# Patient Record
Sex: FEMALE | Race: WHITE | NOT HISPANIC OR LATINO | Employment: OTHER | URBAN - METROPOLITAN AREA
[De-identification: names, ages, dates, MRNs, and addresses within clinical notes are randomized per-mention and may not be internally consistent; named-entity substitution may affect disease eponyms.]

---

## 2019-05-26 ENCOUNTER — HOSPITAL ENCOUNTER (INPATIENT)
Facility: MEDICAL CENTER | Age: 79
LOS: 4 days | DRG: 286 | End: 2019-05-30
Attending: EMERGENCY MEDICINE | Admitting: HOSPITALIST
Payer: MEDICARE

## 2019-05-26 ENCOUNTER — APPOINTMENT (OUTPATIENT)
Dept: RADIOLOGY | Facility: MEDICAL CENTER | Age: 79
DRG: 286 | End: 2019-05-26
Attending: HOSPITALIST
Payer: MEDICARE

## 2019-05-26 ENCOUNTER — APPOINTMENT (OUTPATIENT)
Dept: RADIOLOGY | Facility: MEDICAL CENTER | Age: 79
DRG: 286 | End: 2019-05-26
Attending: EMERGENCY MEDICINE
Payer: MEDICARE

## 2019-05-26 ENCOUNTER — APPOINTMENT (OUTPATIENT)
Dept: CARDIOLOGY | Facility: MEDICAL CENTER | Age: 79
DRG: 286 | End: 2019-05-26
Attending: HOSPITALIST
Payer: MEDICARE

## 2019-05-26 DIAGNOSIS — I50.31 ACUTE DIASTOLIC CONGESTIVE HEART FAILURE (HCC): ICD-10-CM

## 2019-05-26 DIAGNOSIS — E87.70 HYPERVOLEMIA, UNSPECIFIED HYPERVOLEMIA TYPE: ICD-10-CM

## 2019-05-26 DIAGNOSIS — N19 RENAL FAILURE, UNSPECIFIED CHRONICITY: ICD-10-CM

## 2019-05-26 DIAGNOSIS — J96.01 ACUTE RESPIRATORY FAILURE WITH HYPOXIA (HCC): ICD-10-CM

## 2019-05-26 PROBLEM — R79.89 ELEVATED LIVER FUNCTION TESTS: Status: ACTIVE | Noted: 2019-05-26

## 2019-05-26 PROBLEM — I10 ESSENTIAL HYPERTENSION: Status: ACTIVE | Noted: 2019-05-26

## 2019-05-26 PROBLEM — I50.9 ACUTE CHF (CONGESTIVE HEART FAILURE) (HCC): Status: ACTIVE | Noted: 2019-05-26

## 2019-05-26 PROBLEM — N18.30 CHRONIC RENAL IMPAIRMENT, STAGE 3 (MODERATE): Status: ACTIVE | Noted: 2019-05-26

## 2019-05-26 PROBLEM — I48.0 PAROXYSMAL A-FIB (HCC): Status: ACTIVE | Noted: 2019-05-26

## 2019-05-26 LAB
ALBUMIN SERPL BCP-MCNC: 4.3 G/DL (ref 3.2–4.9)
ALBUMIN/GLOB SERPL: 1.5 G/DL
ALP SERPL-CCNC: 110 U/L (ref 30–99)
ALT SERPL-CCNC: 76 U/L (ref 2–50)
ANION GAP SERPL CALC-SCNC: 8 MMOL/L (ref 0–11.9)
APPEARANCE UR: CLEAR
APPEARANCE UR: CLEAR
AST SERPL-CCNC: 47 U/L (ref 12–45)
BACTERIA #/AREA URNS HPF: NEGATIVE /HPF
BASOPHILS # BLD AUTO: 0.6 % (ref 0–1.8)
BASOPHILS # BLD: 0.05 K/UL (ref 0–0.12)
BILIRUB SERPL-MCNC: 0.8 MG/DL (ref 0.1–1.5)
BILIRUB UR QL STRIP.AUTO: NEGATIVE
BILIRUB UR QL STRIP.AUTO: NEGATIVE
BNP SERPL-MCNC: 1431 PG/ML (ref 0–100)
BUN SERPL-MCNC: 57 MG/DL (ref 8–22)
CALCIUM SERPL-MCNC: 9.4 MG/DL (ref 8.5–10.5)
CHLORIDE SERPL-SCNC: 111 MMOL/L (ref 96–112)
CO2 SERPL-SCNC: 18 MMOL/L (ref 20–33)
COLOR UR: YELLOW
COLOR UR: YELLOW
CREAT SERPL-MCNC: 2.27 MG/DL (ref 0.5–1.4)
EKG IMPRESSION: NORMAL
EOSINOPHIL # BLD AUTO: 0.17 K/UL (ref 0–0.51)
EOSINOPHIL NFR BLD: 2.1 % (ref 0–6.9)
EPI CELLS #/AREA URNS HPF: NEGATIVE /HPF
ERYTHROCYTE [DISTWIDTH] IN BLOOD BY AUTOMATED COUNT: 49.3 FL (ref 35.9–50)
GLOBULIN SER CALC-MCNC: 2.9 G/DL (ref 1.9–3.5)
GLUCOSE SERPL-MCNC: 119 MG/DL (ref 65–99)
GLUCOSE UR STRIP.AUTO-MCNC: NEGATIVE MG/DL
GLUCOSE UR STRIP.AUTO-MCNC: NEGATIVE MG/DL
HCT VFR BLD AUTO: 40.6 % (ref 37–47)
HGB BLD-MCNC: 13.1 G/DL (ref 12–16)
HYALINE CASTS #/AREA URNS LPF: NORMAL /LPF
IMM GRANULOCYTES # BLD AUTO: 0.03 K/UL (ref 0–0.11)
IMM GRANULOCYTES NFR BLD AUTO: 0.4 % (ref 0–0.9)
KETONES UR STRIP.AUTO-MCNC: NEGATIVE MG/DL
KETONES UR STRIP.AUTO-MCNC: NEGATIVE MG/DL
LACTATE BLD-SCNC: 0.8 MMOL/L (ref 0.5–2)
LACTATE BLD-SCNC: 1.8 MMOL/L (ref 0.5–2)
LEUKOCYTE ESTERASE UR QL STRIP.AUTO: ABNORMAL
LEUKOCYTE ESTERASE UR QL STRIP.AUTO: NEGATIVE
LV EJECT FRACT  99904: 70
LV EJECT FRACT MOD 2C 99903: 79.46
LV EJECT FRACT MOD 4C 99902: 81.21
LV EJECT FRACT MOD BP 99901: 80.35
LYMPHOCYTES # BLD AUTO: 1.49 K/UL (ref 1–4.8)
LYMPHOCYTES NFR BLD: 18.5 % (ref 22–41)
MAGNESIUM SERPL-MCNC: 2.7 MG/DL (ref 1.5–2.5)
MCH RBC QN AUTO: 31.1 PG (ref 27–33)
MCHC RBC AUTO-ENTMCNC: 32.3 G/DL (ref 33.6–35)
MCV RBC AUTO: 96.4 FL (ref 81.4–97.8)
MICRO URNS: ABNORMAL
MICRO URNS: NORMAL
MONOCYTES # BLD AUTO: 0.57 K/UL (ref 0–0.85)
MONOCYTES NFR BLD AUTO: 7.1 % (ref 0–13.4)
NEUTROPHILS # BLD AUTO: 5.76 K/UL (ref 2–7.15)
NEUTROPHILS NFR BLD: 71.3 % (ref 44–72)
NITRITE UR QL STRIP.AUTO: NEGATIVE
NITRITE UR QL STRIP.AUTO: NEGATIVE
NRBC # BLD AUTO: 0 K/UL
NRBC BLD-RTO: 0 /100 WBC
PH UR STRIP.AUTO: 5 [PH]
PH UR STRIP.AUTO: 5.5 [PH]
PHOSPHATE SERPL-MCNC: 4.2 MG/DL (ref 2.5–4.5)
PLATELET # BLD AUTO: 240 K/UL (ref 164–446)
PMV BLD AUTO: 11.4 FL (ref 9–12.9)
POTASSIUM SERPL-SCNC: 4.2 MMOL/L (ref 3.6–5.5)
PROCALCITONIN SERPL-MCNC: <0.05 NG/ML
PROT SERPL-MCNC: 7.2 G/DL (ref 6–8.2)
PROT UR QL STRIP: NEGATIVE MG/DL
PROT UR QL STRIP: NEGATIVE MG/DL
RBC # BLD AUTO: 4.21 M/UL (ref 4.2–5.4)
RBC # URNS HPF: NORMAL /HPF
RBC UR QL AUTO: NEGATIVE
RBC UR QL AUTO: NEGATIVE
SODIUM SERPL-SCNC: 137 MMOL/L (ref 135–145)
SP GR UR STRIP.AUTO: 1.01
SP GR UR STRIP.AUTO: 1.01
TROPONIN I SERPL-MCNC: 0.01 NG/ML (ref 0–0.04)
TROPONIN I SERPL-MCNC: 0.01 NG/ML (ref 0–0.04)
TROPONIN I SERPL-MCNC: 0.02 NG/ML (ref 0–0.04)
TSH SERPL DL<=0.005 MIU/L-ACNC: 4.08 UIU/ML (ref 0.38–5.33)
UROBILINOGEN UR STRIP.AUTO-MCNC: 0.2 MG/DL
UROBILINOGEN UR STRIP.AUTO-MCNC: 0.2 MG/DL
WBC # BLD AUTO: 8.1 K/UL (ref 4.8–10.8)
WBC #/AREA URNS HPF: NORMAL /HPF

## 2019-05-26 PROCEDURE — 84100 ASSAY OF PHOSPHORUS: CPT

## 2019-05-26 PROCEDURE — 304561 HCHG STAT O2

## 2019-05-26 PROCEDURE — 700102 HCHG RX REV CODE 250 W/ 637 OVERRIDE(OP): Performed by: INTERNAL MEDICINE

## 2019-05-26 PROCEDURE — 85025 COMPLETE CBC W/AUTO DIFF WBC: CPT

## 2019-05-26 PROCEDURE — 96374 THER/PROPH/DIAG INJ IV PUSH: CPT

## 2019-05-26 PROCEDURE — 93005 ELECTROCARDIOGRAM TRACING: CPT | Performed by: EMERGENCY MEDICINE

## 2019-05-26 PROCEDURE — 76775 US EXAM ABDO BACK WALL LIM: CPT

## 2019-05-26 PROCEDURE — 84443 ASSAY THYROID STIM HORMONE: CPT

## 2019-05-26 PROCEDURE — 71045 X-RAY EXAM CHEST 1 VIEW: CPT

## 2019-05-26 PROCEDURE — 99291 CRITICAL CARE FIRST HOUR: CPT

## 2019-05-26 PROCEDURE — 700102 HCHG RX REV CODE 250 W/ 637 OVERRIDE(OP): Performed by: HOSPITALIST

## 2019-05-26 PROCEDURE — 83880 ASSAY OF NATRIURETIC PEPTIDE: CPT

## 2019-05-26 PROCEDURE — 81001 URINALYSIS AUTO W/SCOPE: CPT

## 2019-05-26 PROCEDURE — 93306 TTE W/DOPPLER COMPLETE: CPT

## 2019-05-26 PROCEDURE — 93306 TTE W/DOPPLER COMPLETE: CPT | Mod: 26 | Performed by: INTERNAL MEDICINE

## 2019-05-26 PROCEDURE — 84484 ASSAY OF TROPONIN QUANT: CPT

## 2019-05-26 PROCEDURE — 36415 COLL VENOUS BLD VENIPUNCTURE: CPT

## 2019-05-26 PROCEDURE — 770022 HCHG ROOM/CARE - ICU (200)

## 2019-05-26 PROCEDURE — 87040 BLOOD CULTURE FOR BACTERIA: CPT

## 2019-05-26 PROCEDURE — 700111 HCHG RX REV CODE 636 W/ 250 OVERRIDE (IP): Performed by: EMERGENCY MEDICINE

## 2019-05-26 PROCEDURE — 81003 URINALYSIS AUTO W/O SCOPE: CPT

## 2019-05-26 PROCEDURE — 99223 1ST HOSP IP/OBS HIGH 75: CPT | Mod: AI | Performed by: HOSPITALIST

## 2019-05-26 PROCEDURE — A9270 NON-COVERED ITEM OR SERVICE: HCPCS | Performed by: HOSPITALIST

## 2019-05-26 PROCEDURE — 93005 ELECTROCARDIOGRAM TRACING: CPT

## 2019-05-26 PROCEDURE — 80053 COMPREHEN METABOLIC PANEL: CPT

## 2019-05-26 PROCEDURE — 94640 AIRWAY INHALATION TREATMENT: CPT

## 2019-05-26 PROCEDURE — A9270 NON-COVERED ITEM OR SERVICE: HCPCS | Performed by: INTERNAL MEDICINE

## 2019-05-26 PROCEDURE — 83605 ASSAY OF LACTIC ACID: CPT

## 2019-05-26 PROCEDURE — 83735 ASSAY OF MAGNESIUM: CPT

## 2019-05-26 PROCEDURE — 700111 HCHG RX REV CODE 636 W/ 250 OVERRIDE (IP): Performed by: HOSPITALIST

## 2019-05-26 PROCEDURE — 99291 CRITICAL CARE FIRST HOUR: CPT | Performed by: INTERNAL MEDICINE

## 2019-05-26 PROCEDURE — 94002 VENT MGMT INPAT INIT DAY: CPT

## 2019-05-26 PROCEDURE — 84145 PROCALCITONIN (PCT): CPT

## 2019-05-26 RX ORDER — ACETAMINOPHEN 325 MG/1
650 TABLET ORAL PRN
COMMUNITY
End: 2019-12-04

## 2019-05-26 RX ORDER — FUROSEMIDE 10 MG/ML
20 INJECTION INTRAMUSCULAR; INTRAVENOUS
Status: DISCONTINUED | OUTPATIENT
Start: 2019-05-26 | End: 2019-05-27

## 2019-05-26 RX ORDER — ONDANSETRON 2 MG/ML
4 INJECTION INTRAMUSCULAR; INTRAVENOUS EVERY 4 HOURS PRN
Status: DISCONTINUED | OUTPATIENT
Start: 2019-05-26 | End: 2019-05-30 | Stop reason: HOSPADM

## 2019-05-26 RX ORDER — ONDANSETRON 4 MG/1
4 TABLET, ORALLY DISINTEGRATING ORAL EVERY 4 HOURS PRN
Status: DISCONTINUED | OUTPATIENT
Start: 2019-05-26 | End: 2019-05-30 | Stop reason: HOSPADM

## 2019-05-26 RX ORDER — FUROSEMIDE 40 MG/1
40 TABLET ORAL DAILY
Status: ON HOLD | COMMUNITY
End: 2019-05-30

## 2019-05-26 RX ORDER — ALPRAZOLAM 0.25 MG/1
0.25 TABLET ORAL NIGHTLY PRN
Status: DISCONTINUED | OUTPATIENT
Start: 2019-05-26 | End: 2019-05-30 | Stop reason: HOSPADM

## 2019-05-26 RX ORDER — ACETAMINOPHEN 325 MG/1
650 TABLET ORAL EVERY 6 HOURS PRN
Status: DISCONTINUED | OUTPATIENT
Start: 2019-05-26 | End: 2019-05-30 | Stop reason: HOSPADM

## 2019-05-26 RX ORDER — AMOXICILLIN 250 MG
2 CAPSULE ORAL 2 TIMES DAILY
Status: DISCONTINUED | OUTPATIENT
Start: 2019-05-26 | End: 2019-05-30 | Stop reason: HOSPADM

## 2019-05-26 RX ORDER — LOSARTAN POTASSIUM 50 MG/1
50 TABLET ORAL DAILY
COMMUNITY
End: 2019-09-11

## 2019-05-26 RX ORDER — POTASSIUM CHLORIDE 20 MEQ/1
20 TABLET, EXTENDED RELEASE ORAL 2 TIMES DAILY
Status: DISCONTINUED | OUTPATIENT
Start: 2019-05-26 | End: 2019-05-30 | Stop reason: HOSPADM

## 2019-05-26 RX ORDER — HEPARIN SODIUM 5000 [USP'U]/ML
5000 INJECTION, SOLUTION INTRAVENOUS; SUBCUTANEOUS EVERY 8 HOURS
Status: DISCONTINUED | OUTPATIENT
Start: 2019-05-26 | End: 2019-05-30 | Stop reason: HOSPADM

## 2019-05-26 RX ORDER — LOSARTAN POTASSIUM 50 MG/1
50 TABLET ORAL
Status: DISCONTINUED | OUTPATIENT
Start: 2019-05-27 | End: 2019-05-30 | Stop reason: HOSPADM

## 2019-05-26 RX ORDER — OMEPRAZOLE 20 MG/1
20 CAPSULE, DELAYED RELEASE ORAL DAILY
COMMUNITY
End: 2019-11-19

## 2019-05-26 RX ORDER — POLYETHYLENE GLYCOL 3350 17 G/17G
1 POWDER, FOR SOLUTION ORAL
Status: DISCONTINUED | OUTPATIENT
Start: 2019-05-26 | End: 2019-05-30 | Stop reason: HOSPADM

## 2019-05-26 RX ORDER — AMLODIPINE BESYLATE 10 MG/1
10 TABLET ORAL DAILY
Status: ON HOLD | COMMUNITY
End: 2019-05-30

## 2019-05-26 RX ORDER — ALPRAZOLAM 0.25 MG/1
0.25 TABLET ORAL NIGHTLY PRN
COMMUNITY
End: 2024-01-19

## 2019-05-26 RX ORDER — ATENOLOL 25 MG/1
25 TABLET ORAL DAILY
Status: ON HOLD | COMMUNITY
End: 2019-05-30

## 2019-05-26 RX ORDER — BISACODYL 10 MG
10 SUPPOSITORY, RECTAL RECTAL
Status: DISCONTINUED | OUTPATIENT
Start: 2019-05-26 | End: 2019-05-30 | Stop reason: HOSPADM

## 2019-05-26 RX ORDER — FUROSEMIDE 10 MG/ML
40 INJECTION INTRAMUSCULAR; INTRAVENOUS ONCE
Status: COMPLETED | OUTPATIENT
Start: 2019-05-26 | End: 2019-05-26

## 2019-05-26 RX ADMIN — FUROSEMIDE 40 MG: 10 INJECTION, SOLUTION INTRAMUSCULAR; INTRAVENOUS at 04:55

## 2019-05-26 RX ADMIN — FUROSEMIDE 20 MG: 10 INJECTION, SOLUTION INTRAMUSCULAR; INTRAVENOUS at 15:46

## 2019-05-26 RX ADMIN — POTASSIUM CHLORIDE 20 MEQ: 1500 TABLET, EXTENDED RELEASE ORAL at 17:42

## 2019-05-26 RX ADMIN — HEPARIN SODIUM 5000 UNITS: 5000 INJECTION, SOLUTION INTRAVENOUS; SUBCUTANEOUS at 21:18

## 2019-05-26 RX ADMIN — POTASSIUM CHLORIDE 20 MEQ: 1500 TABLET, EXTENDED RELEASE ORAL at 09:57

## 2019-05-26 RX ADMIN — ALPRAZOLAM 0.25 MG: 0.25 TABLET ORAL at 22:14

## 2019-05-26 RX ADMIN — HEPARIN SODIUM 5000 UNITS: 5000 INJECTION, SOLUTION INTRAVENOUS; SUBCUTANEOUS at 14:26

## 2019-05-26 ASSESSMENT — ENCOUNTER SYMPTOMS
SENSORY CHANGE: 0
SPUTUM PRODUCTION: 0
NECK PAIN: 0
NERVOUS/ANXIOUS: 0
WEIGHT LOSS: 0
MEMORY LOSS: 1
PND: 1
MYALGIAS: 0
MUSCULOSKELETAL NEGATIVE: 1
CLAUDICATION: 0
HEADACHES: 0
DIARRHEA: 0
ORTHOPNEA: 0
HEMOPTYSIS: 0
ORTHOPNEA: 1
SHORTNESS OF BREATH: 1
DIZZINESS: 0
SPEECH CHANGE: 0
BLURRED VISION: 0
BLOOD IN STOOL: 0
VOMITING: 0
BRUISES/BLEEDS EASILY: 0
HEARTBURN: 0
COUGH: 0
FLANK PAIN: 0
PALPITATIONS: 0
NAUSEA: 0
FEVER: 0
DIAPHORESIS: 1
CHILLS: 0
DOUBLE VISION: 0
NAUSEA: 1
BACK PAIN: 0
DEPRESSION: 0
SORE THROAT: 0
SINUS PAIN: 0
ABDOMINAL PAIN: 0
FOCAL WEAKNESS: 0
TINGLING: 0

## 2019-05-26 ASSESSMENT — COGNITIVE AND FUNCTIONAL STATUS - GENERAL
MOVING FROM LYING ON BACK TO SITTING ON SIDE OF FLAT BED: A LITTLE
CLIMB 3 TO 5 STEPS WITH RAILING: A LOT
WALKING IN HOSPITAL ROOM: A LOT
MOBILITY SCORE: 15
TOILETING: A LITTLE
SUGGESTED CMS G CODE MODIFIER DAILY ACTIVITY: CI
STANDING UP FROM CHAIR USING ARMS: A LOT
SUGGESTED CMS G CODE MODIFIER MOBILITY: CK
MOVING TO AND FROM BED TO CHAIR: A LOT
DAILY ACTIVITIY SCORE: 23

## 2019-05-26 ASSESSMENT — LIFESTYLE VARIABLES
DO YOU DRINK ALCOHOL: NO
SUBSTANCE_ABUSE: 0
EVER_SMOKED: NEVER

## 2019-05-26 ASSESSMENT — PATIENT HEALTH QUESTIONNAIRE - PHQ9
1. LITTLE INTEREST OR PLEASURE IN DOING THINGS: NOT AT ALL
SUM OF ALL RESPONSES TO PHQ9 QUESTIONS 1 AND 2: 0
2. FEELING DOWN, DEPRESSED, IRRITABLE, OR HOPELESS: NOT AT ALL

## 2019-05-26 ASSESSMENT — PAIN DESCRIPTION - DESCRIPTORS: DESCRIPTORS: ACHING

## 2019-05-26 ASSESSMENT — PULMONARY FUNCTION TESTS: EPAP_CMH2O: 8

## 2019-05-26 NOTE — PROGRESS NOTES
2 RN skin check completed with RACHEAL Lozano.     Medical devices in use:    -BIPAP (patient changes between HFNC and BIPAP)   -Pulse ox probe, cardiac monitor, BP cuff   -Lilly catheter   -SCDs   -PIV x2 to right arm    Areas of concern noted:   -Bilateral lower legs mildly dusky   -top of nose from BIPAP, padding accordingly    -Generalized pitting edema to bilateral lower extremities.      Interventions:   -Low air loss mattress   -Q2h turns with repositioning of all extremities   -Repositioning medical devices prn   - Mepilex to sacrum       Wound consult not applicable, wound photos not applicable at this time.

## 2019-05-26 NOTE — ASSESSMENT & PLAN NOTE
Likely due to volume overload from valvular heart disease.  CXR 5/27 showing decrease pulmonary edema  Monitor I/O's  Severe mitral valvular disease as likely etiology.  Poor candidate for open heart surgery but a good candidate for MitraClip.  RT protocol  Supplemental oxygen and on high flow NC as of 5/27am.  5/28: Left heart cath showed 3-4+ mitral regurgitation.  Plan for YAO tomorrow.  Continue with Demadex.  5/29: Gradually resolving.  YAO showed mild mitral regurgitation.  No indication for mitral valve repair at this point.  Continue medical management.

## 2019-05-26 NOTE — PROGRESS NOTES
Patient arrived to [facility] accompanied by ACLS RN x2 via [facility]. Patient on transport monitor at 0900.     HR 52  /65  O2 sat: 95%, BIPAP 12/8, 50%  RR 14  Temp: 98f temporal  Weight: 67 kg bed scale.     Dr. Thompson aware of patient's arrival to unit.

## 2019-05-26 NOTE — CONSULTS
Critical Care Consultation    Date of consult: 5/26/2019    Referring Physician  No att. providers found    Reason for Consultation  Hypoxemic respiratory failure requiring BiPAP    History of Presenting Illness  78 y.o. female with a history of hypertension and atrial fibrillation who presented 5/26/2019 with severe shortness of breath.  Patient's been having dyspnea for upwards of several weeks.  It was worse the last couple nights that she could not sleep.  Denies any associated infectious symptoms such as cough, sputum production fever etc.  She is a non-smoker and does not use home O2.  She has chronic kidney disease but is not on dialysis.  She normally does not retain water but she is been having ankle swelling for a few weeks as well as orthopnea and PND.  She denies chest pain but does occasionally have palpitations and she experienced about once a year but not recently.  She had a prior heart catheterization that apparently was okay but she was told she had a heart problem and has been taking medications primarily for hypertension.  She states she is pretty faithful with her medication regimen.  She was started on BiPAP in the ER after presenting with pretty severe dyspnea and she improved significantly.  She received an intravenous dose of Lasix and nearly gave a liter of fluids out in the first hour or 2.  Her x-ray was consistent with cardiomegaly pulmonary edema.  Her BNP was elevated nearly 1500.  Her EKG did not reveal a STEMI.  Her first troponin I was 0.02.  Med rec form was not completed to the late, she cannot recall her medicines are much of the day details of her admission in the [facility] for work-up.  Old records were requested from [place] and they are coming.  Renal ultrasound and echocardiogram are pending.    Code Status  Full Code    Review of Systems  Review of Systems   Constitutional: Positive for diaphoresis and malaise/fatigue. Negative for chills and fever.   HENT: Negative for  congestion, sinus pain and sore throat.    Eyes: Negative for blurred vision and double vision.   Respiratory: Positive for shortness of breath. Negative for cough, hemoptysis and sputum production.    Cardiovascular: Positive for orthopnea, leg swelling and PND. Negative for palpitations.   Gastrointestinal: Positive for nausea. Negative for abdominal pain, blood in stool, diarrhea, melena and vomiting.   Genitourinary: Negative for dysuria, flank pain and hematuria.   Musculoskeletal: Negative.    Neurological: Negative for sensory change, speech change, focal weakness and headaches.   Endo/Heme/Allergies: Does not bruise/bleed easily.   Psychiatric/Behavioral: Positive for memory loss. Negative for substance abuse. The patient is not nervous/anxious.        Past Medical History   has a past medical history of A-fib (HCC); Bowel obstruction (HCC); Irregular heart beat; and Renal disorder.    Surgical History   has a past surgical history that includes gyn surgery and other orthopedic surgery.    Family History  family history is not on file.    Social History   reports that she has never smoked. She has never used smokeless tobacco. She reports that she drinks alcohol. She reports that she does not use drugs.    Medications  Home Medications     Reviewed by Leann Harrington, Pharmacy Intern (Pharmacy Intern) on 05/26/19 at 1309  Med List Status: Complete   Medication Last Dose Status   ALPRAZolam (XANAX) 0.25 MG Tab  Active   amLODIPine (NORVASC) 10 MG Tab  Active   atenolol (TENORMIN) 25 MG Tab  Active   losartan (COZAAR) 50 MG Tab  Active              Current Facility-Administered Medications   Medication Dose Route Frequency Provider Last Rate Last Dose   • furosemide (LASIX) injection 20 mg  20 mg Intravenous BID DIURETIC Freddie Encarnacion M.D.   20 mg at 05/26/19 1546   • potassium chloride SA (Kdur) tablet 20 mEq  20 mEq Oral BID Freddie Encarnacion M.D.   20 mEq at 05/26/19 1742   • senna-docusate (PERICOLACE  or SENOKOT S) 8.6-50 MG per tablet 2 Tab  2 Tab Oral BID Freddie Encarnacion M.D.        And   • polyethylene glycol/lytes (MIRALAX) PACKET 1 Packet  1 Packet Oral QDAY PRN Freddie Encarnacion M.D.        And   • magnesium hydroxide (MILK OF MAGNESIA) suspension 30 mL  30 mL Oral QDAY PRN Freddie Encarnacion M.D.        And   • bisacodyl (DULCOLAX) suppository 10 mg  10 mg Rectal QDAY PRN Freddie Encarnacion M.D.       • Respiratory Care per Protocol   Nebulization Continuous RT Freddie Encarnacion M.D.       • heparin injection 5,000 Units  5,000 Units Subcutaneous Q8HRS Freddie Encarnacion M.D.   5,000 Units at 05/26/19 1426   • acetaminophen (TYLENOL) tablet 650 mg  650 mg Oral Q6HRS PRN Freddie Encarnacion M.D.       • ondansetron (ZOFRAN) syringe/vial injection 4 mg  4 mg Intravenous Q4HRS PRN Freddie Encarnacion M.D.       • ondansetron (ZOFRAN ODT) dispertab 4 mg  4 mg Oral Q4HRS PRN Freddie Encarnacion M.D.       • [START ON 5/27/2019] losartan (COZAAR) tablet 50 mg  50 mg Oral Q DAY Peterson Thompson M.D.           Allergies  Allergies   Allergen Reactions   • Pcn [Penicillins]      rash       Vital Signs last 24 hours  Temp:  [36.7 °C (98 °F)] 36.7 °C (98 °F)  Pulse:  [43-62] 56  Resp:  [19-65] 30  BP: (140)/(70) 140/70  SpO2:  [81 %-100 %] 97 %    Physical Exam  Physical Exam   Constitutional: She is oriented to person, place, and time. She appears well-developed and well-nourished. She appears lethargic. She is cooperative. She has a sickly appearance. She appears distressed (Initially prior to BiPAP). Face mask in place.   HENT:   Head: Normocephalic and atraumatic.   Mouth/Throat: Mucous membranes are not dry and not cyanotic.   Eyes: Pupils are equal, round, and reactive to light. EOM are normal. No scleral icterus.   Neck: Phonation normal. Neck supple. JVD present. No thyromegaly present.   Cardiovascular: Regular rhythm and intact distal pulses.   Occasional extrasystoles are present. Bradycardia present.  PMI is not  displaced.  Exam reveals no gallop and no friction rub.    No murmur heard.  Pulmonary/Chest: Accessory muscle usage present. Tachypnea noted. No respiratory distress. She has decreased breath sounds. She has no wheezes. She has no rhonchi. She has rales.   Abdominal: Soft. Bowel sounds are normal. She exhibits no distension. There is no hepatosplenomegaly. There is no tenderness. There is no rebound, no guarding and no CVA tenderness.   Musculoskeletal: She exhibits edema.   Neurological: She is oriented to person, place, and time. She appears lethargic. No cranial nerve deficit or sensory deficit. She exhibits normal muscle tone. GCS eye subscore is 4. GCS verbal subscore is 5. GCS motor subscore is 6.   Skin: Skin is warm. She is diaphoretic (Initially prior to BiPAP). No cyanosis or erythema. Nails show no clubbing.   Psychiatric: She has a normal mood and affect. Her speech is normal and behavior is normal. Judgment and thought content normal. Cognition and memory are impaired.   Vitals reviewed.      Fluids    Intake/Output Summary (Last 24 hours) at 19 1751  Last data filed at 19 0452   Gross per 24 hour   Intake                0 ml   Output               50 ml   Net              -50 ml       Laboratory  Recent Results (from the past 48 hour(s))   EKG    Collection Time: 19  4:25 AM   Result Value Ref Range    Report       [facility]    Test Date:  2019  Pt Name:    NALINI SORIANO               Department: ER  MRN:        [id number]                      Room:        04  Gender:     Female                       Technician: [id number]  :        [id number]                   Requested By:ER TRIAGE PROTOCOL  Order #:    [id number]                    Reading MD:    Measurements  Intervals                                Axis  Rate:       47                           P:          20  UT:         204                          QRS:        -27  QRSD:       98                            T:          69  QT:         488  QTc:        432    Interpretive Statements  SINUS BRADYCARDIA  PROBABLE LEFT ATRIAL ABNORMALITY  LEFT VENTRICULAR HYPERTROPHY  No previous ECG available for comparison     CBC w/ Differential    Collection Time: 05/26/19  4:32 AM   Result Value Ref Range    WBC 8.1 4.8 - 10.8 K/uL    RBC 4.21 4.20 - 5.40 M/uL    Hemoglobin 13.1 12.0 - 16.0 g/dL    Hematocrit 40.6 37.0 - 47.0 %    MCV 96.4 81.4 - 97.8 fL    MCH 31.1 27.0 - 33.0 pg    MCHC 32.3 (L) 33.6 - 35.0 g/dL    RDW 49.3 35.9 - 50.0 fL    Platelet Count 240 164 - 446 K/uL    MPV 11.4 9.0 - 12.9 fL    Neutrophils-Polys 71.30 44.00 - 72.00 %    Lymphocytes 18.50 (L) 22.00 - 41.00 %    Monocytes 7.10 0.00 - 13.40 %    Eosinophils 2.10 0.00 - 6.90 %    Basophils 0.60 0.00 - 1.80 %    Immature Granulocytes 0.40 0.00 - 0.90 %    Nucleated RBC 0.00 /100 WBC    Neutrophils (Absolute) 5.76 2.00 - 7.15 K/uL    Lymphs (Absolute) 1.49 1.00 - 4.80 K/uL    Monos (Absolute) 0.57 0.00 - 0.85 K/uL    Eos (Absolute) 0.17 0.00 - 0.51 K/uL    Baso (Absolute) 0.05 0.00 - 0.12 K/uL    Immature Granulocytes (abs) 0.03 0.00 - 0.11 K/uL    NRBC (Absolute) 0.00 K/uL   Complete Metabolic Panel (CMP)    Collection Time: 05/26/19  4:32 AM   Result Value Ref Range    Sodium 137 135 - 145 mmol/L    Potassium 4.2 3.6 - 5.5 mmol/L    Chloride 111 96 - 112 mmol/L    Co2 18 (L) 20 - 33 mmol/L    Anion Gap 8.0 0.0 - 11.9    Glucose 119 (H) 65 - 99 mg/dL    Bun 57 (H) 8 - 22 mg/dL    Creatinine 2.27 (H) 0.50 - 1.40 mg/dL    Calcium 9.4 8.5 - 10.5 mg/dL    AST(SGOT) 47 (H) 12 - 45 U/L    ALT(SGPT) 76 (H) 2 - 50 U/L    Alkaline Phosphatase 110 (H) 30 - 99 U/L    Total Bilirubin 0.8 0.1 - 1.5 mg/dL    Albumin 4.3 3.2 - 4.9 g/dL    Total Protein 7.2 6.0 - 8.2 g/dL    Globulin 2.9 1.9 - 3.5 g/dL    A-G Ratio 1.5 g/dL   Btype Natriuretic Peptide    Collection Time: 05/26/19  4:32 AM   Result Value Ref Range    B Natriuretic Peptide 1431 (H) 0 - 100 pg/mL    Troponin STAT    Collection Time: 05/26/19  4:32 AM   Result Value Ref Range    Troponin I 0.02 0.00 - 0.04 ng/mL   Magnesium    Collection Time: 05/26/19  4:32 AM   Result Value Ref Range    Magnesium 2.7 (H) 1.5 - 2.5 mg/dL   Phosphorus    Collection Time: 05/26/19  4:32 AM   Result Value Ref Range    Phosphorus 4.2 2.5 - 4.5 mg/dL   LACTIC ACID    Collection Time: 05/26/19  4:32 AM   Result Value Ref Range    Lactic Acid 1.8 0.5 - 2.0 mmol/L   ESTIMATED GFR    Collection Time: 05/26/19  4:32 AM   Result Value Ref Range    GFR If African American 25 (A) >60 mL/min/1.73 m 2    GFR If Non African American 21 (A) >60 mL/min/1.73 m 2   URINALYSIS CULTURE, IF INDICATED    Collection Time: 05/26/19  6:37 AM   Result Value Ref Range    Color Yellow     Character Clear     Specific Gravity 1.011 <1.035    Ph 5.0 5.0 - 8.0    Glucose Negative Negative mg/dL    Ketones Negative Negative mg/dL    Protein Negative Negative mg/dL    Bilirubin Negative Negative    Urobilinogen, Urine 0.2 Negative    Nitrite Negative Negative    Leukocyte Esterase Negative Negative    Occult Blood Negative Negative    Micro Urine Req see below    URINALYSIS    Collection Time: 05/26/19  9:30 AM   Result Value Ref Range    Color Yellow     Character Clear     Specific Gravity 1.008 <1.035    Ph 5.5 5.0 - 8.0    Glucose Negative Negative mg/dL    Ketones Negative Negative mg/dL    Protein Negative Negative mg/dL    Bilirubin Negative Negative    Urobilinogen, Urine 0.2 Negative    Nitrite Negative Negative    Leukocyte Esterase Trace (A) Negative    Occult Blood Negative Negative    Micro Urine Req Microscopic    URINE MICROSCOPIC (W/UA)    Collection Time: 05/26/19  9:30 AM   Result Value Ref Range    WBC 2-5 /hpf    RBC 0-2 /hpf    Bacteria Negative None /hpf    Epithelial Cells Negative /hpf    Hyaline Cast 0-2 /lpf   LACTIC ACID    Collection Time: 05/26/19  9:45 AM   Result Value Ref Range    Lactic Acid 0.8 0.5 - 2.0 mmol/L   PROCALCITONIN     Collection Time: 05/26/19  9:45 AM   Result Value Ref Range    Procalcitonin <0.05 <0.25 ng/mL   TSH    Collection Time: 05/26/19  9:45 AM   Result Value Ref Range    TSH 4.080 0.380 - 5.330 uIU/mL   TROPONIN    Collection Time: 05/26/19  9:45 AM   Result Value Ref Range    Troponin I 0.01 0.00 - 0.04 ng/mL   EC-ECHOCARDIOGRAM COMPLETE W/O CONT    Collection Time: 05/26/19  1:28 PM   Result Value Ref Range    Eject.Frac. MOD BP 80.35     Eject.Frac. MOD 4C 81.21     Eject.Frac. MOD 2C 79.46     Left Ventrical Ejection Fraction 70    TROPONIN    Collection Time: 05/26/19  4:08 PM   Result Value Ref Range    Troponin I 0.01 0.00 - 0.04 ng/mL       Imaging  EC-ECHOCARDIOGRAM COMPLETE W/O CONT   Final Result      US-RENAL   Final Result      1.  Bilateral small echogenic kidney consistent with sequela of chronic process      2.  Negative for hydronephrosis      3.  Multiple renal cyst      DX-CHEST-PORTABLE (1 VIEW)   Final Result      Perihilar and bibasilar opacities with probable small left pleural effusion. Findings are nonspecific and may represent bilateral pneumonia or edema.          Assessment/Plan  * Acute respiratory failure with hypoxia (HCC)- (present on admission)   Assessment & Plan    BiPAP initiated in the ER 5/26  Alternate with high flow nasal cannula  Forced diuresis with Lasix  Infection not suspected, hold on antibiotics for now  RT protocols  Pulmonary toilet including incentive spirometry  Mobilize when clinically appropriate  Monitor for the need for intubation and mechanical ventilation, patient request full CODE STATUS, reviewed with her at length     Acute CHF (congestive heart failure) (HCC)- (present on admission)   Assessment & Plan    History, exam, lab and chest x-ray are all consistent with left heart failure  Patient has chronic kidney disease and systemic hypertension which undoubtedly contributing to this process.  Historically she does not describe a recent myocardial infarction  but will evaluate for this with Trop I/ECHO    Serial BNP levels will be obtained.    Forced diuresis initiated and she is clinically proved,  Monitor fluid balance  Echocardiogram pending  Patient may need cardiology consultation  Tenuous EKG rhythm monitoring and follow-up EKG as needed  Optimize electrolytes     Essential hypertension   Assessment & Plan    Resume home regimen as clinically appropriate, patient cannot recall the names or medicines, working on med rec  Forced diuresis first, optimize electrolytes  Patient fairly bradycardic, hold her beta-blocker for now  Patient thinks she takes losartan, cannot recall her other medicines, resume losartan in a.m.  Monitor for the need for nicardipine continuous infusion to control hypertension specially if contributing to acute pulmonary edema  Echocardiogram pending  Old records from [place] to be obtained     Paroxysmal A-fib (HCC)- (present on admission)   Assessment & Plan    Obtain old records  Check TSH  Echocardiogram pending  Med rec form still not complete and patient does not recall her medicines although she does think she takes a atenolol  Patient notes palpitations and fast heart rate only about once a year  Cardiac monitoring  May need cardiac consultation     Chronic renal impairment, stage 3 (moderate) (HCC)- (present on admission)   Assessment & Plan    Patient is chronic kidney disease dating back at least 10 years by her history  Creatinine apparently is always been a little over 2 it has not changed for quite some time  Fluid, sodium and potassium restricted diet as clinically appropriate  Patient stated she likes to drink a lot of water, fluid restriction will be imposed on her if she cannot control herself, this is reviewed with her nursing staff at length  Renal ultrasound pending  Avoid nephrotoxins  Renally dose medications  Monitor for the need for nephrology consultation     Elevated liver function tests   Assessment & Plan    Mild  elevation in transaminases, ALT greater than AST, alk phos elevated as well  Query hepatic congestion from heart failure versus medications, doubt ALD  Avoid hepatotoxins  Serial CMP  Reassess other hepatic synthetic function as needed         Discussed patient condition and risk of morbidity and/or mortality with Hospitalist, RN, RT, Pharmacy and Patient.    The patient remains critically ill.  Critical care time = 45 minutes in directly providing and coordinating critical care and extensive data review.  No time overlap and excludes procedures.

## 2019-05-26 NOTE — ASSESSMENT & PLAN NOTE
5/27 BUN:55, Cr:2.22  Likely acute but no labs at [facility] since 2005 (normal renal function in 2005)  Monitor I/O's  C/o mitral valve as etiology  Kidney ultrasound showed chronic kidney disease.  No hydronephrosis noted.  Avoid nephrotoxins.  Renal dose all medications.

## 2019-05-26 NOTE — ED NOTES
Rec'd report.  Pt assessment completed. She is compliant on bipap and denies needs, no work of breathing noted.  Oriented to be able to remove if nauseated.  Discussed change of shift.  Call light in lap.

## 2019-05-26 NOTE — ASSESSMENT & PLAN NOTE
Due to significant valvular heart disease.  5/26 Echocardiogram:   Normal left ventricular systolic function.   Left ventricular ejection fraction is visually estimated to be 70%.   Severe mitral regurgitation.   Right heart pressures are consistent with severe pulmonary hypertension    at 70 mmHg.  5/28: Continue with Demadex  5/29: Currently euvolemic.

## 2019-05-26 NOTE — ED PROVIDER NOTES
ED Provider Note    ED Provider Note    Primary care provider: Pcp Unknown  Means of arrival: POV  History obtained from: Patient  History limited by: None    CHIEF COMPLAINT  Chief Complaint   Patient presents with   • Shortness of Breath   • Abdominal Pain       HPI  Geno Gallegos is a 78 y.o. female who presents to the Emergency Department with her  with a chief complaint of shortness of breath.  Patient states that she has been having symptoms for 2 weeks, worsening over that period, last night being the worst.  She presented to triage and was noted to be hypoxic and brought immediately back to room 4.  She denies fever.  She states she has a history of renal insufficiency but is not on dialysis.  She does not smoke and she is not on oxygen at home.  She denies any fever.  No pain at this time.  She does state that she had some mild abdominal pain this morning but this is since resolved.  She has lower extremity edema which she reports is a new.  Patient denies any history of congestive heart failure.    REVIEW OF SYSTEMS  Review of Systems   Constitutional: Negative for chills and fever.   HENT: Negative for congestion.    Respiratory: Positive for shortness of breath.    Cardiovascular: Positive for leg swelling. Negative for chest pain and palpitations.   Gastrointestinal: Negative for abdominal pain, nausea and vomiting.   Genitourinary: Negative for dysuria and urgency.   Musculoskeletal: Negative for back pain.   Neurological: Negative for dizziness and headaches.   All other systems reviewed and are negative.      PAST MEDICAL HISTORY   has a past medical history of A-fib (HCC); Bowel obstruction (HCC); Irregular heart beat; and Renal disorder.    SURGICAL HISTORY   has a past surgical history that includes gyn surgery and other orthopedic surgery.    SOCIAL HISTORY  Social History   Substance Use Topics   • Smoking status: Never Smoker   • Smokeless tobacco: Never Used   • Alcohol use Yes       "Comment: occas      History   Drug Use No       FAMILY HISTORY  History reviewed. No pertinent family history.    CURRENT MEDICATIONS  Home Medications     Reviewed by Leann Harrington, Pharmacy Intern (Pharmacy Intern) on 05/26/19 at 1309  Med List Status: Complete   Medication Last Dose Status   ALPRAZolam (XANAX) 0.25 MG Tab  Active   amLODIPine (NORVASC) 10 MG Tab  Active   atenolol (TENORMIN) 25 MG Tab  Active   losartan (COZAAR) 50 MG Tab  Active                ALLERGIES  Allergies   Allergen Reactions   • Pcn [Penicillins]      rash       PHYSICAL EXAM  VITAL SIGNS: /70   Pulse (!) 54   Temp 36.7 °C (98 °F) (Temporal)   Resp (!) 28   Ht 1.702 m (5' 7\")   Wt 68 kg (149 lb 14.6 oz)   SpO2 96%   BMI 23.48 kg/m²   Vitals reviewed.  Constitutional: Patient is oriented to person, place, and time. Appears well-developed and well-nourished. No distress.    Head: Normocephalic and atraumatic.   Ears: Normal external ears bilaterally.   Mouth/Throat: Oropharynx is clear and moist  Eyes: Conjunctivae are normal. Pupils are equal, round, and reactive to light.   Neck: Normal range of motion. Neck supple.  Cardiovascular: Normal rate, regular rhythm and normal heart sounds. Normal peripheral pulses.  Pulmonary/Chest: There is mild increased work of breathing.  Diminished breath sounds with basilar rales extending up to the mid lung fields.  no wheezes, rhonchi. No chest wall tenderness.  Abdominal: Soft. Bowel sounds are normal. There is no tenderness. No rebound or guarding, or peritoneal signs. No CVA tenderness.  Musculoskeletal: No edema and no tenderness.   Lymphadenopathy: No cervical adenopathy.   Neurological: No focal deficits.   Skin: Skin is warm and dry. No erythema. No pallor.   Psychiatric: Patient has a normal mood and affect.     LABS  Results for orders placed or performed during the hospital encounter of 05/26/19   CBC w/ Differential   Result Value Ref Range    WBC 8.1 4.8 - 10.8 K/uL    " RBC 4.21 4.20 - 5.40 M/uL    Hemoglobin 13.1 12.0 - 16.0 g/dL    Hematocrit 40.6 37.0 - 47.0 %    MCV 96.4 81.4 - 97.8 fL    MCH 31.1 27.0 - 33.0 pg    MCHC 32.3 (L) 33.6 - 35.0 g/dL    RDW 49.3 35.9 - 50.0 fL    Platelet Count 240 164 - 446 K/uL    MPV 11.4 9.0 - 12.9 fL    Neutrophils-Polys 71.30 44.00 - 72.00 %    Lymphocytes 18.50 (L) 22.00 - 41.00 %    Monocytes 7.10 0.00 - 13.40 %    Eosinophils 2.10 0.00 - 6.90 %    Basophils 0.60 0.00 - 1.80 %    Immature Granulocytes 0.40 0.00 - 0.90 %    Nucleated RBC 0.00 /100 WBC    Neutrophils (Absolute) 5.76 2.00 - 7.15 K/uL    Lymphs (Absolute) 1.49 1.00 - 4.80 K/uL    Monos (Absolute) 0.57 0.00 - 0.85 K/uL    Eos (Absolute) 0.17 0.00 - 0.51 K/uL    Baso (Absolute) 0.05 0.00 - 0.12 K/uL    Immature Granulocytes (abs) 0.03 0.00 - 0.11 K/uL    NRBC (Absolute) 0.00 K/uL   Complete Metabolic Panel (CMP)   Result Value Ref Range    Sodium 137 135 - 145 mmol/L    Potassium 4.2 3.6 - 5.5 mmol/L    Chloride 111 96 - 112 mmol/L    Co2 18 (L) 20 - 33 mmol/L    Anion Gap 8.0 0.0 - 11.9    Glucose 119 (H) 65 - 99 mg/dL    Bun 57 (H) 8 - 22 mg/dL    Creatinine 2.27 (H) 0.50 - 1.40 mg/dL    Calcium 9.4 8.5 - 10.5 mg/dL    AST(SGOT) 47 (H) 12 - 45 U/L    ALT(SGPT) 76 (H) 2 - 50 U/L    Alkaline Phosphatase 110 (H) 30 - 99 U/L    Total Bilirubin 0.8 0.1 - 1.5 mg/dL    Albumin 4.3 3.2 - 4.9 g/dL    Total Protein 7.2 6.0 - 8.2 g/dL    Globulin 2.9 1.9 - 3.5 g/dL    A-G Ratio 1.5 g/dL   Btype Natriuretic Peptide   Result Value Ref Range    B Natriuretic Peptide 1431 (H) 0 - 100 pg/mL   Troponin STAT   Result Value Ref Range    Troponin I 0.02 0.00 - 0.04 ng/mL   Magnesium   Result Value Ref Range    Magnesium 2.7 (H) 1.5 - 2.5 mg/dL   Phosphorus   Result Value Ref Range    Phosphorus 4.2 2.5 - 4.5 mg/dL   LACTIC ACID   Result Value Ref Range    Lactic Acid 1.8 0.5 - 2.0 mmol/L   LACTIC ACID   Result Value Ref Range    Lactic Acid 0.8 0.5 - 2.0 mmol/L   URINALYSIS CULTURE, IF INDICATED    Result Value Ref Range    Color Yellow     Character Clear     Specific Gravity 1.011 <1.035    Ph 5.0 5.0 - 8.0    Glucose Negative Negative mg/dL    Ketones Negative Negative mg/dL    Protein Negative Negative mg/dL    Bilirubin Negative Negative    Urobilinogen, Urine 0.2 Negative    Nitrite Negative Negative    Leukocyte Esterase Negative Negative    Occult Blood Negative Negative    Micro Urine Req see below    ESTIMATED GFR   Result Value Ref Range    GFR If African American 25 (A) >60 mL/min/1.73 m 2    GFR If Non African American 21 (A) >60 mL/min/1.73 m 2   PROCALCITONIN   Result Value Ref Range    Procalcitonin <0.05 <0.25 ng/mL   URINALYSIS   Result Value Ref Range    Color Yellow     Character Clear     Specific Gravity 1.008 <1.035    Ph 5.5 5.0 - 8.0    Glucose Negative Negative mg/dL    Ketones Negative Negative mg/dL    Protein Negative Negative mg/dL    Bilirubin Negative Negative    Urobilinogen, Urine 0.2 Negative    Nitrite Negative Negative    Leukocyte Esterase Trace (A) Negative    Occult Blood Negative Negative    Micro Urine Req Microscopic    TSH   Result Value Ref Range    TSH 4.080 0.380 - 5.330 uIU/mL   URINE MICROSCOPIC (W/UA)   Result Value Ref Range    WBC 2-5 /hpf    RBC 0-2 /hpf    Bacteria Negative None /hpf    Epithelial Cells Negative /hpf    Hyaline Cast 0-2 /lpf   TROPONIN   Result Value Ref Range    Troponin I 0.01 0.00 - 0.04 ng/mL   EKG   Result Value Ref Range    Report       [facility] Emergency Dept.    Test Date:  2019  Pt Name:    NALINI SORIANO               Department: ER  MRN:        [id number]                      Room:        04  Gender:     Female                       Technician: [id number]  :        [id number]                   Requested By:ER TRIAGE PROTOCOL  Order #:    [id number]                    Anup BENDER:    Measurements  Intervals                                Axis  Rate:       47                           P:          20  UT:          204                          QRS:        -27  QRSD:       98                           T:          69  QT:         488  QTc:        432    Interpretive Statements  SINUS BRADYCARDIA  PROBABLE LEFT ATRIAL ABNORMALITY  LEFT VENTRICULAR HYPERTROPHY  No previous ECG available for comparison         All labs reviewed by me.    EKG Interpretation  Interpreted by me    Rhythm: Sinus bradycardia  Rate: 47  Axis: normal  Ectopy: none  Conduction: LVH   ST Segments: no acute change  T Waves: no acute change  Q Waves: none    Clinical Impression: No old EKG for comparison.  Sinus bradycardia with left ventricular hypertrophy.  No acute ST segment elevation.      RADIOLOGY  DX-CHEST-PORTABLE (1 VIEW)   Final Result      Perihilar and bibasilar opacities with probable small left pleural effusion. Findings are nonspecific and may represent bilateral pneumonia or edema.      EC-ECHOCARDIOGRAM COMPLETE W/O CONT    (Results Pending)   US-RENAL    (Results Pending)     The radiologist's interpretation of all radiological studies have been reviewed by me.    COURSE & MEDICAL DECISION MAKING  Pertinent Labs & Imaging studies reviewed. (See chart for details)    Obtained and reviewed past medical records.  Patient's last encounter in our EMR is from 2005.  Creatinine noted to be normal at that time.    4:35 AM - Patient seen and examined at bedside, upon arrival.  Patient presents with increased work of breathing and shortness of breath.  No abdominal pain at this time.  She is awake and alert.  She is a good candidate for BiPAP at this time and respiratory has been called.  She does have lower extremity edema and rales concerning for fluid overload especially given the history that she gives of renal insufficiency.  She is not on dialysis.    The differential diagnoses include but are not limited to: Would overload, worsening kidney function, pneumonia    4:42 AM, patient is on BiPAP and satting 100%.  She is awake and alert.   Subjectively notes improvement.  Clinically, this patient appears to be fluid overloaded likely worsening in her renal function or possibly new onset heart failure.  Less likely infectious etiology.  We will continue to monitor closely.  Will gently diurese.    5:24 AM, patient's reevaluated at the bedside.   is at the bedside.  She is resting and appears comfortable.  BiPAP mask in place.  Oxygenating well, 100%.  Systolic blood pressure is improved at 124 up from 109 previously.  She is still bradycardic.  We will continue to monitor closely.    7:10 AM intensivist,/PMA and hospitalist paged.    7:12 AM, at [facility] paged    7:46 AM, discussed with Dr. Encarnacion.  I have spoken with [facility] and they report, that the do not admit for her PCP.  He agrees to admit the patient to their service.    Patient's reevaluated the bedside.  She still tolerating BiPAP well, she did have one episode of decreasing saturations to the high 80s low 90s but with some encouragement, she can take deep breaths and this improves.  At this time, she does not appear to be ready to discontinue BiPAP.    The total critical care time on this patient is 40 minutes, resuscitating patient, speaking with admitting physician, and deciphering test results. This 40 minutes is exclusive of separately billable procedures.      FINAL IMPRESSION  1. Hypervolemia, unspecified hypervolemia type    2. Renal failure, unspecified chronicity    3. Acute respiratory failure with hypoxia (HCC)    Critical care time: 40 minutes

## 2019-05-26 NOTE — H&P
Hospital Medicine History & Physical Note    Date of Service  5/26/2019    Primary Care Physician  Pcp Unknown    Consultants  Dr reddy pma    Code Status  full    Chief Complaint  dyspnea    History of Presenting Illness  78 y.o. female who presented 5/26/2019 with past medical history of A. Fib, renal insufficiency, GERD comes the emergency room complaining of shortness of breath as well as the last 2 weeks. progressively ge getting worse.  She describes paroxysmal  nocturnal dyspnea.  In the ED her BNP was elevated she had x-ray shows infiltrates consistent with edema versus pneumonia.  She was hypoxic and needed a BiPAP to stabilize her respiratory status    Pulmonary MD was consulted      No chest pain    No fever chills    Review of Systems  Review of Systems   Constitutional: Negative for chills, fever and weight loss.   HENT: Negative for ear discharge, ear pain, hearing loss and tinnitus.    Respiratory: Positive for shortness of breath.    Cardiovascular: Negative for palpitations, orthopnea and claudication.   Gastrointestinal: Negative for abdominal pain, heartburn, nausea and vomiting.   Genitourinary: Negative for dysuria, frequency and urgency.   Musculoskeletal: Negative for back pain, myalgias and neck pain.   Neurological: Negative for dizziness, tingling and headaches.   Psychiatric/Behavioral: Negative for depression, substance abuse and suicidal ideas.   All other systems reviewed and are negative.      Past Medical History   has a past medical history of A-fib (HCC); Bowel obstruction (HCC); Irregular heart beat; and Renal disorder.    Surgical History   has a past surgical history that includes gyn surgery and other orthopedic surgery.     Family History  No family history of stroke in the family    Social History   reports that she has never smoked. She has never used smokeless tobacco. She reports that she drinks alcohol. She reports that she does not use drugs.    Allergies  Allergies    Allergen Reactions   • Pcn [Penicillins]      rash       Medications  None       Physical Exam  Temp:  [36.7 °C (98 °F)] 36.7 °C (98 °F)  Pulse:  [43-55] 46  Resp:  [19-28] 28  BP: (140)/(70) 140/70  SpO2:  [81 %-100 %] 93 %    Physical Exam   Constitutional: She is oriented to person, place, and time. She appears well-developed and well-nourished. No distress.   HENT:   Head: Normocephalic and atraumatic.   Mouth/Throat: No oropharyngeal exudate.   Eyes: Right eye exhibits no discharge. Left eye exhibits no discharge.   Neck: JVD present.   Cardiovascular: Normal rate, regular rhythm and intact distal pulses.  Exam reveals no gallop and no friction rub.    No murmur heard.  Pulmonary/Chest: Effort normal. No respiratory distress. She has no wheezes. She has no rales.   Mild bilateral rhonchi    Tachypnea   Abdominal: She exhibits no distension. There is no rebound.   Musculoskeletal: Normal range of motion. She exhibits edema. She exhibits no deformity.   Neurological: She is alert and oriented to person, place, and time. No cranial nerve deficit. Coordination normal.   Skin: She is not diaphoretic. No erythema. No pallor.   Psychiatric: She has a normal mood and affect. Thought content normal.       Laboratory:  Recent Labs      05/26/19 0432   WBC  8.1   RBC  4.21   HEMOGLOBIN  13.1   HEMATOCRIT  40.6   MCV  96.4   MCH  31.1   MCHC  32.3*   RDW  49.3   PLATELETCT  240   MPV  11.4     Recent Labs      05/26/19 0432   SODIUM  137   POTASSIUM  4.2   CHLORIDE  111   CO2  18*   GLUCOSE  119*   BUN  57*   CREATININE  2.27*   CALCIUM  9.4     Recent Labs      05/26/19 0432   ALTSGPT  76*   ASTSGOT  47*   ALKPHOSPHAT  110*   TBILIRUBIN  0.8   GLUCOSE  119*         Recent Labs      05/26/19 0432   BNPBTYPENAT  1431*         Recent Labs      05/26/19 0432   TROPONINI  0.02       Urinalysis:    Recent Labs      05/26/19   0637   SPECGRAVITY  1.011   GLUCOSEUR  Negative   KETONES  Negative   NITRITE  Negative    LEUKESTERAS  Negative        Imaging:  DX-CHEST-PORTABLE (1 VIEW)   Final Result      Perihilar and bibasilar opacities with probable small left pleural effusion. Findings are nonspecific and may represent bilateral pneumonia or edema.      EC-ECHOCARDIOGRAM COMPLETE W/O CONT    (Results Pending)   US-RENAL    (Results Pending)     ekg revieved by me, sinus magdiel rate 47, lvh    Assessment/Plan:  I anticipate this patient will require at least two midnights for appropriate medical management, necessitating inpatient admission.    * Acute respiratory failure with hypoxia (HCC)- (present on admission)   Assessment & Plan    Diuresis      Check procalcitonin level  BiPAP     Paroxysmal A-fib (HCC)- (present on admission)   Assessment & Plan        Asa daily    Check echo         Chronic renal impairment, stage 3 (moderate) (HCC)- (present on admission)   Assessment & Plan    Baseline unknown    Avoid nephrotoxins    Check renal ultrasound     Acute CHF (congestive heart failure) (HCC)- (present on admission)   Assessment & Plan    Diuresis IV Lasix    Monitor I's and O    Daily weight    Check echocardiogram         VTE prophylaxis: scd

## 2019-05-26 NOTE — PROGRESS NOTES
Updates given to Dr. Thompson. Medical records regarding cardiac heart cath received from [facility], discussed renal u/s results and med rec completed. New order received to restart losartan home med tomorrow morning.

## 2019-05-26 NOTE — ED TRIAGE NOTES
Pt to triage via w/c with family. Pt c/o SOB x 3 weeks, worse when laying flat. Pt reports coughing up brownish-red phlem x 1 this AM. RA sat 84% on arrival, improved to 95% on 6L NC. Pt denies chest pain or discomfort. Pt reports abd discomfort this am, currently resolved. Denies n/v/d. Pt noted to be bradycardic, Hx of afib per pt. Pt denies home O2 use. Pt to room 4. Primary RN at .

## 2019-05-26 NOTE — ED NOTES
Pt brought to R4 immediately from triage, pt O2 saturation 80 percent on 10L O2, ERP Bhut[place] to room.

## 2019-05-26 NOTE — PROGRESS NOTES
Call placed to patient's spouse, Rachid, regarding patient's medication list. Per patient's spouse, patient has over 25 bottles of medications that are both current and not current and unable to give RN information at this time. Will call patient's pharmacy for med rec.

## 2019-05-27 ENCOUNTER — APPOINTMENT (OUTPATIENT)
Dept: RADIOLOGY | Facility: MEDICAL CENTER | Age: 79
DRG: 286 | End: 2019-05-27
Attending: INTERNAL MEDICINE
Payer: MEDICARE

## 2019-05-27 PROBLEM — I38 VALVULAR HEART DISEASE: Status: ACTIVE | Noted: 2019-05-27

## 2019-05-27 LAB
ANION GAP SERPL CALC-SCNC: 10 MMOL/L (ref 0–11.9)
BNP SERPL-MCNC: 1074 PG/ML (ref 0–100)
BUN SERPL-MCNC: 55 MG/DL (ref 8–22)
CALCIUM SERPL-MCNC: 8.7 MG/DL (ref 8.5–10.5)
CHLORIDE SERPL-SCNC: 109 MMOL/L (ref 96–112)
CO2 SERPL-SCNC: 18 MMOL/L (ref 20–33)
CREAT SERPL-MCNC: 2.22 MG/DL (ref 0.5–1.4)
ERYTHROCYTE [DISTWIDTH] IN BLOOD BY AUTOMATED COUNT: 50.3 FL (ref 35.9–50)
GLUCOSE SERPL-MCNC: 105 MG/DL (ref 65–99)
HCT VFR BLD AUTO: 34.9 % (ref 37–47)
HGB BLD-MCNC: 11 G/DL (ref 12–16)
MCH RBC QN AUTO: 30.9 PG (ref 27–33)
MCHC RBC AUTO-ENTMCNC: 31.5 G/DL (ref 33.6–35)
MCV RBC AUTO: 98 FL (ref 81.4–97.8)
PLATELET # BLD AUTO: 179 K/UL (ref 164–446)
PMV BLD AUTO: 11.4 FL (ref 9–12.9)
POTASSIUM SERPL-SCNC: 4.4 MMOL/L (ref 3.6–5.5)
RBC # BLD AUTO: 3.56 M/UL (ref 4.2–5.4)
SODIUM SERPL-SCNC: 137 MMOL/L (ref 135–145)
WBC # BLD AUTO: 6.1 K/UL (ref 4.8–10.8)

## 2019-05-27 PROCEDURE — 700111 HCHG RX REV CODE 636 W/ 250 OVERRIDE (IP): Performed by: HOSPITALIST

## 2019-05-27 PROCEDURE — 700102 HCHG RX REV CODE 250 W/ 637 OVERRIDE(OP): Performed by: INTERNAL MEDICINE

## 2019-05-27 PROCEDURE — 85027 COMPLETE CBC AUTOMATED: CPT

## 2019-05-27 PROCEDURE — 700102 HCHG RX REV CODE 250 W/ 637 OVERRIDE(OP): Performed by: HOSPITALIST

## 2019-05-27 PROCEDURE — 80048 BASIC METABOLIC PNL TOTAL CA: CPT

## 2019-05-27 PROCEDURE — 99233 SBSQ HOSP IP/OBS HIGH 50: CPT | Performed by: HOSPITALIST

## 2019-05-27 PROCEDURE — A9270 NON-COVERED ITEM OR SERVICE: HCPCS | Performed by: INTERNAL MEDICINE

## 2019-05-27 PROCEDURE — 99223 1ST HOSP IP/OBS HIGH 75: CPT | Performed by: INTERNAL MEDICINE

## 2019-05-27 PROCEDURE — 71045 X-RAY EXAM CHEST 1 VIEW: CPT

## 2019-05-27 PROCEDURE — 94640 AIRWAY INHALATION TREATMENT: CPT

## 2019-05-27 PROCEDURE — A9270 NON-COVERED ITEM OR SERVICE: HCPCS | Performed by: HOSPITALIST

## 2019-05-27 PROCEDURE — 99291 CRITICAL CARE FIRST HOUR: CPT | Performed by: INTERNAL MEDICINE

## 2019-05-27 PROCEDURE — 770020 HCHG ROOM/CARE - TELE (206)

## 2019-05-27 PROCEDURE — 83880 ASSAY OF NATRIURETIC PEPTIDE: CPT

## 2019-05-27 RX ORDER — TORSEMIDE 20 MG/1
20 TABLET ORAL
Status: DISCONTINUED | OUTPATIENT
Start: 2019-05-27 | End: 2019-05-30 | Stop reason: HOSPADM

## 2019-05-27 RX ADMIN — SENNOSIDES,DOCUSATE SODIUM 2 TABLET: 50; 8.6 TABLET, FILM COATED ORAL at 05:14

## 2019-05-27 RX ADMIN — FUROSEMIDE 20 MG: 10 INJECTION, SOLUTION INTRAMUSCULAR; INTRAVENOUS at 05:14

## 2019-05-27 RX ADMIN — ALPRAZOLAM 0.25 MG: 0.25 TABLET ORAL at 20:44

## 2019-05-27 RX ADMIN — LOSARTAN POTASSIUM 50 MG: 50 TABLET ORAL at 05:14

## 2019-05-27 RX ADMIN — POTASSIUM CHLORIDE 20 MEQ: 1500 TABLET, EXTENDED RELEASE ORAL at 18:15

## 2019-05-27 RX ADMIN — HEPARIN SODIUM 5000 UNITS: 5000 INJECTION, SOLUTION INTRAVENOUS; SUBCUTANEOUS at 14:40

## 2019-05-27 RX ADMIN — HEPARIN SODIUM 5000 UNITS: 5000 INJECTION, SOLUTION INTRAVENOUS; SUBCUTANEOUS at 05:14

## 2019-05-27 RX ADMIN — POTASSIUM CHLORIDE 20 MEQ: 1500 TABLET, EXTENDED RELEASE ORAL at 05:14

## 2019-05-27 RX ADMIN — TORSEMIDE 20 MG: 20 TABLET ORAL at 15:55

## 2019-05-27 RX ADMIN — HEPARIN SODIUM 5000 UNITS: 5000 INJECTION, SOLUTION INTRAVENOUS; SUBCUTANEOUS at 20:44

## 2019-05-27 RX ADMIN — ACETAMINOPHEN 650 MG: 325 TABLET, FILM COATED ORAL at 15:51

## 2019-05-27 ASSESSMENT — PATIENT HEALTH QUESTIONNAIRE - PHQ9
SUM OF ALL RESPONSES TO PHQ9 QUESTIONS 1 AND 2: 0
2. FEELING DOWN, DEPRESSED, IRRITABLE, OR HOPELESS: NOT AT ALL
1. LITTLE INTEREST OR PLEASURE IN DOING THINGS: NOT AT ALL

## 2019-05-27 ASSESSMENT — ENCOUNTER SYMPTOMS
ABDOMINAL PAIN: 0
SPEECH CHANGE: 0
COUGH: 0
CHILLS: 0
DEPRESSION: 0
FEVER: 0
NAUSEA: 0
WEAKNESS: 0
HEADACHES: 0
SHORTNESS OF BREATH: 1
ORTHOPNEA: 0
VOMITING: 0
SORE THROAT: 0
BACK PAIN: 0
DIZZINESS: 0
NECK PAIN: 0
NERVOUS/ANXIOUS: 0
PALPITATIONS: 0
HEMOPTYSIS: 0
FOCAL WEAKNESS: 0
SPUTUM PRODUCTION: 0

## 2019-05-27 NOTE — ASSESSMENT & PLAN NOTE
History, exam, lab and chest x-ray are all consistent with left heart failure  Patient has chronic kidney disease and systemic hypertension which undoubtedly contributing to this process.  Historically she does not describe a recent myocardial infarction but will evaluate for this with Trop I/ECHO    Serial BNP levels will be obtained.    Forced diuresis initiated and she is clinically proved,  Monitor fluid balance  Echocardiogram -> shows severe MR with preserved EF  Will get Cardiology consultation and likely Cardiac surgery consultation   Tenuous EKG rhythm monitoring and follow-up EKG as needed  Optimize electrolytes    This is a surgical disease however with patient age and kidney function could be difficult surgical candidate. Patient bedside exam and function is excellent. Medical therapy likely will fail continue to optimize hemodynamics  Stop atenolol to allow a higher HR 80-90's to prevent regurg continue afterload reduction and diuresis

## 2019-05-27 NOTE — PROGRESS NOTES
2 RN skin check complete with RACHEAL Blunt.    Devices in place:   *High flow O2 and intermittent bipap    *PIVs on RUE   *Pulse ox   *Cardiac monitor leads   *BP Cuff to RUE   *Jo Catheter   *SCDs  Skin assessedunder the following devices: High flow O2, PIV lines, Pulse ox, cardiac leads changed, Skin assessed under BP cuff, jo catheter, and under SCDs.   Following areas of concern:    *Cheeks and top of nose are pink/red from HFO2 and BIPAP, blanching and intact   *Scattered bruising noted on upper extremities    *Generalized pitting edema to bilateral lower extremities.  The following interventions in place:   *Low air loss mattress in use    *Preventative mepilex in place on sacral region, sacrum is intact and blanching.   *Repositioning Q 2 hours.   *Pillows used to float elbows and heels    Wound consult placedYES/NO: N\A    Wound reported YES/NO: N\A  Appropriate LDAs opened YES/NO: N\A    Wound consult  Not applicable at this time.

## 2019-05-27 NOTE — PROGRESS NOTES
Critical Care Progress Note    Date of admission  5/26/2019    Chief Complaint  78 y.o. female with a history of hypertension and atrial fibrillation who presented 5/26/2019 with severe shortness of breath.  Patient's been having dyspnea for upwards of several weeks.  It was worse the last couple nights that she could not sleep.  Denies any associated infectious symptoms such as cough, sputum production fever etc.  She is a non-smoker and does not use home O2.  She has chronic kidney disease but is not on dialysis.  She normally does not retain water but she is been having ankle swelling for a few weeks as well as orthopnea and PND.  She denies chest pain but does occasionally have palpitations and she experienced about once a year but not recently.  She had a prior heart catheterization that apparently was okay but she was told she had a heart problem and has been taking medications primarily for hypertension.  She states she is pretty faithful with her medication regimen.  She was started on BiPAP in the ER after presenting with pretty severe dyspnea and she improved significantly.  She received an intravenous dose of Lasix and nearly gave a liter of fluids out in the first hour or 2.  Her x-ray was consistent with cardiomegaly pulmonary edema.  Her BNP was elevated nearly 1500.  Her EKG did not reveal a STEMI.  Her first troponin I was 0.02.  Med rec form was not completed to the late, she cannot recall her medicines are much of the day details of her admission in the [facility] for work-up.  Old records were requested from [facility] and they are coming.  Renal ultrasound and echocardiogram are pending. Taken from Dr Thompson note.     Hospital Course    Interval Problem Update  Reviewed last 24 hour events:  Neuro: aox4   HR: snr and magdiel 50-60's  SBP: 100-130's  Tmax: 100.8  GI: cardiac, no BM  UOP: 1200ml jo   Lines: peripheral  Resp: HFNC 35/60L  Vte: heparin  PPI/H2:n/a home ppi  Antibx: none    Losartan,  prilosec, norvasc, atenolol     Telemetry ?        Review of Systems  Review of Systems   Constitutional: Negative for chills and fever.   HENT: Negative for congestion.    Respiratory: Positive for shortness of breath. Negative for cough, hemoptysis and sputum production.    Cardiovascular: Positive for leg swelling. Negative for chest pain, palpitations and orthopnea.   Gastrointestinal: Negative for abdominal pain, nausea and vomiting.   Genitourinary: Negative for dysuria and urgency.   Neurological: Negative for speech change, focal weakness, weakness and headaches.   Psychiatric/Behavioral: Negative for depression.   All other systems reviewed and are negative.       Vital Signs for last 24 hours   Temp:  [37.1 °C (98.8 °F)] 37.1 °C (98.8 °F)  Pulse:  [45-62] 53  Resp:  [16-36] 20  BP: (134)/(63) 134/63  SpO2:  [92 %-99 %] 94 %    Hemodynamic parameters for last 24 hours       Respiratory Information for the last 24 hours       Physical Exam   Physical Exam   Constitutional: She is oriented to person, place, and time. She appears well-developed and well-nourished. No distress.   Taking laps with nurse   HENT:   Head: Normocephalic.   Eyes: Pupils are equal, round, and reactive to light. EOM are normal.   Neck: JVD present. No tracheal deviation present. No thyromegaly present.   Cardiovascular: Normal rate and regular rhythm.    Murmur heard.  Pulmonary/Chest: No respiratory distress. She has no wheezes. She has rales.   Abdominal: She exhibits no distension. There is no tenderness. There is no rebound.   Musculoskeletal: She exhibits edema. She exhibits no tenderness.   Neurological: She is alert and oriented to person, place, and time. No cranial nerve deficit. Coordination normal.   Moves all extremities follow commands quickly is appropriate   Skin: Skin is warm and dry. She is not diaphoretic. No erythema.   Psychiatric: She has a normal mood and affect.       Medications  Current Facility-Administered  Medications   Medication Dose Route Frequency Provider Last Rate Last Dose   • torsemide (DEMADEX) tablet 20 mg  20 mg Oral Q DAY Sulaiman Hidalgo M.D.   20 mg at 05/27/19 1555   • potassium chloride SA (Kdur) tablet 20 mEq  20 mEq Oral BID Freddie Encarnacion M.D.   20 mEq at 05/27/19 0514   • senna-docusate (PERICOLACE or SENOKOT S) 8.6-50 MG per tablet 2 Tab  2 Tab Oral BID Freddie Encarnacion M.D.   2 Tab at 05/27/19 0514    And   • polyethylene glycol/lytes (MIRALAX) PACKET 1 Packet  1 Packet Oral QDAY PRN Freddie Encarnacion M.D.        And   • magnesium hydroxide (MILK OF MAGNESIA) suspension 30 mL  30 mL Oral QDAY PRN Freddie Encarnacion M.D.        And   • bisacodyl (DULCOLAX) suppository 10 mg  10 mg Rectal QDAY PRN Freddie Encarnacion M.D.       • Respiratory Care per Protocol   Nebulization Continuous RT Freddie Encarnacion M.D.       • heparin injection 5,000 Units  5,000 Units Subcutaneous Q8HRS Freddie Encarnacion M.D.   5,000 Units at 05/27/19 1440   • acetaminophen (TYLENOL) tablet 650 mg  650 mg Oral Q6HRS PRN Freddie Encarnacion M.D.   650 mg at 05/27/19 1551   • ondansetron (ZOFRAN) syringe/vial injection 4 mg  4 mg Intravenous Q4HRS PRN Freddie Encarnacion M.D.       • ondansetron (ZOFRAN ODT) dispertab 4 mg  4 mg Oral Q4HRS PRN Freddie Encarnacion M.D.       • losartan (COZAAR) tablet 50 mg  50 mg Oral Q DAY Peterson Thompson M.D.   50 mg at 05/27/19 0514   • ALPRAZolam (XANAX) tablet 0.25 mg  0.25 mg Oral HS PRN Jeremy M Gonda, M.D.   0.25 mg at 05/26/19 2214       Fluids    Intake/Output Summary (Last 24 hours) at 05/27/19 1625  Last data filed at 05/27/19 1400   Gross per 24 hour   Intake             1320 ml   Output             3125 ml   Net            -1805 ml       Laboratory      Recent Labs      05/26/19   0432  05/26/19   0945  05/26/19   1608  05/27/19   0307   TROPONINI  0.02  0.01  0.01   --    BNPBTYPENAT  1431*   --    --   1074*     Recent Labs      05/26/19 0432 05/27/19   0307   SODIUM  137  137    POTASSIUM  4.2  4.4   CHLORIDE  111  109   CO2  18*  18*   BUN  57*  55*   CREATININE  2.27*  2.22*   MAGNESIUM  2.7*   --    PHOSPHORUS  4.2   --    CALCIUM  9.4  8.7     Recent Labs      05/26/19 0432 05/27/19 0307   ALTSGPT  76*   --    ASTSGOT  47*   --    ALKPHOSPHAT  110*   --    TBILIRUBIN  0.8   --    GLUCOSE  119*  105*     Recent Labs      05/26/19 0432 05/27/19 0307   WBC  8.1  6.1   NEUTSPOLYS  71.30   --    LYMPHOCYTES  18.50*   --    MONOCYTES  7.10   --    EOSINOPHILS  2.10   --    BASOPHILS  0.60   --    ASTSGOT  47*   --    ALTSGPT  76*   --    ALKPHOSPHAT  110*   --    TBILIRUBIN  0.8   --      Recent Labs      05/26/19 0432 05/27/19 0307   RBC  4.21  3.56*   HEMOGLOBIN  13.1  11.0*   HEMATOCRIT  40.6  34.9*   PLATELETCT  240  179       Imaging  X-Ray:  I have personally reviewed the images and compared with prior images.  EKG:  I have personally reviewed the images and compared with prior images.  Echo:   Reviewed    Assessment/Plan  * Acute respiratory failure with hypoxia (HCC)- (present on admission)   Assessment & Plan    BiPAP initiated in the ER 5/26 -> on HFNC 35l 60% continue force diuresis and active titration  Forced diuresis with Lasix  Infection not suspected, hold on antibiotics for now  RT protocols  Pulmonary toilet including incentive spirometry  Mobilize when clinically appropriate  Monitor for the need for intubation and mechanical ventilation, patient request full CODE STATUS, reviewed with her at length     Acute CHF (congestive heart failure) (HCC)- (present on admission)   Assessment & Plan    History, exam, lab and chest x-ray are all consistent with left heart failure  Patient has chronic kidney disease and systemic hypertension which undoubtedly contributing to this process.  Historically she does not describe a recent myocardial infarction but will evaluate for this with Trop I/ECHO    Serial BNP levels will be obtained.    Forced diuresis initiated and  she is clinically proved,  Monitor fluid balance  Echocardiogram -> shows severe MR with preserved EF  Will get Cardiology consultation and likely Cardiac surgery consultation   Tenuous EKG rhythm monitoring and follow-up EKG as needed  Optimize electrolytes    This is a surgical disease however with patient age and kidney function could be difficult surgical candidate. Patient bedside exam and function is excellent. Medical therapy likely will fail continue to optimize hemodynamics  Stop atenolol to allow a higher HR 80-90's to prevent regurg continue afterload reduction and diuresis     Essential hypertension   Assessment & Plan    Resume home regimen as clinically appropriate, patient cannot recall the names or medicines, working on med rec  Forced diuresis first, optimize electrolytes  Patient fairly bradycardic, hold her beta-blocker for now  Patient thinks she takes losartan, cannot recall her other medicines, resume losartan in a.m.  Monitor for the need for nicardipine continuous infusion to control hypertension specially if contributing to acute pulmonary edema  Echocardiogram pending  Old records from [place] to be obtained     Paroxysmal A-fib (HCC)- (present on admission)   Assessment & Plan    Obtain old records  Check TSH  Echocardiogram pending  Med rec form still not complete and patient does not recall her medicines although she does think she takes a atenolol  Patient notes palpitations and fast heart rate only about once a year  Cardiac monitoring     Chronic renal impairment, stage 3 (moderate) (HCC)- (present on admission)   Assessment & Plan    Patient is chronic kidney disease dating back at least 10 years by her history  Creatinine apparently is always been a little over 2 it has not changed for quite some time  Fluid, sodium and potassium restricted diet as clinically appropriate  Patient stated she likes to drink a lot of water, fluid restriction will be imposed on her if she cannot control  herself, this is reviewed with her nursing staff at length  Renal ultrasound pending  Avoid nephrotoxins  Renally dose medications  Monitor for the need for nephrology consultation     Valvular heart disease   Assessment & Plan    Severe MR seen on echo appreciate cardiology consultation   Plan as above     Elevated liver function tests   Assessment & Plan    Mild elevation in transaminases, ALT greater than AST, alk phos elevated as well  Query hepatic congestion from heart failure versus medications, doubt ALD  Avoid hepatotoxins  Serial CMP  Reassess other hepatic synthetic function as needed          VTE:  Heparin  Ulcer: Not Indicated  Lines: None    I have performed a physical exam and reviewed and updated ROS and Plan today (5/27/2019). In review of yesterday's note (5/26/2019), there are no changes except as documented above.     Discussed patient condition and risk of morbidity and/or mortality with Hospitalist, RN, RT, Pharmacy, Charge nurse / hot rounds, Patient and cardiology  The patient remains critically ill from severe MR requiring active force diuresis and titration of HFNC to prevent life threatening hypoxia.  Critical care time = 40 minutes in directly providing and coordinating critical care and extensive data review.  No time overlap and excludes procedures.

## 2019-05-27 NOTE — ASSESSMENT & PLAN NOTE
Resume home regimen as clinically appropriate, patient cannot recall the names or medicines, working on med rec  Forced diuresis first, optimize electrolytes  Patient fairly bradycardic, hold her beta-blocker for now  Patient thinks she takes losartan, cannot recall her other medicines, resume losartan in a.m.  Monitor for the need for nicardipine continuous infusion to control hypertension specially if contributing to acute pulmonary edema  Echocardiogram pending  Old records from [place] to be obtained

## 2019-05-27 NOTE — PROGRESS NOTES
2 RN skin check completed with RACHEAL Nice     Devices in place:              -High flow nasal cannula              -Pulse ox probe, cardiac monitor, BP cuff              -Jo catheter              -SCDs              -PIV x2 to RUE             Skin assessed under the following devices:     -High flow nasal cannula              -Pulse ox probe, cardiac monitor, BP cuff              -Jo catheter              -SCDs              -PIV x2 to RUE     Following areas of concern:               -Cheeks from HFNC, padding accordingly               -Bruising to BUE from IV attempts and lab draws              -Generalized pitting edema to BLE with mild discoloration    The following interventions in place:              -Low air loss mattress              -Q2h turns with repositioning of all extremities              -Repositioning medical devices prn              - Mepilex to sacrum    -Increasing mobility as tolerated    -Potential d/c of jo      Wound consult placedYES/NO: N/a   Wound reported YES/NO: N/a  Appropriate LDAs opened YES/NO: N/a

## 2019-05-27 NOTE — ASSESSMENT & PLAN NOTE
BiPAP initiated in the ER 5/26 -> on HFNC 35l 60% continue force diuresis and active titration  Forced diuresis with Lasix  Infection not suspected, hold on antibiotics for now  RT protocols  Pulmonary toilet including incentive spirometry  Mobilize when clinically appropriate  Monitor for the need for intubation and mechanical ventilation, patient request full CODE STATUS, reviewed with her at length

## 2019-05-27 NOTE — ASSESSMENT & PLAN NOTE
Mild elevation in transaminases, ALT greater than AST, alk phos elevated as well  Query hepatic congestion from heart failure versus medications, doubt ALD  Avoid hepatotoxins  Serial CMP  Reassess other hepatic synthetic function as needed

## 2019-05-27 NOTE — CARE PLAN
Problem: Infection  Goal: Will remain free from infection  Standard precautions in place with proper hand hygiene.   Infection control discussed with patient   Assessing potential removal of jo catheter.  Monitoring trends in MEWS, labs, and VS.     Problem: Venous Thromboembolism (VTW)/Deep Vein Thrombosis (DVT) Prevention:  Goal: Patient will participate in Venous Thrombosis (VTE)/Deep Vein Thrombosis (DVT)Prevention Measures  DVT prophylaxis in place with heparin subq per MD order  Patient intermittently refusing SCDs d/t cramping. Education provided, continues to refuse.   Will encourage increased mobility throughout the day.

## 2019-05-27 NOTE — CONSULTS
Cardiology Consult Note:    Sulaiman To  Date & Time note created:    5/27/2019   3:26 PM     Referring MD:  Dr. Hathaway    Patient ID:   Name:             Geno Gallegos     YOB: 1940  Age:                 79 y.o.  female   MRN:               [id number]                                                             Chief Complaint / Reason for consult:  Mitral regurgitation and heart failure exacerbation.    History of Present Illness:    This is an 79 years old women with prior history of paroxysmal atrial fibrillation, presented to the hospital with progressively worsening shortness of breath.  Patient was found to be volume overloaded.  She had a transthoracic echocardiogram which showed severe mitral regurgitation with preserved left ventricular systolic function.  Of note, patient does have paroxysmal atrial fibrillation for many years.  She is not on anticoagulation in the outpatient setting.  She does not have a cardiologist in the past.  In 2011, [place] which showed [place] coronaries.    I have independently interpreted and reviewed patient's ECG with patient today, which showed normal sinus rhythm, normal PA, QT intervals. No evidence of acute coronary syndrome.    Review of Systems:      Constitutional: Denies fevers, Denies weight changes  Eyes: Denies changes in vision, no eye pain  Ears/Nose/Throat/Mouth: Denies nasal congestion or sore throat   Cardiovascular: no chest pain, no palpitations   Respiratory: yes shortness of breath , Denies cough  Gastrointestinal/Hepatic: Denies abdominal pain, nausea, vomiting, diarrhea, constipation or GI bleeding   Genitourinary: Denies dysuria or frequency  Musculoskeletal/Rheum: Denies  joint pain and swelling   Skin: Denies rash  Neurological: Denies headache, confusion, memory loss or focal weakness/parasthesias  Psychiatric: denies mood disorder   Endocrine: Daysi thyroid  problems  Heme/Oncology/Lymph Nodes: Denies enlarged lymph nodes, denies brusing or known bleeding disorder  All other systems were reviewed and are negative (AMA/CMS criteria)                Past Medical History:   Past Medical History:   Diagnosis Date   • A-fib (HCC)    • Bowel obstruction (HCC)    • Irregular heart beat    • Renal disorder     stage 4 kidney failure   • Valvular heart disease 5/27/2019     Active Hospital Problems    Diagnosis   • Acute respiratory failure with hypoxia (HCC) [J96.01]     Priority: High   • Acute CHF (congestive heart failure) (HCC) [I50.9]     Priority: High   • Chronic renal impairment, stage 3 (moderate) (HCC) [N18.3]     Priority: Medium   • Paroxysmal A-fib (HCC) [I48.0]     Priority: Medium   • Essential hypertension [I10]     Priority: Medium   • Elevated liver function tests [R94.5]     Priority: Low   • Valvular heart disease [I38]       Past Surgical History:  Past Surgical History:   Procedure Laterality Date   • GYN SURGERY      hysterectomy   • OTHER ORTHOPEDIC SURGERY      right hip replacement       Hospital Medications:    Current Facility-Administered Medications:   •  furosemide (LASIX) injection 20 mg, 20 mg, Intravenous, BID DIURETIC, Freddie Encarnacion M.D., 20 mg at 05/27/19 0514  •  potassium chloride SA (Kdur) tablet 20 mEq, 20 mEq, Oral, BID, Freddie Encarnacion M.D., 20 mEq at 05/27/19 0514  •  senna-docusate (PERICOLACE or SENOKOT S) 8.6-50 MG per tablet 2 Tab, 2 Tab, Oral, BID, 2 Tab at 05/27/19 0514 **AND** polyethylene glycol/lytes (MIRALAX) PACKET 1 Packet, 1 Packet, Oral, QDAY PRN **AND** magnesium hydroxide (MILK OF MAGNESIA) suspension 30 mL, 30 mL, Oral, QDAY PRN **AND** bisacodyl (DULCOLAX) suppository 10 mg, 10 mg, Rectal, QDAY PRN, Freddie Encarnacion M.D.  •  Respiratory Care per Protocol, , Nebulization, Continuous RT, Freddie Encarnacion M.D.  •  heparin injection 5,000 Units, 5,000 Units, Subcutaneous, Q8HRS, Freddie Encarnacion M.D., 5,000 Units at  05/27/19 0514  •  acetaminophen (TYLENOL) tablet 650 mg, 650 mg, Oral, Q6HRS PRN, Freddie Encarnacion M.D.  •  ondansetron (ZOFRAN) syringe/vial injection 4 mg, 4 mg, Intravenous, Q4HRS PRN, Freddie Encarnacion M.D.  •  ondansetron (ZOFRAN ODT) dispertab 4 mg, 4 mg, Oral, Q4HRS PRN, Freddie Encarnacion M.D.  •  losartan (COZAAR) tablet 50 mg, 50 mg, Oral, Q DAY, Peterson Thompson M.D., 50 mg at 05/27/19 0514  •  ALPRAZolam (XANAX) tablet 0.25 mg, 0.25 mg, Oral, HS PRN, Jeremy M Gonda, M.D., 0.25 mg at 05/26/19 2214    Current Outpatient Medications:  Prescriptions Prior to Admission   Medication Sig Dispense Refill Last Dose   • losartan (COZAAR) 50 MG Tab Take 50 mg by mouth every day.      • atenolol (TENORMIN) 25 MG Tab Take 25 mg by mouth every day.      • amLODIPine (NORVASC) 10 MG Tab Take 10 mg by mouth every day.      • ALPRAZolam (XANAX) 0.25 MG Tab Take 0.25 mg by mouth at bedtime as needed for Sleep.      • omeprazole (PRILOSEC) 20 MG delayed-release capsule Take 20 mg by mouth every day.      • furosemide (LASIX) 40 MG Tab Take 40 mg by mouth every day.      • acetaminophen (TYLENOL) 325 MG Tab Take 650 mg by mouth as needed (for arthritis).      • VITAMIN E PO Take  by mouth every day.      • multivitamin (THERAGRAN) Tab Take 1 Tab by mouth every day.          Medication Allergy:  Allergies   Allergen Reactions   • Pcn [Penicillins]      rash       Family History:  History reviewed. No pertinent family history.    Social History:  Social History     Social History   • Marital status:      Spouse name: N/A   • Number of children: N/A   • Years of education: N/A     Occupational History   • Not on file.     Social History Main Topics   • Smoking status: Never Smoker   • Smokeless tobacco: Never Used   • Alcohol use Yes      Comment: occas   • Drug use: No   • Sexual activity: Not on file     Other Topics Concern   • Not on file     Social History Narrative   • No narrative on file         Physical  "Exam:  Vitals/ General Appearance:   Weight/BMI: Body mass index is 23.86 kg/m².  /63   Pulse (!) 56   Temp 36.7 °C (98 °F) (Temporal)   Resp (!) 24   Ht 1.702 m (5' 7\")   Wt 69.1 kg (152 lb 5.4 oz)   SpO2 95%   Vitals:    05/27/19 0613 05/27/19 0700 05/27/19 0800 05/27/19 1044   BP:       Pulse: (!) 55 60 (!) 50 (!) 56   Resp: (!) 23 20 20 (!) 24   Temp:       TempSrc:   Lilly    SpO2: 93% 96% 98% 95%   Weight:       Height:         Oxygen Therapy:  Pulse Oximetry: 95 %, O2 (LPM): 6, FiO2%: 60 %, O2 Delivery: High Flow Nasal Cannula    Constitutional:   Well developed, Well nourished, No acute distress  HENMT:  Normocephalic, Atraumatic, Oropharynx moist mucous membranes, No oral exudates, Nose normal.  No thyromegaly.  Eyes:  EOMI, Conjunctiva normal, No discharge.  Neck:  Normal range of motion, No cervical tenderness,  no JVD.  Cardiovascular:  Normal heart rate, Normal rhythm, 3/6 systolic mumur heard best at lateral side area (MV). No rubs, No gallops.   Extremitites with intact distal pulses, no cyanosis, or edema.  Lungs:  Normal breath sounds, breath sounds clear to auscultation bilaterally,  no rales, no rhonchi, no wheezing.   Abdomen: Bowel sounds normal, Soft, No tenderness, No guarding, No rebound, No masses, No hepatosplenomegaly.  Skin: Warm, Dry, No erythema, No rash, no induration.  Neurologic: Alert & oriented x 3, No focal deficits noted, cranial nerves II through X are intact.  Psychiatric: Affect normal, Judgment normal, Mood normal.      MDM (Data Review):     Records reviewed and summarized in current documentation    Lab Data Review:  Recent Results (from the past 24 hour(s))   TROPONIN    Collection Time: 05/26/19  4:08 PM   Result Value Ref Range    Troponin I 0.01 0.00 - 0.04 ng/mL   CBC WITHOUT DIFFERENTIAL    Collection Time: 05/27/19  3:07 AM   Result Value Ref Range    WBC 6.1 4.8 - 10.8 K/uL    RBC 3.56 (L) 4.20 - 5.40 M/uL    Hemoglobin 11.0 (L) 12.0 - 16.0 g/dL    " Hematocrit 34.9 (L) 37.0 - 47.0 %    MCV 98.0 (H) 81.4 - 97.8 fL    MCH 30.9 27.0 - 33.0 pg    MCHC 31.5 (L) 33.6 - 35.0 g/dL    RDW 50.3 (H) 35.9 - 50.0 fL    Platelet Count 179 164 - 446 K/uL    MPV 11.4 9.0 - 12.9 fL   Basic Metabolic Panel    Collection Time: 05/27/19  3:07 AM   Result Value Ref Range    Sodium 137 135 - 145 mmol/L    Potassium 4.4 3.6 - 5.5 mmol/L    Chloride 109 96 - 112 mmol/L    Co2 18 (L) 20 - 33 mmol/L    Glucose 105 (H) 65 - 99 mg/dL    Bun 55 (H) 8 - 22 mg/dL    Creatinine 2.22 (H) 0.50 - 1.40 mg/dL    Calcium 8.7 8.5 - 10.5 mg/dL    Anion Gap 10.0 0.0 - 11.9   BTYPE NATRIURETIC PEPTIDE    Collection Time: 05/27/19  3:07 AM   Result Value Ref Range    B Natriuretic Peptide 1074 (H) 0 - 100 pg/mL   ESTIMATED GFR    Collection Time: 05/27/19  3:07 AM   Result Value Ref Range    GFR If  26 (A) >60 mL/min/1.73 m 2    GFR If Non African American 21 (A) >60 mL/min/1.73 m 2       Imaging/Procedures Review:    Chest Xray:  Reviewed    EKG:   As in HPI.     MDM (Assessment and Plan):     Active Hospital Problems    Diagnosis   • Acute respiratory failure with hypoxia (HCC) [J96.01]     Priority: High   • Acute CHF (congestive heart failure) (HCC) [I50.9]     Priority: High   • Chronic renal impairment, stage 3 (moderate) (HCC) [N18.3]     Priority: Medium   • Paroxysmal A-fib (HCC) [I48.0]     Priority: Medium   • Essential hypertension [I10]     Priority: Medium   • Elevated liver function tests [R94.5]     Priority: Low   • Valvular heart disease [I38]         At this time, the acute decompensated respiratory distress secondary to volume overloaded state is likely related to valvular disease of mitral regurgitation.  She is indicated to undergo further intervention for her valvular disease.  At this point, we will obtain transesophageal echocardiogram to further assess the nature of her mitral regurgitation.  Overall, she might be a candidate for mitral clip.  I do think that  she is at high risk for any open heart surgery.  She has a baseline GFR of 21.  We will plan for further invasive cardiac work-up and intervention after her YAO.  The risks and benefits of YAO were explained to patient and her .  At this time, they would like to proceed.    In the meantime, we will optimize medical therapy.  We will put patient on torsemide therapy.      Thank you for referring this patient to our cardiology service.  We will follow patient with you.      Sulaiman Hidalgo MD.   Cardiology Inpatient Service.  [facility].  [phone].  Taylor Robbins.

## 2019-05-27 NOTE — CARE PLAN
Problem: Bowel/Gastric:  Goal: Normal bowel function is maintained or improved  Patient tolerating cardiac diet.   Discussed interventions to promote bowel pattern and evacuation.   Patient verbalized understanding     Problem: Respiratory:  Goal: Respiratory status will improve  Collaboration in place with MD and RT regarding BIPAP and HFNC settings.   Monitoring continuous pulse ox and serial cxr.   Encouraging patient to turn cough, deep breathe and use IS.

## 2019-05-27 NOTE — ASSESSMENT & PLAN NOTE
Patient is chronic kidney disease dating back at least 10 years by her history  Creatinine apparently is always been a little over 2 it has not changed for quite some time  Fluid, sodium and potassium restricted diet as clinically appropriate  Patient stated she likes to drink a lot of water, fluid restriction will be imposed on her if she cannot control herself, this is reviewed with her nursing staff at length  Renal ultrasound pending  Avoid nephrotoxins  Renally dose medications  Monitor for the need for nephrology consultation

## 2019-05-27 NOTE — PROGRESS NOTES
Hospital Medicine Daily Progress Note    Date of Service  5/27/2019    Chief Complaint  Short of breath    Hospital Course    78-year-old female with history of atrial fibrillation, renal insufficiency gastroesophageal reflux disease came in with increasing shortness of breath over prior 2-week period of time.  Patient was found in the emergency room pulmonary edema on x-ray.  Patient was admitted to the hospital IV Lasix and had significant improvement overnight.  Echocardiogram showed severe mitral regurgitation with preserved ejection fraction.      Interval Problem Update  Stable BP/HR  On high flow NC 35L & 60% this am  Patient later weaned down to nasal cannula  Patient alert and oriented speech clear  Patient updated on her severe mitral regurgitation  I have consulted , cardiologist for evaluation of mitral valve replacement and medical treatment.  Good urine output with lasix  CXR less pulmonary edema    Consultants/Specialty  Pulmonary    Code Status  FULL    Disposition  Transfer to telemetry    Review of Systems  Review of Systems   Constitutional: Negative for fever and malaise/fatigue.   HENT: Negative for congestion and sore throat.    Respiratory: Positive for shortness of breath. Negative for cough and sputum production.    Cardiovascular: Negative for chest pain, palpitations and leg swelling.   Gastrointestinal: Negative for abdominal pain and nausea.   Genitourinary: Positive for frequency. Negative for dysuria.   Musculoskeletal: Negative for back pain and neck pain.   Neurological: Negative for dizziness, speech change and headaches.   Psychiatric/Behavioral: The patient is not nervous/anxious.         Physical Exam  Temp:  [37.1 °C (98.8 °F)] 37.1 °C (98.8 °F)  Pulse:  [45-62] 53  Resp:  [16-36] 20  BP: (134)/(63) 134/63  SpO2:  [92 %-99 %] 94 %    Physical Exam   Constitutional: She is oriented to person, place, and time. She appears well-developed. No distress.   HENT:   Head:  Normocephalic and atraumatic.   Nose: Nose normal.   Mouth/Throat: No oropharyngeal exudate.   Eyes: Conjunctivae and EOM are normal. Right eye exhibits no discharge. Left eye exhibits no discharge.   Neck: No tracheal deviation present.   Cardiovascular: Normal rate and regular rhythm.    Murmur heard.  Pulmonary/Chest: Effort normal. No stridor. No respiratory distress. She has no wheezes. She has rales (fine crackles bibasilar).   Abdominal: Soft. Bowel sounds are normal. She exhibits no distension. There is no tenderness.   Musculoskeletal: She exhibits no edema.   Neurological: She is alert and oriented to person, place, and time. No cranial nerve deficit.   Skin: Skin is warm. She is not diaphoretic.   Psychiatric: She has a normal mood and affect. Her behavior is normal.   Vitals reviewed.      Fluids    Intake/Output Summary (Last 24 hours) at 05/27/19 1803  Last data filed at 05/27/19 1400   Gross per 24 hour   Intake             1200 ml   Output             2850 ml   Net            -1650 ml       Laboratory  Recent Labs      05/26/19   0432  05/27/19   0307   WBC  8.1  6.1   RBC  4.21  3.56*   HEMOGLOBIN  13.1  11.0*   HEMATOCRIT  40.6  34.9*   MCV  96.4  98.0*   MCH  31.1  30.9   MCHC  32.3*  31.5*   RDW  49.3  50.3*   PLATELETCT  240  179   MPV  11.4  11.4     Recent Labs      05/26/19   0432  05/27/19   0307   SODIUM  137  137   POTASSIUM  4.2  4.4   CHLORIDE  111  109   CO2  18*  18*   GLUCOSE  119*  105*   BUN  57*  55*   CREATININE  2.27*  2.22*   CALCIUM  9.4  8.7         Recent Labs      05/26/19   0432  05/27/19   0307   BNPBTYPENAT  1431*  1074*           Imaging  DX-CHEST-PORTABLE (1 VIEW)   Final Result      1.  There are continued changes of pulmonary edema with slightly improved aeration.      EC-ECHOCARDIOGRAM COMPLETE W/O CONT   Final Result      US-RENAL   Final Result      1.  Bilateral small echogenic kidney consistent with sequela of chronic process      2.  Negative for hydronephrosis       3.  Multiple renal cyst      DX-CHEST-PORTABLE (1 VIEW)   Final Result      Perihilar and bibasilar opacities with probable small left pleural effusion. Findings are nonspecific and may represent bilateral pneumonia or edema.      EC-YAO W/O CONT    (Results Pending)        Assessment/Plan  * Acute respiratory failure with hypoxia (HCC)- (present on admission)   Assessment & Plan    Lasix  CXR 5/27 showing decrease pulmonary edema  Monitor I/O's  Severe mitral valvular disease as likely etiology  RT protocol  Supplemental oxygen and on high flow NC as of 5/27am.     Acute CHF (congestive heart failure) (HCC)- (present on admission)   Assessment & Plan    Lasix 20mg IV BID, losartan  5/26 Echocardiogram:   Normal left ventricular systolic function.   Left ventricular ejection fraction is visually estimated to be 70%.   Severe mitral regurgitation.   Right heart pressures are consistent with severe pulmonary hypertension    at 70 mmHg.  Consult cardiology (question if valve surgery candidate)     Essential hypertension   Assessment & Plan    Monitor vitals  Losartan (watch renal function)  Was on atenolol (hold due to bradycardia) and amlodipine 10mg       Paroxysmal A-fib (HCC)- (present on admission)   Assessment & Plan    Monitor on telemetry  Mitral valve       Chronic renal impairment, stage 3 (moderate) (HCC)- (present on admission)   Assessment & Plan    5/27 BUN:55, Cr:2.22  Likely acute but no labs at [facility] since 2005 (normal renal function in 2005)  Monitor I/O's  C/o mitral valve as etiology     Valvular heart disease   Assessment & Plan    Severe mitral regurgitation noted on echocardiogram  Cardiology consulted for consideration of surgery     Elevated liver function tests   Assessment & Plan    Likely hepatic congestion  Monitor labs          VTE prophylaxis: Heparin

## 2019-05-27 NOTE — PROGRESS NOTES
"Patient refusing mobility at this time. Education provided on importance of mobilization and benefits to health. Patient stating she \"just wants to rest for now.\" RN will continue to encourage patient to increase activity, and attempt to mobilize in the a.m.   "

## 2019-05-27 NOTE — PROGRESS NOTES
Cardiology at bedside.   Discussed options with patient.     Patient to be NPO at midnight for YAO tomorrow 5/28

## 2019-05-27 NOTE — ASSESSMENT & PLAN NOTE
Obtain old records  Check TSH  Echocardiogram pending  Med rec form still not complete and patient does not recall her medicines although she does think she takes a atenolol  Patient notes palpitations and fast heart rate only about once a year  Cardiac monitoring

## 2019-05-27 NOTE — RESPIRATORY CARE
COPD EDUCATION by COPD CLINICAL EDUCATOR  5/27/2019 at 8:41 AM by Concha Gillespie     Patient reviewed by COPD education team. Patient does not have a history or diagnosis of COPD and is a non-smoker, therefore does not qualify for the COPD program.

## 2019-05-27 NOTE — PROGRESS NOTES
Patient preforming edge of bed mobility and standing by bedside with 1 RN assist. Stable gait. VSS.

## 2019-05-27 NOTE — PROGRESS NOTES
"Patient refusing to wear SCDs at beginning of shift. RN educated patient in regards to purpose of SCDs and health benefits related to SCDs use. Patient agreed to trying SCDs.    2300- Patient requesting to take SCDs off due to them increasing cramps in her lower extremities. Patient states that she has baseline \"leg cramping at home,\" and that she has a cream that she uses to alleviate pain. RN offered lotion and lower ext. massage to help decrease calf pain. Patient stating that \"it feels a lot better\" after massage. Patient refusing SCDs use at this time.     RN will continue to monitor.   "

## 2019-05-27 NOTE — CARE PLAN
Problem: Safety  Goal: Will remain free from falls    Intervention: Implement fall precautions  Safety interventions in place. Bed locked and in the lowest position. Bed alarm on. Call light within reach.

## 2019-05-28 ENCOUNTER — TELEPHONE (OUTPATIENT)
Dept: CARDIOLOGY | Facility: MEDICAL CENTER | Age: 79
End: 2019-05-28

## 2019-05-28 ENCOUNTER — APPOINTMENT (OUTPATIENT)
Dept: CARDIOLOGY | Facility: MEDICAL CENTER | Age: 79
DRG: 286 | End: 2019-05-28
Attending: INTERNAL MEDICINE
Payer: MEDICARE

## 2019-05-28 LAB
ANION GAP SERPL CALC-SCNC: 7 MMOL/L (ref 0–11.9)
BASOPHILS # BLD AUTO: 0.8 % (ref 0–1.8)
BASOPHILS # BLD: 0.04 K/UL (ref 0–0.12)
BUN SERPL-MCNC: 53 MG/DL (ref 8–22)
CALCIUM SERPL-MCNC: 9 MG/DL (ref 8.5–10.5)
CHLORIDE SERPL-SCNC: 110 MMOL/L (ref 96–112)
CO2 SERPL-SCNC: 22 MMOL/L (ref 20–33)
CREAT SERPL-MCNC: 2.25 MG/DL (ref 0.5–1.4)
EOSINOPHIL # BLD AUTO: 0.23 K/UL (ref 0–0.51)
EOSINOPHIL NFR BLD: 4.4 % (ref 0–6.9)
ERYTHROCYTE [DISTWIDTH] IN BLOOD BY AUTOMATED COUNT: 47.8 FL (ref 35.9–50)
GLUCOSE SERPL-MCNC: 105 MG/DL (ref 65–99)
HCT VFR BLD AUTO: 36.1 % (ref 37–47)
HGB BLD-MCNC: 11.5 G/DL (ref 12–16)
IMM GRANULOCYTES # BLD AUTO: 0.01 K/UL (ref 0–0.11)
IMM GRANULOCYTES NFR BLD AUTO: 0.2 % (ref 0–0.9)
LYMPHOCYTES # BLD AUTO: 1.76 K/UL (ref 1–4.8)
LYMPHOCYTES NFR BLD: 33.5 % (ref 22–41)
MCH RBC QN AUTO: 30.6 PG (ref 27–33)
MCHC RBC AUTO-ENTMCNC: 31.9 G/DL (ref 33.6–35)
MCV RBC AUTO: 96 FL (ref 81.4–97.8)
MONOCYTES # BLD AUTO: 0.48 K/UL (ref 0–0.85)
MONOCYTES NFR BLD AUTO: 9.1 % (ref 0–13.4)
NEUTROPHILS # BLD AUTO: 2.73 K/UL (ref 2–7.15)
NEUTROPHILS NFR BLD: 52 % (ref 44–72)
NRBC # BLD AUTO: 0 K/UL
NRBC BLD-RTO: 0 /100 WBC
PLATELET # BLD AUTO: 191 K/UL (ref 164–446)
PMV BLD AUTO: 10.9 FL (ref 9–12.9)
POTASSIUM SERPL-SCNC: 4.6 MMOL/L (ref 3.6–5.5)
RBC # BLD AUTO: 3.76 M/UL (ref 4.2–5.4)
SODIUM SERPL-SCNC: 139 MMOL/L (ref 135–145)
WBC # BLD AUTO: 5.3 K/UL (ref 4.8–10.8)

## 2019-05-28 PROCEDURE — 700102 HCHG RX REV CODE 250 W/ 637 OVERRIDE(OP): Performed by: INTERNAL MEDICINE

## 2019-05-28 PROCEDURE — 85025 COMPLETE CBC W/AUTO DIFF WBC: CPT

## 2019-05-28 PROCEDURE — A9270 NON-COVERED ITEM OR SERVICE: HCPCS | Performed by: INTERNAL MEDICINE

## 2019-05-28 PROCEDURE — 99232 SBSQ HOSP IP/OBS MODERATE 35: CPT | Performed by: FAMILY MEDICINE

## 2019-05-28 PROCEDURE — 99233 SBSQ HOSP IP/OBS HIGH 50: CPT | Performed by: INTERNAL MEDICINE

## 2019-05-28 PROCEDURE — 80048 BASIC METABOLIC PNL TOTAL CA: CPT

## 2019-05-28 PROCEDURE — 36415 COLL VENOUS BLD VENIPUNCTURE: CPT

## 2019-05-28 PROCEDURE — 700117 HCHG RX CONTRAST REV CODE 255: Performed by: INTERNAL MEDICINE

## 2019-05-28 PROCEDURE — C1769 GUIDE WIRE: HCPCS

## 2019-05-28 PROCEDURE — B2111ZZ FLUOROSCOPY OF MULTIPLE CORONARY ARTERIES USING LOW OSMOLAR CONTRAST: ICD-10-PCS | Performed by: INTERNAL MEDICINE

## 2019-05-28 PROCEDURE — 700101 HCHG RX REV CODE 250

## 2019-05-28 PROCEDURE — 93460 R&L HRT ART/VENTRICLE ANGIO: CPT | Mod: 26 | Performed by: INTERNAL MEDICINE

## 2019-05-28 PROCEDURE — 99152 MOD SED SAME PHYS/QHP 5/>YRS: CPT | Performed by: INTERNAL MEDICINE

## 2019-05-28 PROCEDURE — 700111 HCHG RX REV CODE 636 W/ 250 OVERRIDE (IP)

## 2019-05-28 PROCEDURE — 4A023N8 MEASUREMENT OF CARDIAC SAMPLING AND PRESSURE, BILATERAL, PERCUTANEOUS APPROACH: ICD-10-PCS | Performed by: INTERNAL MEDICINE

## 2019-05-28 PROCEDURE — 85610 PROTHROMBIN TIME: CPT

## 2019-05-28 PROCEDURE — 700102 HCHG RX REV CODE 250 W/ 637 OVERRIDE(OP): Performed by: HOSPITALIST

## 2019-05-28 PROCEDURE — 770020 HCHG ROOM/CARE - TELE (206)

## 2019-05-28 PROCEDURE — 700111 HCHG RX REV CODE 636 W/ 250 OVERRIDE (IP): Performed by: HOSPITALIST

## 2019-05-28 PROCEDURE — B2151ZZ FLUOROSCOPY OF LEFT HEART USING LOW OSMOLAR CONTRAST: ICD-10-PCS | Performed by: INTERNAL MEDICINE

## 2019-05-28 PROCEDURE — A9270 NON-COVERED ITEM OR SERVICE: HCPCS | Performed by: HOSPITALIST

## 2019-05-28 RX ORDER — HEPARIN SODIUM,PORCINE 1000/ML
VIAL (ML) INJECTION
Status: COMPLETED
Start: 2019-05-28 | End: 2019-05-28

## 2019-05-28 RX ORDER — LIDOCAINE HYDROCHLORIDE 20 MG/ML
INJECTION, SOLUTION INFILTRATION; PERINEURAL
Status: COMPLETED
Start: 2019-05-28 | End: 2019-05-28

## 2019-05-28 RX ORDER — VERAPAMIL HYDROCHLORIDE 2.5 MG/ML
INJECTION, SOLUTION INTRAVENOUS
Status: COMPLETED
Start: 2019-05-28 | End: 2019-05-28

## 2019-05-28 RX ORDER — MIDAZOLAM HYDROCHLORIDE 1 MG/ML
INJECTION INTRAMUSCULAR; INTRAVENOUS
Status: COMPLETED
Start: 2019-05-28 | End: 2019-05-28

## 2019-05-28 RX ADMIN — POTASSIUM CHLORIDE 20 MEQ: 1500 TABLET, EXTENDED RELEASE ORAL at 05:18

## 2019-05-28 RX ADMIN — VERAPAMIL HYDROCHLORIDE 2.5 MG: 2.5 INJECTION, SOLUTION INTRAVENOUS at 10:15

## 2019-05-28 RX ADMIN — HEPARIN SODIUM 5000 UNITS: 5000 INJECTION, SOLUTION INTRAVENOUS; SUBCUTANEOUS at 21:03

## 2019-05-28 RX ADMIN — ALPRAZOLAM 0.25 MG: 0.25 TABLET ORAL at 21:03

## 2019-05-28 RX ADMIN — HEPARIN SODIUM: 1000 INJECTION, SOLUTION INTRAVENOUS; SUBCUTANEOUS at 10:42

## 2019-05-28 RX ADMIN — HEPARIN SODIUM 2000 UNITS: 200 INJECTION, SOLUTION INTRAVENOUS at 10:39

## 2019-05-28 RX ADMIN — MIDAZOLAM: 1 INJECTION INTRAMUSCULAR; INTRAVENOUS at 10:15

## 2019-05-28 RX ADMIN — HEPARIN SODIUM 5000 UNITS: 5000 INJECTION, SOLUTION INTRAVENOUS; SUBCUTANEOUS at 14:51

## 2019-05-28 RX ADMIN — POTASSIUM CHLORIDE 20 MEQ: 1500 TABLET, EXTENDED RELEASE ORAL at 17:35

## 2019-05-28 RX ADMIN — TORSEMIDE 20 MG: 20 TABLET ORAL at 05:18

## 2019-05-28 RX ADMIN — HEPARIN SODIUM 5000 UNITS: 5000 INJECTION, SOLUTION INTRAVENOUS; SUBCUTANEOUS at 05:18

## 2019-05-28 RX ADMIN — NITROGLYCERIN 10 ML: 20 INJECTION INTRAVENOUS at 10:39

## 2019-05-28 RX ADMIN — IOHEXOL 24 ML: 350 INJECTION, SOLUTION INTRAVENOUS at 11:11

## 2019-05-28 RX ADMIN — FENTANYL CITRATE: 50 INJECTION, SOLUTION INTRAMUSCULAR; INTRAVENOUS at 10:15

## 2019-05-28 RX ADMIN — LOSARTAN POTASSIUM 50 MG: 50 TABLET ORAL at 05:18

## 2019-05-28 RX ADMIN — LIDOCAINE HYDROCHLORIDE: 20 INJECTION, SOLUTION INFILTRATION; PERINEURAL at 10:38

## 2019-05-28 ASSESSMENT — ENCOUNTER SYMPTOMS
SPUTUM PRODUCTION: 0
BACK PAIN: 0
DOUBLE VISION: 0
DEPRESSION: 0
DIZZINESS: 0
SHORTNESS OF BREATH: 1
PALPITATIONS: 0
CHEST TIGHTNESS: 0
CHILLS: 0
HEADACHES: 0
NAUSEA: 0
BLOOD IN STOOL: 0
PHOTOPHOBIA: 0
TINGLING: 0
MYALGIAS: 0
COUGH: 0
FEVER: 0
BLURRED VISION: 0
NECK PAIN: 0
VOMITING: 0
NERVOUS/ANXIOUS: 0
ORTHOPNEA: 0
ABDOMINAL PAIN: 0

## 2019-05-28 NOTE — PROCEDURES
DATE OF SERVICE:  05/28/2019    REFERRING PHYSICIAN:  Jerman Hidalgo MD    PROCEDURES:  1.  Right heart catheterization.  2.  Left heart catheterization.  3.  Coronary angiography.  4.  Left ventriculogram.  5.  Monitored conscious sedation starting at 10:36.    PREPROCEDURE DIAGNOSES:  1.  Severe mitral regurgitation.  2.  Acute diastolic congestive heart failure.    POSTPROCEDURE DIAGNOSES:  1.  A 3-4+ mitral regurgitation.  2.  No angiographic evidence of coronary artery disease.  3.  Normal left ventricular systolic function with ejection fraction of 65%.  4.  Elevated left ventricular end-diastolic pressure.  5.  Elevated right heart pressures with PA systolic pressure of 50 mmHg.    INDICATION:  The patient is an 79-year-old female with atrial fibrillation,   not on chronic anticoagulation therapy and chronic kidney disease.  She was   admitted to [facility] with acute diastolic congestive heart   failure.  Echocardiogram showed severe mitral regurgitation.  She was   scheduled for cardiac catheterization.    DESCRIPTION OF PROCEDURE:  After informed consent was signed by the patient,   the patient was brought to the cardiac catheterization laboratory.  She was   prepped and draped in usual sterile manner.  The right brachial area was   anesthetized with 2% Xylocaine.  A 6-Azeri sheath was inserted into the right   brachial vein over an existent IV.  A [place]-Katelyn catheter was positioned at   the pulmonary artery.  Right heart pressure measurements were obtained.  The   [place]-Katelyn catheter was removed.  I accessed the right radial artery under   ultrasound guidance; however, this was unable to be performed due to the small   nature of her radial artery.  The right inguinal area was anesthetized with   2% Xylocaine.  A 4-Azeri sheath was inserted into the right femoral artery   using modified Seldinger technique under ultrasound guidance.  A 4-Azeri   pigtail catheter was positioned into the left  ventricle.  Left   ventriculography was performed.  This was exchanged for a 4-Romanian JL 3.5   catheter, which was positioned into the left main coronary artery.  Coronary   angiography was performed.  This was exchanged for a 4-Romanian 3DRC catheter,   which was positioned into the right coronary artery.  Coronary angiography was   performed.  Patient tolerated the procedure well.  At the end of the   procedure, all catheters and sheaths were removed.  Patient was then   transferred to telemetry in stable condition.    HEMODYNAMIC DATA:  Hemodynamic data shows right heart pressures of RA 15 mmHg,   RV 50/10 mmHg with RVEDP of 15 mmHg, PA 50/25 with mean of 30 mmHg and   pulmonary wedge of 18 mmHg.  Left heart catheterization, /70 with mean   of 100 mmHg and left ventricular pressure 100/0 with LVEDP of 15 mmHg.    AORTIC VALVE:  There was no significant gradient noted.    LEFT VENTRICULOGRAM:  A 5 mL of contrast were delivered for 1 second.    Ejection fraction was estimated to be 55%.  There was 3-4+ mitral   regurgitation noted.    ANGIOGRAM:  LEFT MAIN CORONARY ARTERY:  Left main coronary artery is a moderate length,   very large caliber vessel free of disease.  LEFT ANTERIOR DESCENDING ARTERY:  Left anterior descending artery is a long   moderate caliber, very tortuous vessel, which wraps around the apex.  There is   a moderate caliber, very tortuous diagonal branch also noted.  Left anterior   descending artery and its branches are free of disease.  RAMUS INTERMEDIUS:  Ramus intermedius is a small tortuous vessel free of   disease.  LEFT CIRCUMFLEX ARTERY:  Left circumflex artery is a codominant large caliber   vessel with large distal bifurcating very tortuous obtuse marginal branch and   moderate very tortuous left posterior lateral branch.  Left circumflex artery   and its branches are free of disease.  RIGHT CORONARY ARTERY:  Right coronary artery is a codominant moderate caliber   vessel with  moderate length, moderate caliber, very tortuous posterior   descending artery.  Right coronary artery and its branches are free of   disease.    IMPRESSION:  1.  A 3-4+ mitral regurgitation.  2.  No angiographic evidence of coronary artery disease.  3.  Normal left ventricular systolic function with ejection fraction of 65%.  4.  Elevated left ventricular end-diastolic pressure.  5.  Elevated right heart pressures with PA systolic pressure of 50 mmHg.    RECOMMENDATIONS:  Recommend consultation with Dr. Ma for MitraClip   Procedure.    SEDATION TIME: The patient's sedation was managed by myself with continuous   face to face time with the patient for 15 minutes from 10:36 to 10:51.       ____________________________________     MD GENET SOLORIO / PRANAV    DD:  05/28/2019 11:18:27  DT:  05/28/2019 11:33:17    D#:  [id number]  Job#:  [id number]

## 2019-05-28 NOTE — PROGRESS NOTES
2 RN skin check complete with RACHEAL Coyne    · Devices in place NC  · Skin assessed under devices yes  · Confirmed wounds found on none  · New potential wounds noted on none  · Wound consult: no  · Wound reported and pictures uploaded: no    Ears red and blanching  Trace BLLE edema  Feet dry  BL lateral 5th toe digit knuckle red and blanching     · The following interventions in place: silicone NC, encourage turning side to side for sacrum

## 2019-05-28 NOTE — PROGRESS NOTES
Report given to accepting RN.   Patient to transfer to [facility]-.     Patient transferring via wheelchair, on transport monitor.     Personal belongings and chart with patient en route.

## 2019-05-28 NOTE — PROGRESS NOTES
Assumed care at 0700, received report from elie vargas RN. Pt on monitor. Initial assessment completed. Call light within reach, bed alarm is not on. Pt left to Cath lab. Cardiac monitoring notified.

## 2019-05-28 NOTE — PROGRESS NOTES
Cardiology Follow Up Progress Note    Date of Service  5/28/2019    Attending Physician  Kashif Sloan M.D.    Chief Complaint   Shortness of breath    Consulted to evaluate mitral valve regurgitation and heart failure exacerbation.    CLARA Gallegos is a 79 y.o. female admitted 5/26/2019 with dyspnea and elevated BNP.  Chest x-ray showed infiltrates consistent with edema versus pneumonia and patient was hypoxic needing BiPAP to stabilize respiratory status.    Past medical history significant for atrial fibrillation, renal insufficiency and GERD.    Interim Events  5/28/19: On bed bed rest post cath, recovering without complication.  Updated patient on plan for YAO to be completed tomorrow at 1 PM.  Patient states that overall she is feeling significantly better.  No significant events overnight.    Monitor: SB/SR 54-60    Review of Systems  Review of Systems   Constitutional: Negative for fever.   Respiratory: Positive for shortness of breath (Improved). Negative for chest tightness.    Cardiovascular: Positive for leg swelling (Significantly improved). Negative for chest pain and palpitations.   Gastrointestinal: Negative for abdominal pain and blood in stool.   Genitourinary: Negative for hematuria.   Musculoskeletal: Negative for gait problem.   Neurological: Negative for dizziness and syncope.   All other systems reviewed and are negative.      Vital signs in last 24 hours  Temp:  [36.1 °C (96.9 °F)-37.1 °C (98.8 °F)] 36.2 °C (97.2 °F)  Pulse:  [46-62] 48  Resp:  [16-28] 16  BP: (118-137)/(61-73) 130/66  SpO2:  [94 %-99 %] 98 %    Physical Exam  Physical Exam   Constitutional: She is oriented to person, place, and time. She appears well-developed and well-nourished. No distress.   HENT:   Head: Normocephalic.   Eyes: EOM are normal.   Neck: No JVD present.   Cardiovascular: Normal rate, regular rhythm, normal heart sounds and intact distal pulses.    Pulmonary/Chest: Effort normal. No respiratory  distress. She has wheezes.   ON 6 L O2 via NC   Abdominal: Soft. There is no tenderness.   Musculoskeletal: Normal range of motion. She exhibits no edema.   Neurological: She is alert and oriented to person, place, and time.   Skin: Skin is warm and dry.   Psychiatric: She has a normal mood and affect. Her behavior is normal. Thought content normal.   Nursing note and vitals reviewed.      Lab Review  Lab Results   Component Value Date/Time    WBC 5.3 05/28/2019 02:08 AM    RBC 3.76 (L) 05/28/2019 02:08 AM    HEMOGLOBIN 11.5 (L) 05/28/2019 02:08 AM    HEMATOCRIT 36.1 (L) 05/28/2019 02:08 AM    MCV 96.0 05/28/2019 02:08 AM    MCH 30.6 05/28/2019 02:08 AM    MCHC 31.9 (L) 05/28/2019 02:08 AM    MPV 10.9 05/28/2019 02:08 AM      Lab Results   Component Value Date/Time    SODIUM 139 05/28/2019 02:08 AM    POTASSIUM 4.6 05/28/2019 02:08 AM    CHLORIDE 110 05/28/2019 02:08 AM    CO2 22 05/28/2019 02:08 AM    GLUCOSE 105 (H) 05/28/2019 02:08 AM    BUN 53 (H) 05/28/2019 02:08 AM    CREATININE 2.25 (H) 05/28/2019 02:08 AM    CREATININE 0.8 09/20/2005 07:10 AM      Lab Results   Component Value Date/Time    ASTSGOT 47 (H) 05/26/2019 04:32 AM    ALTSGPT 76 (H) 05/26/2019 04:32 AM     Lab Results   Component Value Date/Time    TROPONINI 0.01 05/26/2019 04:08 PM       Recent Labs      05/26/19   0432  05/27/19   0307   BNPBTYPENAT  1431*  1074*       Cardiac Imaging and Procedures Review  Echocardiogram 5/26/19:  CONCLUSIONS  Normal left ventricular systolic function.  Left ventricular ejection fraction is visually estimated to be 70%.  Severe mitral regurgitation.  Right heart pressures are consistent with severe pulmonary hypertension   at 70 mmHg.  No prior study is available for comparison.     Cardiac Catheterization 5/28/19:  PROCEDURES:  1.  Right heart catheterization.  2.  Left heart catheterization.  3.  Coronary angiography.  4.  Left ventriculogram.  5.  Monitored conscious sedation starting at 1036.     PREPROCEDURE  DIAGNOSES:  1.  Severe mitral regurgitation.  2.  Acute diastolic congestive heart failure.     POSTPROCEDURE DIAGNOSES:  1.  A 3-4+ mitral regurgitation.  2.  No angiographic evidence of coronary artery disease.  3.  Normal left ventricular systolic function with ejection fraction of 65%.  4.  Elevated left ventricular end-diastolic pressure.  5.  Elevated right heart pressures with PA systolic pressure of 50 mmHg.    Imaging  Chest X-Ray 5/27/19:  1.  There are continued changes of pulmonary edema with slightly improved aeration.    Assessment/Plan  Mitral Valve Regurgitation:  -Severe per TTE on 5/26/19.  -3-4+ per [facility] this am.   -Admitted with acute decompensation (Dyspnes/Edema and pulmonary edema on CXR).  -Plan is for YAO tomorrow at 1 pm.   -Continue afterload reduction with Losartan 50 mg daily and Torsemide 20 mg daily.   -?Mitraclip     Pulmonary HTN:  -RVSP 70 mmHg per TTE on 5/26/19 with diastolic dysfunction (unable to be graded due to MR).  -Volume status improving.  -Cr stable at 2.2.  -Continue Losartan and Torsemide as above.       Thank you for allowing us to participate in the care of this patient.  We will continue to follow alongside you.  Please us if you have any questions or concerns.      JUAN Briggs.   [facility]  [phone]

## 2019-05-28 NOTE — PROGRESS NOTES
Assumed care at 1900, bedside report received from day shift RN. Pt. Is SB on the monitor. Initial assessment completed, orders reviewed, call light within reach, bed alarm is not in use, and hourly rounding in place. POC addressed with patient, no additional questions at this time.

## 2019-05-29 ENCOUNTER — APPOINTMENT (OUTPATIENT)
Dept: CARDIOLOGY | Facility: MEDICAL CENTER | Age: 79
DRG: 286 | End: 2019-05-29
Attending: INTERNAL MEDICINE
Payer: MEDICARE

## 2019-05-29 LAB
ANION GAP SERPL CALC-SCNC: 10 MMOL/L (ref 0–11.9)
BUN SERPL-MCNC: 54 MG/DL (ref 8–22)
CALCIUM SERPL-MCNC: 9.1 MG/DL (ref 8.5–10.5)
CHLORIDE SERPL-SCNC: 105 MMOL/L (ref 96–112)
CO2 SERPL-SCNC: 22 MMOL/L (ref 20–33)
CREAT SERPL-MCNC: 2.3 MG/DL (ref 0.5–1.4)
GLUCOSE SERPL-MCNC: 95 MG/DL (ref 65–99)
POTASSIUM SERPL-SCNC: 4.9 MMOL/L (ref 3.6–5.5)
SODIUM SERPL-SCNC: 137 MMOL/L (ref 135–145)

## 2019-05-29 PROCEDURE — 99232 SBSQ HOSP IP/OBS MODERATE 35: CPT | Performed by: FAMILY MEDICINE

## 2019-05-29 PROCEDURE — 700102 HCHG RX REV CODE 250 W/ 637 OVERRIDE(OP): Performed by: INTERNAL MEDICINE

## 2019-05-29 PROCEDURE — 160002 HCHG RECOVERY MINUTES (STAT)

## 2019-05-29 PROCEDURE — 99233 SBSQ HOSP IP/OBS HIGH 50: CPT | Mod: 25 | Performed by: INTERNAL MEDICINE

## 2019-05-29 PROCEDURE — 93325 DOPPLER ECHO COLOR FLOW MAPG: CPT

## 2019-05-29 PROCEDURE — A9270 NON-COVERED ITEM OR SERVICE: HCPCS | Performed by: INTERNAL MEDICINE

## 2019-05-29 PROCEDURE — 700111 HCHG RX REV CODE 636 W/ 250 OVERRIDE (IP): Performed by: HOSPITALIST

## 2019-05-29 PROCEDURE — 80048 BASIC METABOLIC PNL TOTAL CA: CPT

## 2019-05-29 PROCEDURE — 770020 HCHG ROOM/CARE - TELE (206)

## 2019-05-29 PROCEDURE — 99152 MOD SED SAME PHYS/QHP 5/>YRS: CPT | Performed by: INTERNAL MEDICINE

## 2019-05-29 PROCEDURE — 93312 ECHO TRANSESOPHAGEAL: CPT | Mod: 26,52 | Performed by: INTERNAL MEDICINE

## 2019-05-29 PROCEDURE — 97161 PT EVAL LOW COMPLEX 20 MIN: CPT

## 2019-05-29 PROCEDURE — A9270 NON-COVERED ITEM OR SERVICE: HCPCS | Performed by: HOSPITALIST

## 2019-05-29 PROCEDURE — 700102 HCHG RX REV CODE 250 W/ 637 OVERRIDE(OP): Performed by: HOSPITALIST

## 2019-05-29 PROCEDURE — 700111 HCHG RX REV CODE 636 W/ 250 OVERRIDE (IP)

## 2019-05-29 PROCEDURE — 36415 COLL VENOUS BLD VENIPUNCTURE: CPT

## 2019-05-29 RX ORDER — MIDAZOLAM HYDROCHLORIDE 1 MG/ML
INJECTION INTRAMUSCULAR; INTRAVENOUS
Status: COMPLETED
Start: 2019-05-29 | End: 2019-05-29

## 2019-05-29 RX ADMIN — POTASSIUM CHLORIDE 20 MEQ: 1500 TABLET, EXTENDED RELEASE ORAL at 17:58

## 2019-05-29 RX ADMIN — ALPRAZOLAM 0.25 MG: 0.25 TABLET ORAL at 20:16

## 2019-05-29 RX ADMIN — LOSARTAN POTASSIUM 50 MG: 50 TABLET ORAL at 05:33

## 2019-05-29 RX ADMIN — TORSEMIDE 20 MG: 20 TABLET ORAL at 05:34

## 2019-05-29 RX ADMIN — FENTANYL CITRATE 50 MCG: 50 INJECTION, SOLUTION INTRAMUSCULAR; INTRAVENOUS at 08:58

## 2019-05-29 RX ADMIN — MIDAZOLAM 2 MG: 1 INJECTION INTRAMUSCULAR; INTRAVENOUS at 08:58

## 2019-05-29 RX ADMIN — POTASSIUM CHLORIDE 20 MEQ: 1500 TABLET, EXTENDED RELEASE ORAL at 05:33

## 2019-05-29 RX ADMIN — HEPARIN SODIUM 5000 UNITS: 5000 INJECTION, SOLUTION INTRAVENOUS; SUBCUTANEOUS at 20:16

## 2019-05-29 RX ADMIN — HEPARIN SODIUM 5000 UNITS: 5000 INJECTION, SOLUTION INTRAVENOUS; SUBCUTANEOUS at 13:05

## 2019-05-29 RX ADMIN — HEPARIN SODIUM 5000 UNITS: 5000 INJECTION, SOLUTION INTRAVENOUS; SUBCUTANEOUS at 05:34

## 2019-05-29 ASSESSMENT — ENCOUNTER SYMPTOMS
CHEST TIGHTNESS: 0
NAUSEA: 0
ABDOMINAL PAIN: 0
TINGLING: 0
BLURRED VISION: 0
EYE PAIN: 0
CHILLS: 0
BACK PAIN: 0
DIZZINESS: 0
FEVER: 0
SHORTNESS OF BREATH: 1
TREMORS: 0
SPUTUM PRODUCTION: 0
VOMITING: 0
ORTHOPNEA: 0
HEADACHES: 0
COUGH: 0
BLOOD IN STOOL: 0
PHOTOPHOBIA: 0
PALPITATIONS: 0
DOUBLE VISION: 0
CLAUDICATION: 0
MYALGIAS: 0
DEPRESSION: 0
DIARRHEA: 0
NECK PAIN: 0
WEIGHT LOSS: 0

## 2019-05-29 ASSESSMENT — LIFESTYLE VARIABLES: SUBSTANCE_ABUSE: 0

## 2019-05-29 ASSESSMENT — COGNITIVE AND FUNCTIONAL STATUS - GENERAL
SUGGESTED CMS G CODE MODIFIER MOBILITY: CH
MOBILITY SCORE: 24

## 2019-05-29 ASSESSMENT — GAIT ASSESSMENTS
DISTANCE (FEET): 500
GAIT LEVEL OF ASSIST: SUPERVISED

## 2019-05-29 NOTE — PROGRESS NOTES
Pt back from YAO, VSS, awake and alert without complaint, tolerating drinking water.  Verified SB with monitor tech

## 2019-05-29 NOTE — PROCEDURES
Moderate sedation was used by me (Sulaiman Hidalgo MD).    Agents: Fentanyl and Versed via intravenous injection (please see media tab for details of dosage).    Start time: 09:03 AM.    Stop time: 09:13 AM.    Complications: none.    Sulaiman Hidalgo M.D.

## 2019-05-29 NOTE — PROGRESS NOTES
Assumed care at 1900, bedside report received from day shift RN. Pt. Is SB on the monitor. Initial assessment completed, orders reviewed, call light within reach and hourly rounding in place. POC addressed with patient, no additional questions at this time.

## 2019-05-29 NOTE — THERAPY
"Physical Therapy Evaluation completed.   Bed Mobility:  Supine to Sit: Modified Independent  Transfers: Sit to Stand: Supervised  Gait: Level Of Assist: Supervised with No Equipment Needed       Plan of Care: Patient with no further skilled PT needs in the acute care setting at this time  Discharge Recommendations: Equipment: No Equipment Needed. Post-acute therapy Currently anticipate no further skilled therapy needs once patient is discharged from the inpatient setting.    Pt admitted for acute respiratory failure and mitral regurgitation workup. She presents to PT mobility and cardiac rehab phase I eval without gross mobility deficit. She completed 500' of gait without device and x4 stairs all at supervision. As cardiac workup mostly negative other than mild mitral regurgitation, educated pt on basics of cardiac precautions including Talk Test and energy conservation/pacing methods. Pt receptive and reports she is already quite active walking her dog daily for ~45mins. Spouse present and supportive. At this time, pt does not require further acute PT intervention and appears functionally capable of return home.     See \"Rehab Therapy-Acute\" Patient Summary Report for complete documentation.     "

## 2019-05-29 NOTE — CARE PLAN
Problem: Communication  Goal: The ability to communicate needs accurately and effectively will improve  Outcome: PROGRESSING AS EXPECTED  Patient educated to utilize call light. Patient encouraged to ask questions about plan of care. Patient effectively uses call light and is involved in POC.    Problem: Safety  Goal: Will remain free from falls  Outcome: PROGRESSING AS EXPECTED  Patient's risk for injury and falls assessed. Appropriate safety precautions in place. Patient educated to utilize call light for needs. Patient verbalizes understanding.

## 2019-05-29 NOTE — PROCEDURES
Patient was brought to cath lab holding.  Consent was obtained. Risks and benefits of procedures were explained.    I personally supervised sedation process.    Complication: none    Diagnosis: Andria is evidence of mild mitral regurgitation     Sulaiman Hidalgo MD.  [facility].

## 2019-05-29 NOTE — PROGRESS NOTES
Cardiology Follow Up Progress Note    Date of Service  5/29/2019    Attending Physician  Kashif Sloan M.D.    Chief Complaint   Shortness of breath     Consulted to evaluate mitral valve regurgitation and heart failure exacerbation.     CLARA Gallegos is a 79 y.o. female admitted 5/26/2019 with dyspnea and elevated BNP.  Chest x-ray showed infiltrates consistent with edema versus pneumonia and patient was hypoxic needing BiPAP to stabilize respiratory status.     Past medical history significant for atrial fibrillation, renal insufficiency and GERD.     Interim Events  5/28/19: On bed bed rest post cath, recovering without complication.  Updated patient on plan for YAO to be completed tomorrow at 1 PM.  Patient states that overall she is feeling significantly better.  No significant events overnight.    5/29/19: Patient resting on edge of bed with transport at bedside to go down to have her YAO completed.  States that she is feeling better and has no significant complaints this morning.  No significant events overnight per nursing.    Monitor: SB/SR 52-68       Review of Systems  Review of Systems   Constitutional: Negative for fever.   Respiratory: Positive for shortness of breath. Negative for chest tightness.    Cardiovascular: Negative for chest pain, palpitations and leg swelling.   Gastrointestinal: Negative for abdominal pain and blood in stool.   Genitourinary: Negative for hematuria.   Musculoskeletal: Negative for gait problem.   Neurological: Negative for dizziness and syncope.   All other systems reviewed and are negative.      Vital signs in last 24 hours  Temp:  [36.2 °C (97.1 °F)-37.2 °C (99 °F)] 36.5 °C (97.7 °F)  Pulse:  [46-62] 60  Resp:  [16-18] 18  BP: (106-139)/(51-69) 111/63  SpO2:  [92 %-100 %] 94 %    Physical Exam  Physical Exam   Constitutional: She is oriented to person, place, and time. She appears well-developed and well-nourished.   HENT:   Head: Normocephalic.   Eyes: EOM  are normal.   Neck: No JVD present.   Cardiovascular: Normal rate, regular rhythm and intact distal pulses.    Murmur heard.   Systolic murmur is present   Pulmonary/Chest: Effort normal.   Fine bibasilar crackles.    Abdominal: Soft. There is no tenderness.   Musculoskeletal: Normal range of motion. She exhibits no edema.   Neurological: She is alert and oriented to person, place, and time.   Skin: Skin is warm and dry.   Right femoral groin site is uncomplicated without swelling, erythema or discharge. Circulation intact.    Psychiatric: She has a normal mood and affect. Her behavior is normal. Thought content normal.   Nursing note and vitals reviewed.      Lab Review  Lab Results   Component Value Date/Time    WBC 5.3 05/28/2019 02:08 AM    RBC 3.76 (L) 05/28/2019 02:08 AM    HEMOGLOBIN 11.5 (L) 05/28/2019 02:08 AM    HEMATOCRIT 36.1 (L) 05/28/2019 02:08 AM    MCV 96.0 05/28/2019 02:08 AM    MCH 30.6 05/28/2019 02:08 AM    MCHC 31.9 (L) 05/28/2019 02:08 AM    MPV 10.9 05/28/2019 02:08 AM      Lab Results   Component Value Date/Time    SODIUM 137 05/29/2019 12:13 AM    POTASSIUM 4.9 05/29/2019 12:13 AM    CHLORIDE 105 05/29/2019 12:13 AM    CO2 22 05/29/2019 12:13 AM    GLUCOSE 95 05/29/2019 12:13 AM    BUN 54 (H) 05/29/2019 12:13 AM    CREATININE 2.30 (H) 05/29/2019 12:13 AM    CREATININE 0.8 09/20/2005 07:10 AM      Lab Results   Component Value Date/Time    ASTSGOT 47 (H) 05/26/2019 04:32 AM    ALTSGPT 76 (H) 05/26/2019 04:32 AM     Lab Results   Component Value Date/Time    TROPONINI 0.01 05/26/2019 04:08 PM       Recent Labs      05/27/19   0307   BNPBTYPENAT  1074*     Cardiac Imaging and Procedures Review  Echocardiogram 5/26/19:  CONCLUSIONS  Normal left ventricular systolic function.  Left ventricular ejection fraction is visually estimated to be 70%.  Severe mitral regurgitation.  Right heart pressures are consistent with severe pulmonary hypertension   at 70 mmHg.  No prior study is available for  comparison.      Cardiac Catheterization 5/28/19:  PROCEDURES:  1.  Right heart catheterization.  2.  Left heart catheterization.  3.  Coronary angiography.  4.  Left ventriculogram.  5.  Monitored conscious sedation starting at 1036.     PREPROCEDURE DIAGNOSES:  1.  Severe mitral regurgitation.  2.  Acute diastolic congestive heart failure.     POSTPROCEDURE DIAGNOSES:  1.  A 3-4+ mitral regurgitation.  2.  No angiographic evidence of coronary artery disease.  3.  Normal left ventricular systolic function with ejection fraction of 65%.  4.  Elevated left ventricular end-diastolic pressure.  5.  Elevated right heart pressures with PA systolic pressure of 50 mmHg.     Imaging  Chest X-Ray 5/27/19:  1.  There are continued changes of pulmonary edema with slightly improved aeration.     Assessment/Plan  Mitral Valve Regurgitation:  -Severe per TTE on 5/26/19.  -3-4+ per [facility] this am.   -Admitted with acute decompensation (Dyspnes/Edema and pulmonary edema on CXR).  -Plan is for YAO today, may be earlier than 1pm.  -Continue afterload reduction with Losartan 50 mg daily and Torsemide 20 mg daily.   -?Mitraclip   -CTS referral placed this am.      Pulmonary HTN:  -RVSP 70 mmHg per TTE on 5/26/19 with diastolic dysfunction (unable to be graded due to MR).  -Volume status improving.  -Cr stable at 2.2.  -Continue Losartan and Torsemide as above.        Thank you for allowing us to participate in the care of this patient.  We will continue to follow alongside you.  Please us if you have any questions or concerns.        JUAN Briggs.   [facility]  [phone]

## 2019-05-29 NOTE — THERAPY
Awaiting further cardiac workup (YAO) and CTS consult. Will attempt cardiac rehab phase I education after as appropriate.

## 2019-05-29 NOTE — PROGRESS NOTES
Hospital Medicine Daily Progress Note    Date of Service  5/28/2019    Chief Complaint  Short of breath    Hospital Course    78-year-old female with history of atrial fibrillation, renal insufficiency gastroesophageal reflux disease came in with increasing shortness of breath over prior 2-week period of time.  Patient was found in the emergency room pulmonary edema on x-ray.  Patient was admitted to the hospital IV Lasix and had significant improvement overnight.  Echocardiogram showed severe mitral regurgitation with preserved ejection fraction.      Interval Problem Update  Stable BP/HR  On high flow NC 35L & 60% this am  Patient later weaned down to nasal cannula  Patient alert and oriented speech clear  Patient updated on her severe mitral regurgitation  I have consulted , cardiologist for evaluation of mitral valve replacement and medical treatment.  Good urine output with lasix  CXR less pulmonary edema  5/28: Resting comfortably.  Respiratory status stable.  N.p.o. since midnight for cardiac cath.  Consultants/Specialty  Pulmonary    Code Status  FULL    Disposition  Pending clinical course.    Review of Systems  Review of Systems   Constitutional: Negative for chills and fever.   HENT: Negative for ear discharge, ear pain, hearing loss and tinnitus.    Eyes: Negative for blurred vision, double vision and photophobia.   Respiratory: Positive for shortness of breath. Negative for cough and sputum production.    Cardiovascular: Negative for chest pain, palpitations and orthopnea.   Gastrointestinal: Negative for abdominal pain, nausea and vomiting.   Genitourinary: Positive for frequency. Negative for dysuria.   Musculoskeletal: Negative for back pain, myalgias and neck pain.   Skin: Negative for rash.   Neurological: Negative for dizziness, tingling and headaches.   Psychiatric/Behavioral: Negative for depression and suicidal ideas. The patient is not nervous/anxious.         Physical Exam  Temp:  [36.1  °C (96.9 °F)-36.8 °C (98.3 °F)] 36.4 °C (97.6 °F)  Pulse:  [46-62] 50  Resp:  [16-17] 16  BP: (116-139)/(60-73) 121/60  SpO2:  [94 %-100 %] 100 %    Physical Exam   Constitutional: She is oriented to person, place, and time. No distress.   HENT:   Head: Normocephalic and atraumatic.   Nose: Nose normal.   Mouth/Throat: No oropharyngeal exudate.   Eyes: Conjunctivae and EOM are normal. No scleral icterus.   Neck: No tracheal deviation present. No thyromegaly present.   Cardiovascular: Normal rate and regular rhythm.  Exam reveals no friction rub.    Murmur heard.  Pulmonary/Chest: Effort normal. No respiratory distress. She has decreased breath sounds. She has no wheezes. She has rales (fine crackles bibasilar).   Abdominal: Soft. Bowel sounds are normal. She exhibits no distension. There is no tenderness.   Musculoskeletal: She exhibits no edema or deformity.   Neurological: She is alert and oriented to person, place, and time. No cranial nerve deficit.   Skin: Skin is warm and dry. She is not diaphoretic.   Psychiatric: She has a normal mood and affect. Her behavior is normal.   Vitals reviewed.      Fluids  No intake or output data in the 24 hours ending 05/28/19 1810    Laboratory  Recent Labs      05/26/19 0432 05/27/19   0307  05/28/19   0208   WBC  8.1  6.1  5.3   RBC  4.21  3.56*  3.76*   HEMOGLOBIN  13.1  11.0*  11.5*   HEMATOCRIT  40.6  34.9*  36.1*   MCV  96.4  98.0*  96.0   MCH  31.1  30.9  30.6   MCHC  32.3*  31.5*  31.9*   RDW  49.3  50.3*  47.8   PLATELETCT  240  179  191   MPV  11.4  11.4  10.9     Recent Labs      05/26/19 0432  05/27/19   0307  05/28/19   0208   SODIUM  137  137  139   POTASSIUM  4.2  4.4  4.6   CHLORIDE  111  109  110   CO2  18*  18*  22   GLUCOSE  119*  105*  105*   BUN  57*  55*  53*   CREATININE  2.27*  2.22*  2.25*   CALCIUM  9.4  8.7  9.0         Recent Labs      05/26/19   0432  05/27/19   0307   BNPBTYPENAT  1431*  1074*           Imaging  DX-CHEST-PORTABLE (1 VIEW)    Final Result      1.  There are continued changes of pulmonary edema with slightly improved aeration.      EC-ECHOCARDIOGRAM COMPLETE W/O CONT   Final Result      US-RENAL   Final Result      1.  Bilateral small echogenic kidney consistent with sequela of chronic process      2.  Negative for hydronephrosis      3.  Multiple renal cyst      DX-CHEST-PORTABLE (1 VIEW)   Final Result      Perihilar and bibasilar opacities with probable small left pleural effusion. Findings are nonspecific and may represent bilateral pneumonia or edema.      EC-YAO W/O CONT    (Results Pending)   CL-LEFT HEART CATHETERIZATION W/ AORTIC ROOT    (Results Pending)        Assessment/Plan  * Acute respiratory failure with hypoxia (HCC)- (present on admission)   Assessment & Plan    Likely due to volume overload from valvular heart disease.  CXR 5/27 showing decrease pulmonary edema  Monitor I/O's  Severe mitral valvular disease as likely etiology.  Poor candidate for open heart surgery but a good candidate for MitraClip.  RT protocol  Supplemental oxygen and on high flow NC as of 5/27am.  5/28: Left heart cath showed 3-4+ mitral regurgitation.  Plan for YAO tomorrow.  Continue with Demadex.     Acute CHF (congestive heart failure) (HCC)- (present on admission)   Assessment & Plan    Due to significant valvular heart disease.  5/26 Echocardiogram:   Normal left ventricular systolic function.   Left ventricular ejection fraction is visually estimated to be 70%.   Severe mitral regurgitation.   Right heart pressures are consistent with severe pulmonary hypertension    at 70 mmHg.  5/28: Continue with Demadex       Essential hypertension   Assessment & Plan    Monitor vitals  Losartan (watch renal function)  Was on atenolol (hold due to bradycardia) and amlodipine 10mg       Paroxysmal A-fib (HCC)- (present on admission)   Assessment & Plan    Monitor on telemetry  Mitral valve       Chronic renal impairment, stage 3 (moderate) (HCC)-  (present on admission)   Assessment & Plan    5/27 BUN:55, Cr:2.22  Likely acute but no labs at [facility] since 2005 (normal renal function in 2005)  Monitor I/O's  C/o mitral valve as etiology  Kidney ultrasound showed chronic kidney disease.  No hydronephrosis noted.  Avoid nephrotoxins.  Renal dose all medications.     Valvular heart disease   Assessment & Plan    Severe mitral regurgitation noted on echocardiogram  Management as above     Elevated liver function tests   Assessment & Plan    Likely hepatic congestion  Monitor labs     Plan of care discussed with multidisciplinary team during rounds.    VTE prophylaxis: Heparin

## 2019-05-29 NOTE — PROGRESS NOTES
I have messaged Dr. Hidalgo and LASHA Holcomb for clarification: hospital medicine are diagnosing acute heart failure but the cardiology team are not.    Susanna MENENDEZ RN, [facility] ext. [phone] M-F

## 2019-05-30 ENCOUNTER — PATIENT OUTREACH (OUTPATIENT)
Dept: HEALTH INFORMATION MANAGEMENT | Facility: OTHER | Age: 79
End: 2019-05-30

## 2019-05-30 ENCOUNTER — TELEPHONE (OUTPATIENT)
Dept: CARDIOLOGY | Facility: MEDICAL CENTER | Age: 79
End: 2019-05-30

## 2019-05-30 VITALS
RESPIRATION RATE: 18 BRPM | DIASTOLIC BLOOD PRESSURE: 70 MMHG | TEMPERATURE: 97.7 F | WEIGHT: 139.99 LBS | BODY MASS INDEX: 21.97 KG/M2 | OXYGEN SATURATION: 93 % | SYSTOLIC BLOOD PRESSURE: 124 MMHG | HEART RATE: 53 BPM | HEIGHT: 67 IN

## 2019-05-30 PROBLEM — J96.01 ACUTE RESPIRATORY FAILURE WITH HYPOXIA (HCC): Status: RESOLVED | Noted: 2019-05-26 | Resolved: 2019-05-30

## 2019-05-30 PROBLEM — R79.89 ELEVATED LIVER FUNCTION TESTS: Status: RESOLVED | Noted: 2019-05-26 | Resolved: 2019-05-30

## 2019-05-30 PROBLEM — I50.9 ACUTE CHF (CONGESTIVE HEART FAILURE) (HCC): Status: RESOLVED | Noted: 2019-05-26 | Resolved: 2019-05-30

## 2019-05-30 PROCEDURE — A9270 NON-COVERED ITEM OR SERVICE: HCPCS | Performed by: INTERNAL MEDICINE

## 2019-05-30 PROCEDURE — 99239 HOSP IP/OBS DSCHRG MGMT >30: CPT | Performed by: FAMILY MEDICINE

## 2019-05-30 PROCEDURE — 700111 HCHG RX REV CODE 636 W/ 250 OVERRIDE (IP): Performed by: HOSPITALIST

## 2019-05-30 PROCEDURE — 700102 HCHG RX REV CODE 250 W/ 637 OVERRIDE(OP): Performed by: INTERNAL MEDICINE

## 2019-05-30 PROCEDURE — 700102 HCHG RX REV CODE 250 W/ 637 OVERRIDE(OP): Performed by: HOSPITALIST

## 2019-05-30 PROCEDURE — A9270 NON-COVERED ITEM OR SERVICE: HCPCS | Performed by: HOSPITALIST

## 2019-05-30 RX ORDER — TORSEMIDE 20 MG/1
20 TABLET ORAL DAILY
Qty: 30 TAB | Refills: 0 | Status: SHIPPED | OUTPATIENT
Start: 2019-05-31 | End: 2019-12-04

## 2019-05-30 RX ORDER — POTASSIUM CHLORIDE 20 MEQ/1
20 TABLET, EXTENDED RELEASE ORAL DAILY
Qty: 30 TAB | Refills: 0 | Status: SHIPPED | OUTPATIENT
Start: 2019-05-30 | End: 2019-10-09

## 2019-05-30 RX ADMIN — TORSEMIDE 20 MG: 20 TABLET ORAL at 05:29

## 2019-05-30 RX ADMIN — HEPARIN SODIUM 5000 UNITS: 5000 INJECTION, SOLUTION INTRAVENOUS; SUBCUTANEOUS at 05:29

## 2019-05-30 RX ADMIN — POTASSIUM CHLORIDE 20 MEQ: 1500 TABLET, EXTENDED RELEASE ORAL at 05:29

## 2019-05-30 RX ADMIN — LOSARTAN POTASSIUM 50 MG: 50 TABLET ORAL at 05:29

## 2019-05-30 NOTE — PROGRESS NOTES
Received bedside report. Patient alert and oriented. No complaints of pain. Discussed POC and answered questions. Call light within reach

## 2019-05-30 NOTE — TELEPHONE ENCOUNTER
I called the patient to see if she had a previous cardiologist. Patient stated no and that she will be at her appt on 06/03/2019 with LASHA Suero.

## 2019-05-30 NOTE — PROGRESS NOTES
Hospital Medicine Daily Progress Note    Date of Service  5/29/2019    Chief Complaint  Short of breath    Hospital Course    78-year-old female with history of atrial fibrillation, renal insufficiency gastroesophageal reflux disease came in with increasing shortness of breath over prior 2-week period of time.  Patient was found in the emergency room pulmonary edema on x-ray.  Patient was admitted to the hospital IV Lasix and had significant improvement overnight.  Echocardiogram showed severe mitral regurgitation with preserved ejection fraction.      Interval Problem Update  Stable BP/HR  On high flow NC 35L & 60% this am  Patient later weaned down to nasal cannula  Patient alert and oriented speech clear  Patient updated on her severe mitral regurgitation  I have consulted , cardiologist for evaluation of mitral valve replacement and medical treatment.  Good urine output with lasix  CXR less pulmonary edema  5/28: Resting comfortably.  Respiratory status stable.  N.p.o. since midnight for cardiac cath.  5/29: YAO showed mild mitral regurgitation.  No surgical repair indicated at this point per cardiology.  Optimize medical management.  Respiratory status stable with oxygen requirements decreasing.  Consultants/Specialty  Pulmonary    Code Status  FULL    Disposition  Home in the morning.    Review of Systems  Review of Systems   Constitutional: Negative for chills, fever and weight loss.   HENT: Negative for ear discharge, ear pain, hearing loss, nosebleeds and tinnitus.    Eyes: Negative for blurred vision, double vision, photophobia and pain.   Respiratory: Positive for shortness of breath. Negative for cough and sputum production.    Cardiovascular: Negative for chest pain, palpitations, orthopnea and claudication.   Gastrointestinal: Negative for abdominal pain, diarrhea, nausea and vomiting.   Genitourinary: Positive for frequency. Negative for dysuria.   Musculoskeletal: Negative for back pain, joint  pain, myalgias and neck pain.   Skin: Negative for rash.   Neurological: Negative for dizziness, tingling, tremors and headaches.   Psychiatric/Behavioral: Negative for depression, substance abuse and suicidal ideas.        Physical Exam  Temp:  [36.4 °C (97.6 °F)-37.2 °C (99 °F)] 36.9 °C (98.4 °F)  Pulse:  [48-60] 56  Resp:  [13-18] 16  BP: ()/(50-70) 141/70  SpO2:  [92 %-100 %] 95 %    Physical Exam   Constitutional: She is oriented to person, place, and time. No distress.   HENT:   Head: Normocephalic and atraumatic.   Nose: Nose normal.   Mouth/Throat: No oropharyngeal exudate.   Eyes: Conjunctivae and EOM are normal. No scleral icterus.   Neck: No thyromegaly present.   Cardiovascular: Normal rate and regular rhythm.  Exam reveals no friction rub.    Murmur heard.  Pulmonary/Chest: Effort normal. No respiratory distress. She has decreased breath sounds. She has no wheezes. She has no rales.   Abdominal: Soft. Bowel sounds are normal. She exhibits no distension. There is no tenderness. There is no rebound.   Musculoskeletal: She exhibits no edema or deformity.   Neurological: She is alert and oriented to person, place, and time. No cranial nerve deficit.   Skin: Skin is warm and dry. She is not diaphoretic.   Psychiatric: She has a normal mood and affect. Her behavior is normal.   Vitals reviewed.      Fluids  No intake or output data in the 24 hours ending 05/29/19 1941    Laboratory  Recent Labs      05/27/19 0307 05/28/19 0208   WBC  6.1  5.3   RBC  3.56*  3.76*   HEMOGLOBIN  11.0*  11.5*   HEMATOCRIT  34.9*  36.1*   MCV  98.0*  96.0   MCH  30.9  30.6   MCHC  31.5*  31.9*   RDW  50.3*  47.8   PLATELETCT  179  191   MPV  11.4  10.9     Recent Labs      05/27/19   0307 05/28/19   0208  05/29/19   0013   SODIUM  137  139  137   POTASSIUM  4.4  4.6  4.9   CHLORIDE  109  110  105   CO2  18*  22  22   GLUCOSE  105*  105*  95   BUN  55*  53*  54*   CREATININE  2.22*  2.25*  2.30*   CALCIUM  8.7  9.0  9.1          Recent Labs      05/27/19   0307   BNPBTYPENAT  1074*           Imaging  EC-YAO W/O CONT   Final Result      DX-CHEST-PORTABLE (1 VIEW)   Final Result      1.  There are continued changes of pulmonary edema with slightly improved aeration.      EC-ECHOCARDIOGRAM COMPLETE W/O CONT   Final Result      US-RENAL   Final Result      1.  Bilateral small echogenic kidney consistent with sequela of chronic process      2.  Negative for hydronephrosis      3.  Multiple renal cyst      DX-CHEST-PORTABLE (1 VIEW)   Final Result      Perihilar and bibasilar opacities with probable small left pleural effusion. Findings are nonspecific and may represent bilateral pneumonia or edema.      CL-LEFT HEART CATHETERIZATION W/ AORTIC ROOT    (Results Pending)        Assessment/Plan  * Acute respiratory failure with hypoxia (HCC)- (present on admission)   Assessment & Plan    Likely due to volume overload from valvular heart disease.  CXR 5/27 showing decrease pulmonary edema  Monitor I/O's  Severe mitral valvular disease as likely etiology.  Poor candidate for open heart surgery but a good candidate for MitraClip.  RT protocol  Supplemental oxygen and on high flow NC as of 5/27am.  5/28: Left heart cath showed 3-4+ mitral regurgitation.  Plan for YAO tomorrow.  Continue with Demadex.  5/29: Gradually resolving.  YAO showed mild mitral regurgitation.  No indication for mitral valve repair at this point.  Continue medical management.     Acute CHF (congestive heart failure) (HCC)- (present on admission)   Assessment & Plan    Due to significant valvular heart disease.  5/26 Echocardiogram:   Normal left ventricular systolic function.   Left ventricular ejection fraction is visually estimated to be 70%.   Severe mitral regurgitation.   Right heart pressures are consistent with severe pulmonary hypertension    at 70 mmHg.  5/28: Continue with Demadex  5/29: Currently euvolemic.     Essential hypertension   Assessment & Plan     Monitor vitals  Losartan (watch renal function)  Was on atenolol (hold due to bradycardia) and amlodipine 10mg       Paroxysmal A-fib (HCC)- (present on admission)   Assessment & Plan    Monitor on telemetry  Rate controlled.       Chronic renal impairment, stage 3 (moderate) (HCC)- (present on admission)   Assessment & Plan    5/27 BUN:55, Cr:2.22  Likely acute but no labs at [facility] since 2005 (normal renal function in 2005)  Monitor I/O's  C/o mitral valve as etiology  Kidney ultrasound showed chronic kidney disease.  No hydronephrosis noted.  Avoid nephrotoxins.  Renal dose all medications.     Valvular heart disease   Assessment & Plan    Severe mitral regurgitation noted on echocardiogram  Management as above     Elevated liver function tests   Assessment & Plan    Likely hepatic congestion  Monitor labs     Plan of care discussed with multidisciplinary team during rounds.    VTE prophylaxis: Heparin

## 2019-05-30 NOTE — DISCHARGE INSTRUCTIONS
Discharge Instructions    Discharged to home by car with relative. Discharged via wheelchair, hospital escort: Yes.  Special equipment needed: Not Applicable    Be sure to schedule a follow-up appointment with your primary care doctor or any specialists as instructed.     Discharge Plan:   Diet Plan: Discussed  Activity Level: Discussed  Confirmed Follow up Appointment: Appointment Scheduled  Confirmed Symptoms Management: Discussed  Medication Reconciliation Updated: Yes  Influenza Vaccine Indication: Not indicated: Previously immunized this influenza season and > 8 years of age    I understand that a diet low in cholesterol, fat, and sodium is recommended for good health. Unless I have been given specific instructions below for another diet, I accept this instruction as my diet prescription.   Other diet: Regular    Special Instructions: None    · Is patient discharged on Warfarin / Coumadin?   No     Depression / Suicide Risk    As you are discharged from this [facility] facility, it is important to learn how to keep safe from harming yourself.    Recognize the warning signs:  · Abrupt changes in personality, positive or negative- including increase in energy   · Giving away possessions  · Change in eating patterns- significant weight changes-  positive or negative  · Change in sleeping patterns- unable to sleep or sleeping all the time   · Unwillingness or inability to communicate  · Depression  · Unusual sadness, discouragement and loneliness  · Talk of wanting to die  · Neglect of personal appearance   · Rebelliousness- reckless behavior  · Withdrawal from people/activities they love  · Confusion- inability to concentrate     If you or a loved one observes any of these behaviors or has concerns about self-harm, here's what you can do:  · Talk about it- your feelings and reasons for harming yourself  · Remove any means that you might use to hurt yourself (examples: pills, rope, extension cords,  firearm)  · Get professional help from the community (Mental Health, Substance Abuse, psychological counseling)  · Do not be alone:Call your Safe Contact- someone whom you trust who will be there for you.  · Call your local CRISIS HOTLINE 748-4254 or 709-558-3616  · Call your local Children's Mobile Crisis Response Team [place] (303) 806-3247 or www.Keenjar  · Call the toll free National Suicide Prevention Hotlines   · National Suicide Prevention Lifeline 259-452-RRXX (3425)  · [employer] Network 800-SUICIDE (959-4507)    Torsemide tablets  What is this medicine?  TORSEMIDE (TORE se mide) is a diuretic. It helps you make more urine and to lose salt and excess water from your body. This medicine is used to treat high blood pressure, and edema or swelling from heart, kidney, or liver disease.  This medicine may be used for other purposes; ask your health care provider or pharmacist if you have questions.  COMMON BRAND NAME(S): Demadex  What should I tell my health care provider before I take this medicine?  They need to know if you have any of these conditions:  -abnormal blood electrolytes  -diabetes  -gout  -heart disease  -kidney disease  -liver disease  -small amounts of urine, or difficulty passing urine  -an unusual or allergic reaction to torsemide, sulfa drugs, other medicines, foods, dyes, or preservatives  -pregnant or trying to get pregnant  -breast-feeding  How should I use this medicine?  Take this medicine by mouth with a glass of water. Follow the directions on the prescription label. You may take this medicine with or without food. If it upsets your stomach, take it with food or milk. Do not take your medicine more often than directed. Remember that you will need to pass more urine after taking this medicine. Do not take your medicine at a time of day that will cause you problems. Do not take at bedtime.  Talk to your pediatrician regarding the use of this medicine in children.  Special care may be needed.  Overdosage: If you think you have taken too much of this medicine contact a poison control center or emergency room at once.  NOTE: This medicine is only for you. Do not share this medicine with others.  What if I miss a dose?  If you miss a dose, take it as soon as you can. If it is almost time for your next dose, take only that dose. Do not take double or extra doses.  What may interact with this medicine?  -alcohol  -certain antibiotics given by injection  certain heart medicines like digoxin  -diuretics  -lithium  -medicines for diabetes  -medicines for blood pressure  -medicines for cholesterol like cholestyramine  -medicines that relax muscles for surgery  -NSAIDs, medicines for pain and inflammation, like ibuprofen or naproxen  -OTC supplements like ginseng and ephedra  -probenecid  -steroid medicines like prednisone or cortisone  This list may not describe all possible interactions. Give your health care provider a list of all the medicines, herbs, non-prescription drugs, or dietary supplements you use. Also tell them if you smoke, drink alcohol, or use illegal drugs. Some items may interact with your medicine.  What should I watch for while using this medicine?  Visit your doctor or health care professional for regular checks on your progress. Check your blood pressure regularly. Ask your doctor or health care professional what your blood pressure should be, and when you should contact him or her. If you are a diabetic, check your blood sugar as directed.  You may need to be on a special diet while taking this medicine. Check with your doctor. Also, ask how many glasses of fluid you need to drink a day. You must not get dehydrated.  You may get drowsy or dizzy. Do not drive, use machinery, or do anything that needs mental alertness until you know how this drug affects you. Do not stand or sit up quickly, especially if you are an older patient. This reduces the risk of dizzy or  fainting spells. Alcohol can make you more drowsy and dizzy. Avoid alcoholic drinks.  What side effects may I notice from receiving this medicine?  Side effects that you should report to your doctor or health care professional as soon as possible:  -allergic reactions such as skin rash or itching, hives, swelling of the lips, mouth, tongue or throat  -blood in urine or stool  -dry mouth  -hearing loss or ringing in the ears  -irregular heartbeat  -muscle pain, weakness or cramps  -pain or difficulty passing urine  -unusually weak or tired  -vomiting or diarrhea  Side effects that usually do not require medical attention (report to your doctor or health care professional if they continue or are bothersome):  -dizzy or lightheaded  -headache  -increased thirst  -passing large amounts of urine  -sexual difficulties  -stomach pain, upset or nausea  This list may not describe all possible side effects. Call your doctor for medical advice about side effects. You may report side effects to FDA at 0-714-FDA-6932.  Where should I keep my medicine?  Keep out of the reach of children.  Store at room temperature between 15 and 30 degrees C (59 and 86 degrees F). Throw away any unused medicine after the expiration date.  NOTE: This sheet is a summary. It may not cover all possible information. If you have questions about this medicine, talk to your doctor, pharmacist, or health care provider.  © 2018 [employer] (2009-09-03 11:35:45)

## 2019-05-31 LAB
BACTERIA BLD CULT: NORMAL
BACTERIA BLD CULT: NORMAL
SIGNIFICANT IND 70042: NORMAL
SIGNIFICANT IND 70042: NORMAL
SITE SITE: NORMAL
SITE SITE: NORMAL
SOURCE SOURCE: NORMAL
SOURCE SOURCE: NORMAL

## 2019-05-31 NOTE — DISCHARGE SUMMARY
Discharge Summary    CHIEF COMPLAINT ON ADMISSION  Chief Complaint   Patient presents with   • Shortness of Breath   • Abdominal Pain       Reason for Admission  trouble breathing     Admission Date  5/26/2019    CODE STATUS  Prior    HPI & HOSPITAL COURSE   78-year-old female with history of atrial fibrillation, renal insufficiency gastroesophageal reflux disease came in with increasing shortness of breath over prior 2-week period of time.  Patient was found in the emergency room pulmonary edema on x-ray.  Patient was admitted to the hospital IV Lasix and had significant improvement overnight.  Echocardiogram showed severe mitral regurgitation with preserved ejection fraction.  Echocardiogram showed normal left ventricular systolic function with estimated ejection fraction to be around 70% with severe mitral regurgitation.  Patient clinically was euvolemic.  Was placed on Demadex.  Left heart cath showed 3-4+ mitral regurgitation.  Cardiology recommended YAO.  That showed mild mitral regurgitation.  Initial plan was for outpatient MitraClip evaluation.  However after the YAO, cardiology decided that patient does not need any intervention for now and recommended to optimize medical treatment for now.  Acute respiratory failure resolved over hospital course and patient was saturating well on room air.  She was hemodynamically and clinically stable.  She was cleared for discharge from medical standpoint.    Therefore, she is discharged in fair and stable condition to home with close outpatient follow-up.    The patient met 2-midnight criteria for an inpatient stay at the time of discharge.    Discharge Date  5/30/2019    FOLLOW UP ITEMS POST DISCHARGE  Follow-up with PCP in 1 week  Follow-up with cardiology as per recommendations    DISCHARGE DIAGNOSES  Principal Problem (Resolved):    Acute respiratory failure with hypoxia (HCC) POA: Yes  Active Problems:    Chronic renal impairment, stage 3 (moderate) (HCC) POA:  Yes    Paroxysmal A-fib (HCC) POA: Yes    Essential hypertension POA: Unknown    Valvular heart disease POA: Unknown  Resolved Problems:    Acute CHF (congestive heart failure) (HCC) POA: Yes    Elevated liver function tests POA: Unknown      FOLLOW UP  Future Appointments  Date Time Provider Department Center   6/3/2019 2:00 PM Syeda Kay [facility] None     Colton Schroeder M.D.  [address] [facility] [address]  [address]  [facility] [address]  [phone]      Follow up next week as scheduled      MEDICATIONS ON DISCHARGE     Medication List      START taking these medications      Instructions   potassium chloride SA 20 MEQ Tbcr  Commonly known as:  Kdur   Take 1 Tab by mouth every day.  Dose:  20 mEq     torsemide 20 MG Tabs  Start taking on:  5/31/2019  Commonly known as:  DEMADEX   Take 1 Tab by mouth every day.  Dose:  20 mg        CONTINUE taking these medications      Instructions   acetaminophen 325 MG Tabs  Commonly known as:  TYLENOL   Take 650 mg by mouth as needed (for arthritis).  Dose:  650 mg     ALPRAZolam 0.25 MG Tabs  Commonly known as:  XANAX   Take 0.25 mg by mouth at bedtime as needed for Sleep.  Dose:  0.25 mg     losartan 50 MG Tabs  Commonly known as:  COZAAR   Take 50 mg by mouth every day.  Dose:  50 mg     multivitamin Tabs   Take 1 Tab by mouth every day.  Dose:  1 Tab     omeprazole 20 MG delayed-release capsule  Commonly known as:  PRILOSEC   Take 20 mg by mouth every day.  Dose:  20 mg     VITAMIN E PO   Take  by mouth every day.        STOP taking these medications    amLODIPine 10 MG Tabs  Commonly known as:  NORVASC     atenolol 25 MG Tabs  Commonly known as:  TENORMIN     furosemide 40 MG Tabs  Commonly known as:  LASIX            Allergies  Allergies   Allergen Reactions   • Pcn [Penicillins]      rash       DIET  No orders of the defined types were placed in this encounter.      ACTIVITY  As tolerated.  Weight bearing as tolerated    CONSULTATIONS  Cardiology    PROCEDURES  As mentioned  above    LABORATORY  Lab Results   Component Value Date    SODIUM 137 05/29/2019    POTASSIUM 4.9 05/29/2019    CHLORIDE 105 05/29/2019    CO2 22 05/29/2019    GLUCOSE 95 05/29/2019    BUN 54 (H) 05/29/2019    CREATININE 2.30 (H) 05/29/2019    CREATININE 0.8 09/20/2005        Lab Results   Component Value Date    WBC 5.3 05/28/2019    HEMOGLOBIN 11.5 (L) 05/28/2019    HEMATOCRIT 36.1 (L) 05/28/2019    PLATELETCT 191 05/28/2019        Total time of the discharge process exceeds 40 minutes.

## 2019-06-03 ENCOUNTER — OFFICE VISIT (OUTPATIENT)
Dept: CARDIOLOGY | Facility: MEDICAL CENTER | Age: 79
End: 2019-06-03
Payer: MEDICARE

## 2019-06-03 VITALS
BODY MASS INDEX: 21.66 KG/M2 | SYSTOLIC BLOOD PRESSURE: 136 MMHG | HEIGHT: 67 IN | OXYGEN SATURATION: 96 % | HEART RATE: 72 BPM | WEIGHT: 138 LBS | DIASTOLIC BLOOD PRESSURE: 80 MMHG

## 2019-06-03 DIAGNOSIS — I48.0 PAROXYSMAL A-FIB (HCC): ICD-10-CM

## 2019-06-03 DIAGNOSIS — I27.20 PULMONARY HTN (HCC): ICD-10-CM

## 2019-06-03 DIAGNOSIS — R06.02 SOB (SHORTNESS OF BREATH): ICD-10-CM

## 2019-06-03 DIAGNOSIS — I38 VALVULAR HEART DISEASE: ICD-10-CM

## 2019-06-03 DIAGNOSIS — N18.4 CHRONIC RENAL IMPAIRMENT, STAGE 4 (SEVERE) (HCC): ICD-10-CM

## 2019-06-03 PROCEDURE — 99214 OFFICE O/P EST MOD 30 MIN: CPT | Mod: 25 | Performed by: NURSE PRACTITIONER

## 2019-06-03 PROCEDURE — 94618 PULMONARY STRESS TESTING: CPT | Performed by: NURSE PRACTITIONER

## 2019-06-03 ASSESSMENT — MINNESOTA LIVING WITH HEART FAILURE QUESTIONNAIRE (MLHF)
DIFFICULTY SOCIALIZING WITH FAMILY OR FRIENDS: 0
FEELING LIKE A BURDEN TO FAMILY AND FRIENDS: 0
MAKING YOU SHORT OF BREATH: 5
TIRED, FATIGUED OR LOW ON ENERGY: 5
DIFFICULTY TO CONCENTRATE OR REMEMBERING THINGS: 0
SWELLING IN ANKLES OR LEGS: 4
COSTING YOU MONEY FOR MEDICAL CARE: 4
GIVING YOU SIDE EFFECTS FROM TREATMENTS: 0
TOTAL_SCORE: 42
DIFFICULTY WITH SEXUAL ACTIVITIES: 0
HAVING TO SIT OR LIE DOWN DURING THE DAY: 4
DIFFICULTY SLEEPING WELL AT NIGHT: 4
EATING LESS FOODS YOU LIKE: 0
MAKING YOU WORRY: 3
MAKING YOU STAY IN A HOSPITAL: 1
LOSS OF SELF CONTROL IN YOUR LIFE: 3
DIFFICULTY WORKING TO EARN A LIVING: 0
DIFFICULTY WITH RECREATIONAL PASTIMES, SPORTS, HOBBIES: 0
WALKING ABOUT OR CLIMBING STAIRS DIFFICULT: 4
MAKING YOU FEEL DEPRESSED: 3
WORKING AROUND THE HOUSE OR YARD DIFFICULT: 2
DIFFICULTY GOING AWAY FROM HOME: 0

## 2019-06-03 ASSESSMENT — ENCOUNTER SYMPTOMS
PALPITATIONS: 0
PND: 0
DIZZINESS: 0
ABDOMINAL PAIN: 0
CLAUDICATION: 0
SHORTNESS OF BREATH: 1
COUGH: 0
FEVER: 0
ORTHOPNEA: 0
MYALGIAS: 0

## 2019-06-03 ASSESSMENT — 6 MINUTE WALK TEST (6MWT): TOTAL DISTANCE WALKED (METERS): 365

## 2019-06-03 NOTE — PROGRESS NOTES
Chief Complaint   Patient presents with   • Congestive Heart Failure       Subjective:   Geno Gallegos is a 79 y.o. female who presents today for follow-up after her recent hospitalization for pulmonary hypertension and mitral regurgitation with her .    New patient to the office.  Patient had a hospitalization from 5/26/2019 through 5/30/2019.  Patient presented to the ER with increasing shortness of breath.  Patient was found to have pulmonary edema on her x-ray.  Patient was diuresed, her echocardiogram showed severe mitral regurgitation with preserved EF.  Patient had left heart cath which showed 3-4+ mitral regurgitation and a YAO was performed.  The YAO showed mild mitral regurgitation.    Since her discharge, patient reports she is been feeling much better.  She no longer reports orthopnea, PND or shortness of breath.  She basically has returned back to her prior activities.  She reports being mildly winded with exertion.  Otherwise she just has some fatigue.  She denies chest pain, palpitations, dizziness/lightheadedness or edema.    Her weights over the past 3 days have been at 135 pounds.    Initial 6 minute walk test, patient was able to complete 365 m during her 6 minute walk test. Her O2 saturation at baseline was 96% and at the end of the test, the O2 saturation was 98%. She reported level 1 of dyspnea on Linda scale.    MLWHF score 42    She is watching her sodium intake and drinking enough water.    Additonally, patient has the following medical problems:    -Paroxysmal atrial fibrillation    -GERD    -Stage IV kidney disease    Past Medical History:   Diagnosis Date   • A-fib (HCC)    • Bowel obstruction (HCC)    • Irregular heart beat    • Renal disorder     stage 4 kidney failure   • Valvular heart disease 5/27/2019     Past Surgical History:   Procedure Laterality Date   • GYN SURGERY      hysterectomy   • OTHER ORTHOPEDIC SURGERY      right hip replacement     History reviewed. No  pertinent family history.  Social History     Social History   • Marital status:      Spouse name: N/A   • Number of children: N/A   • Years of education: N/A     Occupational History   • Not on file.     Social History Main Topics   • Smoking status: Never Smoker   • Smokeless tobacco: Never Used   • Alcohol use Yes      Comment: occas   • Drug use: No   • Sexual activity: Not on file     Other Topics Concern   • Not on file     Social History Narrative   • No narrative on file     Allergies   Allergen Reactions   • Pcn [Penicillins]      rash     Outpatient Encounter Prescriptions as of 6/3/2019   Medication Sig Dispense Refill   • torsemide (DEMADEX) 20 MG Tab Take 1 Tab by mouth every day. 30 Tab 0   • potassium chloride SA (KDUR) 20 MEQ Tab CR Take 1 Tab by mouth every day. 30 Tab 0   • losartan (COZAAR) 50 MG Tab Take 50 mg by mouth every day.     • ALPRAZolam (XANAX) 0.25 MG Tab Take 0.25 mg by mouth at bedtime as needed for Sleep.     • omeprazole (PRILOSEC) 20 MG delayed-release capsule Take 20 mg by mouth every day.     • acetaminophen (TYLENOL) 325 MG Tab Take 650 mg by mouth as needed (for arthritis).     • multivitamin (THERAGRAN) Tab Take 1 Tab by mouth every day.     • VITAMIN E PO Take  by mouth every day.     • [DISCONTINUED] torsemide (DEMADEX) tablet 20 mg      • [DISCONTINUED] potassium chloride SA (Kdur) tablet 20 mEq      • [DISCONTINUED] senna-docusate (PERICOLACE or SENOKOT S) 8.6-50 MG per tablet 2 Tab      • [DISCONTINUED] polyethylene glycol/lytes (MIRALAX) PACKET 1 Packet      • [DISCONTINUED] magnesium hydroxide (MILK OF MAGNESIA) suspension 30 mL      • [DISCONTINUED] bisacodyl (DULCOLAX) suppository 10 mg      • [DISCONTINUED] Respiratory Care per Protocol      • [DISCONTINUED] heparin injection 5,000 Units      • [DISCONTINUED] acetaminophen (TYLENOL) tablet 650 mg      • [DISCONTINUED] ondansetron (ZOFRAN) syringe/vial injection 4 mg      • [DISCONTINUED] ondansetron (ZOFRAN  "ODT) dispertab 4 mg      • [DISCONTINUED] losartan (COZAAR) tablet 50 mg      • [DISCONTINUED] ALPRAZolam (XANAX) tablet 0.25 mg        No facility-administered encounter medications on file as of 6/3/2019.      Review of Systems   Constitutional: Positive for malaise/fatigue. Negative for fever.   Respiratory: Positive for shortness of breath (winded with exertion). Negative for cough.    Cardiovascular: Negative for chest pain, palpitations, orthopnea, claudication, leg swelling and PND.   Gastrointestinal: Negative for abdominal pain.   Musculoskeletal: Negative for myalgias.   Neurological: Negative for dizziness.   All other systems reviewed and are negative.       Objective:   /80 (BP Location: Left arm, Patient Position: Sitting, BP Cuff Size: Adult)   Pulse 72   Ht 1.702 m (5' 7\")   Wt 62.6 kg (138 lb)   SpO2 96%   BMI 21.61 kg/m²     Physical Exam   Constitutional: She is oriented to person, place, and time. She appears well-developed and well-nourished.   HENT:   Head: Normocephalic and atraumatic.   Eyes: Pupils are equal, round, and reactive to light. EOM are normal.   Neck: Normal range of motion. Neck supple. No JVD present.   Cardiovascular: Normal rate, regular rhythm and normal heart sounds.    Pulmonary/Chest: Effort normal and breath sounds normal. No respiratory distress. She has no wheezes. She has no rales.   Abdominal: Soft. Bowel sounds are normal.   Musculoskeletal: She exhibits no edema.   Neurological: She is alert and oriented to person, place, and time.   Skin: Skin is warm and dry.   Psychiatric: She has a normal mood and affect. Her behavior is normal.   Vitals reviewed.    No results found for: CHOLSTRLTOT, LDL, HDL, TRIGLYCERIDE    Lab Results   Component Value Date/Time    SODIUM 137 05/29/2019 12:13 AM    POTASSIUM 4.9 05/29/2019 12:13 AM    CHLORIDE 105 05/29/2019 12:13 AM    CO2 22 05/29/2019 12:13 AM    GLUCOSE 95 05/29/2019 12:13 AM    BUN 54 (H) 05/29/2019 12:13 AM "    CREATININE 2.30 (H) 05/29/2019 12:13 AM    CREATININE 0.8 09/20/2005 07:10 AM     Lab Results   Component Value Date/Time    ALKPHOSPHAT 110 (H) 05/26/2019 04:32 AM    ASTSGOT 47 (H) 05/26/2019 04:32 AM    ALTSGPT 76 (H) 05/26/2019 04:32 AM    TBILIRUBIN 0.8 05/26/2019 04:32 AM      Lab Results   Component Value Date/Time    BNPBTYPENAT 1074 (H) 05/27/2019 03:07 AM        Transthoracic Echo Report 5/26/2019  Normal left ventricular systolic function.  Left ventricular ejection fraction is visually estimated to be 70%.  Severe mitral regurgitation.  Right heart pressures are consistent with severe pulmonary hypertension at 70 mmHg.  No prior study is available for comparison.     Heart cath 5/28/2019  IMPRESSION:  1.  A 3-4+ mitral regurgitation.  2.  No angiographic evidence of coronary artery disease.  3.  Normal left ventricular systolic function with ejection fraction of 65%.  4.  Elevated left ventricular end-diastolic pressure.  5.  Elevated right heart pressures with PA systolic pressure of 50 mmHg.     RECOMMENDATIONS:  Recommend consultation with Dr. Ma for MitraClip Procedure.     SEDATION TIME: The patient's sedation was managed by myself with continuous   face to face time with the patient for 15 minutes from 10:36 to 10:51.    Transesophageal Echo Report 5/29/2019  Moderate pulmonic insufficiency.   No mitral stenosis. Mild mitral regurgitation.   No aortic stenosis. Trace aortic insufficiency.   No tricuspid stenosis. Trace tricuspid regurgitation.   Compared to prior study done on 05/26/2019, there has been improvement in severity of mitral regurgitation.    Assessment:     1. Valvular heart disease  Basic Metabolic Panel   2. Chronic renal impairment, stage 4 (severe) ([facility])  Basic Metabolic Panel   3. Pulmonary HTN ([facility])  Basic Metabolic Panel   4. Paroxysmal A-fib ([facility])     5. SOB (shortness of breath)         Medical Decision Making:  Today's Assessment / Status / Plan:   1.  Pulmonary  hypertension: Based on physical examination findings, patient is euvolemic. No JVD, lungs are clear to auscultation, no pitting edema in bilateral lower extremities, no ascites.  -Continue torsemide 20 mg daily  -Continue potassium 20 mEq daily  -Obtain a BMP before next visit  -Reinforced s/sx of worsening symptoms with patient and weight monitoring. Pt verbalizes understanding. Pt to call office or RTC if present.     2.  Mitral regurgitation:  -Mild per YAO, will follow  -Continue Losartan 50 mg daily    3.  PAF:  -pt denies any recent episodes of afib.  -no on OAC  -pt to alter clinic if present    4. CKD, stage IV:  -Followed by PCP as well  -BMP in 2 weeks    FU in clinic in 2 weeks with HF MD. Sooner if needed.    Patient verbalizes understanding and agrees with the plan of care.     Collaborating MD: Rocio Pate MD

## 2019-06-04 LAB — INR BLD: 1.1

## 2019-06-11 ENCOUNTER — HOSPITAL ENCOUNTER (OUTPATIENT)
Dept: LAB | Facility: MEDICAL CENTER | Age: 79
End: 2019-06-11
Attending: FAMILY MEDICINE
Payer: MEDICARE

## 2019-06-11 LAB
ALBUMIN SERPL BCP-MCNC: 4.2 G/DL (ref 3.2–4.9)
ALBUMIN/GLOB SERPL: 1.4 G/DL
ALP SERPL-CCNC: 79 U/L (ref 30–99)
ALT SERPL-CCNC: 15 U/L (ref 2–50)
ANION GAP SERPL CALC-SCNC: 11 MMOL/L (ref 0–11.9)
AST SERPL-CCNC: 20 U/L (ref 12–45)
BILIRUB SERPL-MCNC: 0.6 MG/DL (ref 0.1–1.5)
BUN SERPL-MCNC: 47 MG/DL (ref 8–22)
CALCIUM SERPL-MCNC: 9.5 MG/DL (ref 8.5–10.5)
CHLORIDE SERPL-SCNC: 103 MMOL/L (ref 96–112)
CO2 SERPL-SCNC: 22 MMOL/L (ref 20–33)
CREAT SERPL-MCNC: 2.23 MG/DL (ref 0.5–1.4)
FASTING STATUS PATIENT QL REPORTED: NORMAL
GLOBULIN SER CALC-MCNC: 3.1 G/DL (ref 1.9–3.5)
GLUCOSE SERPL-MCNC: 91 MG/DL (ref 65–99)
POTASSIUM SERPL-SCNC: 4.3 MMOL/L (ref 3.6–5.5)
PROT SERPL-MCNC: 7.3 G/DL (ref 6–8.2)
SODIUM SERPL-SCNC: 136 MMOL/L (ref 135–145)

## 2019-06-11 PROCEDURE — 80053 COMPREHEN METABOLIC PANEL: CPT

## 2019-06-11 PROCEDURE — 36415 COLL VENOUS BLD VENIPUNCTURE: CPT

## 2019-06-27 ENCOUNTER — OFFICE VISIT (OUTPATIENT)
Dept: CARDIOLOGY | Facility: MEDICAL CENTER | Age: 79
End: 2019-06-27
Payer: MEDICARE

## 2019-06-27 VITALS
OXYGEN SATURATION: 97 % | BODY MASS INDEX: 21.66 KG/M2 | SYSTOLIC BLOOD PRESSURE: 136 MMHG | WEIGHT: 138 LBS | DIASTOLIC BLOOD PRESSURE: 70 MMHG | HEART RATE: 62 BPM | HEIGHT: 67 IN

## 2019-06-27 DIAGNOSIS — I10 ESSENTIAL HYPERTENSION: ICD-10-CM

## 2019-06-27 DIAGNOSIS — I38 VALVULAR HEART DISEASE: ICD-10-CM

## 2019-06-27 DIAGNOSIS — N18.4 CHRONIC RENAL IMPAIRMENT, STAGE 4 (SEVERE) (HCC): ICD-10-CM

## 2019-06-27 DIAGNOSIS — I48.0 PAROXYSMAL A-FIB (HCC): ICD-10-CM

## 2019-06-27 PROBLEM — N18.30 CHRONIC RENAL IMPAIRMENT, STAGE 3 (MODERATE): Status: RESOLVED | Noted: 2019-05-26 | Resolved: 2019-06-27

## 2019-06-27 PROCEDURE — 99214 OFFICE O/P EST MOD 30 MIN: CPT | Performed by: INTERNAL MEDICINE

## 2019-06-27 RX ORDER — LANSOPRAZOLE 30 MG/1
CAPSULE, DELAYED RELEASE ORAL
COMMUNITY
Start: 2019-06-24 | End: 2019-11-12

## 2019-06-27 RX ORDER — VITS A,C,E/LUTEIN/MINERALS 300MCG-200
1 TABLET ORAL DAILY
Status: ON HOLD | COMMUNITY
End: 2019-12-10

## 2019-06-27 NOTE — PROGRESS NOTES
Chief Complaint   Patient presents with   • Atrial Fibrillation     F/V: 2 WEEK       Subjective:   Geno Gallegos is a 79 y.o. female who presents today For follow-up of her congestive heart failure secondary to valvular heart disease.  She was admitted to hospital and may for congestive heart failure and was initially found to have severe mitral insufficiency.  However, she also has significant renal insufficiency and hypertension.  After diuresis, her severe mitral insufficiency was only mild.  Her medical therapy was adjusted including changing furosemide to torsemide.    She has had no recurrent dyspnea on exertion.  She denies any PND, orthopnea or edema.  She is noted no chest discomfort, palpitations or lightheadedness.    Past Medical History:   Diagnosis Date   • A-fib (HCC)     had history of Afib   • Bowel obstruction (HCC)    • Irregular heart beat    • Renal disorder     stage 4 kidney failure   • Valvular heart disease 5/27/2019     Past Surgical History:   Procedure Laterality Date   • GYN SURGERY      hysterectomy   • OTHER ORTHOPEDIC SURGERY      right hip replacement     Family History   Problem Relation Age of Onset   • Cancer Mother    • Heart Attack Father    • Heart Disease Father         valve surgeries x2     Social History     Social History   • Marital status:      Spouse name: N/A   • Number of children: N/A   • Years of education: N/A     Occupational History   • Not on file.     Social History Main Topics   • Smoking status: Never Smoker   • Smokeless tobacco: Never Used   • Alcohol use Yes      Comment: occas   • Drug use: No   • Sexual activity: Not on file     Other Topics Concern   • Not on file     Social History Narrative   • No narrative on file     Allergies   Allergen Reactions   • Pcn [Penicillins]      rash     Outpatient Encounter Prescriptions as of 6/27/2019   Medication Sig Dispense Refill   • Multiple Vitamins-Minerals (OCUVITE-LUTEIN) Tab Take 1 tablet by mouth  "every day.     • torsemide (DEMADEX) 20 MG Tab Take 1 Tab by mouth every day. (Patient taking differently: Take 20 mg by mouth 2 times a day.) 30 Tab 0   • potassium chloride SA (KDUR) 20 MEQ Tab CR Take 1 Tab by mouth every day. 30 Tab 0   • losartan (COZAAR) 50 MG Tab Take 50 mg by mouth every day.     • omeprazole (PRILOSEC) 20 MG delayed-release capsule Take 20 mg by mouth every day.     • VITAMIN E PO Take  by mouth every day.     • multivitamin (THERAGRAN) Tab Take 1 Tab by mouth every day.     • lansoprazole (PREVACID) 30 MG CAPSULE DELAYED RELEASE      • ALPRAZolam (XANAX) 0.25 MG Tab Take 0.25 mg by mouth at bedtime as needed for Sleep.     • acetaminophen (TYLENOL) 325 MG Tab Take 650 mg by mouth as needed (for arthritis).       No facility-administered encounter medications on file as of 6/27/2019.      ROS     Objective:   /70 (BP Location: Left arm, Patient Position: Sitting, BP Cuff Size: Adult)   Pulse 62   Ht 1.702 m (5' 7\")   Wt 62.6 kg (138 lb)   SpO2 97%   BMI 21.61 kg/m²      Physical Exam   Constitutional: She appears well-developed and well-nourished.   Neck: No JVD present.   Cardiovascular: Normal rate and regular rhythm.    No murmur heard.  Pulmonary/Chest: Effort normal and breath sounds normal. She has no rales.   Abdominal: Soft. There is no tenderness.   Musculoskeletal: She exhibits no edema.     No results found for: CHOLSTRLTOT, LDL, HDL, TRIGLYCERIDE    Lab Results   Component Value Date/Time    SODIUM 136 06/11/2019 01:27 PM    POTASSIUM 4.3 06/11/2019 01:27 PM    CHLORIDE 103 06/11/2019 01:27 PM    CO2 22 06/11/2019 01:27 PM    GLUCOSE 91 06/11/2019 01:27 PM    BUN 47 (H) 06/11/2019 01:27 PM    CREATININE 2.23 (H) 06/11/2019 01:27 PM    CREATININE 0.8 09/20/2005 07:10 AM     Lab Results   Component Value Date/Time    ALKPHOSPHAT 79 06/11/2019 01:27 PM    ASTSGOT 20 06/11/2019 01:27 PM    ALTSGPT 15 06/11/2019 01:27 PM    TBILIRUBIN 0.6 06/11/2019 01:27 PM      Lab " Results   Component Value Date/Time    BNPBTYPENAT 1074 (H) 05/27/2019 03:07 AM   Cardiac catheterization:  POSTPROCEDURE DIAGNOSES:  1.  A 3-4+ mitral regurgitation.  2.  No angiographic evidence of coronary artery disease.  3.  Normal left ventricular systolic function with ejection fraction of 65%.  4.  Elevated left ventricular end-diastolic pressure.  5.  Elevated right heart pressures with PA systolic pressure of 50 mmHg.    Assessment:     1. Valvular heart disease     2. Essential hypertension     3. Paroxysmal A-fib ([facility])     4. Chronic renal impairment, stage 4 (severe) ([facility])         Medical Decision Making:  Today's Assessment / Status / Plan:     Ms. Gallegos developed an episode of congestive heart failure secondary to severe mitral insufficiency probably secondary to volume overload from her renal disease and hypertension.  Her cardiac catheterization showed no coronary artery disease.  Her YAO after diuresis showed only trace mitral insufficiency.  At this time, she appears to be euvolemic and we will continue her current medications.  She will follow-up in about 3 months with a BMP

## 2019-06-27 NOTE — LETTER
[facility]   [address] [facility] [address]  [place] [address]  Phone: [phone]  Fax: [phone]              Geno Gallegos  1940    Encounter Date: 6/27/2019    Dawit Larose M.D.          PROGRESS NOTE:  Chief Complaint   Patient presents with   • Atrial Fibrillation     F/V: 2 WEEK       Subjective:   Geno Gallegos is a 79 y.o. female who presents today For follow-up of her congestive heart failure secondary to valvular heart disease.  She was admitted to hospital and may for congestive heart failure and was initially found to have severe mitral insufficiency.  However, she also has significant renal insufficiency and hypertension.  After diuresis, her severe mitral insufficiency was only mild.  Her medical therapy was adjusted including changing furosemide to torsemide.    She has had no recurrent dyspnea on exertion.  She denies any PND, orthopnea or edema.  She is noted no chest discomfort, palpitations or lightheadedness.    Past Medical History:   Diagnosis Date   • A-fib (HCC)     had history of Afib   • Bowel obstruction (HCC)    • Irregular heart beat    • Renal disorder     stage 4 kidney failure   • Valvular heart disease 5/27/2019     Past Surgical History:   Procedure Laterality Date   • GYN SURGERY      hysterectomy   • OTHER ORTHOPEDIC SURGERY      right hip replacement     Family History   Problem Relation Age of Onset   • Cancer Mother    • Heart Attack Father    • Heart Disease Father         valve surgeries x2     Social History     Social History   • Marital status:      Spouse name: N/A   • Number of children: N/A   • Years of education: N/A     Occupational History   • Not on file.     Social History Main Topics   • Smoking status: Never Smoker   • Smokeless tobacco: Never Used   • Alcohol use Yes      Comment: occas   • Drug use: No   • Sexual activity: Not on file     Other Topics Concern   • Not on file     Social History  "Narrative   • No narrative on file     Allergies   Allergen Reactions   • Pcn [Penicillins]      rash     Outpatient Encounter Prescriptions as of 6/27/2019   Medication Sig Dispense Refill   • Multiple Vitamins-Minerals (OCUVITE-LUTEIN) Tab Take 1 tablet by mouth every day.     • torsemide (DEMADEX) 20 MG Tab Take 1 Tab by mouth every day. (Patient taking differently: Take 20 mg by mouth 2 times a day.) 30 Tab 0   • potassium chloride SA (KDUR) 20 MEQ Tab CR Take 1 Tab by mouth every day. 30 Tab 0   • losartan (COZAAR) 50 MG Tab Take 50 mg by mouth every day.     • omeprazole (PRILOSEC) 20 MG delayed-release capsule Take 20 mg by mouth every day.     • VITAMIN E PO Take  by mouth every day.     • multivitamin (THERAGRAN) Tab Take 1 Tab by mouth every day.     • lansoprazole (PREVACID) 30 MG CAPSULE DELAYED RELEASE      • ALPRAZolam (XANAX) 0.25 MG Tab Take 0.25 mg by mouth at bedtime as needed for Sleep.     • acetaminophen (TYLENOL) 325 MG Tab Take 650 mg by mouth as needed (for arthritis).       No facility-administered encounter medications on file as of 6/27/2019.      ROS     Objective:   /70 (BP Location: Left arm, Patient Position: Sitting, BP Cuff Size: Adult)   Pulse 62   Ht 1.702 m (5' 7\")   Wt 62.6 kg (138 lb)   SpO2 97%   BMI 21.61 kg/m²      Physical Exam   Constitutional: She appears well-developed and well-nourished.   Neck: No JVD present.   Cardiovascular: Normal rate and regular rhythm.    No murmur heard.  Pulmonary/Chest: Effort normal and breath sounds normal. She has no rales.   Abdominal: Soft. There is no tenderness.   Musculoskeletal: She exhibits no edema.     No results found for: CHOLSTRLTOT, LDL, HDL, TRIGLYCERIDE    Lab Results   Component Value Date/Time    SODIUM 136 06/11/2019 01:27 PM    POTASSIUM 4.3 06/11/2019 01:27 PM    CHLORIDE 103 06/11/2019 01:27 PM    CO2 22 06/11/2019 01:27 PM    GLUCOSE 91 06/11/2019 01:27 PM    BUN 47 (H) 06/11/2019 01:27 PM    CREATININE 2.23 " (H) 06/11/2019 01:27 PM    CREATININE 0.8 09/20/2005 07:10 AM     Lab Results   Component Value Date/Time    ALKPHOSPHAT 79 06/11/2019 01:27 PM    ASTSGOT 20 06/11/2019 01:27 PM    ALTSGPT 15 06/11/2019 01:27 PM    TBILIRUBIN 0.6 06/11/2019 01:27 PM      Lab Results   Component Value Date/Time    BNPBTYPENAT 1074 (H) 05/27/2019 03:07 AM   Cardiac catheterization:  POSTPROCEDURE DIAGNOSES:  1.  A 3-4+ mitral regurgitation.  2.  No angiographic evidence of coronary artery disease.  3.  Normal left ventricular systolic function with ejection fraction of 65%.  4.  Elevated left ventricular end-diastolic pressure.  5.  Elevated right heart pressures with PA systolic pressure of 50 mmHg.    Assessment:     1. Valvular heart disease     2. Essential hypertension     3. Paroxysmal A-fib (HCC)     4. Chronic renal impairment, stage 4 (severe) ([facility])         Medical Decision Making:  Today's Assessment / Status / Plan:     Ms. Gallegos developed an episode of congestive heart failure secondary to severe mitral insufficiency probably secondary to volume overload from her renal disease and hypertension.  Her cardiac catheterization showed no coronary artery disease.  Her YAO after diuresis showed only trace mitral insufficiency.  At this time, she appears to be euvolemic and we will continue her current medications.  She will follow-up in about 3 months with a JON Schroeder M.D.  [address]  [address] [address]  VIA Facsimile: [phone]

## 2019-08-26 ENCOUNTER — HOSPITAL ENCOUNTER (OUTPATIENT)
Dept: LAB | Facility: MEDICAL CENTER | Age: 79
End: 2019-08-26
Attending: INTERNAL MEDICINE
Payer: MEDICARE

## 2019-08-26 DIAGNOSIS — N18.4 CHRONIC RENAL IMPAIRMENT, STAGE 4 (SEVERE) (HCC): ICD-10-CM

## 2019-08-26 PROCEDURE — 80048 BASIC METABOLIC PNL TOTAL CA: CPT

## 2019-08-26 PROCEDURE — 36415 COLL VENOUS BLD VENIPUNCTURE: CPT

## 2019-08-27 LAB
ANION GAP SERPL CALC-SCNC: 10 MMOL/L (ref 0–11.9)
BUN SERPL-MCNC: 49 MG/DL (ref 8–22)
CALCIUM SERPL-MCNC: 9.2 MG/DL (ref 8.5–10.5)
CHLORIDE SERPL-SCNC: 106 MMOL/L (ref 96–112)
CO2 SERPL-SCNC: 23 MMOL/L (ref 20–33)
CREAT SERPL-MCNC: 2.06 MG/DL (ref 0.5–1.4)
FASTING STATUS PATIENT QL REPORTED: NORMAL
GLUCOSE SERPL-MCNC: 113 MG/DL (ref 65–99)
POTASSIUM SERPL-SCNC: 4.4 MMOL/L (ref 3.6–5.5)
SODIUM SERPL-SCNC: 139 MMOL/L (ref 135–145)

## 2019-08-28 ENCOUNTER — OFFICE VISIT (OUTPATIENT)
Dept: CARDIOLOGY | Facility: MEDICAL CENTER | Age: 79
End: 2019-08-28
Payer: MEDICARE

## 2019-08-28 VITALS
BODY MASS INDEX: 21.97 KG/M2 | HEIGHT: 67 IN | SYSTOLIC BLOOD PRESSURE: 140 MMHG | OXYGEN SATURATION: 98 % | DIASTOLIC BLOOD PRESSURE: 80 MMHG | WEIGHT: 140 LBS | HEART RATE: 66 BPM

## 2019-08-28 DIAGNOSIS — R07.89 CHEST DISCOMFORT: ICD-10-CM

## 2019-08-28 DIAGNOSIS — I48.0 PAROXYSMAL A-FIB (HCC): ICD-10-CM

## 2019-08-28 DIAGNOSIS — I10 ESSENTIAL HYPERTENSION: ICD-10-CM

## 2019-08-28 DIAGNOSIS — I38 VALVULAR HEART DISEASE: ICD-10-CM

## 2019-08-28 PROCEDURE — 99214 OFFICE O/P EST MOD 30 MIN: CPT | Performed by: INTERNAL MEDICINE

## 2019-08-28 RX ORDER — DILTIAZEM HYDROCHLORIDE 180 MG/1
180 CAPSULE, EXTENDED RELEASE ORAL DAILY
Qty: 30 CAP | Refills: 11 | Status: SHIPPED | OUTPATIENT
Start: 2019-08-28 | End: 2019-09-11

## 2019-08-28 ASSESSMENT — 6 MINUTE WALK TEST (6MWT): TOTAL DISTANCE WALKED (METERS): 402

## 2019-08-28 NOTE — PROGRESS NOTES
Chief Complaint   Patient presents with   • CHF (Chronic)       Subjective:   Geno Gallegos is a 79 y.o. female who presents today for follow-up of her congestive heart failure secondary to valvular heart disease.  She was admitted to hospital and may for congestive heart failure and was initially found to have severe mitral insufficiency.  However, she also has significant renal insufficiency and hypertension.  After diuresis, her severe mitral insufficiency was only mild.  Her medical therapy was adjusted including changing furosemide to torsemide.    She has had some chest discomfort under the left lateral portion of her left breast.  This is a localized discomfort in about a 2 cm area.  She has had 2 rather severe episodes in about a 6 minor episodes in the last month.  The severe episodes are are an aching type sensation which she rates up to 9/10.  They may last up to 30 seconds.  They do not radiate and are not associated with any nausea, vomiting, diaphoresis or shortness of breath.  These episodes seem to occur mainly at night and especially if she lies on her left side.    She has no dyspnea on exertion.  Her chest pain has awoken her at night and she has to take a deep breath at that time.  She otherwise has no difficulty with PND or orthopnea.  She is noted no edema, palpitations or lightheadedness.    Past Medical History:   Diagnosis Date   • A-fib (HCC)     had history of Afib   • Bowel obstruction (HCC)    • Irregular heart beat    • Renal disorder     stage 4 kidney failure   • Valvular heart disease 5/27/2019     Past Surgical History:   Procedure Laterality Date   • GYN SURGERY      hysterectomy   • OTHER ORTHOPEDIC SURGERY      right hip replacement     Family History   Problem Relation Age of Onset   • Cancer Mother    • Heart Attack Father    • Heart Disease Father         valve surgeries x2     Social History     Socioeconomic History   • Marital status:      Spouse name: Not on file    • Number of children: Not on file   • Years of education: Not on file   • Highest education level: Not on file   Occupational History   • Not on file   Social Needs   • Financial resource strain: Not on file   • Food insecurity:     Worry: Not on file     Inability: Not on file   • Transportation needs:     Medical: Not on file     Non-medical: Not on file   Tobacco Use   • Smoking status: Never Smoker   • Smokeless tobacco: Never Used   Substance and Sexual Activity   • Alcohol use: Yes     Comment: occas   • Drug use: No   • Sexual activity: Not on file   Lifestyle   • Physical activity:     Days per week: Not on file     Minutes per session: Not on file   • Stress: Not on file   Relationships   • Social connections:     Talks on phone: Not on file     Gets together: Not on file     Attends Congregation service: Not on file     Active member of club or organization: Not on file     Attends meetings of clubs or organizations: Not on file     Relationship status: Not on file   • Intimate partner violence:     Fear of current or ex partner: Not on file     Emotionally abused: Not on file     Physically abused: Not on file     Forced sexual activity: Not on file   Other Topics Concern   • Not on file   Social History Narrative   • Not on file     Allergies   Allergen Reactions   • Pcn [Penicillins]      rash     Outpatient Encounter Medications as of 8/28/2019   Medication Sig Dispense Refill   • lansoprazole (PREVACID) 30 MG CAPSULE DELAYED RELEASE      • Multiple Vitamins-Minerals (OCUVITE-LUTEIN) Tab Take 1 tablet by mouth every day.     • torsemide (DEMADEX) 20 MG Tab Take 1 Tab by mouth every day. (Patient taking differently: Take 20 mg by mouth 2 times a day.) 30 Tab 0   • potassium chloride SA (KDUR) 20 MEQ Tab CR Take 1 Tab by mouth every day. 30 Tab 0   • losartan (COZAAR) 50 MG Tab Take 50 mg by mouth every day.     • ALPRAZolam (XANAX) 0.25 MG Tab Take 0.25 mg by mouth at bedtime as needed for Sleep.     •  "omeprazole (PRILOSEC) 20 MG delayed-release capsule Take 20 mg by mouth every day.     • acetaminophen (TYLENOL) 325 MG Tab Take 650 mg by mouth as needed (for arthritis).     • VITAMIN E PO Take  by mouth every day.     • multivitamin (THERAGRAN) Tab Take 1 Tab by mouth every day.       No facility-administered encounter medications on file as of 8/28/2019.      ROS       Objective:   /80 (BP Location: Left arm, Patient Position: Sitting, BP Cuff Size: Adult)   Pulse 66   Ht 1.702 m (5' 7\")   Wt 63.5 kg (140 lb)   SpO2 98%   BMI 21.93 kg/m²      Physical Exam   Constitutional: She appears well-developed and well-nourished.   Neck: No JVD present.   Cardiovascular: Normal rate and regular rhythm.   Murmur (2/6 to 3/6 systolic at the apex) heard.  Pulmonary/Chest: Effort normal and breath sounds normal. She has no rales.   Abdominal: Soft. There is no tenderness.   Musculoskeletal: She exhibits no edema.     No results found for: CHOLSTRLTOT, LDL, HDL, TRIGLYCERIDE    Lab Results   Component Value Date/Time    SODIUM 139 08/26/2019 01:22 PM    POTASSIUM 4.4 08/26/2019 01:22 PM    CHLORIDE 106 08/26/2019 01:22 PM    CO2 23 08/26/2019 01:22 PM    GLUCOSE 113 (H) 08/26/2019 01:22 PM    BUN 49 (H) 08/26/2019 01:22 PM    CREATININE 2.06 (H) 08/26/2019 01:22 PM    CREATININE 0.8 09/20/2005 07:10 AM     Lab Results   Component Value Date/Time    ALKPHOSPHAT 79 06/11/2019 01:27 PM    ASTSGOT 20 06/11/2019 01:27 PM    ALTSGPT 15 06/11/2019 01:27 PM    TBILIRUBIN 0.6 06/11/2019 01:27 PM      Lab Results   Component Value Date/Time    BNPBTYPENAT 1074 (H) 05/27/2019 03:07 AM   Cardiac catheterization:  POSTPROCEDURE DIAGNOSES:  1.  A 3-4+ mitral regurgitation.  2.  No angiographic evidence of coronary artery disease.  3.  Normal left ventricular systolic function with ejection fraction of 65%.  4.  Elevated left ventricular end-diastolic pressure.  5.  Elevated right heart pressures with PA systolic pressure of 50 " mmHg.    Echocardiography Laboratory    CONCLUSIONS  Normal left ventricular systolic function.  Left ventricular ejection fraction is visually estimated to be 70%.  Severe mitral regurgitation.  Right heart pressures are consistent with severe pulmonary hypertension   at 70 mmHg.  No prior study is available for comparison.     NALINI GALLEGOS  Exam Date:         05/26/2019     Assessment:     1. Paroxysmal A-fib (HCC)     2. Essential hypertension     3. Valvular heart disease     4. Chest discomfort         Medical Decision Making:  Today's Assessment / Status / Plan:     Ms. Gallegos is having occasional episodes of chest pain which are almost certainly musculoskeletal in etiology.  She might need to see a physical therapist.  However, she cannot take an NSAID because of her renal dysfunction.  Her murmur was appreciated today and it was not appreciated at the time of her last visit.  Her blood pressures been intermittently elevated and we will increase her antihypertensive therapy.  At this time, we will add diltiazem  mg daily.  She will follow-up in a couple weeks and we will reassess her blood pressure medications.  If her murmur does not improve with better blood pressure control, we might consider a repeat limited echocardiogram.  Her annual stroke risk is about 3% from her PAF.  We discussed this and she is going to think about anticoagulation therapy.

## 2019-09-11 ENCOUNTER — OFFICE VISIT (OUTPATIENT)
Dept: CARDIOLOGY | Facility: MEDICAL CENTER | Age: 79
End: 2019-09-11
Payer: MEDICARE

## 2019-09-11 VITALS
OXYGEN SATURATION: 97 % | SYSTOLIC BLOOD PRESSURE: 120 MMHG | DIASTOLIC BLOOD PRESSURE: 76 MMHG | BODY MASS INDEX: 21.97 KG/M2 | HEART RATE: 68 BPM | WEIGHT: 140 LBS | HEIGHT: 67 IN

## 2019-09-11 DIAGNOSIS — I10 ESSENTIAL HYPERTENSION: ICD-10-CM

## 2019-09-11 DIAGNOSIS — R07.89 CHEST DISCOMFORT: ICD-10-CM

## 2019-09-11 DIAGNOSIS — I48.0 PAROXYSMAL A-FIB (HCC): ICD-10-CM

## 2019-09-11 DIAGNOSIS — I38 VALVULAR HEART DISEASE: ICD-10-CM

## 2019-09-11 PROCEDURE — 99214 OFFICE O/P EST MOD 30 MIN: CPT | Performed by: INTERNAL MEDICINE

## 2019-09-11 RX ORDER — DILTIAZEM HYDROCHLORIDE 240 MG/1
240 CAPSULE, COATED, EXTENDED RELEASE ORAL DAILY
Qty: 30 CAP | Refills: 11 | Status: SHIPPED | OUTPATIENT
Start: 2019-09-11 | End: 2019-09-30

## 2019-09-11 RX ORDER — LOSARTAN POTASSIUM 100 MG/1
100 TABLET ORAL DAILY
Qty: 30 TAB | Refills: 11 | Status: SHIPPED | OUTPATIENT
Start: 2019-09-11 | End: 2019-12-04

## 2019-09-11 NOTE — PROGRESS NOTES
Chief Complaint   Patient presents with   • Atrial Fibrillation       Subjective:   Geno Gallegos is a 79 y.o. female who presents today for follow-up of her congestive heart failure secondary to valvular heart disease.  She was admitted to hospital and may for congestive heart failure and was initially found to have severe mitral insufficiency.  However, she also has significant renal insufficiency and hypertension.  After diuresis, her severe mitral insufficiency was only mild.  Her medical therapy was adjusted including changing furosemide to torsemide.  At the time of her last visit, her murmur was appreciated and her blood pressure medication was increased because of continued hypertension.    She she continues to have some chest discomfort under the left lateral portion of her left breast.  This is a localized discomfort in about a 2 cm area.  She has had 2 rather severe episodes in about a 6 minor episodes in the last month.  The severe episodes are are an aching type sensation which she rates up to 9/10.  They may last up to 30 seconds.  They do not radiate and are not associated with any nausea, vomiting, diaphoresis or shortness of breath.  These episodes seem to occur mainly at night and especially if she lies on her left side.    She denies any dyspnea on exertion, PND, orthopnea or edema.  She is noted no palpitations or lightheadedness.    Her blood pressures at home of been running anywhere from approximately 115-155/75-90.    Past Medical History:   Diagnosis Date   • A-fib (HCC)     had history of Afib   • Bowel obstruction (HCC)    • Irregular heart beat    • Renal disorder     stage 4 kidney failure   • Valvular heart disease 5/27/2019     Past Surgical History:   Procedure Laterality Date   • GYN SURGERY      hysterectomy   • OTHER ORTHOPEDIC SURGERY      right hip replacement     Family History   Problem Relation Age of Onset   • Cancer Mother    • Heart Attack Father    • Heart Disease Father          valve surgeries x2     Social History     Socioeconomic History   • Marital status:      Spouse name: Not on file   • Number of children: Not on file   • Years of education: Not on file   • Highest education level: Not on file   Occupational History   • Not on file   Social Needs   • Financial resource strain: Not on file   • Food insecurity:     Worry: Not on file     Inability: Not on file   • Transportation needs:     Medical: Not on file     Non-medical: Not on file   Tobacco Use   • Smoking status: Never Smoker   • Smokeless tobacco: Never Used   Substance and Sexual Activity   • Alcohol use: Yes     Comment: occas   • Drug use: No   • Sexual activity: Not on file   Lifestyle   • Physical activity:     Days per week: Not on file     Minutes per session: Not on file   • Stress: Not on file   Relationships   • Social connections:     Talks on phone: Not on file     Gets together: Not on file     Attends Sabianist service: Not on file     Active member of club or organization: Not on file     Attends meetings of clubs or organizations: Not on file     Relationship status: Not on file   • Intimate partner violence:     Fear of current or ex partner: Not on file     Emotionally abused: Not on file     Physically abused: Not on file     Forced sexual activity: Not on file   Other Topics Concern   • Not on file   Social History Narrative   • Not on file     Allergies   Allergen Reactions   • Pcn [Penicillins]      rash     Outpatient Encounter Medications as of 9/11/2019   Medication Sig Dispense Refill   • diltiazem (DILACOR XR) 180 MG XR capsule Take 1 Cap by mouth every day. 30 Cap 11   • lansoprazole (PREVACID) 30 MG CAPSULE DELAYED RELEASE      • Multiple Vitamins-Minerals (OCUVITE-LUTEIN) Tab Take 1 tablet by mouth every day.     • torsemide (DEMADEX) 20 MG Tab Take 1 Tab by mouth every day. (Patient taking differently: Take 20 mg by mouth 2 times a day.) 30 Tab 0   • potassium chloride SA (KDUR)  "20 MEQ Tab CR Take 1 Tab by mouth every day. 30 Tab 0   • losartan (COZAAR) 50 MG Tab Take 50 mg by mouth every day.     • ALPRAZolam (XANAX) 0.25 MG Tab Take 0.25 mg by mouth at bedtime as needed for Sleep.     • omeprazole (PRILOSEC) 20 MG delayed-release capsule Take 20 mg by mouth every day.     • acetaminophen (TYLENOL) 325 MG Tab Take 650 mg by mouth as needed (for arthritis).     • VITAMIN E PO Take  by mouth every day.     • multivitamin (THERAGRAN) Tab Take 1 Tab by mouth every day.       No facility-administered encounter medications on file as of 9/11/2019.      ROS       Objective:   /76 (BP Location: Left arm, Patient Position: Sitting, BP Cuff Size: Adult)   Pulse 68   Ht 1.702 m (5' 7\")   Wt 63.5 kg (140 lb)   SpO2 97%   BMI 21.93 kg/m²     Physical Exam   Constitutional: She appears well-developed and well-nourished.   Neck: No JVD present.   Cardiovascular: Normal rate and regular rhythm.   Murmur (2/6 to 3/6 systolic at the apex) heard.  Pulmonary/Chest: Effort normal and breath sounds normal. She has no rales.   Abdominal: Soft. There is no tenderness.   Musculoskeletal: She exhibits no edema.     No results found for: CHOLSTRLTOT, LDL, HDL, TRIGLYCERIDE    Lab Results   Component Value Date/Time    SODIUM 139 08/26/2019 01:22 PM    POTASSIUM 4.4 08/26/2019 01:22 PM    CHLORIDE 106 08/26/2019 01:22 PM    CO2 23 08/26/2019 01:22 PM    GLUCOSE 113 (H) 08/26/2019 01:22 PM    BUN 49 (H) 08/26/2019 01:22 PM    CREATININE 2.06 (H) 08/26/2019 01:22 PM    CREATININE 0.8 09/20/2005 07:10 AM     Lab Results   Component Value Date/Time    ALKPHOSPHAT 79 06/11/2019 01:27 PM    ASTSGOT 20 06/11/2019 01:27 PM    ALTSGPT 15 06/11/2019 01:27 PM    TBILIRUBIN 0.6 06/11/2019 01:27 PM      Lab Results   Component Value Date/Time    BNPBTYPENAT 1074 (H) 05/27/2019 03:07 AM   Cardiac catheterization:  POSTPROCEDURE DIAGNOSES:  1.  A 3-4+ mitral regurgitation.  2.  No angiographic evidence of coronary artery " disease.  3.  Normal left ventricular systolic function with ejection fraction of 65%.  4.  Elevated left ventricular end-diastolic pressure.  5.  Elevated right heart pressures with PA systolic pressure of 50 mmHg.    Echocardiography Laboratory    CONCLUSIONS  Normal left ventricular systolic function.  Left ventricular ejection fraction is visually estimated to be 70%.  Severe mitral regurgitation.  Right heart pressures are consistent with severe pulmonary hypertension   at 70 mmHg.  No prior study is available for comparison.     NALINI GALLEGOS  Exam Date:         05/26/2019     Assessment:     1. Essential hypertension     2. Valvular heart disease     3. Paroxysmal A-fib (HCC)     4. Chest discomfort         Medical Decision Making:  Today's Assessment / Status / Plan:     Ms. Gallegos is clinically stable though her blood pressure is still not under optimal control.  At this time, we will increase losartan to 100 mg daily and diltiazem ER to 240 mg daily.  She is agreeable to start anticoagulation therapy and we will put her on Eliquis 2.5 mg twice daily.  She will follow-up in a month with a BMP.  We may consider a repeat echocardiogram once we have optimal control of her blood pressure.

## 2019-09-24 ENCOUNTER — TELEPHONE (OUTPATIENT)
Dept: CARDIOLOGY | Facility: MEDICAL CENTER | Age: 79
End: 2019-09-24

## 2019-09-25 ENCOUNTER — HOSPITAL ENCOUNTER (OUTPATIENT)
Dept: LAB | Facility: MEDICAL CENTER | Age: 79
End: 2019-09-25
Attending: INTERNAL MEDICINE
Payer: MEDICARE

## 2019-09-25 DIAGNOSIS — I10 ESSENTIAL HYPERTENSION: ICD-10-CM

## 2019-09-25 LAB
ANION GAP SERPL CALC-SCNC: 11 MMOL/L (ref 0–11.9)
BUN SERPL-MCNC: 52 MG/DL (ref 8–22)
CALCIUM SERPL-MCNC: 10 MG/DL (ref 8.5–10.5)
CHLORIDE SERPL-SCNC: 107 MMOL/L (ref 96–112)
CO2 SERPL-SCNC: 23 MMOL/L (ref 20–33)
CREAT SERPL-MCNC: 2.03 MG/DL (ref 0.5–1.4)
GLUCOSE SERPL-MCNC: 79 MG/DL (ref 65–99)
POTASSIUM SERPL-SCNC: 3.9 MMOL/L (ref 3.6–5.5)
SODIUM SERPL-SCNC: 141 MMOL/L (ref 135–145)

## 2019-09-25 PROCEDURE — 80048 BASIC METABOLIC PNL TOTAL CA: CPT

## 2019-09-25 PROCEDURE — 36415 COLL VENOUS BLD VENIPUNCTURE: CPT

## 2019-09-30 ENCOUNTER — OFFICE VISIT (OUTPATIENT)
Dept: CARDIOLOGY | Facility: MEDICAL CENTER | Age: 79
End: 2019-09-30
Payer: MEDICARE

## 2019-09-30 VITALS
DIASTOLIC BLOOD PRESSURE: 70 MMHG | SYSTOLIC BLOOD PRESSURE: 128 MMHG | WEIGHT: 140.4 LBS | BODY MASS INDEX: 22.03 KG/M2 | HEART RATE: 68 BPM | OXYGEN SATURATION: 97 % | HEIGHT: 67 IN

## 2019-09-30 DIAGNOSIS — I48.0 PAROXYSMAL A-FIB (HCC): ICD-10-CM

## 2019-09-30 DIAGNOSIS — N18.4 CHRONIC RENAL IMPAIRMENT, STAGE 4 (SEVERE) (HCC): ICD-10-CM

## 2019-09-30 DIAGNOSIS — I38 VALVULAR HEART DISEASE: ICD-10-CM

## 2019-09-30 DIAGNOSIS — I10 ESSENTIAL HYPERTENSION: ICD-10-CM

## 2019-09-30 PROBLEM — R07.89 CHEST DISCOMFORT: Status: RESOLVED | Noted: 2019-08-28 | Resolved: 2019-09-30

## 2019-09-30 PROCEDURE — 99214 OFFICE O/P EST MOD 30 MIN: CPT | Performed by: INTERNAL MEDICINE

## 2019-09-30 RX ORDER — DILTIAZEM HYDROCHLORIDE 180 MG/1
CAPSULE, EXTENDED RELEASE ORAL
COMMUNITY
Start: 2019-09-26 | End: 2019-09-30

## 2019-09-30 RX ORDER — DILTIAZEM HYDROCHLORIDE 180 MG/1
360 CAPSULE, EXTENDED RELEASE ORAL DAILY
Qty: 60 CAP | Refills: 11 | Status: SHIPPED | OUTPATIENT
Start: 2019-09-30 | End: 2019-10-09

## 2019-09-30 NOTE — PROGRESS NOTES
Chief Complaint   Patient presents with   • Hypertension       Subjective:   Geno Gallegos is a 79 y.o. female who presents today for follow-up of her congestive heart failure secondary to valvular heart disease.  She was admitted to hospital and may for congestive heart failure and was initially found to have severe mitral insufficiency.  However, she also has significant renal insufficiency and hypertension.  After diuresis, her severe mitral insufficiency was only mild.  Her medical therapy was adjusted including changing furosemide to torsemide.  At the time of her last visit, her murmur was appreciated and her blood pressure medication was increased because of continued hypertension.  At that time, she also underwent cardiac catheterization which showed no angiographic coronary artery disease.    In the last week, she has had 2 episodes of atrial fibrillation.  One episode lasted a day and a half in the second 2-1/2 days.  She had some associated dyspnea, lightheadedness and nausea.  She also felt very weak.  Her heart rates were in the low 100s.    She has no dyspnea on exertion when she is not in atrial fibrillation.  She is noted no PND, orthopnea or edema.      Past Medical History:   Diagnosis Date   • A-fib ([facility])     had history of Afib   • Bowel obstruction (HCC)    • Irregular heart beat    • Renal disorder     stage 4 kidney failure   • Valvular heart disease 5/27/2019     Past Surgical History:   Procedure Laterality Date   • GYN SURGERY      hysterectomy   • OTHER ORTHOPEDIC SURGERY      right hip replacement     Family History   Problem Relation Age of Onset   • Cancer Mother    • Heart Attack Father    • Heart Disease Father         valve surgeries x2     Social History     Socioeconomic History   • Marital status:      Spouse name: Not on file   • Number of children: Not on file   • Years of education: Not on file   • Highest education level: Not on file   Occupational History   • Not on  file   Social Needs   • Financial resource strain: Not on file   • Food insecurity:     Worry: Not on file     Inability: Not on file   • Transportation needs:     Medical: Not on file     Non-medical: Not on file   Tobacco Use   • Smoking status: Never Smoker   • Smokeless tobacco: Never Used   Substance and Sexual Activity   • Alcohol use: Yes     Comment: occas   • Drug use: No   • Sexual activity: Not on file   Lifestyle   • Physical activity:     Days per week: Not on file     Minutes per session: Not on file   • Stress: Not on file   Relationships   • Social connections:     Talks on phone: Not on file     Gets together: Not on file     Attends [employer] service: Not on file     Active member of club or organization: Not on file     Attends meetings of clubs or organizations: Not on file     Relationship status: Not on file   • Intimate partner violence:     Fear of current or ex partner: Not on file     Emotionally abused: Not on file     Physically abused: Not on file     Forced sexual activity: Not on file   Other Topics Concern   • Not on file   Social History Narrative   • Not on file     Allergies   Allergen Reactions   • Pcn [Penicillins]      rash     Outpatient Encounter Medications as of 9/30/2019   Medication Sig Dispense Refill   • DILT- MG XR capsule      • DILTIAZem CD (CARDIZEM CD) 240 MG CAPSULE SR 24 HR Take 1 Cap by mouth every day. 30 Cap 11   • losartan (COZAAR) 100 MG Tab Take 1 Tab by mouth every day. 30 Tab 11   • apixaban (ELIQUIS) 2.5mg Tab Take 1 Tab by mouth 2 Times a Day. 60 Tab 11   • lansoprazole (PREVACID) 30 MG CAPSULE DELAYED RELEASE      • Multiple Vitamins-Minerals (OCUVITE-LUTEIN) Tab Take 1 tablet by mouth every day.     • torsemide (DEMADEX) 20 MG Tab Take 1 Tab by mouth every day. (Patient taking differently: Take 20 mg by mouth 2 times a day.) 30 Tab 0   • potassium chloride SA (KDUR) 20 MEQ Tab CR Take 1 Tab by mouth every day. 30 Tab 0   • ALPRAZolam (XANAX) 0.25  "MG Tab Take 0.25 mg by mouth at bedtime as needed for Sleep.     • omeprazole (PRILOSEC) 20 MG delayed-release capsule Take 20 mg by mouth every day.     • acetaminophen (TYLENOL) 325 MG Tab Take 650 mg by mouth as needed (for arthritis).     • VITAMIN E PO Take  by mouth every day.     • multivitamin (THERAGRAN) Tab Take 1 Tab by mouth every day.       No facility-administered encounter medications on file as of 9/30/2019.      ROS     Objective:   /70 (BP Location: Left arm, Patient Position: Sitting, BP Cuff Size: Adult)   Pulse 68   Ht 1.702 m (5' 7\")   Wt 63.7 kg (140 lb 6.4 oz)   SpO2 97%   BMI 21.99 kg/m²     Physical Exam   Constitutional: She appears well-developed and well-nourished.   Neck: No JVD present.   Cardiovascular: Normal rate and regular rhythm.   No murmur heard.  Pulmonary/Chest: Effort normal and breath sounds normal. She has no rales.   Abdominal: Soft. There is no tenderness.   Musculoskeletal: She exhibits no edema.     No results found for: CHOLSTRLTOT, LDL, HDL, TRIGLYCERIDE    Lab Results   Component Value Date/Time    SODIUM 141 09/25/2019 10:16 AM    POTASSIUM 3.9 09/25/2019 10:16 AM    CHLORIDE 107 09/25/2019 10:16 AM    CO2 23 09/25/2019 10:16 AM    GLUCOSE 79 09/25/2019 10:16 AM    BUN 52 (H) 09/25/2019 10:16 AM    CREATININE 2.03 (H) 09/25/2019 10:16 AM    CREATININE 0.8 09/20/2005 07:10 AM     Lab Results   Component Value Date/Time    ALKPHOSPHAT 79 06/11/2019 01:27 PM    ASTSGOT 20 06/11/2019 01:27 PM    ALTSGPT 15 06/11/2019 01:27 PM    TBILIRUBIN 0.6 06/11/2019 01:27 PM      Lab Results   Component Value Date/Time    BNPBTYPENAT 1074 (H) 05/27/2019 03:07 AM          Cardiac catheterization from May 2019:  POSTPROCEDURE DIAGNOSES:  1.  A 3-4+ mitral regurgitation.  2.  No angiographic evidence of coronary artery disease.  3.  Normal left ventricular systolic function with ejection fraction of 65%.  4.  Elevated left ventricular end-diastolic pressure.  5.  Elevated " right heart pressures with PA systolic pressure of 50 mmHg.     Echocardiography Laboratory, [facility]:  CONCLUSIONS  Moderate pulmonic insufficiency.   No mitral stenosis. Mild mitral regurgitation.   No aortic stenosis. Trace aortic insufficiency.   No tricuspid stenosis. Trace tricuspid regurgitation.   Compared to prior study done on 05/26/2019, there has been improvement   in severity of mitral regurgitation.    NALINI GALLEGOS  Exam Date:          05/29/2019     Assessment:     1. Paroxysmal A-fib ([facility])     2. Essential hypertension     3. Valvular heart disease     4. Chronic renal impairment, stage 4 (severe) ([facility])         Medical Decision Making:  Today's Assessment / Status / Plan:     Ms. Gallegos is having difficulty with PAF.  At this time, we will increase diltiazem to 360 mg daily.  We will have her evaluated by EP with respect to whether or not we should start antiarrhythmic therapy.  Her blood pressure is under good control.  She did have severe MR previously but this seems to have improved with diuresis.  She will follow-up in a couple of months.

## 2019-10-09 ENCOUNTER — OFFICE VISIT (OUTPATIENT)
Dept: CARDIOLOGY | Facility: MEDICAL CENTER | Age: 79
End: 2019-10-09
Payer: MEDICARE

## 2019-10-09 VITALS — WEIGHT: 139 LBS | BODY MASS INDEX: 21.82 KG/M2 | HEIGHT: 67 IN

## 2019-10-09 DIAGNOSIS — I48.0 PAF (PAROXYSMAL ATRIAL FIBRILLATION) (HCC): ICD-10-CM

## 2019-10-09 DIAGNOSIS — I27.20 PULMONARY HTN (HCC): ICD-10-CM

## 2019-10-09 DIAGNOSIS — I48.0 PAROXYSMAL A-FIB (HCC): ICD-10-CM

## 2019-10-09 DIAGNOSIS — I38 VALVULAR HEART DISEASE: ICD-10-CM

## 2019-10-09 DIAGNOSIS — N18.4 CHRONIC RENAL IMPAIRMENT, STAGE 4 (SEVERE) (HCC): ICD-10-CM

## 2019-10-09 DIAGNOSIS — Z79.01 ANTICOAGULATED: ICD-10-CM

## 2019-10-09 DIAGNOSIS — R06.02 SOB (SHORTNESS OF BREATH): ICD-10-CM

## 2019-10-09 DIAGNOSIS — Z79.899 HIGH RISK MEDICATION USE: ICD-10-CM

## 2019-10-09 DIAGNOSIS — Z91.89 AT RISK FOR PULMONARY FIBROSIS: ICD-10-CM

## 2019-10-09 LAB — EKG IMPRESSION: NORMAL

## 2019-10-09 PROCEDURE — 93000 ELECTROCARDIOGRAM COMPLETE: CPT | Performed by: INTERNAL MEDICINE

## 2019-10-09 PROCEDURE — 99204 OFFICE O/P NEW MOD 45 MIN: CPT | Performed by: INTERNAL MEDICINE

## 2019-10-09 RX ORDER — POTASSIUM CHLORIDE 1500 MG/1
20 TABLET, EXTENDED RELEASE ORAL DAILY
Status: ON HOLD | COMMUNITY
Start: 2019-09-30 | End: 2019-12-10

## 2019-10-09 RX ORDER — DILTIAZEM HYDROCHLORIDE 240 MG/1
CAPSULE, EXTENDED RELEASE ORAL
COMMUNITY
Start: 2019-10-03 | End: 2019-10-09

## 2019-10-09 RX ORDER — DILTIAZEM HYDROCHLORIDE 120 MG/1
120 CAPSULE, COATED, EXTENDED RELEASE ORAL DAILY
Qty: 30 CAP | Refills: 3 | Status: SHIPPED | OUTPATIENT
Start: 2019-10-09 | End: 2019-12-04

## 2019-10-09 RX ORDER — AMIODARONE HYDROCHLORIDE 200 MG/1
200 TABLET ORAL DAILY
Qty: 90 TAB | Refills: 3 | Status: SHIPPED | OUTPATIENT
Start: 2019-10-09 | End: 2019-11-19 | Stop reason: SDUPTHER

## 2019-10-09 ASSESSMENT — ENCOUNTER SYMPTOMS
MYALGIAS: 0
BLURRED VISION: 0
DEPRESSION: 0
COUGH: 0
EYE PAIN: 0
LOSS OF CONSCIOUSNESS: 0
PALPITATIONS: 1
FEVER: 0
VOMITING: 0
EYE DISCHARGE: 0
SPEECH CHANGE: 0
ABDOMINAL PAIN: 0
WHEEZING: 0
NAUSEA: 0
BRUISES/BLEEDS EASILY: 0
CHILLS: 0
HEMOPTYSIS: 0
NERVOUS/ANXIOUS: 0

## 2019-10-09 NOTE — LETTER
[facility]   [address], [address]  [place] [address]  Phone: [phone]  Fax: [phone]              Geno Gallegos  1940    Encounter Date: 10/9/2019    Cliff Wu M.D.          PROGRESS NOTE:  Chief Complaint   Patient presents with   • Atrial Fibrillation     PP DX:PAF       Subjective:   Geno Gallegos is a 79 y.o. female who presents today being seen in consult request of Dr. Dariel Larose secondary to paroxysmal atrial fibrillation symptomatic 2 episodes of about 1 month ago feels quite poorly with this recently diagnosed with congestive heart failure felt to be related to mitral valve disease normal cors on angiogram. Normal troponins.  Treated medically subsequent transesophageal echocardiogram with minimal mitral regurgitation.  Denies smoking denies alcohol use.  Feels poorly when he she has atrial fibrillation she is pretty sure that this was atrial fibrillation.    Past Medical History:   Diagnosis Date   • A-fib (HCC)     had history of Afib   • Bowel obstruction (HCC)    • Renal disorder     stage 4 kidney failure   • Valvular heart disease 5/27/2019     Past Surgical History:   Procedure Laterality Date   • GYN SURGERY      hysterectomy   • OTHER ORTHOPEDIC SURGERY      right hip replacement     Family History   Problem Relation Age of Onset   • Cancer Mother    • Heart Attack Father    • Heart Disease Father         valve surgeries x2     Social History     Socioeconomic History   • Marital status:      Spouse name: Not on file   • Number of children: Not on file   • Years of education: Not on file   • Highest education level: Not on file   Occupational History   • Not on file   Social Needs   • Financial resource strain: Not on file   • Food insecurity:     Worry: Not on file     Inability: Not on file   • Transportation needs:     Medical: Not on file     Non-medical: Not on file   Tobacco Use   • Smoking status: Never Smoker      • Smokeless tobacco: Never Used   Substance and Sexual Activity   • Alcohol use: Yes     Comment: occas   • Drug use: No   • Sexual activity: Not on file   Lifestyle   • Physical activity:     Days per week: Not on file     Minutes per session: Not on file   • Stress: Not on file   Relationships   • Social connections:     Talks on phone: Not on file     Gets together: Not on file     Attends Jainism service: Not on file     Active member of club or organization: Not on file     Attends meetings of clubs or organizations: Not on file     Relationship status: Not on file   • Intimate partner violence:     Fear of current or ex partner: Not on file     Emotionally abused: Not on file     Physically abused: Not on file     Forced sexual activity: Not on file   Other Topics Concern   • Not on file   Social History Narrative   • Not on file     Allergies   Allergen Reactions   • Pcn [Penicillins]      rash     Outpatient Encounter Medications as of 10/9/2019   Medication Sig Dispense Refill   • potassium Chloride ER (K-TAB) 20 MEQ Tab CR tablet      • amiodarone (CORDARONE) 200 MG Tab Take 1 Tab by mouth every day. 90 Tab 3   • DILTIAZem CD (CARDIZEM CD) 120 MG CAPSULE SR 24 HR Take 1 Cap by mouth every day. 30 Cap 3   • losartan (COZAAR) 100 MG Tab Take 1 Tab by mouth every day. 30 Tab 11   • apixaban (ELIQUIS) 2.5mg Tab Take 1 Tab by mouth 2 Times a Day. 60 Tab 11   • lansoprazole (PREVACID) 30 MG CAPSULE DELAYED RELEASE      • Multiple Vitamins-Minerals (OCUVITE-LUTEIN) Tab Take 1 tablet by mouth every day.     • torsemide (DEMADEX) 20 MG Tab Take 1 Tab by mouth every day. (Patient taking differently: Take 20 mg by mouth 2 times a day.) 30 Tab 0   • ALPRAZolam (XANAX) 0.25 MG Tab Take 0.25 mg by mouth at bedtime as needed for Sleep.     • omeprazole (PRILOSEC) 20 MG delayed-release capsule Take 20 mg by mouth every day.     • acetaminophen (TYLENOL) 325 MG Tab Take 650 mg by mouth as needed (for arthritis).     •  "[DISCONTINUED] CARTIA  MG CAPSULE SR 24 HR      • [DISCONTINUED] diltiazem (DILACOR XR) 180 MG XR capsule Take 2 Caps by mouth every day. 60 Cap 11   • [DISCONTINUED] potassium chloride SA (KDUR) 20 MEQ Tab CR Take 1 Tab by mouth every day. (Patient not taking: Reported on 10/9/2019) 30 Tab 0   • VITAMIN E PO Take  by mouth every day.       No facility-administered encounter medications on file as of 10/9/2019.      Review of Systems   Constitutional: Negative for chills and fever.   HENT: Negative for congestion.    Eyes: Negative for blurred vision, pain and discharge.   Respiratory: Negative for cough, hemoptysis and wheezing.    Cardiovascular: Positive for palpitations. Negative for chest pain.   Gastrointestinal: Negative for abdominal pain, nausea and vomiting.   Musculoskeletal: Negative for joint pain and myalgias.   Skin: Negative for itching and rash.   Neurological: Negative for speech change and loss of consciousness.   Endo/Heme/Allergies: Does not bruise/bleed easily.   Psychiatric/Behavioral: Negative for depression. The patient is not nervous/anxious.    All other systems reviewed and are negative.       Objective:   Ht 1.702 m (5' 7\")   Wt 63 kg (139 lb)   BMI 21.77 kg/m²      Physical Exam   Constitutional: She is oriented to person, place, and time. She appears well-developed and well-nourished.   HENT:   Head: Normocephalic and atraumatic.   Eyes: Pupils are equal, round, and reactive to light. EOM are normal.   Neck: Normal range of motion. Neck supple.   Cardiovascular: Normal rate, regular rhythm and intact distal pulses. Exam reveals no gallop and no friction rub.   Murmur heard.  Pulmonary/Chest: Effort normal and breath sounds normal.   Abdominal: Soft. Bowel sounds are normal.   Musculoskeletal: Normal range of motion. She exhibits no edema.   Neurological: She is alert and oriented to person, place, and time. No cranial nerve deficit.   Skin: Skin is warm.   Psychiatric: She has " a normal mood and affect. Her behavior is normal. Judgment and thought content normal.       Assessment:     1. PAF (paroxysmal atrial fibrillation) ([facility])  EKG    PULMONARY FUNCTION TESTS -Test requested: Complete Pulmonary Function Test   2. Valvular heart disease     3. Chronic renal impairment, stage 4 (severe) ([facility])     4. Paroxysmal A-fib ([facility])     5. Anticoagulated         Medical Decision Making:  Today's Assessment / Status / Plan:   1.  Paroxysmal atrial fibrillation symptomatic previous CHF felt to be related to mitral valve disease.  Rec mentation will be  for starting low-dose amiodarone 200 mL a day cut the diltiazem down to 120 daily.  2.  Anticoagulation continue NOAC.  3.  Mitral regurgitation last transesophageal echocardiogram mild.  4.  Follow-up with [facility] in 6 weeks.      Dawit Larose M.D.  [address]  [address]  [place] [id number]  VIA In Basket

## 2019-10-09 NOTE — PROGRESS NOTES
Chief Complaint   Patient presents with   • Atrial Fibrillation     PP DX:PAF       Subjective:   Geno Gallegos is a 79 y.o. female who presents today being seen in consult request of Dr. Dariel Larose secondary to paroxysmal atrial fibrillation symptomatic 2 episodes of about 1 month ago feels quite poorly with this recently diagnosed with congestive heart failure felt to be related to mitral valve disease normal cors on angiogram. Normal troponins.  Treated medically subsequent transesophageal echocardiogram with minimal mitral regurgitation.  Denies smoking denies alcohol use.  Feels poorly when he she has atrial fibrillation she is pretty sure that this was atrial fibrillation.    Past Medical History:   Diagnosis Date   • A-fib (HCC)     had history of Afib   • Bowel obstruction (HCC)    • Renal disorder     stage 4 kidney failure   • Valvular heart disease 5/27/2019     Past Surgical History:   Procedure Laterality Date   • GYN SURGERY      hysterectomy   • OTHER ORTHOPEDIC SURGERY      right hip replacement     Family History   Problem Relation Age of Onset   • Cancer Mother    • Heart Attack Father    • Heart Disease Father         valve surgeries x2     Social History     Socioeconomic History   • Marital status:      Spouse name: Not on file   • Number of children: Not on file   • Years of education: Not on file   • Highest education level: Not on file   Occupational History   • Not on file   Social Needs   • Financial resource strain: Not on file   • Food insecurity:     Worry: Not on file     Inability: Not on file   • Transportation needs:     Medical: Not on file     Non-medical: Not on file   Tobacco Use   • Smoking status: Never Smoker   • Smokeless tobacco: Never Used   Substance and Sexual Activity   • Alcohol use: Yes     Comment: occas   • Drug use: No   • Sexual activity: Not on file   Lifestyle   • Physical activity:     Days per week: Not on file     Minutes per session: Not on file   •  Stress: Not on file   Relationships   • Social connections:     Talks on phone: Not on file     Gets together: Not on file     Attends Muslim service: Not on file     Active member of club or organization: Not on file     Attends meetings of clubs or organizations: Not on file     Relationship status: Not on file   • Intimate partner violence:     Fear of current or ex partner: Not on file     Emotionally abused: Not on file     Physically abused: Not on file     Forced sexual activity: Not on file   Other Topics Concern   • Not on file   Social History Narrative   • Not on file     Allergies   Allergen Reactions   • Pcn [Penicillins]      rash     Outpatient Encounter Medications as of 10/9/2019   Medication Sig Dispense Refill   • potassium Chloride ER (K-TAB) 20 MEQ Tab CR tablet      • amiodarone (CORDARONE) 200 MG Tab Take 1 Tab by mouth every day. 90 Tab 3   • DILTIAZem CD (CARDIZEM CD) 120 MG CAPSULE SR 24 HR Take 1 Cap by mouth every day. 30 Cap 3   • losartan (COZAAR) 100 MG Tab Take 1 Tab by mouth every day. 30 Tab 11   • apixaban (ELIQUIS) 2.5mg Tab Take 1 Tab by mouth 2 Times a Day. 60 Tab 11   • lansoprazole (PREVACID) 30 MG CAPSULE DELAYED RELEASE      • Multiple Vitamins-Minerals (OCUVITE-LUTEIN) Tab Take 1 tablet by mouth every day.     • torsemide (DEMADEX) 20 MG Tab Take 1 Tab by mouth every day. (Patient taking differently: Take 20 mg by mouth 2 times a day.) 30 Tab 0   • ALPRAZolam (XANAX) 0.25 MG Tab Take 0.25 mg by mouth at bedtime as needed for Sleep.     • omeprazole (PRILOSEC) 20 MG delayed-release capsule Take 20 mg by mouth every day.     • acetaminophen (TYLENOL) 325 MG Tab Take 650 mg by mouth as needed (for arthritis).     • [DISCONTINUED] CARTIA  MG CAPSULE SR 24 HR      • [DISCONTINUED] diltiazem (DILACOR XR) 180 MG XR capsule Take 2 Caps by mouth every day. 60 Cap 11   • [DISCONTINUED] potassium chloride SA (KDUR) 20 MEQ Tab CR Take 1 Tab by mouth every day. (Patient not  "taking: Reported on 10/9/2019) 30 Tab 0   • VITAMIN E PO Take  by mouth every day.       No facility-administered encounter medications on file as of 10/9/2019.      Review of Systems   Constitutional: Negative for chills and fever.   HENT: Negative for congestion.    Eyes: Negative for blurred vision, pain and discharge.   Respiratory: Negative for cough, hemoptysis and wheezing.    Cardiovascular: Positive for palpitations. Negative for chest pain.   Gastrointestinal: Negative for abdominal pain, nausea and vomiting.   Musculoskeletal: Negative for joint pain and myalgias.   Skin: Negative for itching and rash.   Neurological: Negative for speech change and loss of consciousness.   Endo/Heme/Allergies: Does not bruise/bleed easily.   Psychiatric/Behavioral: Negative for depression. The patient is not nervous/anxious.    All other systems reviewed and are negative.       Objective:   Ht 1.702 m (5' 7\")   Wt 63 kg (139 lb)   BMI 21.77 kg/m²     Physical Exam   Constitutional: She is oriented to person, place, and time. She appears well-developed and well-nourished.   HENT:   Head: Normocephalic and atraumatic.   Eyes: Pupils are equal, round, and reactive to light. EOM are normal.   Neck: Normal range of motion. Neck supple.   Cardiovascular: Normal rate, regular rhythm and intact distal pulses. Exam reveals no gallop and no friction rub.   Murmur heard.  Pulmonary/Chest: Effort normal and breath sounds normal.   Abdominal: Soft. Bowel sounds are normal.   Musculoskeletal: Normal range of motion. She exhibits no edema.   Neurological: She is alert and oriented to person, place, and time. No cranial nerve deficit.   Skin: Skin is warm.   Psychiatric: She has a normal mood and affect. Her behavior is normal. Judgment and thought content normal.       Assessment:     1. PAF (paroxysmal atrial fibrillation) ([facility])  EKG    PULMONARY FUNCTION TESTS -Test requested: Complete Pulmonary Function Test   2. Valvular heart " disease     3. Chronic renal impairment, stage 4 (severe) (HCC)     4. Paroxysmal A-fib (HCC)     5. Anticoagulated         Medical Decision Making:  Today's Assessment / Status / Plan:   1.  Paroxysmal atrial fibrillation symptomatic previous CHF felt to be related to mitral valve disease.  Rec mentation will be  for starting low-dose amiodarone 200 mL a day cut the diltiazem down to 120 daily.  2.  Anticoagulation continue NOAC.  3.  Mitral regurgitation last transesophageal echocardiogram mild.  4.  Follow-up with [facility] in 6 weeks.

## 2019-10-31 ENCOUNTER — OFFICE VISIT (OUTPATIENT)
Dept: CARDIOLOGY | Facility: MEDICAL CENTER | Age: 79
End: 2019-10-31
Payer: MEDICARE

## 2019-10-31 VITALS
SYSTOLIC BLOOD PRESSURE: 120 MMHG | BODY MASS INDEX: 22.13 KG/M2 | HEART RATE: 60 BPM | WEIGHT: 141 LBS | DIASTOLIC BLOOD PRESSURE: 70 MMHG | OXYGEN SATURATION: 98 % | HEIGHT: 67 IN

## 2019-10-31 DIAGNOSIS — Z79.01 ANTICOAGULATED: ICD-10-CM

## 2019-10-31 DIAGNOSIS — I38 VALVULAR HEART DISEASE: ICD-10-CM

## 2019-10-31 DIAGNOSIS — I48.0 PAROXYSMAL A-FIB (HCC): ICD-10-CM

## 2019-10-31 DIAGNOSIS — I10 ESSENTIAL HYPERTENSION: ICD-10-CM

## 2019-10-31 PROCEDURE — 99214 OFFICE O/P EST MOD 30 MIN: CPT | Performed by: INTERNAL MEDICINE

## 2019-10-31 RX ORDER — DILTIAZEM HYDROCHLORIDE 180 MG/1
CAPSULE, EXTENDED RELEASE ORAL
COMMUNITY
Start: 2019-10-29 | End: 2019-11-12

## 2019-10-31 ASSESSMENT — MINNESOTA LIVING WITH HEART FAILURE QUESTIONNAIRE (MLHF)
DIFFICULTY WITH SEXUAL ACTIVITIES: 1
MAKING YOU WORRY: 3
MAKING YOU SHORT OF BREATH: 3
WORKING AROUND THE HOUSE OR YARD DIFFICULT: 1
DIFFICULTY WORKING TO EARN A LIVING: 1
DIFFICULTY WITH RECREATIONAL PASTIMES, SPORTS, HOBBIES: 1
MAKING YOU STAY IN A HOSPITAL: 0
TOTAL_SCORE: 28
WALKING ABOUT OR CLIMBING STAIRS DIFFICULT: 2
HAVING TO SIT OR LIE DOWN DURING THE DAY: 2
DIFFICULTY SLEEPING WELL AT NIGHT: 1
MAKING YOU FEEL DEPRESSED: 2
EATING LESS FOODS YOU LIKE: 1
TIRED, FATIGUED OR LOW ON ENERGY: 3
DIFFICULTY GOING AWAY FROM HOME: 1
GIVING YOU SIDE EFFECTS FROM TREATMENTS: 1
DIFFICULTY SOCIALIZING WITH FAMILY OR FRIENDS: 1
FEELING LIKE A BURDEN TO FAMILY AND FRIENDS: 0
LOSS OF SELF CONTROL IN YOUR LIFE: 1
SWELLING IN ANKLES OR LEGS: 1
DIFFICULTY TO CONCENTRATE OR REMEMBERING THINGS: 1
COSTING YOU MONEY FOR MEDICAL CARE: 1

## 2019-10-31 NOTE — PROGRESS NOTES
Chief Complaint   Patient presents with   • Congestive Heart Failure       Subjective:   Geno Gallegos is a 79 y.o. female who presents today for follow-up of her congestive heart failure secondary to valvular heart disease.  She was admitted to hospital and may for congestive heart failure and was initially found to have severe mitral insufficiency.  However, she also has significant renal insufficiency and hypertension.  After diuresis, her severe mitral insufficiency was only mild.  Her medical therapy was adjusted including changing furosemide to torsemide.  She has had cardiac catheterization which showed no angiographic coronary artery disease.    I she did see EP and was started on amiodarone therapy.  She is had no recurrent palpitations.    She has dyspnea on exertion if she walks upstairs but not on a flat walk.  She is noted no PND, orthopnea or edema.    She does have some chest discomfort which she feels secondary to GERD.  It is improved by antacids.      Past Medical History:   Diagnosis Date   • A-fib (HCC)     had history of Afib   • Bowel obstruction (HCC)    • Renal disorder     stage 4 kidney failure   • Valvular heart disease 5/27/2019     Past Surgical History:   Procedure Laterality Date   • GYN SURGERY      hysterectomy   • OTHER ORTHOPEDIC SURGERY      right hip replacement     Family History   Problem Relation Age of Onset   • Cancer Mother    • Heart Attack Father    • Heart Disease Father         valve surgeries x2     Social History     Socioeconomic History   • Marital status:      Spouse name: Not on file   • Number of children: Not on file   • Years of education: Not on file   • Highest education level: Not on file   Occupational History   • Not on file   Social Needs   • Financial resource strain: Not on file   • Food insecurity:     Worry: Not on file     Inability: Not on file   • Transportation needs:     Medical: Not on file     Non-medical: Not on file   Tobacco Use   •  Smoking status: Never Smoker   • Smokeless tobacco: Never Used   Substance and Sexual Activity   • Alcohol use: Yes     Comment: occas   • Drug use: No   • Sexual activity: Not on file   Lifestyle   • Physical activity:     Days per week: Not on file     Minutes per session: Not on file   • Stress: Not on file   Relationships   • Social connections:     Talks on phone: Not on file     Gets together: Not on file     Attends Evangelical service: Not on file     Active member of club or organization: Not on file     Attends meetings of clubs or organizations: Not on file     Relationship status: Not on file   • Intimate partner violence:     Fear of current or ex partner: Not on file     Emotionally abused: Not on file     Physically abused: Not on file     Forced sexual activity: Not on file   Other Topics Concern   • Not on file   Social History Narrative   • Not on file     Allergies   Allergen Reactions   • Pcn [Penicillins]      rash     Outpatient Encounter Medications as of 10/31/2019   Medication Sig Dispense Refill   • DILT- MG XR capsule      • potassium Chloride ER (K-TAB) 20 MEQ Tab CR tablet      • amiodarone (CORDARONE) 200 MG Tab Take 1 Tab by mouth every day. 90 Tab 3   • DILTIAZem CD (CARDIZEM CD) 120 MG CAPSULE SR 24 HR Take 1 Cap by mouth every day. 30 Cap 3   • losartan (COZAAR) 100 MG Tab Take 1 Tab by mouth every day. 30 Tab 11   • apixaban (ELIQUIS) 2.5mg Tab Take 1 Tab by mouth 2 Times a Day. 60 Tab 11   • lansoprazole (PREVACID) 30 MG CAPSULE DELAYED RELEASE      • Multiple Vitamins-Minerals (OCUVITE-LUTEIN) Tab Take 1 tablet by mouth every day.     • torsemide (DEMADEX) 20 MG Tab Take 1 Tab by mouth every day. 30 Tab 0   • ALPRAZolam (XANAX) 0.25 MG Tab Take 0.25 mg by mouth at bedtime as needed for Sleep.     • omeprazole (PRILOSEC) 20 MG delayed-release capsule Take 20 mg by mouth every day.     • acetaminophen (TYLENOL) 325 MG Tab Take 650 mg by mouth as needed (for arthritis).     •  "VITAMIN E PO Take  by mouth every day.       No facility-administered encounter medications on file as of 10/31/2019.      ROS       Objective:   /70 (BP Location: Left arm, Patient Position: Sitting, BP Cuff Size: Adult)   Pulse 60   Ht 1.702 m (5' 7\")   Wt 64 kg (141 lb)   SpO2 98%   BMI 22.08 kg/m²     Physical Exam   Constitutional: She appears well-developed and well-nourished.   Neck: No JVD present.   Cardiovascular: Normal rate and regular rhythm.   No murmur heard.  Pulmonary/Chest: Effort normal and breath sounds normal. She has no rales.   Abdominal: Soft. There is no tenderness.   Musculoskeletal: She exhibits no edema.     No results found for: CHOLSTRLTOT, LDL, HDL, TRIGLYCERIDE    Lab Results   Component Value Date/Time    SODIUM 141 09/25/2019 10:16 AM    POTASSIUM 3.9 09/25/2019 10:16 AM    CHLORIDE 107 09/25/2019 10:16 AM    CO2 23 09/25/2019 10:16 AM    GLUCOSE 79 09/25/2019 10:16 AM    BUN 52 (H) 09/25/2019 10:16 AM    CREATININE 2.03 (H) 09/25/2019 10:16 AM    CREATININE 0.8 09/20/2005 07:10 AM     Lab Results   Component Value Date/Time    ALKPHOSPHAT 79 06/11/2019 01:27 PM    ASTSGOT 20 06/11/2019 01:27 PM    ALTSGPT 15 06/11/2019 01:27 PM    TBILIRUBIN 0.6 06/11/2019 01:27 PM      Lab Results   Component Value Date/Time    BNPBTYPENAT 1074 (H) 05/27/2019 03:07 AM          Cardiac catheterization from May 2019:  POSTPROCEDURE DIAGNOSES:  1.  A 3-4+ mitral regurgitation.  2.  No angiographic evidence of coronary artery disease.  3.  Normal left ventricular systolic function with ejection fraction of 65%.  4.  Elevated left ventricular end-diastolic pressure.  5.  Elevated right heart pressures with PA systolic pressure of 50 mmHg.     Echocardiography Laboratory, [facility]:  CONCLUSIONS  Moderate pulmonic insufficiency.   No mitral stenosis. Mild mitral regurgitation.   No aortic stenosis. Trace aortic insufficiency.   No tricuspid stenosis. Trace tricuspid regurgitation.   Compared to " prior study done on 05/26/2019, there has been improvement   in severity of mitral regurgitation.    NALINI GALLEGOS  Exam Date:          05/29/2019     Assessment:     1. Paroxysmal A-fib (HCC)     2. Essential hypertension     3. Valvular heart disease     4. Anticoagulated         Medical Decision Making:  Today's Assessment / Status / Plan:     Ms. Gallegos has had resolution of her PAF with amiodarone therapy.  Her blood pressure is under excellent control.  She has had difficulty with severe mitral insufficiency in the past which improved with diuretic therapy and treatment of her blood pressure.  She will follow-up in about 6 months with a repeat echocardiogram.

## 2019-11-12 ENCOUNTER — OFFICE VISIT (OUTPATIENT)
Dept: CARDIOLOGY | Facility: MEDICAL CENTER | Age: 79
End: 2019-11-12
Payer: MEDICARE

## 2019-11-12 VITALS
BODY MASS INDEX: 22.29 KG/M2 | SYSTOLIC BLOOD PRESSURE: 120 MMHG | DIASTOLIC BLOOD PRESSURE: 62 MMHG | OXYGEN SATURATION: 96 % | HEART RATE: 57 BPM | WEIGHT: 142 LBS | HEIGHT: 67 IN

## 2019-11-12 DIAGNOSIS — Z79.01 CHRONIC ANTICOAGULATION: ICD-10-CM

## 2019-11-12 DIAGNOSIS — Z79.899 HIGH RISK MEDICATION USE: ICD-10-CM

## 2019-11-12 DIAGNOSIS — I38 VALVULAR HEART DISEASE: ICD-10-CM

## 2019-11-12 DIAGNOSIS — I48.0 PAF (PAROXYSMAL ATRIAL FIBRILLATION) (HCC): Primary | ICD-10-CM

## 2019-11-12 LAB — EKG IMPRESSION: NORMAL

## 2019-11-12 PROCEDURE — 99214 OFFICE O/P EST MOD 30 MIN: CPT | Performed by: NURSE PRACTITIONER

## 2019-11-12 PROCEDURE — 93000 ELECTROCARDIOGRAM COMPLETE: CPT | Performed by: INTERNAL MEDICINE

## 2019-11-12 ASSESSMENT — ENCOUNTER SYMPTOMS
PALPITATIONS: 0
DIZZINESS: 0
BLOOD IN STOOL: 0
HEMOPTYSIS: 0
NAUSEA: 0
WEIGHT LOSS: 0
DIARRHEA: 0
CHILLS: 0
TINGLING: 0
SENSORY CHANGE: 0
SHORTNESS OF BREATH: 0
STRIDOR: 0
FEVER: 0
TREMORS: 0
SORE THROAT: 0
FOCAL WEAKNESS: 0
HEADACHES: 0
WHEEZING: 0
DOUBLE VISION: 0
HEARTBURN: 0
LOSS OF CONSCIOUSNESS: 0
ORTHOPNEA: 0
ABDOMINAL PAIN: 0
COUGH: 0
VOMITING: 0
BLURRED VISION: 0
SPUTUM PRODUCTION: 0
PND: 0
SPEECH CHANGE: 0

## 2019-11-12 NOTE — PROGRESS NOTES
Cardiology/Electrophysiology Follow-up Note      Subjective:   Chief Complaint:   Chief Complaint   Patient presents with   • Atrial Fibrillation     PP,        Geno Gallegos is a 79 y.o. female who presents today for follow up and review of her atrial fibrillation and high risk medication in the form of Amiodarone.     SHe is followed by Dr. Larose and was seen in consultation by Dr. Wu for her atrial arrhythmias.  Past medical history also significant for valvular heart disease, mirtal insuffiency, previous heart failure secondary to valvular heart disease, hypertension, chronic anticoagulation, and CKD, stage IV.    Last seen by Dr. Larose.      Today in follow up she tells me that she has been feeling well overall.  She reports that she does not believe  that she has had any significant arrhythmia/atral fibrillation.  She reports that she is feeling well on amiodarone without reports side effects.  From a heart failure standpoint she reports that she continues to do well as well, most recent echo showed improvement in her mitral regurgitation after diuresis.  She denies chest pain, dizziness, palpitations, pre syncope or syncope, dyspnea, PND, orthopnea, or lower extremity edema.      Patient endorses medication compliance        Past Medical History:   Diagnosis Date   • A-fib (HCC)     had history of Afib   • Bowel obstruction (HCC)    • Renal disorder     stage 4 kidney failure   • Valvular heart disease 5/27/2019     Past Surgical History:   Procedure Laterality Date   • GYN SURGERY      hysterectomy   • OTHER ORTHOPEDIC SURGERY      right hip replacement     Family History   Problem Relation Age of Onset   • Cancer Mother    • Heart Attack Father    • Heart Disease Father         valve surgeries x2     Social History     Socioeconomic History   • Marital status:      Spouse name: Not on file   • Number of children: Not on file   • Years of education: Not on file   • Highest education level: Not  on file   Occupational History   • Not on file   Social Needs   • Financial resource strain: Not on file   • Food insecurity:     Worry: Not on file     Inability: Not on file   • Transportation needs:     Medical: Not on file     Non-medical: Not on file   Tobacco Use   • Smoking status: Never Smoker   • Smokeless tobacco: Never Used   Substance and Sexual Activity   • Alcohol use: Yes     Comment: occas   • Drug use: No   • Sexual activity: Not on file   Lifestyle   • Physical activity:     Days per week: Not on file     Minutes per session: Not on file   • Stress: Not on file   Relationships   • Social connections:     Talks on phone: Not on file     Gets together: Not on file     Attends Spiritism service: Not on file     Active member of club or organization: Not on file     Attends meetings of clubs or organizations: Not on file     Relationship status: Not on file   • Intimate partner violence:     Fear of current or ex partner: Not on file     Emotionally abused: Not on file     Physically abused: Not on file     Forced sexual activity: Not on file   Other Topics Concern   • Not on file   Social History Narrative   • Not on file     Allergies   Allergen Reactions   • Pcn [Penicillins]      rash       Current Outpatient Medications   Medication Sig Dispense Refill   • DILT- MG XR capsule      • potassium Chloride ER (K-TAB) 20 MEQ Tab CR tablet      • amiodarone (CORDARONE) 200 MG Tab Take 1 Tab by mouth every day. 90 Tab 3   • DILTIAZem CD (CARDIZEM CD) 120 MG CAPSULE SR 24 HR Take 1 Cap by mouth every day. 30 Cap 3   • losartan (COZAAR) 100 MG Tab Take 1 Tab by mouth every day. 30 Tab 11   • apixaban (ELIQUIS) 2.5mg Tab Take 1 Tab by mouth 2 Times a Day. 60 Tab 11   • lansoprazole (PREVACID) 30 MG CAPSULE DELAYED RELEASE      • Multiple Vitamins-Minerals (OCUVITE-LUTEIN) Tab Take 1 tablet by mouth every day.     • torsemide (DEMADEX) 20 MG Tab Take 1 Tab by mouth every day. 30 Tab 0   • ALPRAZolam  "(XANAX) 0.25 MG Tab Take 0.25 mg by mouth at bedtime as needed for Sleep.     • omeprazole (PRILOSEC) 20 MG delayed-release capsule Take 20 mg by mouth every day.     • acetaminophen (TYLENOL) 325 MG Tab Take 650 mg by mouth as needed (for arthritis).     • VITAMIN E PO Take  by mouth every day.       No current facility-administered medications for this visit.        Review of Systems   Constitutional: Negative for chills, fever, malaise/fatigue and weight loss.   HENT: Negative for congestion, nosebleeds, sore throat and tinnitus.    Eyes: Negative for blurred vision and double vision.   Respiratory: Negative for cough, hemoptysis, sputum production, shortness of breath, wheezing and stridor.    Cardiovascular: Negative for chest pain, palpitations, orthopnea, leg swelling and PND.   Gastrointestinal: Negative for abdominal pain, blood in stool, diarrhea, heartburn, melena, nausea and vomiting.   Genitourinary: Negative for hematuria.   Skin: Negative for rash.   Neurological: Negative for dizziness, tingling, tremors, sensory change, speech change, focal weakness, loss of consciousness and headaches.     All others systems reviewed and negative.     Objective:     /62 (BP Location: Left arm, Patient Position: Sitting, BP Cuff Size: Adult)   Pulse (!) 57   Ht 1.702 m (5' 7\")   Wt 64.4 kg (142 lb)   SpO2 96%  Body mass index is 22.24 kg/m².    Physical Exam   Constitutional: She is oriented to person, place, and time and well-developed, well-nourished, and in no distress.   HENT:   Head: Normocephalic and atraumatic.   Eyes: Pupils are equal, round, and reactive to light. Conjunctivae and EOM are normal.   Neck: Normal range of motion. Neck supple. No JVD present.   Cardiovascular: Normal rate, regular rhythm and intact distal pulses. Exam reveals no gallop and no friction rub.   Murmur heard.   Systolic murmur is present.  Pulses:       Radial pulses are 2+ on the right side and 2+ on the left side.      "   Dorsalis pedis pulses are 2+ on the right side and 2+ on the left side.        Posterior tibial pulses are 2+ on the right side and 2+ on the left side.   Pulmonary/Chest: Effort normal and breath sounds normal. No respiratory distress. She has no wheezes. She has no rales. She exhibits no tenderness.   Abdominal: Soft. Bowel sounds are normal. There is no tenderness.   Musculoskeletal: Normal range of motion.         General: No edema.   Neurological: She is alert and oriented to person, place, and time.   Skin: Skin is warm and dry. No rash noted. No erythema.   Psychiatric: Mood, memory, affect and judgment normal.         Cardiac Imaging and Procedures Review:    EKG (11/12/19) reviewed by myself:   Sinus magdiel, rate 57. Slight first deg AVB, .22 ms     Echo (2/29/19):   YAO  Moderate pulmonic insufficiency.   No mitral stenosis. Mild mitral regurgitation.   No aortic stenosis. Trace aortic insufficiency.   No tricuspid stenosis. Trace tricuspid regurgitation.   Compared to prior study done on 05/26/2019, there has been improvement   in severity of mitral regurgitation.    Complete 5/26/19  Left Ventricle  The left ventricle was normal in size and thickness. Normal left   ventricular systolic function. Normal regional wall motion. Left   ventricular ejection fraction is visually estimated to be 70%.   Diastolic function is abnormal, but grade cannot be determined due to   mitral valve disease.     Right Ventricle  The right ventricle was normal in size and function.     Right Atrium  Enlarged right atrium. Dilated inferior vena cava without inspiratory   collapse.     Left Atrium  Severely dilated left atrium. Left atrial volume index is 57 mL/sq m.     Mitral Valve  Thickened mitral valve leaflets. No mitral stenosis. Severe mitral   regurgitation. ERO by PISA method is 0.5 sq cm and vena contracta is   0.7 cm.     Aortic Valve  Tricuspid aortic valve. Aortic sclerosis without stenosis. No aortic    insufficiency.     Tricuspid Valve  Myxomatous changes of the tricuspid valve. No tricuspid stenosis.   Moderate tricuspid regurgitation. Estimated right ventricular systolic   pressure is 55 mmHg. Right atrial pressure is estimated to be 15 mmHg.   Right heart pressures are consistent with severe pulmonary hypertension   at 70 mmHg.     Pulmonic Valve  Structurally normal pulmonic valve without significant stenosis. Mild   pulmonic insufficiency.     Pericardium  Normal pericardium without effusion.     Aorta  The aortic root is normal.  Ascending aorta diameter is 3.2 cm.      Labs (personally reviewed and notable for):   Lab Results   Component Value Date/Time    SODIUM 141 09/25/2019 10:16 AM    POTASSIUM 3.9 09/25/2019 10:16 AM    CHLORIDE 107 09/25/2019 10:16 AM    CO2 23 09/25/2019 10:16 AM    GLUCOSE 79 09/25/2019 10:16 AM    BUN 52 (H) 09/25/2019 10:16 AM    CREATININE 2.03 (H) 09/25/2019 10:16 AM      Lab Results   Component Value Date/Time    WBC 5.3 05/28/2019 02:08 AM    RBC 3.76 (L) 05/28/2019 02:08 AM    HEMOGLOBIN 11.5 (L) 05/28/2019 02:08 AM    HEMATOCRIT 36.1 (L) 05/28/2019 02:08 AM    MCV 96.0 05/28/2019 02:08 AM    MCH 30.6 05/28/2019 02:08 AM    MCHC 31.9 (L) 05/28/2019 02:08 AM    MPV 10.9 05/28/2019 02:08 AM    NEUTSPOLYS 52.00 05/28/2019 02:08 AM    LYMPHOCYTES 33.50 05/28/2019 02:08 AM    MONOCYTES 9.10 05/28/2019 02:08 AM    EOSINOPHILS 4.40 05/28/2019 02:08 AM    BASOPHILS 0.80 05/28/2019 02:08 AM          Assessment:     1. PAF (paroxysmal atrial fibrillation) (HCC)  EKG    TSH WITH REFLEX TO FT4    Comp Metabolic Panel    DX-CHEST-2 VIEWS   2. High risk medication use  TSH WITH REFLEX TO FT4    Comp Metabolic Panel    DX-CHEST-2 VIEWS   3. Valvular heart disease     4. Chronic anticoagulation         Medical Decision Making:  Today's Assessment / Status / Plan:   1. Partoxysmal Atrial firbilaltion:  - Reports no episodes on low dose Amiodarone.  In sinus on 12 lead EKG today.   - She  will continue AMiodarone 200 mg PO daily, consider taper in 6 months if rhythm well controled.  - Continue Diltiazem 120 mg and eliquis 2.5 mg BID.    2. High Risk Medication Use (Amiodarone):  - Tolerating well without report side effects.  No presynope or syncope.   - No significnat QT prolongation on ekg today.  - I have ordered TSH, CMP, CXR for routine survillence before next follow up in 6 months.     3. Valvular Heart Disease:  - Mitral regurgitation, managed by Dr. Larose.  - Cotninues to do well at this time.  No signs of CHF/volume overload today.   - Keep follow up as requested.     4. Anticoagualtion:  - Eliquis 2.5 mg BID.  She is on appropratie dose for wt/renal fucntion.   - She dneies any evidence of internal bleeding.       Plan reviewed in detail with the patient and she verbalizes understanding and is in agreement.   RTC in 6 months, sooner if clinical condition changes  Collaborating MD/ADD: JUAN Landaverde.

## 2019-11-19 ENCOUNTER — TELEPHONE (OUTPATIENT)
Dept: CARDIOLOGY | Facility: MEDICAL CENTER | Age: 79
End: 2019-11-19

## 2019-11-19 RX ORDER — AMIODARONE HYDROCHLORIDE 200 MG/1
400 TABLET ORAL DAILY
Qty: 90 TAB | Refills: 3 | COMMUNITY
Start: 2019-11-19 | End: 2019-12-04

## 2019-11-19 RX ORDER — ATENOLOL 25 MG/1
25 TABLET ORAL DAILY
Qty: 90 TAB | Refills: 1 | COMMUNITY
Start: 2019-11-19 | End: 2019-12-04

## 2019-11-19 NOTE — TELEPHONE ENCOUNTER
----- Message from Zora Ramos R.N. sent at 11/19/2019  2:52 PM PST -----  Regarding: FW: doctor to doctor call    ----- Message -----  From: Violet Soliz  Sent: 11/19/2019   2:38 PM PST  To: Zora Ramos R.N.  Subject: doctor to doctor call                            Dr. Alexandre Blakely's office called and said Dr. Schroeder is requesting a call back directly from Dr. Wu to discuss the mutual patient. Dr. Schroeder's #[phone]. Requested message be sent as an urgent message.

## 2019-11-19 NOTE — TELEPHONE ENCOUNTER
----- Message from Zora Ramos R.N. sent at 11/19/2019  2:52 PM PST -----  Regarding: FW: doctor to doctor call    ----- Message -----  From: Violet Soliz  Sent: 11/19/2019   2:38 PM PST  To: Zora Ramos R.N.  Subject: doctor to doctor call                            Dr. Alexandre Blakely's office called and said Dr. Schroeder is requesting a call back directly from Dr. Wu to discuss the mutual patient. Dr. Schoreder's #[phone]. Requested message be sent as an urgent message.

## 2019-11-20 NOTE — TELEPHONE ENCOUNTER
Called pharmacy to check on pt med list, Pharmacy has pt listed as having   Dilt  qd,   dilt  2 tabs daily and   cartia 240 XT 1 qd .    This is what Dr Schroeder and Dr Wu Spoke about. Dr Schroeder will clarify with pt while in office to only be on dilt 120 per DS, Pharmacist Osbaldo notified to dc cartia 240, and dilt 180., keep only dilt 120.   Called pt and re meds. She had all 3 in pill box and has been taking all 3 ditazem versions but has not been taking atenolol 25mg daily. Pt has removed dilt 180 and cartia 240 from pill box and discard and add back in atenolol 25.     Per DS dilt 120, atenolol 25mg daily and increase amio to 2 tabs daily with FU appt.Pt had c/o afib 2 episodes with near syncope and HR sometimes in the 50's.  Pt verbalized understanding and has removed dilt 180 cand cartia 240. Will take atenolol.

## 2019-12-04 ENCOUNTER — APPOINTMENT (OUTPATIENT)
Dept: RADIOLOGY | Facility: MEDICAL CENTER | Age: 79
DRG: 208 | End: 2019-12-04
Attending: EMERGENCY MEDICINE
Payer: MEDICARE

## 2019-12-04 ENCOUNTER — APPOINTMENT (OUTPATIENT)
Dept: CARDIOLOGY | Facility: MEDICAL CENTER | Age: 79
DRG: 208 | End: 2019-12-04
Attending: INTERNAL MEDICINE
Payer: MEDICARE

## 2019-12-04 ENCOUNTER — OFFICE VISIT (OUTPATIENT)
Dept: CARDIOLOGY | Facility: MEDICAL CENTER | Age: 79
End: 2019-12-04
Payer: MEDICARE

## 2019-12-04 ENCOUNTER — HOSPITAL ENCOUNTER (INPATIENT)
Facility: MEDICAL CENTER | Age: 79
LOS: 6 days | DRG: 208 | End: 2019-12-10
Attending: EMERGENCY MEDICINE | Admitting: HOSPITALIST
Payer: MEDICARE

## 2019-12-04 VITALS
SYSTOLIC BLOOD PRESSURE: 124 MMHG | WEIGHT: 146 LBS | BODY MASS INDEX: 22.91 KG/M2 | OXYGEN SATURATION: 95 % | HEART RATE: 43 BPM | DIASTOLIC BLOOD PRESSURE: 80 MMHG | HEIGHT: 67 IN

## 2019-12-04 DIAGNOSIS — I38 VALVULAR HEART DISEASE: ICD-10-CM

## 2019-12-04 DIAGNOSIS — Z79.899 ON AMIODARONE THERAPY: ICD-10-CM

## 2019-12-04 DIAGNOSIS — R00.1 SINUS BRADYCARDIA: ICD-10-CM

## 2019-12-04 DIAGNOSIS — N18.4 CHRONIC RENAL IMPAIRMENT, STAGE 4 (SEVERE) (HCC): ICD-10-CM

## 2019-12-04 DIAGNOSIS — R57.9 SHOCK (HCC): ICD-10-CM

## 2019-12-04 DIAGNOSIS — R00.1 BRADYCARDIA: ICD-10-CM

## 2019-12-04 DIAGNOSIS — T46.1X1A CALCIUM CHANNEL BLOCKER OVERDOSE, ACCIDENTAL OR UNINTENTIONAL, INITIAL ENCOUNTER: ICD-10-CM

## 2019-12-04 DIAGNOSIS — I34.0 MITRAL VALVE INSUFFICIENCY, UNSPECIFIED ETIOLOGY: ICD-10-CM

## 2019-12-04 DIAGNOSIS — I48.0 PAROXYSMAL A-FIB (HCC): ICD-10-CM

## 2019-12-04 DIAGNOSIS — Z79.01 ANTICOAGULATED: ICD-10-CM

## 2019-12-04 DIAGNOSIS — I48.0 PAF (PAROXYSMAL ATRIAL FIBRILLATION) (HCC): ICD-10-CM

## 2019-12-04 DIAGNOSIS — I10 ESSENTIAL HYPERTENSION: ICD-10-CM

## 2019-12-04 PROBLEM — J96.00 ACUTE RESPIRATORY FAILURE (HCC): Status: ACTIVE | Noted: 2019-12-04

## 2019-12-04 PROBLEM — E87.20 METABOLIC ACIDOSIS: Status: ACTIVE | Noted: 2019-12-04

## 2019-12-04 PROBLEM — N17.9 ACUTE ON CHRONIC RENAL FAILURE (HCC): Status: ACTIVE | Noted: 2019-12-04

## 2019-12-04 PROBLEM — N18.9 ACUTE ON CHRONIC RENAL FAILURE (HCC): Status: ACTIVE | Noted: 2019-12-04

## 2019-12-04 PROBLEM — G93.40 ACUTE ENCEPHALOPATHY: Status: ACTIVE | Noted: 2019-12-04

## 2019-12-04 LAB
ACTION RANGE TRIGGERED IACRT: YES
ACTION RANGE TRIGGERED IACRT: YES
ALBUMIN SERPL BCP-MCNC: 3.9 G/DL (ref 3.2–4.9)
ALBUMIN/GLOB SERPL: 1.2 G/DL
ALP SERPL-CCNC: 129 U/L (ref 30–99)
ALT SERPL-CCNC: 197 U/L (ref 2–50)
ANION GAP SERPL CALC-SCNC: 21 MMOL/L (ref 0–11.9)
APPEARANCE UR: CLEAR
APTT PPP: 34.3 SEC (ref 24.7–36)
AST SERPL-CCNC: 236 U/L (ref 12–45)
BACTERIA #/AREA URNS HPF: ABNORMAL /HPF
BASE EXCESS BLDA CALC-SCNC: -18 MMOL/L (ref -4–3)
BASE EXCESS BLDA CALC-SCNC: -9 MMOL/L (ref -4–3)
BASOPHILS # BLD AUTO: 0.7 % (ref 0–1.8)
BASOPHILS # BLD: 0.07 K/UL (ref 0–0.12)
BILIRUB SERPL-MCNC: 0.7 MG/DL (ref 0.1–1.5)
BILIRUB UR QL STRIP.AUTO: NEGATIVE
BODY TEMPERATURE: ABNORMAL DEGREES
BODY TEMPERATURE: ABNORMAL DEGREES
BUN SERPL-MCNC: 81 MG/DL (ref 8–22)
CA-I SERPL-SCNC: 1.4 MMOL/L (ref 1.1–1.3)
CA-I SERPL-SCNC: 1.6 MMOL/L (ref 1.1–1.3)
CALCIUM SERPL-MCNC: 8.9 MG/DL (ref 8.5–10.5)
CHLORIDE SERPL-SCNC: 105 MMOL/L (ref 96–112)
CO2 BLDA-SCNC: 15 MMOL/L (ref 20–33)
CO2 BLDA-SCNC: 21 MMOL/L (ref 20–33)
CO2 SERPL-SCNC: 12 MMOL/L (ref 20–33)
COLOR UR: YELLOW
CREAT SERPL-MCNC: 3.92 MG/DL (ref 0.5–1.4)
D DIMER PPP IA.FEU-MCNC: 0.76 UG/ML (FEU) (ref 0–0.5)
EKG IMPRESSION: NORMAL
EOSINOPHIL # BLD AUTO: 0.08 K/UL (ref 0–0.51)
EOSINOPHIL NFR BLD: 0.8 % (ref 0–6.9)
EPI CELLS #/AREA URNS HPF: ABNORMAL /HPF
ERYTHROCYTE [DISTWIDTH] IN BLOOD BY AUTOMATED COUNT: 55.1 FL (ref 35.9–50)
GLOBULIN SER CALC-MCNC: 3.2 G/DL (ref 1.9–3.5)
GLUCOSE SERPL-MCNC: 287 MG/DL (ref 65–99)
GLUCOSE UR STRIP.AUTO-MCNC: NEGATIVE MG/DL
HCO3 BLDA-SCNC: 13 MMOL/L (ref 17–25)
HCO3 BLDA-SCNC: 19.6 MMOL/L (ref 17–25)
HCT VFR BLD AUTO: 43.2 % (ref 37–47)
HGB BLD-MCNC: 13 G/DL (ref 12–16)
HOROWITZ INDEX BLDA+IHG-RTO: 79 MM[HG]
HOROWITZ INDEX BLDA+IHG-RTO: 91 MM[HG]
HYALINE CASTS #/AREA URNS LPF: ABNORMAL /LPF
IMM GRANULOCYTES # BLD AUTO: 0.08 K/UL (ref 0–0.11)
IMM GRANULOCYTES NFR BLD AUTO: 0.8 % (ref 0–0.9)
INR PPP: 1.68 (ref 0.87–1.13)
INST. QUALIFIED PATIENT IIQPT: YES
INST. QUALIFIED PATIENT IIQPT: YES
KETONES UR STRIP.AUTO-MCNC: ABNORMAL MG/DL
LACTATE BLD-SCNC: 3.8 MMOL/L (ref 0.5–2)
LACTATE BLD-SCNC: 7.5 MMOL/L (ref 0.5–2)
LACTATE BLD-SCNC: 8.2 MMOL/L (ref 0.5–2)
LEUKOCYTE ESTERASE UR QL STRIP.AUTO: NEGATIVE
LV EJECT FRACT  99904: 70
LV EJECT FRACT MOD 2C 99903: 77.78
LV EJECT FRACT MOD 4C 99902: 71.28
LV EJECT FRACT MOD BP 99901: 74.3
LYMPHOCYTES # BLD AUTO: 3.27 K/UL (ref 1–4.8)
LYMPHOCYTES NFR BLD: 34.4 % (ref 22–41)
MAGNESIUM SERPL-MCNC: 2.9 MG/DL (ref 1.5–2.5)
MCH RBC QN AUTO: 31.8 PG (ref 27–33)
MCHC RBC AUTO-ENTMCNC: 30.1 G/DL (ref 33.6–35)
MCV RBC AUTO: 105.6 FL (ref 81.4–97.8)
MICRO URNS: ABNORMAL
MONOCYTES # BLD AUTO: 0.77 K/UL (ref 0–0.85)
MONOCYTES NFR BLD AUTO: 8.1 % (ref 0–13.4)
NEUTROPHILS # BLD AUTO: 5.23 K/UL (ref 2–7.15)
NEUTROPHILS NFR BLD: 55.2 % (ref 44–72)
NITRITE UR QL STRIP.AUTO: NEGATIVE
NRBC # BLD AUTO: 0 K/UL
NRBC BLD-RTO: 0 /100 WBC
NT-PROBNP SERPL IA-MCNC: ABNORMAL PG/ML (ref 0–125)
O2/TOTAL GAS SETTING VFR VENT: 100 %
O2/TOTAL GAS SETTING VFR VENT: 90 %
PCO2 BLDA: 52.8 MMHG (ref 26–37)
PCO2 BLDA: 54.4 MMHG (ref 26–37)
PCO2 TEMP ADJ BLDA: 50.3 MMHG (ref 26–37)
PCO2 TEMP ADJ BLDA: 52.8 MMHG (ref 26–37)
PH BLDA: 7 [PH] (ref 7.4–7.5)
PH BLDA: 7.17 [PH] (ref 7.4–7.5)
PH TEMP ADJ BLDA: 7 [PH] (ref 7.4–7.5)
PH TEMP ADJ BLDA: 7.19 [PH] (ref 7.4–7.5)
PH UR STRIP.AUTO: 5.5 [PH] (ref 5–8)
PLATELET # BLD AUTO: 224 K/UL (ref 164–446)
PMV BLD AUTO: 11.8 FL (ref 9–12.9)
PO2 BLDA: 79 MMHG (ref 64–87)
PO2 BLDA: 82 MMHG (ref 64–87)
PO2 TEMP ADJ BLDA: 73 MMHG (ref 64–87)
PO2 TEMP ADJ BLDA: 79 MMHG (ref 64–87)
POTASSIUM SERPL-SCNC: 4.7 MMOL/L (ref 3.6–5.5)
PROT SERPL-MCNC: 7.1 G/DL (ref 6–8.2)
PROT UR QL STRIP: 30 MG/DL
PROTHROMBIN TIME: 20.3 SEC (ref 12–14.6)
RBC # BLD AUTO: 4.09 M/UL (ref 4.2–5.4)
RBC UR QL AUTO: NEGATIVE
SAO2 % BLDA: 87 % (ref 93–99)
SAO2 % BLDA: 92 % (ref 93–99)
SODIUM SERPL-SCNC: 138 MMOL/L (ref 135–145)
SP GR UR STRIP.AUTO: 1.01
SPECIMEN DRAWN FROM PATIENT: ABNORMAL
SPECIMEN DRAWN FROM PATIENT: ABNORMAL
T4 FREE SERPL-MCNC: 0.91 NG/DL (ref 0.53–1.43)
TRIGL SERPL-MCNC: 85 MG/DL (ref 0–149)
TROPONIN T SERPL-MCNC: 61 NG/L (ref 6–19)
TSH SERPL DL<=0.005 MIU/L-ACNC: 15.16 UIU/ML (ref 0.38–5.33)
UROBILINOGEN UR STRIP.AUTO-MCNC: 0.2 MG/DL
WBC # BLD AUTO: 9.5 K/UL (ref 4.8–10.8)
WBC #/AREA URNS HPF: ABNORMAL /HPF

## 2019-12-04 PROCEDURE — 80053 COMPREHEN METABOLIC PANEL: CPT

## 2019-12-04 PROCEDURE — 93325 DOPPLER ECHO COLOR FLOW MAPG: CPT

## 2019-12-04 PROCEDURE — 5A1935Z RESPIRATORY VENTILATION, LESS THAN 24 CONSECUTIVE HOURS: ICD-10-PCS | Performed by: EMERGENCY MEDICINE

## 2019-12-04 PROCEDURE — 93000 ELECTROCARDIOGRAM COMPLETE: CPT | Performed by: INTERNAL MEDICINE

## 2019-12-04 PROCEDURE — 81001 URINALYSIS AUTO W/SCOPE: CPT

## 2019-12-04 PROCEDURE — 5A1223Z PERFORMANCE OF CARDIAC PACING, CONTINUOUS: ICD-10-PCS | Performed by: INTERNAL MEDICINE

## 2019-12-04 PROCEDURE — 99223 1ST HOSP IP/OBS HIGH 75: CPT | Mod: AI | Performed by: HOSPITALIST

## 2019-12-04 PROCEDURE — 96365 THER/PROPH/DIAG IV INF INIT: CPT

## 2019-12-04 PROCEDURE — 02HV33Z INSERTION OF INFUSION DEVICE INTO SUPERIOR VENA CAVA, PERCUTANEOUS APPROACH: ICD-10-PCS | Performed by: EMERGENCY MEDICINE

## 2019-12-04 PROCEDURE — 82962 GLUCOSE BLOOD TEST: CPT

## 2019-12-04 PROCEDURE — 99152 MOD SED SAME PHYS/QHP 5/>YRS: CPT | Performed by: INTERNAL MEDICINE

## 2019-12-04 PROCEDURE — 96376 TX/PRO/DX INJ SAME DRUG ADON: CPT

## 2019-12-04 PROCEDURE — 700111 HCHG RX REV CODE 636 W/ 250 OVERRIDE (IP)

## 2019-12-04 PROCEDURE — 84478 ASSAY OF TRIGLYCERIDES: CPT

## 2019-12-04 PROCEDURE — 71045 X-RAY EXAM CHEST 1 VIEW: CPT

## 2019-12-04 PROCEDURE — 96368 THER/DIAG CONCURRENT INF: CPT

## 2019-12-04 PROCEDURE — 36620 INSERTION CATHETER ARTERY: CPT

## 2019-12-04 PROCEDURE — 99291 CRITICAL CARE FIRST HOUR: CPT | Performed by: PSYCHIATRY & NEUROLOGY

## 2019-12-04 PROCEDURE — 700105 HCHG RX REV CODE 258: Performed by: INTERNAL MEDICINE

## 2019-12-04 PROCEDURE — 700105 HCHG RX REV CODE 258

## 2019-12-04 PROCEDURE — 85730 THROMBOPLASTIN TIME PARTIAL: CPT

## 2019-12-04 PROCEDURE — 82803 BLOOD GASES ANY COMBINATION: CPT

## 2019-12-04 PROCEDURE — 36556 INSERT NON-TUNNEL CV CATH: CPT

## 2019-12-04 PROCEDURE — 305387 CL-TEMPORARY PACEMAKER INSERT

## 2019-12-04 PROCEDURE — 700105 HCHG RX REV CODE 258: Performed by: EMERGENCY MEDICINE

## 2019-12-04 PROCEDURE — 99214 OFFICE O/P EST MOD 30 MIN: CPT | Performed by: INTERNAL MEDICINE

## 2019-12-04 PROCEDURE — C1751 CATH, INF, PER/CENT/MIDLINE: HCPCS

## 2019-12-04 PROCEDURE — 93010 ELECTROCARDIOGRAM REPORT: CPT | Performed by: INTERNAL MEDICINE

## 2019-12-04 PROCEDURE — 700111 HCHG RX REV CODE 636 W/ 250 OVERRIDE (IP): Performed by: PSYCHIATRY & NEUROLOGY

## 2019-12-04 PROCEDURE — 700101 HCHG RX REV CODE 250: Performed by: EMERGENCY MEDICINE

## 2019-12-04 PROCEDURE — 85379 FIBRIN DEGRADATION QUANT: CPT

## 2019-12-04 PROCEDURE — 85025 COMPLETE CBC W/AUTO DIFF WBC: CPT

## 2019-12-04 PROCEDURE — 83605 ASSAY OF LACTIC ACID: CPT | Mod: 91

## 2019-12-04 PROCEDURE — 700105 HCHG RX REV CODE 258: Performed by: PSYCHIATRY & NEUROLOGY

## 2019-12-04 PROCEDURE — 700111 HCHG RX REV CODE 636 W/ 250 OVERRIDE (IP): Performed by: HOSPITALIST

## 2019-12-04 PROCEDURE — 93325 DOPPLER ECHO COLOR FLOW MAPG: CPT | Mod: 26 | Performed by: INTERNAL MEDICINE

## 2019-12-04 PROCEDURE — 83880 ASSAY OF NATRIURETIC PEPTIDE: CPT

## 2019-12-04 PROCEDURE — 96367 TX/PROPH/DG ADDL SEQ IV INF: CPT

## 2019-12-04 PROCEDURE — 96366 THER/PROPH/DIAG IV INF ADDON: CPT

## 2019-12-04 PROCEDURE — 700101 HCHG RX REV CODE 250

## 2019-12-04 PROCEDURE — 700111 HCHG RX REV CODE 636 W/ 250 OVERRIDE (IP): Performed by: EMERGENCY MEDICINE

## 2019-12-04 PROCEDURE — 93005 ELECTROCARDIOGRAM TRACING: CPT | Performed by: HOSPITALIST

## 2019-12-04 PROCEDURE — 83735 ASSAY OF MAGNESIUM: CPT

## 2019-12-04 PROCEDURE — 84484 ASSAY OF TROPONIN QUANT: CPT

## 2019-12-04 PROCEDURE — 93321 DOPPLER ECHO F-UP/LMTD STD: CPT | Mod: 26 | Performed by: INTERNAL MEDICINE

## 2019-12-04 PROCEDURE — 93308 TTE F-UP OR LMTD: CPT | Mod: 26 | Performed by: INTERNAL MEDICINE

## 2019-12-04 PROCEDURE — 700111 HCHG RX REV CODE 636 W/ 250 OVERRIDE (IP): Performed by: INTERNAL MEDICINE

## 2019-12-04 PROCEDURE — 302214 INTUBATION BOX: Performed by: EMERGENCY MEDICINE

## 2019-12-04 PROCEDURE — 84439 ASSAY OF FREE THYROXINE: CPT

## 2019-12-04 PROCEDURE — 96375 TX/PRO/DX INJ NEW DRUG ADDON: CPT

## 2019-12-04 PROCEDURE — 70450 CT HEAD/BRAIN W/O DYE: CPT

## 2019-12-04 PROCEDURE — 94002 VENT MGMT INPAT INIT DAY: CPT

## 2019-12-04 PROCEDURE — 84443 ASSAY THYROID STIM HORMONE: CPT

## 2019-12-04 PROCEDURE — 37799 UNLISTED PX VASCULAR SURGERY: CPT

## 2019-12-04 PROCEDURE — 04HY32Z INSERTION OF MONITORING DEVICE INTO LOWER ARTERY, PERCUTANEOUS APPROACH: ICD-10-PCS | Performed by: EMERGENCY MEDICINE

## 2019-12-04 PROCEDURE — 99223 1ST HOSP IP/OBS HIGH 75: CPT | Mod: 25 | Performed by: INTERNAL MEDICINE

## 2019-12-04 PROCEDURE — 0BH18EZ INSERTION OF ENDOTRACHEAL AIRWAY INTO TRACHEA, VIA NATURAL OR ARTIFICIAL OPENING ENDOSCOPIC: ICD-10-PCS | Performed by: EMERGENCY MEDICINE

## 2019-12-04 PROCEDURE — 33210 INSERT ELECTRD/PM CATH SNGL: CPT | Performed by: INTERNAL MEDICINE

## 2019-12-04 PROCEDURE — 36600 WITHDRAWAL OF ARTERIAL BLOOD: CPT

## 2019-12-04 PROCEDURE — 36415 COLL VENOUS BLD VENIPUNCTURE: CPT

## 2019-12-04 PROCEDURE — 31500 INSERT EMERGENCY AIRWAY: CPT

## 2019-12-04 PROCEDURE — 770022 HCHG ROOM/CARE - ICU (200)

## 2019-12-04 PROCEDURE — 99292 CRITICAL CARE ADDL 30 MIN: CPT | Performed by: PSYCHIATRY & NEUROLOGY

## 2019-12-04 PROCEDURE — 99291 CRITICAL CARE FIRST HOUR: CPT

## 2019-12-04 PROCEDURE — 87086 URINE CULTURE/COLONY COUNT: CPT

## 2019-12-04 PROCEDURE — 87040 BLOOD CULTURE FOR BACTERIA: CPT | Mod: 91

## 2019-12-04 PROCEDURE — 85610 PROTHROMBIN TIME: CPT

## 2019-12-04 PROCEDURE — 82330 ASSAY OF CALCIUM: CPT

## 2019-12-04 RX ORDER — ROCURONIUM BROMIDE 10 MG/ML
75 INJECTION, SOLUTION INTRAVENOUS ONCE
Status: DISCONTINUED | OUTPATIENT
Start: 2019-12-04 | End: 2019-12-04

## 2019-12-04 RX ORDER — DEXMEDETOMIDINE HYDROCHLORIDE 4 UG/ML
0-1.5 INJECTION, SOLUTION INTRAVENOUS CONTINUOUS
Status: DISCONTINUED | OUTPATIENT
Start: 2019-12-04 | End: 2019-12-05

## 2019-12-04 RX ORDER — MIDAZOLAM HYDROCHLORIDE 1 MG/ML
INJECTION INTRAMUSCULAR; INTRAVENOUS
Status: COMPLETED
Start: 2019-12-04 | End: 2019-12-04

## 2019-12-04 RX ORDER — LIDOCAINE HYDROCHLORIDE 20 MG/ML
INJECTION, SOLUTION INFILTRATION; PERINEURAL
Status: COMPLETED
Start: 2019-12-04 | End: 2019-12-04

## 2019-12-04 RX ORDER — CALCIUM CHLORIDE 100 MG/ML
INJECTION INTRAVENOUS; INTRAVENTRICULAR
Status: COMPLETED
Start: 2019-12-04 | End: 2019-12-04

## 2019-12-04 RX ORDER — KETAMINE HYDROCHLORIDE 50 MG/ML
65 INJECTION, SOLUTION INTRAMUSCULAR; INTRAVENOUS ONCE
Status: COMPLETED | OUTPATIENT
Start: 2019-12-04 | End: 2019-12-04

## 2019-12-04 RX ORDER — POLYETHYLENE GLYCOL 3350 17 G/17G
1 POWDER, FOR SOLUTION ORAL
Status: DISCONTINUED | OUTPATIENT
Start: 2019-12-04 | End: 2019-12-07

## 2019-12-04 RX ORDER — PHENYLEPHRINE HCL IN 0.9% NACL 0.5 MG/5ML
200 SYRINGE (ML) INTRAVENOUS ONCE
Status: COMPLETED | OUTPATIENT
Start: 2019-12-04 | End: 2019-12-04

## 2019-12-04 RX ORDER — TORSEMIDE 20 MG/1
10-20 TABLET ORAL 2 TIMES DAILY
Status: ON HOLD | COMMUNITY
End: 2019-12-10

## 2019-12-04 RX ORDER — HEPARIN SODIUM,PORCINE 1000/ML
VIAL (ML) INJECTION
Status: COMPLETED
Start: 2019-12-04 | End: 2019-12-04

## 2019-12-04 RX ORDER — KETAMINE HYDROCHLORIDE 50 MG/ML
65 INJECTION, SOLUTION INTRAMUSCULAR; INTRAVENOUS ONCE
Status: DISCONTINUED | OUTPATIENT
Start: 2019-12-04 | End: 2019-12-05

## 2019-12-04 RX ORDER — PHENYLEPHRINE HCL IN 0.9% NACL 0.5 MG/5ML
100 SYRINGE (ML) INTRAVENOUS
Status: ACTIVE | OUTPATIENT
Start: 2019-12-04 | End: 2019-12-04

## 2019-12-04 RX ORDER — AMOXICILLIN 250 MG
2 CAPSULE ORAL 2 TIMES DAILY
Status: DISCONTINUED | OUTPATIENT
Start: 2019-12-05 | End: 2019-12-07

## 2019-12-04 RX ORDER — DOPAMINE HYDROCHLORIDE 160 MG/100ML
10 INJECTION, SOLUTION INTRAVENOUS CONTINUOUS
Status: DISCONTINUED | OUTPATIENT
Start: 2019-12-04 | End: 2019-12-04

## 2019-12-04 RX ORDER — SODIUM CHLORIDE 9 MG/ML
1000 INJECTION, SOLUTION INTRAVENOUS ONCE
Status: COMPLETED | OUTPATIENT
Start: 2019-12-04 | End: 2019-12-04

## 2019-12-04 RX ORDER — DILTIAZEM HYDROCHLORIDE 120 MG/1
120 CAPSULE, COATED, EXTENDED RELEASE ORAL DAILY
Status: ON HOLD | COMMUNITY
End: 2019-12-10

## 2019-12-04 RX ORDER — AMIODARONE HYDROCHLORIDE 200 MG/1
200 TABLET ORAL DAILY
Qty: 90 TAB | Refills: 3
Start: 2019-12-04 | End: 2019-12-04

## 2019-12-04 RX ORDER — KETAMINE HYDROCHLORIDE 50 MG/ML
INJECTION, SOLUTION INTRAMUSCULAR; INTRAVENOUS
Status: DISPENSED
Start: 2019-12-04 | End: 2019-12-05

## 2019-12-04 RX ORDER — ROCURONIUM BROMIDE 10 MG/ML
75 INJECTION, SOLUTION INTRAVENOUS ONCE
Status: COMPLETED | OUTPATIENT
Start: 2019-12-04 | End: 2019-12-04

## 2019-12-04 RX ORDER — AMIODARONE HYDROCHLORIDE 200 MG/1
200 TABLET ORAL DAILY
Status: ON HOLD | COMMUNITY
End: 2019-12-10

## 2019-12-04 RX ORDER — BISACODYL 10 MG
10 SUPPOSITORY, RECTAL RECTAL
Status: DISCONTINUED | OUTPATIENT
Start: 2019-12-04 | End: 2019-12-07

## 2019-12-04 RX ORDER — NOREPINEPHRINE BITARTRATE 1 MG/ML
INJECTION, SOLUTION INTRAVENOUS
Status: DISPENSED
Start: 2019-12-04 | End: 2019-12-05

## 2019-12-04 RX ORDER — CALCIUM CHLORIDE 100 MG/ML
1 INJECTION INTRAVENOUS; INTRAVENTRICULAR ONCE
Status: COMPLETED | OUTPATIENT
Start: 2019-12-04 | End: 2019-12-04

## 2019-12-04 RX ORDER — LOSARTAN POTASSIUM 100 MG/1
100 TABLET ORAL DAILY
Status: ON HOLD | COMMUNITY
End: 2019-12-10

## 2019-12-04 RX ADMIN — Medication 200 MCG: at 17:52

## 2019-12-04 RX ADMIN — CALCIUM CHLORIDE: 100 INJECTION INTRAVENOUS; INTRAVENTRICULAR at 18:05

## 2019-12-04 RX ADMIN — ATROPINE SULFATE 0.5 MG: 1 INJECTION, SOLUTION INTRAMUSCULAR; INTRAVENOUS; SUBCUTANEOUS at 17:57

## 2019-12-04 RX ADMIN — CALCIUM CHLORIDE 1 G: 100 INJECTION INTRAVENOUS; INTRAVENTRICULAR at 18:53

## 2019-12-04 RX ADMIN — SODIUM BICARBONATE 150 MEQ: 84 INJECTION, SOLUTION INTRAVENOUS at 20:10

## 2019-12-04 RX ADMIN — PROPOFOL 5 MCG/KG/MIN: 10 INJECTION, EMULSION INTRAVENOUS at 20:43

## 2019-12-04 RX ADMIN — SODIUM BICARBONATE 50 MEQ: 84 INJECTION, SOLUTION INTRAVENOUS at 19:42

## 2019-12-04 RX ADMIN — EPINEPHRINE 2 MCG/MIN: 1 INJECTION, SOLUTION, CONCENTRATE INTRAVENOUS at 20:00

## 2019-12-04 RX ADMIN — SODIUM BICARBONATE 50 MEQ: 84 INJECTION, SOLUTION INTRAVENOUS at 19:38

## 2019-12-04 RX ADMIN — DOPAMINE HYDROCHLORIDE 10 MCG/KG/MIN: 160 INJECTION, SOLUTION INTRAVENOUS at 18:20

## 2019-12-04 RX ADMIN — CEFTRIAXONE SODIUM 2 G: 2 INJECTION, POWDER, FOR SOLUTION INTRAMUSCULAR; INTRAVENOUS at 18:53

## 2019-12-04 RX ADMIN — LIDOCAINE HYDROCHLORIDE: 20 INJECTION, SOLUTION INFILTRATION; PERINEURAL at 21:24

## 2019-12-04 RX ADMIN — KETAMINE HYDROCHLORIDE 65 MG: 50 INJECTION, SOLUTION INTRAMUSCULAR; INTRAVENOUS at 17:47

## 2019-12-04 RX ADMIN — FENTANYL CITRATE 25 MCG/HR: 50 INJECTION, SOLUTION INTRAMUSCULAR; INTRAVENOUS at 22:32

## 2019-12-04 RX ADMIN — ROCURONIUM BROMIDE 75 MG: 10 INJECTION, SOLUTION INTRAVENOUS at 17:50

## 2019-12-04 RX ADMIN — CALCIUM GLUCONATE 60 MG/KG/HR: 98 INJECTION, SOLUTION INTRAVENOUS at 22:32

## 2019-12-04 RX ADMIN — SODIUM CHLORIDE 1000 ML: 9 INJECTION, SOLUTION INTRAVENOUS at 19:45

## 2019-12-04 RX ADMIN — CALCIUM GLUCONATE 60 MG/KG/HR: 98 INJECTION, SOLUTION INTRAVENOUS at 20:01

## 2019-12-04 RX ADMIN — NOREPINEPHRINE BITARTRATE 10 MCG/MIN: 1 INJECTION INTRAVENOUS at 18:35

## 2019-12-04 RX ADMIN — GLUCAGON 3 MG: 1 INJECTION, POWDER, LYOPHILIZED, FOR SOLUTION INTRAMUSCULAR; INTRAVENOUS at 19:37

## 2019-12-04 RX ADMIN — ATROPINE SULFATE 0.5 MG: 1 INJECTION, SOLUTION INTRAMUSCULAR; INTRAVENOUS; SUBCUTANEOUS at 17:49

## 2019-12-04 RX ADMIN — HEPARIN SODIUM: 1000 INJECTION, SOLUTION INTRAVENOUS; SUBCUTANEOUS at 21:24

## 2019-12-04 ASSESSMENT — PULMONARY FUNCTION TESTS: EPAP_CMH2O: 8

## 2019-12-04 NOTE — PROGRESS NOTES
Chief Complaint   Patient presents with   • Atrial Fibrillation     F/V DX:PAF       Subjective:   Geno Gallegos is a 79 y.o. female who presents today with history of paroxysmal atrial fibrillation.  Patient previously was taking double and triple dosing of calcium channel blocker.  This is been remedied.  Currently on 120 volumes of diltiazem and atenolol 25 mg a day.  Home monitoring shows bradycardia no obvious atrial fibrillation.  Significant mitral regurgitation on cath and transthoracic echo.  Not confirmed on YAO.  Murmur noted on examination.  Fatigue and shortness of breath.  Denies smoking minimal alcohol use.  Currently on amiodarone 400 mg a day.    Past Medical History:   Diagnosis Date   • A-fib (HCC)     had history of Afib   • Bowel obstruction (HCC)    • Renal disorder     stage 4 kidney failure   • Valvular heart disease 5/27/2019     Past Surgical History:   Procedure Laterality Date   • GYN SURGERY      hysterectomy   • OTHER ORTHOPEDIC SURGERY      right hip replacement     Family History   Problem Relation Age of Onset   • Cancer Mother    • Heart Attack Father    • Heart Disease Father         valve surgeries x2     Social History     Socioeconomic History   • Marital status:      Spouse name: Not on file   • Number of children: Not on file   • Years of education: Not on file   • Highest education level: Not on file   Occupational History   • Not on file   Social Needs   • Financial resource strain: Not on file   • Food insecurity:     Worry: Not on file     Inability: Not on file   • Transportation needs:     Medical: Not on file     Non-medical: Not on file   Tobacco Use   • Smoking status: Never Smoker   • Smokeless tobacco: Never Used   Substance and Sexual Activity   • Alcohol use: Yes     Comment: occas   • Drug use: No   • Sexual activity: Not on file   Lifestyle   • Physical activity:     Days per week: Not on file     Minutes per session: Not on file   • Stress: Not on file  "  Relationships   • Social connections:     Talks on phone: Not on file     Gets together: Not on file     Attends Buddhist service: Not on file     Active member of club or organization: Not on file     Attends meetings of clubs or organizations: Not on file     Relationship status: Not on file   • Intimate partner violence:     Fear of current or ex partner: Not on file     Emotionally abused: Not on file     Physically abused: Not on file     Forced sexual activity: Not on file   Other Topics Concern   • Not on file   Social History Narrative   • Not on file     Allergies   Allergen Reactions   • Pcn [Penicillins]      rash     Outpatient Encounter Medications as of 12/4/2019   Medication Sig Dispense Refill   • amiodarone (CORDARONE) 200 MG Tab Take 1 Tab by mouth every day. 90 Tab 3   • potassium Chloride ER (K-TAB) 20 MEQ Tab CR tablet      • DILTIAZem CD (CARDIZEM CD) 120 MG CAPSULE SR 24 HR Take 1 Cap by mouth every day. 30 Cap 3   • losartan (COZAAR) 100 MG Tab Take 1 Tab by mouth every day. 30 Tab 11   • apixaban (ELIQUIS) 2.5mg Tab Take 1 Tab by mouth 2 Times a Day. 60 Tab 11   • Multiple Vitamins-Minerals (OCUVITE-LUTEIN) Tab Take 1 tablet by mouth every day.     • torsemide (DEMADEX) 20 MG Tab Take 1 Tab by mouth every day. 30 Tab 0   • ALPRAZolam (XANAX) 0.25 MG Tab Take 0.25 mg by mouth at bedtime as needed for Sleep.     • acetaminophen (TYLENOL) 325 MG Tab Take 650 mg by mouth as needed (for arthritis).     • VITAMIN E PO Take  by mouth every day.     • [DISCONTINUED] amiodarone (CORDARONE) 200 MG Tab Take 2 Tabs by mouth every day. 90 Tab 3   • [DISCONTINUED] atenolol (TENORMIN) 25 MG Tab Take 1 Tab by mouth every day. 90 Tab 1     No facility-administered encounter medications on file as of 12/4/2019.      ROS     Objective:   /80 (BP Location: Left arm, Patient Position: Sitting, BP Cuff Size: Adult)   Pulse (!) 43   Ht 1.702 m (5' 7\")   Wt 66.2 kg (146 lb)   SpO2 95%   BMI 22.87 " kg/m²     Physical Exam   Constitutional: She is oriented to person, place, and time. She appears well-developed and well-nourished.   HENT:   Head: Normocephalic and atraumatic.   Eyes: Pupils are equal, round, and reactive to light. EOM are normal.   Neck: Normal range of motion. Neck supple.   Cardiovascular: Regular rhythm and intact distal pulses. Bradycardia present. Exam reveals no gallop and no friction rub.   Murmur heard.  Pulmonary/Chest: Effort normal and breath sounds normal.   Abdominal: Soft. Bowel sounds are normal.   Musculoskeletal: Normal range of motion.         General: No edema.   Neurological: She is alert and oriented to person, place, and time. No cranial nerve deficit.   Skin: Skin is warm.   Psychiatric: She has a normal mood and affect. Her behavior is normal. Judgment and thought content normal.       Assessment:     1. PAF (paroxysmal atrial fibrillation) ([facility])  EKG    Holter Monitor Study    EC-ECHOCARDIOGRAM COMPLETE W/O CONT   2. Valvular heart disease  EC-ECHOCARDIOGRAM COMPLETE W/O CONT   3. Mitral valve insufficiency, unspecified etiology     4. Paroxysmal A-fib ([facility])     5. Sinus bradycardia     6. On amiodarone therapy     7. Essential hypertension     8. Chronic renal impairment, stage 4 (severe) ([facility])     9. Anticoagulated         Medical Decision Making:  Today's Assessment / Status / Plan:   1.  Paroxysmal atrial fibrillation.  No obvious documentation heart rates have been slow on her monitoring.  Check 48-hour monitor, DC atenolol, decrease amiodarone to 200 mg a day.  2.  Mitral regurgitation noted on examination short of breath repeat echocardiogram.  Dichotomy between transthoracic echo and YAO.  3.  Anticoagulation continue renal dose Eliquis.  4.  Patient requested going to see Dr. Nieto.  5.  Follow-up with me in 3 months.  6.  Bradycardia adjustment and amiodarone DC atenolol.

## 2019-12-05 ENCOUNTER — APPOINTMENT (OUTPATIENT)
Dept: RADIOLOGY | Facility: MEDICAL CENTER | Age: 79
DRG: 208 | End: 2019-12-05
Attending: HOSPITALIST
Payer: MEDICARE

## 2019-12-05 ENCOUNTER — APPOINTMENT (OUTPATIENT)
Dept: RADIOLOGY | Facility: MEDICAL CENTER | Age: 79
DRG: 208 | End: 2019-12-05
Attending: INTERNAL MEDICINE
Payer: MEDICARE

## 2019-12-05 ENCOUNTER — APPOINTMENT (OUTPATIENT)
Dept: RADIOLOGY | Facility: MEDICAL CENTER | Age: 79
DRG: 208 | End: 2019-12-05
Attending: PSYCHIATRY & NEUROLOGY
Payer: MEDICARE

## 2019-12-05 PROBLEM — J69.0 ASPIRATION PNEUMONIA (HCC): Status: ACTIVE | Noted: 2019-12-05

## 2019-12-05 PROBLEM — R74.8 ELEVATED LIVER ENZYMES: Status: ACTIVE | Noted: 2019-12-05

## 2019-12-05 LAB
ACTION RANGE TRIGGERED IACRT: NO
ACTION RANGE TRIGGERED IACRT: NO
ALBUMIN SERPL BCP-MCNC: 3.1 G/DL (ref 3.2–4.9)
ALBUMIN SERPL BCP-MCNC: 3.2 G/DL (ref 3.2–4.9)
ALBUMIN/GLOB SERPL: 1.2 G/DL
ALBUMIN/GLOB SERPL: 1.3 G/DL
ALP SERPL-CCNC: 126 U/L (ref 30–99)
ALP SERPL-CCNC: 129 U/L (ref 30–99)
ALT SERPL-CCNC: 610 U/L (ref 2–50)
ALT SERPL-CCNC: 935 U/L (ref 2–50)
AMMONIA PLAS-SCNC: 74 UMOL/L (ref 11–45)
ANION GAP SERPL CALC-SCNC: 15 MMOL/L (ref 0–11.9)
ANION GAP SERPL CALC-SCNC: 18 MMOL/L (ref 0–11.9)
APPEARANCE UR: CLEAR
APTT PPP: 31.6 SEC (ref 24.7–36)
AST SERPL-CCNC: 1111 U/L (ref 12–45)
AST SERPL-CCNC: 716 U/L (ref 12–45)
BACTERIA #/AREA URNS HPF: NEGATIVE /HPF
BASE EXCESS BLDA CALC-SCNC: -3 MMOL/L (ref -4–3)
BASE EXCESS BLDA CALC-SCNC: -8 MMOL/L (ref -4–3)
BASOPHILS # BLD AUTO: 0.3 % (ref 0–1.8)
BASOPHILS # BLD AUTO: 0.3 % (ref 0–1.8)
BASOPHILS # BLD: 0.04 K/UL (ref 0–0.12)
BASOPHILS # BLD: 0.05 K/UL (ref 0–0.12)
BILIRUB SERPL-MCNC: 0.6 MG/DL (ref 0.1–1.5)
BILIRUB SERPL-MCNC: 0.9 MG/DL (ref 0.1–1.5)
BILIRUB UR QL STRIP.AUTO: NEGATIVE
BODY TEMPERATURE: ABNORMAL DEGREES
BODY TEMPERATURE: ABNORMAL DEGREES
BUN SERPL-MCNC: 81 MG/DL (ref 8–22)
BUN SERPL-MCNC: 81 MG/DL (ref 8–22)
CA-I BLD ISE-SCNC: 2.36 MMOL/L (ref 1.1–1.3)
CA-I SERPL-SCNC: 1.9 MMOL/L (ref 1.1–1.3)
CA-I SERPL-SCNC: 2 MMOL/L (ref 1.1–1.3)
CA-I SERPL-SCNC: 2.3 MMOL/L (ref 1.1–1.3)
CA-I SERPL-SCNC: 2.5 MMOL/L (ref 1.1–1.3)
CALCIUM SERPL-MCNC: 14.9 MG/DL (ref 8.5–10.5)
CALCIUM SERPL-MCNC: 17.8 MG/DL (ref 8.5–10.5)
CHLORIDE SERPL-SCNC: 102 MMOL/L (ref 96–112)
CHLORIDE SERPL-SCNC: 98 MMOL/L (ref 96–112)
CHLORIDE UR-SCNC: 86 MMOL/L
CO2 BLDA-SCNC: 18 MMOL/L (ref 20–33)
CO2 BLDA-SCNC: 22 MMOL/L (ref 20–33)
CO2 SERPL-SCNC: 16 MMOL/L (ref 20–33)
CO2 SERPL-SCNC: 20 MMOL/L (ref 20–33)
COLOR UR: YELLOW
CREAT SERPL-MCNC: 3.92 MG/DL (ref 0.5–1.4)
CREAT SERPL-MCNC: 3.94 MG/DL (ref 0.5–1.4)
CREAT UR-MCNC: 25.4 MG/DL
EKG IMPRESSION: NORMAL
EKG IMPRESSION: NORMAL
EOSINOPHIL # BLD AUTO: 0 K/UL (ref 0–0.51)
EOSINOPHIL # BLD AUTO: 0.01 K/UL (ref 0–0.51)
EOSINOPHIL NFR BLD: 0 % (ref 0–6.9)
EOSINOPHIL NFR BLD: 0.1 % (ref 0–6.9)
EPI CELLS #/AREA URNS HPF: NORMAL /HPF
ERYTHROCYTE [DISTWIDTH] IN BLOOD BY AUTOMATED COUNT: 48.4 FL (ref 35.9–50)
ERYTHROCYTE [DISTWIDTH] IN BLOOD BY AUTOMATED COUNT: 51 FL (ref 35.9–50)
GLOBULIN SER CALC-MCNC: 2.5 G/DL (ref 1.9–3.5)
GLOBULIN SER CALC-MCNC: 2.5 G/DL (ref 1.9–3.5)
GLUCOSE BLD-MCNC: 201 MG/DL (ref 65–99)
GLUCOSE BLD-MCNC: 215 MG/DL (ref 65–99)
GLUCOSE BLD-MCNC: 243 MG/DL (ref 65–99)
GLUCOSE BLD-MCNC: 71 MG/DL (ref 65–99)
GLUCOSE BLD-MCNC: 75 MG/DL (ref 65–99)
GLUCOSE BLD-MCNC: 91 MG/DL (ref 65–99)
GLUCOSE SERPL-MCNC: 270 MG/DL (ref 65–99)
GLUCOSE SERPL-MCNC: 273 MG/DL (ref 65–99)
GLUCOSE UR STRIP.AUTO-MCNC: NEGATIVE MG/DL
HCO3 BLDA-SCNC: 16.8 MMOL/L (ref 17–25)
HCO3 BLDA-SCNC: 21.3 MMOL/L (ref 17–25)
HCT VFR BLD AUTO: 37.7 % (ref 37–47)
HCT VFR BLD AUTO: 40.3 % (ref 37–47)
HCT VFR BLD CALC: 37 % (ref 37–47)
HGB BLD-MCNC: 12.2 G/DL (ref 12–16)
HGB BLD-MCNC: 12.6 G/DL (ref 12–16)
HGB BLD-MCNC: 12.8 G/DL (ref 12–16)
HOROWITZ INDEX BLDA+IHG-RTO: 114 MM[HG]
HOROWITZ INDEX BLDA+IHG-RTO: 129 MM[HG]
IMM GRANULOCYTES # BLD AUTO: 0.15 K/UL (ref 0–0.11)
IMM GRANULOCYTES # BLD AUTO: 0.15 K/UL (ref 0–0.11)
IMM GRANULOCYTES NFR BLD AUTO: 0.8 % (ref 0–0.9)
IMM GRANULOCYTES NFR BLD AUTO: 1 % (ref 0–0.9)
INR PPP: 1.74 (ref 0.87–1.13)
INST. QUALIFIED PATIENT IIQPT: YES
INST. QUALIFIED PATIENT IIQPT: YES
KETONES UR STRIP.AUTO-MCNC: NEGATIVE MG/DL
LACTATE BLD-SCNC: 1.9 MMOL/L (ref 0.5–2)
LACTATE BLD-SCNC: 2.3 MMOL/L (ref 0.5–2)
LEUKOCYTE ESTERASE UR QL STRIP.AUTO: ABNORMAL
LYMPHOCYTES # BLD AUTO: 0.43 K/UL (ref 1–4.8)
LYMPHOCYTES # BLD AUTO: 1.36 K/UL (ref 1–4.8)
LYMPHOCYTES NFR BLD: 2.9 % (ref 22–41)
LYMPHOCYTES NFR BLD: 7.1 % (ref 22–41)
MCH RBC QN AUTO: 31.8 PG (ref 27–33)
MCH RBC QN AUTO: 32 PG (ref 27–33)
MCHC RBC AUTO-ENTMCNC: 31.8 G/DL (ref 33.6–35)
MCHC RBC AUTO-ENTMCNC: 32.4 G/DL (ref 33.6–35)
MCV RBC AUTO: 100.8 FL (ref 81.4–97.8)
MCV RBC AUTO: 98.2 FL (ref 81.4–97.8)
MICRO URNS: ABNORMAL
MONOCYTES # BLD AUTO: 0.47 K/UL (ref 0–0.85)
MONOCYTES # BLD AUTO: 1.33 K/UL (ref 0–0.85)
MONOCYTES NFR BLD AUTO: 3.2 % (ref 0–13.4)
MONOCYTES NFR BLD AUTO: 6.9 % (ref 0–13.4)
MUCOUS THREADS #/AREA URNS HPF: NORMAL /HPF
NEUTROPHILS # BLD AUTO: 13.77 K/UL (ref 2–7.15)
NEUTROPHILS # BLD AUTO: 16.29 K/UL (ref 2–7.15)
NEUTROPHILS NFR BLD: 84.8 % (ref 44–72)
NEUTROPHILS NFR BLD: 92.6 % (ref 44–72)
NITRITE UR QL STRIP.AUTO: NEGATIVE
NRBC # BLD AUTO: 0 K/UL
NRBC # BLD AUTO: 0 K/UL
NRBC BLD-RTO: 0 /100 WBC
NRBC BLD-RTO: 0 /100 WBC
O2/TOTAL GAS SETTING VFR VENT: 50 %
O2/TOTAL GAS SETTING VFR VENT: 70 %
PCO2 BLDA: 32.9 MMHG (ref 26–37)
PCO2 BLDA: 35.2 MMHG (ref 26–37)
PCO2 TEMP ADJ BLDA: 33.5 MMHG (ref 26–37)
PCO2 TEMP ADJ BLDA: 36.4 MMHG (ref 26–37)
PH BLDA: 7.32 [PH] (ref 7.4–7.5)
PH BLDA: 7.39 [PH] (ref 7.4–7.5)
PH TEMP ADJ BLDA: 7.31 [PH] (ref 7.4–7.5)
PH TEMP ADJ BLDA: 7.38 [PH] (ref 7.4–7.5)
PH UR STRIP.AUTO: 5.5 [PH] (ref 5–8)
PLATELET # BLD AUTO: 183 K/UL (ref 164–446)
PLATELET # BLD AUTO: 244 K/UL (ref 164–446)
PMV BLD AUTO: 11.6 FL (ref 9–12.9)
PMV BLD AUTO: 11.7 FL (ref 9–12.9)
PO2 BLDA: 57 MMHG (ref 64–87)
PO2 BLDA: 90 MMHG (ref 64–87)
PO2 TEMP ADJ BLDA: 60 MMHG (ref 64–87)
PO2 TEMP ADJ BLDA: 92 MMHG (ref 64–87)
POTASSIUM BLD-SCNC: 5.4 MMOL/L (ref 3.6–5.5)
POTASSIUM SERPL-SCNC: 4.8 MMOL/L (ref 3.6–5.5)
POTASSIUM SERPL-SCNC: 4.8 MMOL/L (ref 3.6–5.5)
POTASSIUM UR-SCNC: 35.3 MMOL/L
PROT SERPL-MCNC: 5.6 G/DL (ref 6–8.2)
PROT SERPL-MCNC: 5.7 G/DL (ref 6–8.2)
PROT UR QL STRIP: 30 MG/DL
PROT UR-MCNC: 47.6 MG/DL (ref 0–15)
PROTHROMBIN TIME: 20.9 SEC (ref 12–14.6)
RBC # BLD AUTO: 3.84 M/UL (ref 4.2–5.4)
RBC # BLD AUTO: 4 M/UL (ref 4.2–5.4)
RBC # URNS HPF: NORMAL /HPF
RBC UR QL AUTO: ABNORMAL
RENAL EPI CELLS #/AREA URNS HPF: NEGATIVE /HPF
SAO2 % BLDA: 89 % (ref 93–99)
SAO2 % BLDA: 96 % (ref 93–99)
SODIUM BLD-SCNC: 131 MMOL/L (ref 135–145)
SODIUM SERPL-SCNC: 133 MMOL/L (ref 135–145)
SODIUM SERPL-SCNC: 136 MMOL/L (ref 135–145)
SODIUM UR-SCNC: 85 MMOL/L
SP GR UR STRIP.AUTO: 1.01
SPECIMEN DRAWN FROM PATIENT: ABNORMAL
SPECIMEN DRAWN FROM PATIENT: ABNORMAL
TRANS CELLS #/AREA URNS HPF: NEGATIVE /HPF
UROBILINOGEN UR STRIP.AUTO-MCNC: 0.2 MG/DL
WBC # BLD AUTO: 14.9 K/UL (ref 4.8–10.8)
WBC # BLD AUTO: 19.2 K/UL (ref 4.8–10.8)
WBC #/AREA URNS HPF: NORMAL /HPF

## 2019-12-05 PROCEDURE — 99223 1ST HOSP IP/OBS HIGH 75: CPT | Mod: GC | Performed by: INTERNAL MEDICINE

## 2019-12-05 PROCEDURE — 94640 AIRWAY INHALATION TREATMENT: CPT

## 2019-12-05 PROCEDURE — 81001 URINALYSIS AUTO W/SCOPE: CPT

## 2019-12-05 PROCEDURE — 99233 SBSQ HOSP IP/OBS HIGH 50: CPT | Performed by: HOSPITALIST

## 2019-12-05 PROCEDURE — 76775 US EXAM ABDO BACK WALL LIM: CPT

## 2019-12-05 PROCEDURE — A9270 NON-COVERED ITEM OR SERVICE: HCPCS | Performed by: INTERNAL MEDICINE

## 2019-12-05 PROCEDURE — 84300 ASSAY OF URINE SODIUM: CPT

## 2019-12-05 PROCEDURE — 700102 HCHG RX REV CODE 250 W/ 637 OVERRIDE(OP): Performed by: INTERNAL MEDICINE

## 2019-12-05 PROCEDURE — 82330 ASSAY OF CALCIUM: CPT | Mod: 91

## 2019-12-05 PROCEDURE — 71045 X-RAY EXAM CHEST 1 VIEW: CPT

## 2019-12-05 PROCEDURE — 85014 HEMATOCRIT: CPT

## 2019-12-05 PROCEDURE — 85610 PROTHROMBIN TIME: CPT

## 2019-12-05 PROCEDURE — 93005 ELECTROCARDIOGRAM TRACING: CPT | Performed by: INTERNAL MEDICINE

## 2019-12-05 PROCEDURE — 82140 ASSAY OF AMMONIA: CPT

## 2019-12-05 PROCEDURE — 700105 HCHG RX REV CODE 258: Performed by: PSYCHIATRY & NEUROLOGY

## 2019-12-05 PROCEDURE — 83605 ASSAY OF LACTIC ACID: CPT

## 2019-12-05 PROCEDURE — 82436 ASSAY OF URINE CHLORIDE: CPT

## 2019-12-05 PROCEDURE — 700105 HCHG RX REV CODE 258: Performed by: INTERNAL MEDICINE

## 2019-12-05 PROCEDURE — 36600 WITHDRAWAL OF ARTERIAL BLOOD: CPT

## 2019-12-05 PROCEDURE — 82570 ASSAY OF URINE CREATININE: CPT

## 2019-12-05 PROCEDURE — 700111 HCHG RX REV CODE 636 W/ 250 OVERRIDE (IP): Performed by: PSYCHIATRY & NEUROLOGY

## 2019-12-05 PROCEDURE — 99291 CRITICAL CARE FIRST HOUR: CPT | Performed by: INTERNAL MEDICINE

## 2019-12-05 PROCEDURE — 700111 HCHG RX REV CODE 636 W/ 250 OVERRIDE (IP): Performed by: INTERNAL MEDICINE

## 2019-12-05 PROCEDURE — 85025 COMPLETE CBC W/AUTO DIFF WBC: CPT | Mod: 91

## 2019-12-05 PROCEDURE — 80053 COMPREHEN METABOLIC PANEL: CPT | Mod: 91

## 2019-12-05 PROCEDURE — 93010 ELECTROCARDIOGRAM REPORT: CPT | Performed by: INTERNAL MEDICINE

## 2019-12-05 PROCEDURE — 94770 HCHG CO2 EXPIRED GAS DETERMINATION: CPT

## 2019-12-05 PROCEDURE — 84295 ASSAY OF SERUM SODIUM: CPT

## 2019-12-05 PROCEDURE — 84132 ASSAY OF SERUM POTASSIUM: CPT

## 2019-12-05 PROCEDURE — 99292 CRITICAL CARE ADDL 30 MIN: CPT | Performed by: INTERNAL MEDICINE

## 2019-12-05 PROCEDURE — 82962 GLUCOSE BLOOD TEST: CPT | Mod: 91

## 2019-12-05 PROCEDURE — 770022 HCHG ROOM/CARE - ICU (200)

## 2019-12-05 PROCEDURE — 84133 ASSAY OF URINE POTASSIUM: CPT

## 2019-12-05 PROCEDURE — 37799 UNLISTED PX VASCULAR SURGERY: CPT

## 2019-12-05 PROCEDURE — 82803 BLOOD GASES ANY COMBINATION: CPT

## 2019-12-05 PROCEDURE — 94150 VITAL CAPACITY TEST: CPT

## 2019-12-05 PROCEDURE — 94002 VENT MGMT INPAT INIT DAY: CPT

## 2019-12-05 PROCEDURE — 84156 ASSAY OF PROTEIN URINE: CPT

## 2019-12-05 PROCEDURE — 85730 THROMBOPLASTIN TIME PARTIAL: CPT

## 2019-12-05 RX ORDER — ACETAMINOPHEN 650 MG/1
325 SUPPOSITORY RECTAL EVERY 6 HOURS PRN
Status: DISCONTINUED | OUTPATIENT
Start: 2019-12-05 | End: 2019-12-07

## 2019-12-05 RX ORDER — LORAZEPAM 2 MG/ML
1 INJECTION INTRAMUSCULAR ONCE
Status: COMPLETED | OUTPATIENT
Start: 2019-12-05 | End: 2019-12-05

## 2019-12-05 RX ORDER — ACETAMINOPHEN 325 MG/1
650 TABLET ORAL EVERY 4 HOURS PRN
Status: DISCONTINUED | OUTPATIENT
Start: 2019-12-05 | End: 2019-12-05

## 2019-12-05 RX ORDER — PHENYLEPHRINE HCL IN 0.9% NACL 0.5 MG/5ML
SYRINGE (ML) INTRAVENOUS
Status: DISCONTINUED
Start: 2019-12-05 | End: 2019-12-05

## 2019-12-05 RX ORDER — LACTULOSE 20 G/30ML
30 SOLUTION ORAL 3 TIMES DAILY
Status: DISCONTINUED | OUTPATIENT
Start: 2019-12-05 | End: 2019-12-07

## 2019-12-05 RX ORDER — ONDANSETRON 2 MG/ML
4 INJECTION INTRAMUSCULAR; INTRAVENOUS ONCE
Status: DISPENSED | OUTPATIENT
Start: 2019-12-05 | End: 2019-12-06

## 2019-12-05 RX ORDER — FUROSEMIDE 10 MG/ML
40 INJECTION INTRAMUSCULAR; INTRAVENOUS ONCE
Status: COMPLETED | OUTPATIENT
Start: 2019-12-05 | End: 2019-12-05

## 2019-12-05 RX ORDER — LORAZEPAM 2 MG/ML
INJECTION INTRAMUSCULAR
Status: ACTIVE
Start: 2019-12-05 | End: 2019-12-05

## 2019-12-05 RX ORDER — ACETAMINOPHEN 325 MG/1
650 TABLET ORAL EVERY 4 HOURS PRN
Status: DISCONTINUED | OUTPATIENT
Start: 2019-12-05 | End: 2019-12-10 | Stop reason: HOSPADM

## 2019-12-05 RX ORDER — HYDRALAZINE HYDROCHLORIDE 20 MG/ML
10 INJECTION INTRAMUSCULAR; INTRAVENOUS
Status: DISCONTINUED | OUTPATIENT
Start: 2019-12-05 | End: 2019-12-10 | Stop reason: HOSPADM

## 2019-12-05 RX ADMIN — CALCIUM GLUCONATE 80 MG/KG/HR: 98 INJECTION, SOLUTION INTRAVENOUS at 02:28

## 2019-12-05 RX ADMIN — CALCIUM GLUCONATE 80 MG/KG/HR: 98 INJECTION, SOLUTION INTRAVENOUS at 03:52

## 2019-12-05 RX ADMIN — INSULIN HUMAN 3 UNITS: 100 INJECTION, SOLUTION PARENTERAL at 06:49

## 2019-12-05 RX ADMIN — CEFTRIAXONE SODIUM 1 G: 1 INJECTION, POWDER, FOR SOLUTION INTRAMUSCULAR; INTRAVENOUS at 09:21

## 2019-12-05 RX ADMIN — CALCIUM GLUCONATE 39.88 MG/KG/HR: 98 INJECTION, SOLUTION INTRAVENOUS at 06:24

## 2019-12-05 RX ADMIN — FUROSEMIDE 40 MG: 10 INJECTION, SOLUTION INTRAMUSCULAR; INTRAVENOUS at 05:28

## 2019-12-05 RX ADMIN — CALCIUM GLUCONATE 80 MG/KG/HR: 98 INJECTION, SOLUTION INTRAVENOUS at 01:23

## 2019-12-05 RX ADMIN — INSULIN HUMAN 3 UNITS: 100 INJECTION, SOLUTION PARENTERAL at 02:50

## 2019-12-05 RX ADMIN — FENTANYL CITRATE 100 MCG: 0.05 INJECTION, SOLUTION INTRAMUSCULAR; INTRAVENOUS at 02:35

## 2019-12-05 RX ADMIN — LORAZEPAM 1 MG: 2 INJECTION INTRAMUSCULAR; INTRAVENOUS at 06:19

## 2019-12-05 ASSESSMENT — CHA2DS2 SCORE
PRIOR STROKE OR TIA OR THROMBOEMBOLISM: NO
SEX: FEMALE
AGE 75 OR GREATER: YES
CHA2DS2 VASC SCORE: 4
VASCULAR DISEASE: NO
DIABETES: NO
CHF OR LEFT VENTRICULAR DYSFUNCTION: NO
AGE 65 TO 74: NO
HYPERTENSION: YES

## 2019-12-05 ASSESSMENT — PULMONARY FUNCTION TESTS
EPAP_CMH2O: 5
FVC: .5

## 2019-12-05 NOTE — ED NOTES
Shauna from Lab called with critical result of Lactic Acid of 8.2 at 18:06. Critical lab result read back to Shauna.   Dr. Anna notified of critical lab result at 18:07.  Critical lab result read back by Dr. Anna.

## 2019-12-05 NOTE — DIETARY
"Nutrition Support Assessment   Day 1 of admit.  Geno Gallegos is a 79 y.o. female with admitting DX of Acute hypoxemic respiratory failure (HCC)     Current problem list:  Per ED note : \"Geno Gallegos is a 79 y.o. female who presents following a syncopal event that was witnessed by her .  FINAL IMPRESSION  Syncope  Bradycardia  Hypoxia /respiratory failure  Hypotension  Chronic kidney disease\"      Assessment:  Estimated Nutritional Needs based on:   Height: 170.2 cm (5' 7\")  Weight: 69 kg (152 lb 1.9 oz)  Ideal Body Weight: 61.2 kg (135 lb)  Percent Ideal Body Weight: 112.7  Body mass index is 23.82 kg/m²., BMI classification: Normal    Calculation/Equation: REE per MSJ x1.2 = 1427 kcal/day  Total Calories / day: 1400 - 1500 (Calories / k - 22)  Total Grams Protein / day: 69 (Grams Protein / k.0)     Evaluation:   S/p transvenous pacemaker placement .  Pt extubated .  Noted pt on BiPAP this morning.  TF consult received. Await Cortrak placement.  Multiple laboratory derangements noted.  Meds reviewed.   BM PTA; bowel meds per MAR.  Protein needs adjusted per CKD.  Low-carbohydrate TF formula is indicated.     Malnutrition Risk: None identified @ this time.     Recommendations/Plan:  1. Start Diabetisource AC @ 25 ml/hr and advance per protocol to goal rate 50 ml/hr to provide 1440 kcal, 72 grams protein, and 984 ml free water per day.  2. Fluids per MD.  3. PO diet when appropriate.    RD following.                    "

## 2019-12-05 NOTE — ED PROVIDER NOTES
"ED Provider Note    CHIEF COMPLAINT  Chief Complaint   Patient presents with   • Syncope     arrived via [facility] following a witnessed syncopal event, PT Hypoxic on arrival with a RA sat of 62% and [facility] attempted baggin but pt was noncompliant, Pt's Saturation came to \"80's\" with 15L NRB, PRssure reportedly 60/30's [facility] attempted pacing in route        HPI  Geno Gallegos is a 79 y.o. female who presents following a syncopal event that was witnessed by her .  She is hypoxic on arrival with room air sats of 62%.  She is on 15 L nonrebreather mask.  Best pressure prior to arrival 60/30.  [facility] attempting transcutaneous pacing en route due to bradycardia however did not have much improvement in blood pressure.    The patient is alert and speaking.  She appears to be in respiratory distress however and had difficulty obtaining clear medical history from her due to extremis.    The patient was seen at her cardiologist office earlier today, Dr. Wu.  At that time she had a sinus bradycardic rhythm.  In the medical record there was note that the patient has had bradycardia.  Recommending discontinuing atenolol and decreasing amiodarone.  Known to have mitral regurgitation.  Dr. Wu did make note that the patient was previously taking double and triple dosing of calcium channel blockers.  This has been remedied according to the medical record.    REVIEW OF SYSTEMS  See HPI for further details.  Limited secondary to respiratory distress.    PAST MEDICAL HISTORY   has a past medical history of A-fib (HCC), Bowel obstruction (HCC), Renal disorder, and Valvular heart disease (5/27/2019).    SOCIAL HISTORY  Social History     Tobacco Use   • Smoking status: Never Smoker   • Smokeless tobacco: Never Used   Substance and Sexual Activity   • Alcohol use: Yes     Comment: occas   • Drug use: No   • Sexual activity: Not on file       SURGICAL HISTORY   has a past surgical history that includes gyn surgery and other " "orthopedic surgery.    CURRENT MEDICATIONS  Home Medications     Reviewed by Augusta Manning (Pharmacy Tech) on 12/04/19 at 1857  Med List Status: Complete   Medication Last Dose Status   ALPRAZolam (XANAX) 0.25 MG Tab unknown Active   amiodarone (CORDARONE) 200 MG Tab unknown Active   apixaban (ELIQUIS) 2.5mg Tab unknown Active   DILTIAZem CD (CARDIZEM CD) 120 MG CAPSULE SR 24 HR unknown Active   losartan (COZAAR) 100 MG Tab unknown Active   Multiple Vitamins-Minerals (OCUVITE-LUTEIN) Tab unknown Active   potassium Chloride ER (K-TAB) 20 MEQ Tab CR tablet unknown Active   torsemide (DEMADEX) 20 MG Tab unknown Active                ALLERGIES  Allergies   Allergen Reactions   • Pcn [Penicillins]      rash       PHYSICAL EXAM  VITAL SIGNS: Pulse (!) 56   Resp 13   Ht 1.702 m (5' 7\")   Wt 66.2 kg (146 lb)   SpO2 99%   BMI 22.87 kg/m²   Pulse ox interpretation: I interpret this pulse ox as normal.  Constitutional: Alert in no apparent distress.  HENT: No signs of trauma, Bilateral external ears normal, Nose normal.  Cyanosis to the tip of her nose  Eyes: Pupils are equal and reactive, Conjunctiva normal, Non-icteric.   Neck: Normal range of motion, No tenderness, Supple, No stridor.   Cardiovascular: Bradycardic rate and regular rhythm.   Thorax & Lungs: Bilateral diffuse rales, respiratory distress, No wheezing, No chest tenderness.   Abdomen: Bowel sounds normal, Soft, No tenderness, No masses, No pulsatile masses. No peritoneal signs.  Skin: Warm, Dry, No erythema, No rash.   Back: No bony tenderness, No CVA tenderness.   Extremities: Intact distal pulses, No edema, No tenderness, No cyanosis  Musculoskeletal: Good range of motion in all major joints. No major deformities noted.   Neurologic: Alert, lethargic, Normal motor function, Normal sensory function, No focal deficits noted.       DIAGNOSTIC STUDIES / PROCEDURES    EKG - Physician interpretation  12/4/2019 at 5:30 PM.  Junctional escape rhythm at 46 " with left bundle branch block morphology.  This is changed from prior EKG earlier today that showed a sinus bradycardic rhythm.      LABS  Labs Reviewed   PROTHROMBIN TIME - Abnormal; Notable for the following components:       Result Value    PT 20.3 (*)     INR 1.68 (*)     All other components within normal limits    Narrative:     Indicate which anticoagulants the patient is on:->UNKNOWN   D-DIMER - Abnormal; Notable for the following components:    D-Dimer Screen 0.76 (*)     All other components within normal limits    Narrative:     Indicate which anticoagulants the patient is on:->UNKNOWN   LACTIC ACID - Abnormal; Notable for the following components:    Lactic Acid 8.2 (*)     All other components within normal limits   LACTIC ACID - Abnormal; Notable for the following components:    Lactic Acid 7.5 (*)     All other components within normal limits    Narrative:     Repeat if initial lactic acid result is greater than 2   LACTIC ACID - Abnormal; Notable for the following components:    Lactic Acid 3.8 (*)     All other components within normal limits    Narrative:     Repeat if initial lactic acid result is greater than 2   CBC WITH DIFFERENTIAL - Abnormal; Notable for the following components:    RBC 4.09 (*)     .6 (*)     MCHC 30.1 (*)     RDW 55.1 (*)     All other components within normal limits    Narrative:     Indicate which anticoagulants the patient is on:->UNKNOWN   COMP METABOLIC PANEL - Abnormal; Notable for the following components:    Co2 12 (*)     Anion Gap 21.0 (*)     Glucose 287 (*)     Bun 81 (*)     Creatinine 3.92 (*)     AST(SGOT) 236 (*)     ALT(SGPT) 197 (*)     Alkaline Phosphatase 129 (*)     All other components within normal limits    Narrative:     Indicate which anticoagulants the patient is on:->UNKNOWN   URINALYSIS - Abnormal; Notable for the following components:    Ketones Trace (*)     Protein 30 (*)     All other components within normal limits    Narrative:      Indication for culture:->Septic Shock: Persistent  hypotension,  Lactic acid > 4, vasopressors/inotropes started   ESTIMATED GFR - Abnormal; Notable for the following components:    GFR If  13 (*)     GFR If Non  11 (*)     All other components within normal limits    Narrative:     Indicate which anticoagulants the patient is on:->UNKNOWN   MAGNESIUM - Abnormal; Notable for the following components:    Magnesium 2.9 (*)     All other components within normal limits   PROBRAIN NATRIURETIC PEPTIDE, NT - Abnormal; Notable for the following components:    NT-proBNP 03421 (*)     All other components within normal limits   TSH - Abnormal; Notable for the following components:    TSH 15.160 (*)     All other components within normal limits   URINE MICROSCOPIC (W/UA) - Abnormal; Notable for the following components:    Bacteria Few (*)     Hyaline Cast 3-5 (*)     All other components within normal limits    Narrative:     Indication for culture:->Septic Shock: Persistent  hypotension,  Lactic acid > 4, vasopressors/inotropes started   TROPONIN - Abnormal; Notable for the following components:    Troponin T 61 (*)     All other components within normal limits   IONIZED CALCIUM - Abnormal; Notable for the following components:    Ionized Calcium 1.4 (*)     All other components within normal limits    Narrative:     While on calcium GLUConate drip   IONIZED CALCIUM - Abnormal; Notable for the following components:    Ionized Calcium 1.6 (*)     All other components within normal limits    Narrative:     While on calcium GLUConate drip   ISTAT ARTERIAL BLOOD GAS - Abnormal; Notable for the following components:    Ph 6.998 (*)     Pco2 52.8 (*)     Tco2 15 (*)     S02 87 (*)     Hco3 13.0 (*)     BE -18 (*)     Ph Temp Chantelle 6.998 (*)     Pco2 Temp Co 52.8 (*)     All other components within normal limits   ISTAT ARTERIAL BLOOD GAS - Abnormal; Notable for the following components:    Ph 7.165  "(*)     Pco2 54.4 (*)     S02 92 (*)     BE -9 (*)     Ph Temp Chantelle 7.189 (*)     Pco2 Temp Co 50.3 (*)     All other components within normal limits   URINE CULTURE(NEW)    Narrative:     Indication for culture:->Septic Shock: Persistent  hypotension,  Lactic acid > 4, vasopressors/inotropes started   BLOOD CULTURE    Narrative:     Per Hospital Policy: Only change Specimen Src: to \"Line\" if  specified by physician order.   BLOOD CULTURE    Narrative:     Per Hospital Policy: Only change Specimen Src: to \"Line\" if  specified by physician order.   APTT    Narrative:     Indicate which anticoagulants the patient is on:->NONE   FREE THYROXINE   TRIGLYCERIDE    Narrative:     While on propofol   ARTERIAL BLOOD GAS   IONIZED CALCIUM   IONIZED CALCIUM         RADIOLOGY  CT-HEAD W/O   Final Result      1.  Cerebral atrophy.      2.  White matter lucencies most consistent with small vessel ischemic change versus demyelination or gliosis.      3.  Otherwise, Head CT without contrast with no acute findings. No evidence of acute cerebral infarction, hemorrhage or mass lesion.      EC-ECHOCARDIOGRAM LTD W/O CONT   Final Result      DX-CHEST-PORTABLE (1 VIEW)   Final Result         1. ETT is well-positioned. Right IJ central catheter is in the proximal SVC.   2. Unchanged bilateral interstitial and possible opacities, edema versus multifocal pneumonia.   3. Right basilar consolidation versus atelectasis. Suspected small right pleural effusion.      DX-CHEST-PORTABLE (1 VIEW)   Final Result      1.  Cardiac silhouette enlargement.   2.  Diffuse interstitial and airspace opacities most likely pulmonary edema/CHF. Correlate clinically for infection.   3.  Small right pleural effusion.      CL-TEMPORARY PACEMAKER INSERT    (Results Pending)       Intubation Procedure Note    Indication: Respiratory failure, impending airway compromise, hypoxia and airway protection    Consent: Unable to be obtained due to the emergent nature of " this procedure.    Medications Used: ketamine intravenously and rocuronium intravenously    Procedure: The patient was placed in the appropriate position.  Cricoid pressure was not required.  Intubation was performed by video laryngoscopy using a Glydescope and a 7.5 cuffed endotracheal tube.  The cuff was then inflated and the tube was secured appropriately at a distance of 23 cm to the dental ridge.  Initial confirmation of placement included bilateral breath sounds, an end tidal CO2 detector, absence of sounds over the stomach, tube fogging, adequate chest rise, adequate pulse oximetry reading and improved skin color.  A chest x-ray to verify correct placement of the tube showed appropriate tube position.    The patient tolerated the procedure well.     Complications: None        Central Line Placement Procedure Note  Indication: vascular access, centrally administered medications and need for frequent blood draws    Consent: Unable to be obtained due to the emergent nature of this procedure.    Procedure: Procedure was done using sterile technique.  The patient was positioned appropriately and the skin over the right internal jugular vein was prepped with Chloraprep.  Patient was draped using sterile technique.  Local anesthesia was not performed due to the emergent nature of this procedure.  A large bore needle was used to identify the vein.  A guide wire was then inserted into the vein through the needle. A triple lumen catheter was then inserted into the vessel over the guide wire using the Seldinger technique.  All ports showed good, free flowing blood return and were flushed with saline solution.  The catheter was then securely fastened to the skin with sutures and covered with a sterile dressing.  A post procedure X-ray was ordered and showed good line position.    The patient tolerated the procedure well.    Complications: None        Arterial Line Placement Procedure Note                     Indication:  severe hypotension    Consent: Unable to be obtained due to the emergent nature of this procedure.    Eliceer's Test: Not indicated in this particular procedure    Procedure: The skin over the left femoral artery was prepped with Chloraprep and draped in a sterile fashion.  Local anesthesia was not performed due to the emergent nature of this procedure.  An 18 gauge arterial line catheter was then inserted, using a modified Seldinger technique, into the vessel.  The transducer set was then attached and securely fastened to the skin with sutures.  Waveforms on the monitor were observed and found to be adequate.  The patient had good distal perfusion after the procedure. The site was then dressed in a sterile fashion.    The patient tolerated the procedure well.     Complications: None          COURSE & MEDICAL DECISION MAKING    Medications   norepinephrine (LEVOPHED) 8 mg in  mL Infusion (10 mcg/min Intravenous New Bag 12/4/19 1835)   phenylephrine (KORTNEY-SYNEPHRINE) 100 mcg/mL inj (IV Push Syringe) 100 mcg (has no administration in time range)   ketamine (KETALAR) 50 mg/ml injection (has no administration in time range)   NOREPINEPHRINE BITARTRATE 1 MG/ML IV SOLN (has no administration in time range)   DOPamine 1600 mcg/mL in D5W premix (20 mcg/kg/min × 66.2 kg Intravenous Rate Change 12/4/19 1828)   cefTRIAXone (ROCEPHIN) 2 g in  mL IVPB (has no administration in time range)   calcium CHLORIDE injection 1 g (has no administration in time range)   ketamine (KETALAR) 50 mg/ml injection (65 mg Intravenous Given 12/4/19 1747)   rocuronium (ZEMURON) injection 75 mg (75 mg Intravenous Given 12/4/19 1750)   atropine injection 0.5 mg (0.5 mg Intravenous Given 12/4/19 1749)   phenylephrine (KORTNEY-SYNEPHRINE) 100 mcg/mL inj (IV Push Syringe) 200 mcg (200 mcg Intravenous Given 12/4/19 1752)   atropine injection 0.5 mg (0.5 mg Intravenous Given 12/4/19 1757)   CALCIUM CHLORIDE 10 % IV SOLN (  Given 12/4/19 1805)        Pertinent Labs & Imaging studies reviewed. (See chart for details)  79 y.o. female presenting following a syncopal event.  She is arriving in extremis.  Has hypotension, bradycardia, hypoxia.  Has extreme respiratory distress.  Unable to obtain a blood pressure however the patient is alert though appearing cyanotic and in a quintin-code situation.  Attempted to improve the patient's oxygen saturation with BiPAP however this did not improve and the patient still remained in respiratory distress with hypoxia.  Decision was made to intubate the patient for respiratory failure.  She was given ketamine and rocuronium.  Video laryngoscope was used.  Had much improved oxygenation following intubation and much improved color.  She had been turning slightly cyanotic in the face prior to intubation.  She still remained hypotensive however.  Central line was placed emergently.  She was given push doses of Raji-Synephrine to help with her blood pressure while awaiting central venous access.  She was also started on transcutaneous pacing due to bradycardia and hypotension.  Atropine did not improve her heart rate.  Also given calcium chloride without improvement.    Central line was placed and the patient was started on a dopamine drip.  This did not help with her blood pressure and so norepinephrine was then started.  Continued with transcutaneous pacing.  She was given ketamine for sedation.  Contacted cardiology.  Dr. Mejias will come to the emergency department to reevaluate the patient.     Potassium was found to be normal.  Hyperkalemia was an initial suspicion.  The patient does have worsening renal failure.  Also some liver enzyme abnormalities.  May be related to hypotension and hypoperfusion.  Unclear etiology for the patient's hypotension and bradycardia at this time.    6:49 PM spoke with Dr Doshi from [facility].  Will admit to the ICU for further management.    Dr. Mejias arrived to the patient's bedside and  ordered a stat echo and took the patient to the Cath Lab for insertion of a transvenous pacemaker.  Continued with transcutaneous pacing.    Patient was given bicarb bolus x2 followed by drip.  Also given further doses of calcium.  She was titrated down on the dopamine and levophed and started on epinephrine.  Arterial line was placed in the left groin.  Blood pressure did eventually improve and pressors were titrated down.  Propofol was then ordered for continued sedation.  The patient was brought to the Cath Lab for transcutaneous pacing.  CT of the head was performed afterwards as a precaution given the patient's anticoagulated state and fall history.  CT was unremarkable.      Suspicion for possible overdose of calcium channel blockers since the patient does have a history of inappropriate dosing of calcium channel blockers however the cardiologist today did note that this seems to be a resolved issue.  The only change was titrating down of her medications.  The  at bedside did note the patient has been increasingly short of breath recently especially with lying flat.  Does not have significant lower extremity pedal edema though does have bilateral rales and chest x-ray concerning for pulmonary edema.  There is likely a component of CHF resulting in hypoxia.    Due to the patient's lactic acidosis and inability to rule out possible sepsis, patient was given IV fluid hydration and antibiotics.  Repeat chest x-ray however is more consistent with pulmonary edema rather than true pulmonary infiltrate / multifocal pneumonia.  Lactic acidosis is likely related to the patient's anion gap.  She was an uric.  She was given IV fluid hydration as a trial though she does have evidence of fluid overload state as well.  Defer further hydration to the admitting service as needed depending on urine output and respiratory status.      HYDRATION: Based on the patient's presentation of Hypotension the patient was given IV  "fluids. IV Hydration was used because oral hydration was not as rapid as required. Upon recheck following hydration, the patient was improved.      /65   Pulse (!) 59   Temp (!) 35.2 °C (95.4 °F) (Bladder)   Resp (!) 24   Ht 1.702 m (5' 7\")   Wt 69 kg (152 lb 1.9 oz)   SpO2 96%   BMI 23.82 kg/m²       The total critical care time on this patient is 35 minutes, resuscitating patient, speaking with admitting physician, and deciphering test results. This 35 minutes is exclusive of separately billable procedures.      FINAL IMPRESSION  Syncope  Bradycardia  Hypoxia /respiratory failure  Hypotension  Chronic kidney disease     Electronically signed by: Jerman Anna, 12/4/2019 6:25 PM    "

## 2019-12-05 NOTE — ED NOTES
Med rec updated and complete. Allergies reviewed.   unable to  Verify that pt took her morning doses.   Home pharmacy [facility].

## 2019-12-05 NOTE — PROGRESS NOTES
Critical Care Progress Note    Date of admission  12/4/2019    Chief Complaint  79 y.o. female admitted 12/4/2019 with shock, respiratory failure    Hospital Course    79 y.o. female who presented 12/4/2019 with a history of severe pulmonary hypertension, moderate to severe mitral regurgitation, paroxysmal atrial fibrillation, CKD, valvular heart failure, chronic oral anticoagulation and unintentional calcium channel blocker overdose. Apparently took 3 times her dosing of her daily extended release CCB as well as her beta blocker and amiodarone. She was seen earlier today in the cardiology clinic was noted to be bradycardic. Sent home and then had several syncopal events along with dyspnea. Pt was brought into the ED and emergently intubated.  Upon arrival she was noted to be hypotensive with a wide-complex bradycardic junctional escape rhythm.  She was also noted to have diffuse pulmonary edema, hypoxic and hypotensive. She received 1 amp of calcium and was started on DA, and NE gtts for bradycardia and hypotension. She was started on transcutaneous pacing as well however with little effect on her BP         Interval Problem Update  Reviewed last 24 hour events:   - extubated to BiPAP   - confused with agitation this morning requiring ativan x 1   - off gtts after resolution of shock   - iCa elevated at 2.5   - starting to spike a fever (38.4), WBC with abnormal CXR concerning for possible aspiration PNA   - Na down to 133   - unchanged NOLAN   - worsening LFTs --> check ammonia   - glucose elevated on insulin SS   - TV pacing wires in place but not being used currently (intrinsic rate 50s)   - repeat ABG with pH 3.8, pCO2 36, pAO2 60   - echo with EF 70%   - CT head without contrast   - renal US with medical renal disease   - lactic acid elevated, recheck pending    Review of Systems  Review of Systems   Unable to perform ROS: Mental status change        Vital Signs for last 24 hours   Temp:  [35.2 °C (95.4 °F)-37.4  °C (99.3 °F)] 37.4 °C (99.3 °F)  Pulse:  [] 48  Resp:  [13-35] 18  BP: ()/(17-96) 141/69  SpO2:  [66 %-100 %] 99 %    Respiratory Information for the last 24 hours  Jefferson Vent Mode: Spont  Rate (breaths/min): 24  Vt Target (mL): 370  PEEP/CPAP: 10  FiO2: 50  P Support: 5  P MEAN: 14  Length of Weaning Trial (Hours): 1  Control VTE (exp VT): 371 --> extubated to BiPAP 12/5, 60% on 12/5 with TVs 600-700, RR 20-24    Physical Exam   Physical Exam  Vitals signs and nursing note reviewed.   Constitutional:       General: She is in acute distress.      Appearance: Normal appearance. She is well-developed. She is ill-appearing. She is not diaphoretic.      Comments: Drowsy but arouses to voice, Requiring noninvasive ventilatory support   HENT:      Head: Normocephalic and atraumatic.      Right Ear: External ear normal.      Left Ear: External ear normal.      Nose: Nose normal. No congestion.      Mouth/Throat:      Mouth: Mucous membranes are moist.      Pharynx: No oropharyngeal exudate.      Comments: Full facemask BiPAP in place  Eyes:      General: No scleral icterus.     Conjunctiva/sclera: Conjunctivae normal.      Pupils: Pupils are equal, round, and reactive to light.   Neck:      Musculoskeletal: Neck supple.   Cardiovascular:      Rate and Rhythm: Bradycardia present. Rhythm irregular. Frequent extrasystoles are present.     Chest Wall: PMI is displaced.      Pulses: Normal pulses.      Heart sounds: Murmur present.   Pulmonary:      Effort: Tachypnea and respiratory distress present. No accessory muscle usage.      Breath sounds: No decreased air movement. Examination of the right-lower field reveals rhonchi and rales. Examination of the left-lower field reveals rhonchi and rales. Rhonchi and rales present. No wheezing.   Abdominal:      General: Bowel sounds are normal. There is no distension.      Palpations: Abdomen is soft.      Tenderness: There is no tenderness.   Genitourinary:      Comments: Lilly catheter in place  Musculoskeletal:         General: No tenderness.      Right lower leg: No edema.      Left lower leg: No edema.   Skin:     General: Skin is warm and dry.      Capillary Refill: Capillary refill takes less than 2 seconds.      Findings: No rash.   Neurological:      General: No focal deficit present.      Mental Status: She is lethargic and disoriented.      Comments: Moves all extremities, agitated, confused   Psychiatric:      Comments: Unable to assess given current clinical condition         Medications  Current Facility-Administered Medications   Medication Dose Route Frequency Provider Last Rate Last Dose   • insulin regular (HUMULIN R) injection 2-9 Units  2-9 Units Subcutaneous Q6HRS Brian Johnson M.D.   3 Units at 12/05/19 0649    And   • glucose 4 g chewable tablet 16 g  16 g Oral Q15 MIN PRN Brian Johnson M.D.        And   • DEXTROSE 10% BOLUS 250 mL  250 mL Intravenous Q15 MIN PRN Brian Johnson M.D.       • calcium GLUConate 12 g in D5W 500 mL infusion   mg/kg/hr Intravenous Continuous Prasanna Doshi M.D.   Stopped at 12/05/19 0717   • LORAZEPAM 2 MG/ML INJ SOLN            • norepinephrine (LEVOPHED) 8 mg in  mL Infusion  0-30 mcg/min Intravenous Continuous Darrius Vargas M.D.   Stopped at 12/04/19 2212   • ketamine (KETALAR) 50 mg/ml injection  65 mg Intravenous Once Jerman Anna M.D.   Stopped at 12/04/19 1830   • EPINEPHrine 4 mg in  mL Infusion  0.5-10 mcg/min Intravenous Continuous Darrius Vargas M.D.   Stopped at 12/05/19 0427   • sodium bicarbonate 8.4 % 150 mEq in D5W 1,000 mL infusion  150 mEq Intravenous Continuous Prasanna Doshi M.D.   Stopped at 12/05/19 0557   • senna-docusate (PERICOLACE or SENOKOT S) 8.6-50 MG per tablet 2 Tab  2 Tab Enteral Tube BID Darrius Vargas M.D.   Stopped at 12/05/19 0600    And   • polyethylene glycol/lytes (MIRALAX) PACKET 1 Packet  1 Packet Enteral Tube QDAY PRN Darrius Vargas M.D.         And   • magnesium hydroxide (MILK OF MAGNESIA) suspension 30 mL  30 mL Enteral Tube QDAY PRN Darrius Vargas M.D.        And   • bisacodyl (DULCOLAX) suppository 10 mg  10 mg Rectal QDAY PRN Darrius Vargas M.D.           Fluids    Intake/Output Summary (Last 24 hours) at 12/5/2019 0747  Last data filed at 12/5/2019 0600  Gross per 24 hour   Intake 3731.65 ml   Output 350 ml   Net 3381.65 ml       Laboratory  Recent Labs     12/04/19  1901 12/04/19  2218 12/05/19  0303   ISTATAPH 6.998* 7.165* 7.316*   ISTATAPCO2 52.8* 54.4* 32.9   ISTATAPO2 79 82 90*   ISTATATCO2 15* 21 18*   [id number] 87* 92* 96   ISTATARTHCO3 13.0* 19.6 16.8*   ISTATARTBE -18* -9* -8*   ISTATTEMP 37.0 C 35.2 C 37.4 C   ISTATFIO2 100 90 70   ISTATSPEC Arterial Arterial Arterial   ISTATAPHTC 6.998* 7.189* 7.310*   [id number] 79 73 92*         Recent Labs     12/04/19  1740 12/04/19 1752 12/05/19  0010 12/05/19  0435   SODIUM  --  138 136 133*   POTASSIUM  --  4.7 4.8 4.8   CHLORIDE  --  105 102 98   CO2  --  12* 16* 20   BUN  --  81* 81* 81*   CREATININE  --  3.92* 3.92* 3.94*   MAGNESIUM 2.9*  --   --   --    CALCIUM  --  8.9 14.9* 17.8*     Recent Labs     12/04/19  1752 12/05/19  0010 12/05/19  0435   ALTSGPT 197* 610* 935*   ASTSGOT 236* 716* 1111*   ALKPHOSPHAT 129* 129* 126*   TBILIRUBIN 0.7 0.9 0.6   GLUCOSE 287* 273* 270*     Recent Labs     12/04/19  1752 12/05/19  0010 12/05/19  0435   WBC 9.5 19.2* 14.9*   NEUTSPOLYS 55.20 84.80* 92.60*   LYMPHOCYTES 34.40 7.10* 2.90*   MONOCYTES 8.10 6.90 3.20   EOSINOPHILS 0.80 0.10 0.00   BASOPHILS 0.70 0.30 0.30   ASTSGOT 236* 716* 1111*   ALTSGPT 197* 610* 935*   ALKPHOSPHAT 129* 129* 126*   TBILIRUBIN 0.7 0.9 0.6     Recent Labs     12/04/19  1752 12/05/19  0010 12/05/19  0435   RBC 4.09* 4.00* 3.84*   HEMOGLOBIN 13.0 12.8 12.2   HEMATOCRIT 43.2 40.3 37.7   PLATELETCT 224 244 183   PROTHROMBTM 20.3* 20.9*  --    APTT 34.3 31.6  --    INR 1.68* 1.74*  --        Imaging  X-Ray:  I have  personally reviewed the images and compared with prior images. and My impression is: Mild improvement in diffuse bilateral infiltrates with possible small effusions, enlarged cardiac silhouette, endotracheal tube/gastric tube/right IJ central venous catheter/transvenous pacing wires seen  CT:    Reviewed  Echo:   Reviewed  Ultrasound:  Reviewed    Assessment/Plan  * Acute respiratory failure (HCC)  Assessment & Plan  Intubated in ED 12/4, extubated early 12/5  Continue BiPAP support titrating FiO2 and IPAP/EPAP for adequate ventilation and SaO2  Aspiration precautions  RT/O2 protocol  Keep n.p.o.  Low threshold for reintubation  Recheck ABG    Aspiration pneumonia (HCC)  Assessment & Plan  Start patient on Rocephin  Follow-up on blood cultures, acquire sputum culture if able  Ongoing aspiration precautions, n.p.o.    Acute encephalopathy  Assessment & Plan  Likely multifactorial. Metabolic component. Question if any hypoxemic injury give hypoxemia and profound shock  Avoid sedatives  Continue to correct underlying causes  Seizure and aspiration precautions  Reorient and maintain sleep/wake cycle    Metabolic acidosis  Assessment & Plan  2/2 to Shock, renal failure, lactate elevation -improving  Status post 2 amps bicarb in the ED  Discontinue bicarb gtt and recheck ABG in 1 to 2 hours    Calcium channel blocker overdose  Assessment & Plan  Unintentional  Poison control consultation  Status post calcium supplementation up to ionized level greater than 2.0  Transvenous pacing wires in place  Optimize other electrolytes, avoid QT prolonging agents    Bradycardia  Assessment & Plan  Due to unintentional calcium channel blockade -improving  Temporary transvenous pacing wire in place    Shock (HCC)  Assessment & Plan  Cardiogenic shock due to calcium channel blocker overdose -improving  Discontinue pressors/inotropes  Discontinue calcium supplementation and recheck ionized calcium 2 hours later  Discontinue bicarbonate  drip  Recheck lactic acid  Monitor urine output, mentation, vital signs closely for adequacy of resuscitation  Echocardiography  Cardiology consultation    Elevated liver enzymes  Assessment & Plan  Due to shock -worsening  Avoid hepatotoxins  Check ammonia level and treat if elevated  Daily CMP    Acute on chronic renal failure (HCC)  Assessment & Plan  Likely secondary to shock -unchanged  Continue fluid resuscitation  Avoid nephrotoxins  Monitor creatinine, urine output, electrolytes closely    Paroxysmal A-fib (HCC)- (present on admission)  Assessment & Plan  Hold beta-blocker/calcium channel blocker for obvious reasons  Resume Eliquis  Optimize electrolytes  Continuous telemetry monitoring       VTE:  NOAC  Ulcer: Not Indicated  Lines: Central Line  Ongoing indication addressed, Arterial Line  Ongoing indication addressed and Lilly Catheter  Ongoing indication addressed    I have performed a physical exam and reviewed and updated ROS and Plan today (12/5/2019). In review of yesterday's note (12/4/2019), there are no changes except as documented above.     Patient remains critically ill today requiring active management of her persistent respiratory failure using noninvasive ventilatory support, close monitoring of ability to protect airway with low threshold for reintubation, cardiovascular stabilization as well as acidosis correction.  She is in multiorgan failure. High risk of deterioration and worsening vital organ dysfunction and death without the above critical care interventions.    Discussed patient condition and risk of morbidity and/or mortality with Hospitalist, Family, RN, RT, Pharmacy, , Charge nurse / hot rounds, Patient and cardiology  The patient remains critically ill.  Critical care time = 95 minutes in directly providing and coordinating critical care and extensive data review.  No time overlap and excludes procedures.

## 2019-12-05 NOTE — ASSESSMENT & PLAN NOTE
Continue to hold heart rate control agents for now  Continue Eliquis renally adjusted  Optimize electrolytes  Continuous telemetry monitoring

## 2019-12-05 NOTE — PROGRESS NOTES
Dr. Johnson updated on recent critical lab values    Calcium of 14.9 (ionized Ca 1.9)  BUN 81    Also notified of high blood sugar and low urine output.   Orders for insulin sliding scale (see mar) inputted, and no further orders for low urine output at this time.

## 2019-12-05 NOTE — ED NOTES
Rounded with pt spouse, discussed POC, updated on wait status, answered questions, addressed needs, ensured call light within reach. Provided emotional support for pt spouse.

## 2019-12-05 NOTE — DISCHARGE PLANNING
LSW provided emotional support to Pt's  Rachid ([phone]), LSW remained nearby until Rachid was able to escorted to bedside, Rachid confirmed Pt's son Mark lives with Pt in [place], [place] was available for additional support. Pt's has 2 other children DTR who lives in the [facility] and 2nd son who lives in [place], [place].

## 2019-12-05 NOTE — ASSESSMENT & PLAN NOTE
Related to OD on underlying dementia  CT head unremarkable  Minimize sedation   Mobilize  Now back to her bassbertha

## 2019-12-05 NOTE — CARE PLAN
Problem: Ventilation Defect:  Goal: Ability to achieve and maintain unassisted ventilation or tolerate decreased levels of ventilator support  Outcome: PROGRESSING AS EXPECTED     Adult Ventilation Update    Total Vent Days: 2    Patient Lines/Drains/Airways Status      Active Airway       Name: Placement date: Placement time: Site: Days:    Airway ETT Oral 7.5  12/04/19 1750   Oral  less than 1                      Plateau Pressure (Q Shift): 20 (12/05/19 0301)  Static Compliance (ml / cm H2O): 38.7 (12/05/19 0301)    Cough: Non Productive (12/04/19 2201)  Sputum Amount: Small (12/04/19 2201)  Sputum Color: Clear (12/04/19 2201)  Sputum Consistency: Thick (12/04/19 2201)         Events/Summary/Plan: abg (12/05/19 0301)    24 370 peep 10 60%  Titrated O2 overnight, no other changes made to the vent.

## 2019-12-05 NOTE — CARE PLAN
Extubation    Cuff leak noted -yes  Stridor present -no     FiO2%: 60 % (12/05/19 0552)  O2 (LPM): 15 (12/05/19 0522)  O2 Daily Delivery Respiratory : (bipap) (12/05/19 0552)  Patient toleration- extubated to bipap after 20 mins    Events/Summary/Plan: started bipap per desats to 84 on flush oxymask-NRB- coarse/fine crackles  (12/05/19 0520)    Adult Noninvasive Ventilation    BiPap Day 1  IPAP (cmH2O): 12 (12/05/19 0520)  EPAP (cm H2O): 5 (12/05/19 0520)  FiO2: 60 (12/05/19 0520)            Events/Summary/Plan: started bipap per desats to 84 on flush oxymask-NRB- coarse/fine crackles  (12/05/19 0520)

## 2019-12-05 NOTE — ED NOTES
Upon shift change, ED doctor and intensivist at bedside. Pt BP in 50s-60s systolic. Orders for epinephrine obtained. Instructed to start epi and ween pt off dopamine. Pt BP now stable.

## 2019-12-05 NOTE — ASSESSMENT & PLAN NOTE
Had been on amio and CCB both DC'd in setting of bradycardia and hypotension  Cont apixaban though will need to follow renal function closely  DW Cardiology Dr Carr: Recommends DC on no rate or rythm control meds for now.  F/U with Dr Wu her EP physician  ?if we should resume amio given elevation of AST/ALT

## 2019-12-05 NOTE — ED TRIAGE NOTES
"Chief Complaint   Patient presents with   • Syncope     arrived via [facility] following a witnessed syncopal event, PT Hypoxic on arrival with a RA sat of 62% and [facility] attempted baggin but pt was noncompliant, Pt's Saturation came to \"80's\" with 15L NRB, PRssure reportedly 60/30's [facility] attempted pacing in route        "

## 2019-12-05 NOTE — ASSESSMENT & PLAN NOTE
Likely secondary to shock -improving daily, non-oliguric  Status post fluid resuscitation  Continue diuresis (oral)  Avoid nephrotoxins  Monitor creatinine, urine output, electrolytes closely

## 2019-12-05 NOTE — ED NOTES
Yoli from Lab called with critical result of BUN at 81. Critical lab result read back to Yoli.   Dr. Anna notified of critical lab result at 1828.  Critical lab result read back by Dr. Anna.

## 2019-12-05 NOTE — ASSESSMENT & PLAN NOTE
Likely multifactorial. Metabolic component -improved  Avoid sedatives  Reorient and maintain sleep/wake cycle  PT/OT, ambulation

## 2019-12-05 NOTE — ASSESSMENT & PLAN NOTE
Baseline creat around 2  Creat elevated 4.6 however is coming down daily  ?secondary to ischemic injury from hypotension  Has responded to lasix  Renal US showing chronic changes, no obstruction  Renally dose medicatoions  Follow BMP daily

## 2019-12-05 NOTE — PROGRESS NOTES
Hospital Medicine Daily Progress Note    Date of Service  12/5/2019    Chief Complaint  79 y.o. female admitted 12/4/2019 with syncope    Hospital Course    PMHx severe pulmonary HTN, MR, PAFib, CKD, on AC with apixaban.  The pt apparently mistakenly took a triple dose of her CCB as well as amiodarone and BB.  Found to be magdiel and hypotensive.  Temp pacer placed and Intubated in the ED.  EKG showing a wide complex escape rythm.  Extubated 12/52      Interval Problem Update  ROS is unobtainble due to pt's mental status.  In no apparent distress  Extubated this am  Sinus/jnctnl 40-50s  -160s  Tmax 38.4  UOP 625ml/12hrs s/p lasix  BiPAP  D1 Rocephin  Consultants/Specialty  Cardiology  CC    Code Status  DNAR/I    Disposition  Cont in ICU    Review of Systems  Review of Systems   Unable to perform ROS: Mental status change        Physical Exam  Temp:  [35.2 °C (95.4 °F)-38.4 °C (101.1 °F)] 38.4 °C (101.1 °F)  Pulse:  [] 50  Resp:  [13-35] 21  BP: ()/(17-96) 134/60  SpO2:  [66 %-100 %] 98 %    Physical Exam  Constitutional:       General: She is not in acute distress.     Appearance: She is well-developed. She is not diaphoretic.   HENT:      Head: Normocephalic and atraumatic.   Eyes:      General: No scleral icterus.     Pupils: Pupils are equal, round, and reactive to light.   Neck:      Vascular: No JVD.   Cardiovascular:      Rate and Rhythm: Normal rate and regular rhythm.      Heart sounds: Murmur present.   Pulmonary:      Effort: Pulmonary effort is normal. No respiratory distress.      Breath sounds: No stridor. No wheezing or rales.   Abdominal:      Palpations: Abdomen is soft.      Tenderness: There is no tenderness. There is no guarding or rebound.   Musculoskeletal:         General: No tenderness.      Right lower leg: No edema.      Left lower leg: No edema.   Skin:     General: Skin is warm and dry.      Findings: No rash.   Neurological:      General: No focal deficit present.       Mental Status: She is disoriented.         Fluids    Intake/Output Summary (Last 24 hours) at 12/5/2019 1020  Last data filed at 12/5/2019 0800  Gross per 24 hour   Intake 3913.41 ml   Output 350 ml   Net 3563.41 ml       Laboratory  Recent Labs     12/04/19  1752 12/05/19  0010 12/05/19  0435   WBC 9.5 19.2* 14.9*   RBC 4.09* 4.00* 3.84*   HEMOGLOBIN 13.0 12.8 12.2   HEMATOCRIT 43.2 40.3 37.7   .6* 100.8* 98.2*   MCH 31.8 32.0 31.8   MCHC 30.1* 31.8* 32.4*   RDW 55.1* 51.0* 48.4   PLATELETCT 224 244 183   MPV 11.8 11.7 11.6     Recent Labs     12/04/19  1752 12/05/19  0010 12/05/19  0435   SODIUM 138 136 133*   POTASSIUM 4.7 4.8 4.8   CHLORIDE 105 102 98   CO2 12* 16* 20   GLUCOSE 287* 273* 270*   BUN 81* 81* 81*   CREATININE 3.92* 3.92* 3.94*   CALCIUM 8.9 14.9* 17.8*     Recent Labs     12/04/19  1752 12/05/19  0010   APTT 34.3 31.6   INR 1.68* 1.74*         Recent Labs     12/04/19  2209   TRIGLYCERIDE 85       Imaging  US-RENAL   Final Result      Atrophic, echogenic kidneys consistent with medical renal disease.      Bilateral renal cysts.      DX-CHEST-PORTABLE (1 VIEW)   Final Result      Congestive heart failure.      CT-HEAD W/O   Final Result      1.  Cerebral atrophy.      2.  White matter lucencies most consistent with small vessel ischemic change versus demyelination or gliosis.      3.  Otherwise, Head CT without contrast with no acute findings. No evidence of acute cerebral infarction, hemorrhage or mass lesion.      EC-ECHOCARDIOGRAM LTD W/O CONT   Final Result      DX-CHEST-PORTABLE (1 VIEW)   Final Result         1. ETT is well-positioned. Right IJ central catheter is in the proximal SVC.   2. Unchanged bilateral interstitial and possible opacities, edema versus multifocal pneumonia.   3. Right basilar consolidation versus atelectasis. Suspected small right pleural effusion.      DX-CHEST-PORTABLE (1 VIEW)   Final Result      1.  Cardiac silhouette enlargement.   2.  Diffuse interstitial  and airspace opacities most likely pulmonary edema/CHF. Correlate clinically for infection.   3.  Small right pleural effusion.      CL-TEMPORARY PACEMAKER INSERT    (Results Pending)        Assessment/Plan  * Acute respiratory failure (HCC)  Assessment & Plan  Due to volume overload   Intubated 12/4/2019  Extubated 12/5  PMA consulted   Unable to diurese given patients current condition     Aspiration pneumonia (HCC)  Assessment & Plan  Rocephin  Follow cultures    Acute encephalopathy  Assessment & Plan  Due to shock   CT head unremarkable  Minimize sedation   mobilize    Metabolic acidosis  Assessment & Plan  Bicarb ggt for now    Calcium channel blocker overdose  Assessment & Plan  Calcium GGT   Unintentional overdose concern    Bradycardia  Assessment & Plan  Due to CCB overdose?   Maintaining 40-50s without pacing  Cards following      Shock (HCC)  Assessment & Plan  Undifferentiated shock?   Due to CCB or BB overdose?   Cardiogenic shock?  PMA and cardiology following   Cont epinephrine ggt  Calcium ggt  Bicarb ggt    Elevated liver enzymes  Assessment & Plan  ?ischemic injury  Follow daily CMP    Acute on chronic renal failure (HCC)  Assessment & Plan  Baseline creat around 2  Creat has remained unchanged arond 3.9  UOP however has markedly improved today  Renal US showing chronic changes, no obstruction  Renally dose medicatoions  Follow BMP daily  Check urine lytes  Consult Neph if she does not start to improve    Paroxysmal A-fib (HCC)- (present on admission)  Assessment & Plan  Hx of on CCB, anticoagulation and amidoarone at baseline        VTE prophylaxis: apixaban

## 2019-12-05 NOTE — PROGRESS NOTES
Late entry:    0700: Bedside report completed. All lines and drips verified. Patient currently resting. Patient is hypertensive, 160's/70's. Dr. Lisa at bedside and notified. New orders received. Chart reviewed.     0800: Patient more awake and alert. Patient is restless and fidgeting. Patient pulling on lines and bending knees. RN educated patient about lines and their locations, which requires her to keep her legs straight. Patient is A&Ox2-3 (disoriented to event and sometimes time). Patient mumbling continuously in room. Some sentences or statements do not make sense.Patient also calls out for , RN re-oriented patient and explained that patient  is at home. Patient continues to be confused.  MD Gonda aware. New orders received.

## 2019-12-05 NOTE — PROCEDURES
REFERRING PHYSICIAN: JOVANNY Wyman    PREOPERATIVE DIAGNOSIS:  1.  Junctional rhythm with wide complex escape  2.  Undifferentiated shock  3.  Pulmonary edema    POSTOPERATIVE DIAGNOSIS:  1.  Successful transvenous pacemaker placement via the right femoral vein      PROCEDURE PERFORMED:  Transvenous pacemaker placement  Supervision moderate sedation    DESCRIPTION OF PROCEDURE:  Patient was brought to the catheterization laboratory in emergency fashion for placement of a temporary transvenous pacemaker.  Patient was currently being transcutaneously paced.  Right femoral area was prepped and draped in a sterile fashion and a size 10 cc 1% Xylocaine after fluoroscopic localization.  A single femoral puncture was utilized to place a 6 [employer]o SideArm sheath via the RFA via the modified center technique without difficulty.  A balloontipped temporary transvenous pacemaker was advanced in the right heart without difficulty.  Thresholds were obtained and good pacing capture was found.  Underlying rhythm was sinus bradycardia at the time of pacemaker placement at a rate of 40 bpm.  This was an improvement from prior to transport to the catheterization laboratory.  No complications were noted.  Sheath was sewn in place.    Moderate sedation directly monitored by me during the case while supervising the administration of the sedation medication by an independent trained RN to assist in the monitoring of the patient's level of consciousness and physiological status. I, the supervising physician was present the entire time from beginning of medication administration until the end of the procedure from 9:01 PM until 9:17 PM. For detailed administration records please see the moderate sedation documentation in the median tab.

## 2019-12-05 NOTE — ASSESSMENT & PLAN NOTE
Cardiogenic shock due to calcium channel blocker overdose -resolved  Cardiology consulted  Continue close blood pressure monitoring

## 2019-12-05 NOTE — ASSESSMENT & PLAN NOTE
Intubated in ED 12/4, extubated early 12/5  Encourage incentive spirometry/PEP  Ambulation, out of bed to chair  RT/O2 protocol, titrate oxygen therapy to keep SaO2 greater than 92%  Continue forced diuresis (decreased to p.o.)

## 2019-12-05 NOTE — ASSESSMENT & PLAN NOTE
Due to shock -worsening today for unclear reasons (discussed medication causes with pharmacy)  Avoid hepatotoxins  Daily cmp  Abdominal ultrasound to evaluate for obstructive process versus portal venous thrombosis

## 2019-12-05 NOTE — H&P
Hospital Medicine History & Physical Note    Date of Service  12/4/2019    Primary Care Physician  Colton Schroeder M.D.    Consultants  PMA  Cardiology    Code Status  full    Chief Complaint  Shock     History of Presenting Illness  79 y.o. female who presented 12/4/2019 with past medical history of severe pulmonary hypertension, mitral regurgitation, paroxysmal atrial fibrillation, CKD on anticoagulation, who presents with unintentional calcium blocker overdose.  Apparently patient took 3 times a dose of extended release calcium channel blocker as well as her beta-blocker and amiodarone.  History is significantly limited as the patient was intubated at the time of examination.  She was found to be bradycardic and hypotensive.  She had several near syncopal events along with dyspnea.  She was emergently intubated in the emergency department.  She is found to be hypotensive with a wide-complex bradycardic junctional escape rhythm.  She will be admitted to the ICU for further management of her symptoms.    Review of Systems  Review of Systems   Unable to perform ROS: Intubated       Past Medical History   has a past medical history of A-fib (HCC), Bowel obstruction (HCC), Renal disorder, and Valvular heart disease (5/27/2019).    Surgical History   has a past surgical history that includes gyn surgery and other orthopedic surgery.     Family History  family history includes Cancer in her mother; Heart Attack in her father; Heart Disease in her father.     Social History   reports that she has never smoked. She has never used smokeless tobacco. She reports current alcohol use. She reports that she does not use drugs.    Allergies  Allergies   Allergen Reactions   • Pcn [Penicillins]      rash       Medications  Prior to Admission Medications   Prescriptions Last Dose Informant Patient Reported? Taking?   ALPRAZolam (XANAX) 0.25 MG Tab unknown at unknown Family Member Yes No   Sig: Take 0.25 mg by mouth at bedtime as  needed for Sleep.   DILTIAZem CD (CARDIZEM CD) 120 MG CAPSULE SR 24 HR unknown at unknown Family Member Yes Yes   Sig: Take 120 mg by mouth every day.   Multiple Vitamins-Minerals (OCUVITE-LUTEIN) Tab unknown at unknown Family Member Yes No   Sig: Take 1 tablet by mouth every day.   amiodarone (CORDARONE) 200 MG Tab unknown at unknown Family Member Yes Yes   Sig: Take 200 mg by mouth every day.   apixaban (ELIQUIS) 2.5mg Tab unknown at unknown Family Member Yes Yes   Sig: Take 2.5 mg by mouth 2 Times a Day.   losartan (COZAAR) 100 MG Tab unknown at unknown Family Member Yes Yes   Sig: Take 100 mg by mouth every day.   potassium Chloride ER (K-TAB) 20 MEQ Tab CR tablet unknown at unknown Family Member Yes No   Sig: Take 20 mEq by mouth every day.   torsemide (DEMADEX) 20 MG Tab unknown at unknown Family Member Yes Yes   Sig: Take 10-20 mg by mouth 2 times a day. 20 mg in AM  10 mg in afternoon      Facility-Administered Medications: None       Physical Exam  Pulse:  [] 94  Resp:  [13-35] 32  BP: ()/(17-71) 147/66  SpO2:  [66 %-99 %] 92 %    Physical Exam  Vitals signs reviewed.   Constitutional:       Appearance: Normal appearance. She is ill-appearing.   HENT:      Head: Normocephalic and atraumatic.      Nose: No congestion.      Mouth/Throat:      Mouth: Mucous membranes are dry.      Comments: intubated  Eyes:      Extraocular Movements: Extraocular movements intact.      Pupils: Pupils are equal, round, and reactive to light.   Neck:      Musculoskeletal: Normal range of motion and neck supple. No muscular tenderness.   Cardiovascular:      Rate and Rhythm: Regular rhythm. Bradycardia present.      Pulses: Normal pulses.      Heart sounds: Normal heart sounds. No murmur.   Pulmonary:      Effort: Pulmonary effort is normal. No respiratory distress.      Breath sounds: Normal breath sounds. No stridor.   Abdominal:      General: Bowel sounds are normal. There is no distension.      Palpations: Abdomen  is soft.      Tenderness: There is no tenderness.   Musculoskeletal:         General: No swelling or deformity.   Skin:     General: Skin is warm and dry.      Capillary Refill: Capillary refill takes more than 3 seconds.   Neurological:      Mental Status: She is alert.      Comments: sedated   Psychiatric:      Comments: Unable to determine as patient intubated         Laboratory:  Recent Labs     12/04/19  1752   WBC 9.5   RBC 4.09*   HEMOGLOBIN 13.0   HEMATOCRIT 43.2   .6*   MCH 31.8   MCHC 30.1*   RDW 55.1*   PLATELETCT 224   MPV 11.8     Recent Labs     12/04/19  1752   SODIUM 138   POTASSIUM 4.7   CHLORIDE 105   CO2 12*   GLUCOSE 287*   BUN 81*   CREATININE 3.92*   CALCIUM 8.9     Recent Labs     12/04/19  1752   ALTSGPT 197*   ASTSGOT 236*   ALKPHOSPHAT 129*   TBILIRUBIN 0.7   GLUCOSE 287*     Recent Labs     12/04/19  1752   APTT 34.3   INR 1.68*     Recent Labs     12/04/19  1752   NTPROBNP 86662*         Recent Labs     12/04/19  1752   TROPONINT 61*       Urinalysis:    Recent Labs     12/04/19  1821   SPECGRAVITY 1.015   GLUCOSEUR Negative   KETONES Trace*   NITRITE Negative   LEUKESTERAS Negative   WBCURINE 0-2   BACTERIA Few*   EPITHELCELL Few        Imaging:  CT-HEAD W/O   Final Result      1.  Cerebral atrophy.      2.  White matter lucencies most consistent with small vessel ischemic change versus demyelination or gliosis.      3.  Otherwise, Head CT without contrast with no acute findings. No evidence of acute cerebral infarction, hemorrhage or mass lesion.      EC-ECHOCARDIOGRAM LTD W/O CONT   Final Result      DX-CHEST-PORTABLE (1 VIEW)   Final Result         1. ETT is well-positioned. Right IJ central catheter is in the proximal SVC.   2. Unchanged bilateral interstitial and possible opacities, edema versus multifocal pneumonia.   3. Right basilar consolidation versus atelectasis. Suspected small right pleural effusion.      DX-CHEST-PORTABLE (1 VIEW)   Final Result      1.  Cardiac  silhouette enlargement.   2.  Diffuse interstitial and airspace opacities most likely pulmonary edema/CHF. Correlate clinically for infection.   3.  Small right pleural effusion.      CL-TEMPORARY PACEMAKER INSERT    (Results Pending)         Assessment/Plan:  I anticipate this patient will require at least two midnights for appropriate medical management, necessitating inpatient admission.    * Acute respiratory failure (HCC)  Assessment & Plan  Due to volume overload   Intubated 12/4/2019  PMA consulted   Unable to diurese given patients current condition     Metabolic acidosis  Assessment & Plan  Bicarb ggt for now    Calcium channel blocker overdose  Assessment & Plan  Calcium GGT   Unintentional overdose concern    Bradycardia  Assessment & Plan  Due to CCB overdose?   Plan for pacemaker palcement by Cardiology   Cont cardiac monitoring   Cardiology following   On epinephrine ggt    Shock (HCC)  Assessment & Plan  Undifferentiated shock?   Due to CCB or BB overdose?   Cardiogenic shock?  PMA and cardiology following   Cont epinephrine ggt  Calcium ggt  Bicarb ggt    Acute on chronic renal failure (HCC)  Assessment & Plan  Avoid nephrotoxic medications   Renal ultrasound, urine lytes   Likely due to shock   Monitor I/o   Recheck renal function in am     Acute encephalopathy  Assessment & Plan  Due to shock   CT head ordered  Minimize sedation     Paroxysmal A-fib (HCC)- (present on admission)  Assessment & Plan  Hx of on CCB, anticoagulation and amidoarone at baseline       VTE prophylaxis: scd

## 2019-12-05 NOTE — CARE PLAN
Problem: Ventilation Defect:  Goal: Ability to achieve and maintain unassisted ventilation or tolerate decreased levels of ventilator support  Outcome: MET       Respiratory Update    Treatment modality: HHFNC  Frequency: Q4    Pt tolerating current treatments well with no adverse reactions.

## 2019-12-05 NOTE — CONSULTS
Reason of Consult: Hypotension and bradycardia    Consulting Physician: JOVANNY Wyman    HPI:  79-year-old female with a history of severe pulmonary hypertension, moderate to severe mitral regurgitation, paroxysmal atrial fibrillation, CKD, valvular heart failure, chronic oral anticoagulation and a recent history of inadvertent but repeated overdosing of calcium channel blockers and other medications.  She has taken over 3 times her dosing of long-acting diltiazem previously as well as taking her atenolol and amiodarone.  She was seen earlier today in the cardiology clinic by Dr. Cliff Wu and was noted to be bradycardic.  Some testing was ordered and she was sent home.  She had had a recent syncopal event and after going home her dyspnea that she had been experiencing worsened suddenly, and she lost consciousness.  This is all per her  who is at the bedside as the patient has been emergently intubated and is sedated and paralyzed.  Upon arrival she was noted to be hypotensive with a wide-complex bradycardic junctional escape rhythm.  She was also noted to have diffuse pulmonary edema hypoxia and profound hypotension.  She was intubated with rapid sequence intubation and initiated on vasopressors.  She was given 1 amp of calcium and laboratory studies returned with a pH of 6.9 and multiorgan system dysfunction.  She is currently not responsive.  She did strike her head when she fell and has been too unstable to receive CT of the head at this time.  She is received transcutaneous pacing with good capture up to 100 bpm without significant change in her blood pressure.  Her  is unaware of her previous medication overdosing and does not monitor her medications himself.  She is a current non-smoker who does not drink excessively and does not do drugs per her .  No precocious CAD in family.    Past Medical History:   Diagnosis Date   • A-fib (HCC)     had history of Afib   • Bowel obstruction (HCC)     • Renal disorder     stage 4 kidney failure   • Valvular heart disease 5/27/2019       Social History     Socioeconomic History   • Marital status:      Spouse name: Not on file   • Number of children: Not on file   • Years of education: Not on file   • Highest education level: Not on file   Occupational History   • Not on file   Social Needs   • Financial resource strain: Not on file   • Food insecurity:     Worry: Not on file     Inability: Not on file   • Transportation needs:     Medical: Not on file     Non-medical: Not on file   Tobacco Use   • Smoking status: Never Smoker   • Smokeless tobacco: Never Used   Substance and Sexual Activity   • Alcohol use: Yes     Comment: occas   • Drug use: No   • Sexual activity: Not on file   Lifestyle   • Physical activity:     Days per week: Not on file     Minutes per session: Not on file   • Stress: Not on file   Relationships   • Social connections:     Talks on phone: Not on file     Gets together: Not on file     Attends Scientologist service: Not on file     Active member of club or organization: Not on file     Attends meetings of clubs or organizations: Not on file     Relationship status: Not on file   • Intimate partner violence:     Fear of current or ex partner: Not on file     Emotionally abused: Not on file     Physically abused: Not on file     Forced sexual activity: Not on file   Other Topics Concern   • Not on file   Social History Narrative   • Not on file       No current facility-administered medications on file prior to encounter.      Current Outpatient Medications on File Prior to Encounter   Medication Sig Dispense Refill   • amiodarone (CORDARONE) 200 MG Tab Take 200 mg by mouth every day.     • apixaban (ELIQUIS) 2.5mg Tab Take 2.5 mg by mouth 2 Times a Day.     • DILTIAZem CD (CARDIZEM CD) 120 MG CAPSULE SR 24 HR Take 120 mg by mouth every day.     • losartan (COZAAR) 100 MG Tab Take 100 mg by mouth every day.     • torsemide (DEMADEX) 20  MG Tab Take 10-20 mg by mouth 2 times a day. 20 mg in AM  10 mg in afternoon     • potassium Chloride ER (K-TAB) 20 MEQ Tab CR tablet Take 20 mEq by mouth every day.     • Multiple Vitamins-Minerals (OCUVITE-LUTEIN) Tab Take 1 tablet by mouth every day.     • ALPRAZolam (XANAX) 0.25 MG Tab Take 0.25 mg by mouth at bedtime as needed for Sleep.         Current Facility-Administered Medications   Medication Dose Frequency Provider Last Rate Last Dose   • norepinephrine (LEVOPHED) 8 mg in  mL Infusion  0-30 mcg/min Continuous Jerman Anna M.D. 37.5 mL/hr at 12/04/19 1914 20 mcg/min at 12/04/19 1914   • phenylephrine (KORTNEY-SYNEPHRINE) 100 mcg/mL inj (IV Push Syringe) 100 mcg  100 mcg Q15 MIN PRN Jerman Anna M.D.       • ketamine (KETALAR) 50 mg/ml injection  65 mg Once Jerman Anna M.D.       • NOREPINEPHRINE BITARTRATE 1 MG/ML IV SOLN           • DOPamine 1600 mcg/mL in D5W premix  10 mcg/kg/min Continuous Jerman Anna M.D. 37.2 mL/hr at 12/04/19 1912 15 mcg/kg/min at 12/04/19 1912   • [COMPLETED] NS infusion 1,000 mL  1,000 mL Once Jerman Anna M.D. 1,000 mL/hr at 12/04/19 1945 1,000 mL at 12/04/19 1945   • calcium GLUConate 6 g in D5W 250 mL infusion   mg/kg/hr Continuous Khoi Mejias M.D.       • GLUCAGON HCL RDNA (DIAGNOSTIC) 1 MG INJ SOLR           • sodium bicarbonate 8.4 % injection 50 mEq  50 mEq Once Jerman Anna M.D.       • sodium bicarbonate 8.4 % injection 50 mEq  50 mEq Once Jerman Anna M.D.       • EPINEPHrine 30 mg/30mL (ADRENALIN) 4 mg in  mL Infusion  0.5-10 mcg/min Continuous Jerman Anna M.D.       Last reviewed on 12/4/2019  6:57 PM by Augusta Manning      Pcn [penicillins]    Family History   Problem Relation Age of Onset   • Cancer Mother    • Heart Attack Father    • Heart Disease Father         valve surgeries x2       ROS: Unable to obtain, patient intubated and sedated unable to communicate effectively.    Physical Exam   Blood pressure (!) 67/36, pulse 91, resp. rate (!)  "31, height 1.702 m (5' 7\"), weight 66.2 kg (146 lb), SpO2 88 %, not currently breastfeeding.    Constitutional: Intubated sedated.  Appears well-developed.   HENT: Normocephalic and atraumatic. No scleral icterus.  Pupils dilated and fixed.  Neck: JVD present.   Cardiovascular: Normal rate. Exam reveals no gallop and no friction rub.  3/6 systolic murmur heard.   Pulmonary/Chest: Ccoarse   Abdominal: S/NT/ND BS+   Musculoskeletal:  Pulses present. No atrophy. Strength normal.  Extremities: Exhibits no edema. No clubbing or cyanosis.   Skin: Skin is cold and dry.   Neuro: Non-focal, CN 2-12 intact grossly      Intake/Output Summary (Last 24 hours) at 12/4/2019 1938  Last data filed at 12/4/2019 1923  Gross per 24 hour   Intake 100 ml   Output --   Net 100 ml       Recent Labs     12/04/19  1752   WBC 9.5   RBC 4.09*   HEMOGLOBIN 13.0   HEMATOCRIT 43.2   .6*   MCH 31.8   MCHC 30.1*   RDW 55.1*   PLATELETCT 224   MPV 11.8     Recent Labs     12/04/19  1752   SODIUM 138   POTASSIUM 4.7   CHLORIDE 105   CO2 12*   GLUCOSE 287*   BUN 81*   CREATININE 3.92*   CALCIUM 8.9     Recent Labs     12/04/19  1752   INR 1.68*                   EKG (12/4/2019 ):  I have personally reviewed the EKG this visit and discussed with the patient. It shows junctional bradycardia with a wide complex escape rhythm.    Imaging reviewed    Impressions:  1.  Undifferentiated shock  2.  Hypercarbic hypoxemic respiratory failure, ventilator dependent  3.  Junctional escape rhythm  4.  Lactic acidosis  5.  Acute on chronic renal insufficiency  6.  Multiorgan system dysfunction  7.  Chronic oral anticoagulation  8.  Shock liver  9.  Pulmonary edema, cardiogenic versus noncardiogenic  10.  Mitral regurgitation  11.  Severe pulmonary hypertension    Recommendations:  It appears pacing has not improved her blood pressure much but she could use a higher heart rate and with her wide-complex junctional escape rhythm I think temporary transvenous " pacemaker is very reasonable as requested by the emergency department physician.  This will be done emergently.  Recommend vasopressor support for her shock and a stat echocardiogram.  She may have overdosed on her diltiazem again worsening her situation and allowing for less compensation and therefore calcium infusion for the time being until her situation is more fully clarified would be reasonable.  Obviously I would hold her beta-blocker calcium channel blocker and amiodarone.  She will be given glucagon.    Bedside echocardiography reveals a normal IVC despite being intubated and normal biventricular function with possibly severe mitral regurgitation.      Recommend supportive care and bicarbonate infusion to improve her acidosis and allow more effective pressors.  This is already been completed with 2 A bicarb to good effect.    Discussed that the prognosis is extremely poor given her age and comorbid conditions multiorgan system failure and presenting lactate and pH of 6.9.   acknowledges this but states she would like to remain a full code.    Stat transvenous pacemaker, temporary.    Discussed with the referring physician and bedside nursing.    Thank you for this interesting consultation. It was my pleasure to see Geno Gallegos today.    Khoi Mejias MD, FACC, [facility]  Division of Interventional Cardiology  [facility]

## 2019-12-05 NOTE — ASSESSMENT & PLAN NOTE
Due to volume overload   Intubated 12/4/2019  Extubated 12/5  PMA consulted   On maintenance lasix dose

## 2019-12-05 NOTE — RESPIRATORY CARE
COPD EDUCATION by COPD CLINICAL EDUCATOR  12/5/2019 at 6:39 AM by Marshall Sorto     Patient reviewed by COPD education team. Patient does not have a history or diagnosis of COPD and is a non-smoker, therefore does not qualify for the COPD program.

## 2019-12-05 NOTE — ASSESSMENT & PLAN NOTE
Unintentional  Poison control consulted  Status post supportive care  Optimize other electrolytes, avoid QT prolonging agents

## 2019-12-05 NOTE — PROGRESS NOTES
While RN was at bedside, patient started to cough and then vomited into BIPAP. RN removed BIPAP immediately, suctioned patient out and called RT to bedside. Patient was placed on 15L oxymask, MD and charge nurse notfied. Orders received to place patient on HIFLO NC and for RT to completed breathing treatment. Patient sats are currently 92% on 15L with a rate of 30BPM.

## 2019-12-05 NOTE — CONSULTS
Cardiology Medicine Consult Note  Note Author: Pasha Noel D.O.       Name Geno Gallegos 1940   Age/Sex 79 y.o. female   MRN [id number]   Code Status DNR, I OK       Chief Complaint:  Unintentional Overdose  Syncope    HPI:   79 y.o. female with a PMH of severe pulmonary hypertension (last echo showed RVSP 70), moderate MR and TR, paroxysmal atrial fibrillation (eliquis, dilt, and amio), CKD stage IV that presented with unintentional overdose. She has likely been taking 3 times her dosing of her Cardizem and amiodarone for an unclear period of time. She was seen in cardiology clinic prior to presentation, she was noted to be bradycardic but asymptomatic and sent home with instruction to hold meds. On return home she had several syncopal events andSOB.    Pt was brought into the ED where she was emergently intubated.  She was hypotensive with a wide-complex bradycardic junctional escape rhythm.  She was also noted to have diffuse pulmonary edema and hypoxia. She received 1 amp of calcium and was started on dopamine and norepi drips. She was started on transcutaneous pacing but with little improvement of hypotension. She was then admitted to the ICU and started on sodium bicarb, due to acidosis, as well as a calcium drip. Dopamine was replaced with epi. Cardiology was consulted for further management. She then underwent temporary PM placement by Dr Mejias. Her respiratory status improved and she was extubated morning after presentation and calcium drip was stopped.        Review of Systems   Unable to perform ROS: Critical illness       Past Medical History:   Past Medical History:   Diagnosis Date   • A-fib (HCC)     had history of Afib   • Bowel obstruction (HCC)    • Renal disorder     stage 4 kidney failure   • Valvular heart disease 2019       Past Surgical History:  Past Surgical History:   Procedure Laterality Date   • GYN SURGERY      hysterectomy   • OTHER ORTHOPEDIC  SURGERY      right hip replacement       Current Outpatient Medications:  Home Medications     Reviewed by Augusta Manning (Pharmacy Tech) on 12/04/19 at 1857  Med List Status: Complete   Medication Last Dose Status   ALPRAZolam (XANAX) 0.25 MG Tab unknown Active   amiodarone (CORDARONE) 200 MG Tab unknown Active   apixaban (ELIQUIS) 2.5mg Tab unknown Active   DILTIAZem CD (CARDIZEM CD) 120 MG CAPSULE SR 24 HR unknown Active   losartan (COZAAR) 100 MG Tab unknown Active   Multiple Vitamins-Minerals (OCUVITE-LUTEIN) Tab unknown Active   potassium Chloride ER (K-TAB) 20 MEQ Tab CR tablet unknown Active   torsemide (DEMADEX) 20 MG Tab unknown Active                Medication Allergy/Sensitivities:  Allergies   Allergen Reactions   • Pcn [Penicillins]      rash         Family History:  Family History   Problem Relation Age of Onset   • Cancer Mother    • Heart Attack Father    • Heart Disease Father         valve surgeries x2       Social History:  Social History     Socioeconomic History   • Marital status:      Spouse name: Not on file   • Number of children: Not on file   • Years of education: Not on file   • Highest education level: Not on file   Occupational History   • Not on file   Social Needs   • Financial resource strain: Not on file   • Food insecurity:     Worry: Not on file     Inability: Not on file   • Transportation needs:     Medical: Not on file     Non-medical: Not on file   Tobacco Use   • Smoking status: Never Smoker   • Smokeless tobacco: Never Used   Substance and Sexual Activity   • Alcohol use: Yes     Comment: occas   • Drug use: No   • Sexual activity: Not on file   Lifestyle   • Physical activity:     Days per week: Not on file     Minutes per session: Not on file   • Stress: Not on file   Relationships   • Social connections:     Talks on phone: Not on file     Gets together: Not on file     Attends [employer] service: Not on file     Active member of club or organization: Not on  "file     Attends meetings of clubs or organizations: Not on file     Relationship status: Not on file   • Intimate partner violence:     Fear of current or ex partner: Not on file     Emotionally abused: Not on file     Physically abused: Not on file     Forced sexual activity: Not on file   Other Topics Concern   • Not on file   Social History Narrative   • Not on file       PCP : Colton Schroeder M.D.      Physical Exam     Vitals:    12/05/19 0846 12/05/19 0900 12/05/19 1000 12/05/19 1053   BP:  143/69 134/60    Pulse: (!) 55 (!) 52 (!) 50 (!) 51   Resp:  (!) 24 (!) 21    Temp:   (!) 38.4 °C (101.1 °F)    TempSrc:   Bladder    SpO2: 89% 94% 98% 98%   Weight:       Height:         Body mass index is 23.82 kg/m².  /60   Pulse (!) 51   Temp (!) 38.4 °C (101.1 °F) (Bladder)   Resp (!) 21   Ht 1.702 m (5' 7\")   Wt 69 kg (152 lb 1.9 oz)   SpO2 98%   BMI 23.82 kg/m²   O2 therapy: Pulse Oximetry: 98 %, O2 (LPM): 15, FiO2%: 60 %, O2 Delivery: BIPAP    Physical Exam   Constitutional: She has a sickly appearance.   HENT:   Head: Normocephalic and atraumatic.   Eyes: Pupils are equal, round, and reactive to light. Conjunctivae are normal.   Neck: JVD present.   Cardiovascular: Regular rhythm. Bradycardia present.   Murmur heard.  Pulmonary/Chest: Effort normal. She has rales.   Abdominal: Soft. She exhibits no distension. There is no tenderness.   Musculoskeletal:         General: No tenderness or edema.   Neurological: She is alert. GCS score is 15.   Skin: Skin is warm and dry.         Data Review       Old Records Request:   Completed  Current Records review/summary: Completed    Lab Data Review:  Recent Results (from the past 24 hour(s))   MAGNESIUM    Collection Time: 12/04/19  5:40 PM   Result Value Ref Range    Magnesium 2.9 (H) 1.5 - 2.5 mg/dL   PT/INR    Collection Time: 12/04/19  5:52 PM   Result Value Ref Range    PT 20.3 (H) 12.0 - 14.6 sec    INR 1.68 (H) 0.87 - 1.13   D-DIMER    Collection Time: " 12/04/19  5:52 PM   Result Value Ref Range    D-Dimer Screen 0.76 (H) 0.00 - 0.50 ug/mL (FEU)   Lactic acid (lactate)    Collection Time: 12/04/19  5:52 PM   Result Value Ref Range    Lactic Acid 8.2 (HH) 0.5 - 2.0 mmol/L   CBC WITH DIFFERENTIAL    Collection Time: 12/04/19  5:52 PM   Result Value Ref Range    WBC 9.5 4.8 - 10.8 K/uL    RBC 4.09 (L) 4.20 - 5.40 M/uL    Hemoglobin 13.0 12.0 - 16.0 g/dL    Hematocrit 43.2 37.0 - 47.0 %    .6 (H) 81.4 - 97.8 fL    MCH 31.8 27.0 - 33.0 pg    MCHC 30.1 (L) 33.6 - 35.0 g/dL    RDW 55.1 (H) 35.9 - 50.0 fL    Platelet Count 224 164 - 446 K/uL    MPV 11.8 9.0 - 12.9 fL    Neutrophils-Polys 55.20 44.00 - 72.00 %    Lymphocytes 34.40 22.00 - 41.00 %    Monocytes 8.10 0.00 - 13.40 %    Eosinophils 0.80 0.00 - 6.90 %    Basophils 0.70 0.00 - 1.80 %    Immature Granulocytes 0.80 0.00 - 0.90 %    Nucleated RBC 0.00 /100 WBC    Neutrophils (Absolute) 5.23 2.00 - 7.15 K/uL    Lymphs (Absolute) 3.27 1.00 - 4.80 K/uL    Monos (Absolute) 0.77 0.00 - 0.85 K/uL    Eos (Absolute) 0.08 0.00 - 0.51 K/uL    Baso (Absolute) 0.07 0.00 - 0.12 K/uL    Immature Granulocytes (abs) 0.08 0.00 - 0.11 K/uL    NRBC (Absolute) 0.00 K/uL   COMP METABOLIC PANEL    Collection Time: 12/04/19  5:52 PM   Result Value Ref Range    Sodium 138 135 - 145 mmol/L    Potassium 4.7 3.6 - 5.5 mmol/L    Chloride 105 96 - 112 mmol/L    Co2 12 (L) 20 - 33 mmol/L    Anion Gap 21.0 (H) 0.0 - 11.9    Glucose 287 (H) 65 - 99 mg/dL    Bun 81 (HH) 8 - 22 mg/dL    Creatinine 3.92 (H) 0.50 - 1.40 mg/dL    Calcium 8.9 8.5 - 10.5 mg/dL    AST(SGOT) 236 (H) 12 - 45 U/L    ALT(SGPT) 197 (H) 2 - 50 U/L    Alkaline Phosphatase 129 (H) 30 - 99 U/L    Total Bilirubin 0.7 0.1 - 1.5 mg/dL    Albumin 3.9 3.2 - 4.9 g/dL    Total Protein 7.1 6.0 - 8.2 g/dL    Globulin 3.2 1.9 - 3.5 g/dL    A-G Ratio 1.2 g/dL   BLOOD CULTURE    Collection Time: 12/04/19  5:52 PM   Result Value Ref Range    Significant Indicator NEG     Source BLD      Site PERIPHERAL     Culture Result       No Growth  Note: Blood cultures are incubated for 5 days and  are monitored continuously.Positive blood cultures  are called to the RN and reported as soon as  they are identified.     ESTIMATED GFR    Collection Time: 12/04/19  5:52 PM   Result Value Ref Range    GFR If  13 (A) >60 mL/min/1.73 m 2    GFR If Non African American 11 (A) >60 mL/min/1.73 m 2   proBrain Natriuretic Peptide, NT    Collection Time: 12/04/19  5:52 PM   Result Value Ref Range    NT-proBNP 83232 (H) 0 - 125 pg/mL   TSH    Collection Time: 12/04/19  5:52 PM   Result Value Ref Range    TSH 15.160 (H) 0.380 - 5.330 uIU/mL   PTT    Collection Time: 12/04/19  5:52 PM   Result Value Ref Range    APTT 34.3 24.7 - 36.0 sec   TROPONIN    Collection Time: 12/04/19  5:52 PM   Result Value Ref Range    Troponin T 61 (H) 6 - 19 ng/L   FREE THYROXINE    Collection Time: 12/04/19  5:52 PM   Result Value Ref Range    Free T-4 0.91 0.53 - 1.43 ng/dL   URINALYSIS    Collection Time: 12/04/19  6:21 PM   Result Value Ref Range    Color Yellow     Character Clear     Specific Gravity 1.015 <1.035    Ph 5.5 5.0 - 8.0    Glucose Negative Negative mg/dL    Ketones Trace (A) Negative mg/dL    Protein 30 (A) Negative mg/dL    Bilirubin Negative Negative    Urobilinogen, Urine 0.2 Negative    Nitrite Negative Negative    Leukocyte Esterase Negative Negative    Occult Blood Negative Negative    Micro Urine Req Microscopic    URINE MICROSCOPIC (W/UA)    Collection Time: 12/04/19  6:21 PM   Result Value Ref Range    WBC 0-2 /hpf    Bacteria Few (A) None /hpf    Epithelial Cells Few /hpf    Hyaline Cast 3-5 (A) /lpf   BLOOD CULTURE    Collection Time: 12/04/19  6:53 PM   Result Value Ref Range    Significant Indicator NEG     Source BLD     Site PERIPHERAL     Culture Result       No Growth  Note: Blood cultures are incubated for 5 days and  are monitored continuously.Positive blood cultures  are called to the RN  and reported as soon as  they are identified.     ISTAT ARTERIAL BLOOD GAS    Collection Time: 12/04/19  7:01 PM   Result Value Ref Range    Ph 6.998 (LL) 7.400 - 7.500    Pco2 52.8 (HH) 26.0 - 37.0 mmHg    Po2 79 64 - 87 mmHg    Tco2 15 (L) 20 - 33 mmol/L    S02 87 (L) 93 - 99 %    Hco3 13.0 (L) 17.0 - 25.0 mmol/L    BE -18 (L) -4 - 3 mmol/L    Body Temp 37.0 C degrees    O2 Therapy 100 %    iPF Ratio 79     Ph Temp Chantelle 6.998 (LL) 7.400 - 7.500    Pco2 Temp Co 52.8 (HH) 26.0 - 37.0 mmHg    Po2 Temp Cor 79 64 - 87 mmHg    Specimen Arterial     Action Range Triggered YES     Inst. Qualified Patient YES    Lactic acid (lactate): Repeat if initial lactic acid result is greater than 2    Collection Time: 12/04/19  7:27 PM   Result Value Ref Range    Lactic Acid 7.5 (HH) 0.5 - 2.0 mmol/L   Ionized Calcium    Collection Time: 12/04/19  7:27 PM   Result Value Ref Range    Ionized Calcium 1.4 (H) 1.1 - 1.3 mmol/L   EC-ECHOCARDIOGRAM LTD W/O CONT    Collection Time: 12/04/19  9:13 PM   Result Value Ref Range    Eject.Frac. MOD BP 74.3     Eject.Frac. MOD 4C 71.28     Eject.Frac. MOD 2C 77.78     Left Ventrical Ejection Fraction 70    Lactic acid (lactate): Repeat if initial lactic acid result is greater than 2    Collection Time: 12/04/19 10:09 PM   Result Value Ref Range    Lactic Acid 3.8 (H) 0.5 - 2.0 mmol/L   Ionized Calcium    Collection Time: 12/04/19 10:09 PM   Result Value Ref Range    Ionized Calcium 1.6 (H) 1.1 - 1.3 mmol/L   Triglycerides Starting now and then Every 3 Days    Collection Time: 12/04/19 10:09 PM   Result Value Ref Range    Triglycerides 85 0 - 149 mg/dL   ACCU-CHEK GLUCOSE    Collection Time: 12/04/19 10:15 PM   Result Value Ref Range    Glucose - Accu-Ck 215 (H) 65 - 99 mg/dL   EKG    Collection Time: 12/04/19 10:15 PM   Result Value Ref Range    Report       [facility]    Test Date:  2019-12-04  Pt Name:    NALINI SORIANO               Department: 161  MRN:        [id number]                       Room:       [facility]  Gender:     Female                       Technician: EMERITA  :        1940                   Requested By:ARMANDO ANDINO  Order #:    [id number]                    Reading MD:    Measurements  Intervals                                Axis  Rate:       60                           P:  NY:                                      QRS:        -26  QRSD:       160                          T:          117  QT:         500  QTc:        500    Interpretive Statements  AFIB/FLUTTER AND VENTRICULAR-PACED RHYTHM  NO FURTHER ANALYSIS ATTEMPTED DUE TO PACED RHYTHM  Compared to ECG 2019 11:02:27  Sinus bradycardia no longer present  First degree AV block no longer present  Left-axis deviation no longer present  Myocardial infarct finding no longer present     ISTAT ARTERIAL BLOOD GAS    Collection Time: 19 10:18 PM   Result Value Ref Range    Ph 7.165 (LL) 7.400 - 7.500    Pco2 54.4 (HH) 26.0 - 37.0 mmHg    Po2 82 64 - 87 mmHg    Tco2 21 20 - 33 mmol/L    S02 92 (L) 93 - 99 %    Hco3 19.6 17.0 - 25.0 mmol/L    BE -9 (L) -4 - 3 mmol/L    Body Temp 35.2 C degrees    O2 Therapy 90 %    iPF Ratio 91     Ph Temp Chantelle 7.189 (LL) 7.400 - 7.500    Pco2 Temp Co 50.3 (H) 26.0 - 37.0 mmHg    Po2 Temp Cor 73 64 - 87 mmHg    Specimen Arterial     Action Range Triggered YES     Inst. Qualified Patient YES    Ionized Calcium    Collection Time: 19 12:10 AM   Result Value Ref Range    Ionized Calcium 1.9 (H) 1.1 - 1.3 mmol/L   Comp Metabolic Panel    Collection Time: 19 12:10 AM   Result Value Ref Range    Sodium 136 135 - 145 mmol/L    Potassium 4.8 3.6 - 5.5 mmol/L    Chloride 102 96 - 112 mmol/L    Co2 16 (L) 20 - 33 mmol/L    Anion Gap 18.0 (H) 0.0 - 11.9    Glucose 273 (H) 65 - 99 mg/dL    Bun 81 (HH) 8 - 22 mg/dL    Creatinine 3.92 (H) 0.50 - 1.40 mg/dL    Calcium 14.9 (HH) 8.5 - 10.5 mg/dL    AST(SGOT) 716 (H) 12 - 45 U/L    ALT(SGPT) 610 (H) 2 - 50 U/L    Alkaline Phosphatase 129 (H) 30 -  99 U/L    Total Bilirubin 0.9 0.1 - 1.5 mg/dL    Albumin 3.2 3.2 - 4.9 g/dL    Total Protein 5.7 (L) 6.0 - 8.2 g/dL    Globulin 2.5 1.9 - 3.5 g/dL    A-G Ratio 1.3 g/dL   Prothrombin Time    Collection Time: 12/05/19 12:10 AM   Result Value Ref Range    PT 20.9 (H) 12.0 - 14.6 sec    INR 1.74 (H) 0.87 - 1.13   APTT    Collection Time: 12/05/19 12:10 AM   Result Value Ref Range    APTT 31.6 24.7 - 36.0 sec   CBC WITH DIFFERENTIAL    Collection Time: 12/05/19 12:10 AM   Result Value Ref Range    WBC 19.2 (H) 4.8 - 10.8 K/uL    RBC 4.00 (L) 4.20 - 5.40 M/uL    Hemoglobin 12.8 12.0 - 16.0 g/dL    Hematocrit 40.3 37.0 - 47.0 %    .8 (H) 81.4 - 97.8 fL    MCH 32.0 27.0 - 33.0 pg    MCHC 31.8 (L) 33.6 - 35.0 g/dL    RDW 51.0 (H) 35.9 - 50.0 fL    Platelet Count 244 164 - 446 K/uL    MPV 11.7 9.0 - 12.9 fL    Neutrophils-Polys 84.80 (H) 44.00 - 72.00 %    Lymphocytes 7.10 (L) 22.00 - 41.00 %    Monocytes 6.90 0.00 - 13.40 %    Eosinophils 0.10 0.00 - 6.90 %    Basophils 0.30 0.00 - 1.80 %    Immature Granulocytes 0.80 0.00 - 0.90 %    Nucleated RBC 0.00 /100 WBC    Neutrophils (Absolute) 16.29 (H) 2.00 - 7.15 K/uL    Lymphs (Absolute) 1.36 1.00 - 4.80 K/uL    Monos (Absolute) 1.33 (H) 0.00 - 0.85 K/uL    Eos (Absolute) 0.01 0.00 - 0.51 K/uL    Baso (Absolute) 0.05 0.00 - 0.12 K/uL    Immature Granulocytes (abs) 0.15 (H) 0.00 - 0.11 K/uL    NRBC (Absolute) 0.00 K/uL   ESTIMATED GFR    Collection Time: 12/05/19 12:10 AM   Result Value Ref Range    GFR If  13 (A) >60 mL/min/1.73 m 2    GFR If Non African American 11 (A) >60 mL/min/1.73 m 2   Ionized Calcium    Collection Time: 12/05/19  2:43 AM   Result Value Ref Range    Ionized Calcium 2.0 (H) 1.1 - 1.3 mmol/L   ACCU-CHEK GLUCOSE    Collection Time: 12/05/19  2:49 AM   Result Value Ref Range    Glucose - Accu-Ck 243 (H) 65 - 99 mg/dL   ISTAT ARTERIAL BLOOD GAS    Collection Time: 12/05/19  3:03 AM   Result Value Ref Range    Ph 7.316 (L) 7.400 - 7.500     Pco2 32.9 26.0 - 37.0 mmHg    Po2 90 (H) 64 - 87 mmHg    Tco2 18 (L) 20 - 33 mmol/L    S02 96 93 - 99 %    Hco3 16.8 (L) 17.0 - 25.0 mmol/L    BE -8 (L) -4 - 3 mmol/L    Body Temp 37.4 C degrees    O2 Therapy 70 %    iPF Ratio 129     Ph Temp Chantelle 7.310 (L) 7.400 - 7.500    Pco2 Temp Co 33.5 26.0 - 37.0 mmHg    Po2 Temp Cor 92 (H) 64 - 87 mmHg    Specimen Arterial     Action Range Triggered NO     Inst. Qualified Patient YES    CBC with Differential    Collection Time: 12/05/19  4:35 AM   Result Value Ref Range    WBC 14.9 (H) 4.8 - 10.8 K/uL    RBC 3.84 (L) 4.20 - 5.40 M/uL    Hemoglobin 12.2 12.0 - 16.0 g/dL    Hematocrit 37.7 37.0 - 47.0 %    MCV 98.2 (H) 81.4 - 97.8 fL    MCH 31.8 27.0 - 33.0 pg    MCHC 32.4 (L) 33.6 - 35.0 g/dL    RDW 48.4 35.9 - 50.0 fL    Platelet Count 183 164 - 446 K/uL    MPV 11.6 9.0 - 12.9 fL    Neutrophils-Polys 92.60 (H) 44.00 - 72.00 %    Lymphocytes 2.90 (L) 22.00 - 41.00 %    Monocytes 3.20 0.00 - 13.40 %    Eosinophils 0.00 0.00 - 6.90 %    Basophils 0.30 0.00 - 1.80 %    Immature Granulocytes 1.00 (H) 0.00 - 0.90 %    Nucleated RBC 0.00 /100 WBC    Neutrophils (Absolute) 13.77 (H) 2.00 - 7.15 K/uL    Lymphs (Absolute) 0.43 (L) 1.00 - 4.80 K/uL    Monos (Absolute) 0.47 0.00 - 0.85 K/uL    Eos (Absolute) 0.00 0.00 - 0.51 K/uL    Baso (Absolute) 0.04 0.00 - 0.12 K/uL    Immature Granulocytes (abs) 0.15 (H) 0.00 - 0.11 K/uL    NRBC (Absolute) 0.00 K/uL   Comp Metabolic Panel (CMP)    Collection Time: 12/05/19  4:35 AM   Result Value Ref Range    Sodium 133 (L) 135 - 145 mmol/L    Potassium 4.8 3.6 - 5.5 mmol/L    Chloride 98 96 - 112 mmol/L    Co2 20 20 - 33 mmol/L    Anion Gap 15.0 (H) 0.0 - 11.9    Glucose 270 (H) 65 - 99 mg/dL    Bun 81 (HH) 8 - 22 mg/dL    Creatinine 3.94 (H) 0.50 - 1.40 mg/dL    Calcium 17.8 (HH) 8.5 - 10.5 mg/dL    AST(SGOT) 1111 (H) 12 - 45 U/L    ALT(SGPT) 935 (H) 2 - 50 U/L    Alkaline Phosphatase 126 (H) 30 - 99 U/L    Total Bilirubin 0.6 0.1 - 1.5 mg/dL     Albumin 3.1 (L) 3.2 - 4.9 g/dL    Total Protein 5.6 (L) 6.0 - 8.2 g/dL    Globulin 2.5 1.9 - 3.5 g/dL    A-G Ratio 1.2 g/dL   Ionized Calcium    Collection Time: 19  4:35 AM   Result Value Ref Range    Ionized Calcium 2.3 (H) 1.1 - 1.3 mmol/L   ESTIMATED GFR    Collection Time: 19  4:35 AM   Result Value Ref Range    GFR If  13 (A) >60 mL/min/1.73 m 2    GFR If Non African American 11 (A) >60 mL/min/1.73 m 2   EKG    Collection Time: 19  4:53 AM   Result Value Ref Range    Report       [facility]    Test Date:  2019  Pt Name:    NALINI SORIANO               Department: 161  MRN:        [id number]                      Room:       [facility]  Gender:     Female                       Technician: [facility]  :        1940                   Requested By:NERY CASTRO  Order #:    [id number]                    Reading MD:    Measurements  Intervals                                Axis  Rate:       55                           P:  DC:                                      QRS:        -22  QRSD:       116                          T:          111  QT:         524  QTc:        502    Interpretive Statements  ATRIAL FLUTTER, A-RATE 234  INCOMPLETE LEFT BUNDLE BRANCH BLOCK  ANTERIOR Q WAVES, POSSIBLY DUE TO ILBBB  Compared to ECG 2019 22:15:02  Left bundle-branch block now present  Q waves now present  Atrial fibrillation no longer present  Ventricular-paced complex(es) or rhythm no longer present     ACCU-CHEK GLUCOSE    Collection Time: 19  6:47 AM   Result Value Ref Range    Glucose - Accu-Ck 201 (H) 65 - 99 mg/dL   Ionized Calcium    Collection Time: 19  6:48 AM   Result Value Ref Range    Ionized Calcium 2.5 (H) 1.1 - 1.3 mmol/L   URINE SODIUM RANDOM    Collection Time: 19  6:53 AM   Result Value Ref Range    Sodium, Urine -per volume 85 mmol/L   URINE POTASSIUM RANDOM    Collection Time: 19  6:53 AM   Result Value Ref Range    Potassium 35.3 mmol/L    URINE CHLORIDE RANDOM    Collection Time: 12/05/19  6:53 AM   Result Value Ref Range    Chloride, Urine-per volume 86 mmol/L   URINE CREATININE RANDOM    Collection Time: 12/05/19  6:53 AM   Result Value Ref Range    Creatinine, Random Urine 25.40 mg/dL   URINE PROTEIN RANDOM    Collection Time: 12/05/19  6:53 AM   Result Value Ref Range    Total Protein, Urine 47.6 (H) 0.0 - 15.0 mg/dL   URINALYSIS    Collection Time: 12/05/19  6:53 AM   Result Value Ref Range    Color Yellow     Character Clear     Specific Gravity 1.011 <1.035    Ph 5.5 5.0 - 8.0    Glucose Negative Negative mg/dL    Ketones Negative Negative mg/dL    Protein 30 (A) Negative mg/dL    Bilirubin Negative Negative    Urobilinogen, Urine 0.2 Negative    Nitrite Negative Negative    Leukocyte Esterase Trace (A) Negative    Occult Blood Trace (A) Negative    Micro Urine Req Microscopic    URINE MICROSCOPIC (W/UA)    Collection Time: 12/05/19  6:53 AM   Result Value Ref Range    WBC 0-2 /hpf    RBC 0-2 /hpf    Bacteria Negative None /hpf    Epithelial Cells Few /hpf    Epithelial Cells Renal Negative /hpf    Trans Epithelial Cells Negative /hpf    Mucous Threads Few /hpf   ISTAT ARTERIAL BLOOD GAS    Collection Time: 12/05/19  8:46 AM   Result Value Ref Range    Ph 7.389 (L) 7.400 - 7.500    Pco2 35.2 26.0 - 37.0 mmHg    Po2 57 (L) 64 - 87 mmHg    Tco2 22 20 - 33 mmol/L    S02 89 (L) 93 - 99 %    Hco3 21.3 17.0 - 25.0 mmol/L    BE -3 -4 - 3 mmol/L    Body Temp 37.8 C degrees    O2 Therapy 50 %    iPF Ratio 114     Ph Temp Chantelle 7.377 (L) 7.400 - 7.500    Pco2 Temp Co 36.4 26.0 - 37.0 mmHg    Po2 Temp Cor 60 (L) 64 - 87 mmHg    Specimen Arterial     Action Range Triggered NO     Inst. Qualified Patient YES    ISTAT SODIUM    Collection Time: 12/05/19  8:46 AM   Result Value Ref Range    Istat Sodium 131 (L) 135 - 145 mmol/L   ISTAT POTASSIUM    Collection Time: 12/05/19  8:46 AM   Result Value Ref Range    Istat Potassium 5.4 3.6 - 5.5 mmol/L   ISTAT  IONIZED CA    Collection Time: 12/05/19  8:46 AM   Result Value Ref Range    Istat Ionized Calcium 2.36 (H) 1.10 - 1.30 mmol/L   ISTAT HEMATOCRIT AND HEMOGLOBIN    Collection Time: 12/05/19  8:46 AM   Result Value Ref Range    Istat Hematocrit 37 37 - 47 %    Istat Hemoglobin 12.6 12.0 - 16.0 g/dL   AMMONIA    Collection Time: 12/05/19  9:15 AM   Result Value Ref Range    Ammonia 74 (H) 11 - 45 umol/L   LACTIC ACID    Collection Time: 12/05/19 10:15 AM   Result Value Ref Range    Lactic Acid 2.3 (H) 0.5 - 2.0 mmol/L       Imaging/Procedures Review:    Independant Imaging Review: Completed  US-RENAL   Final Result      Atrophic, echogenic kidneys consistent with medical renal disease.      Bilateral renal cysts.      DX-CHEST-PORTABLE (1 VIEW)   Final Result      Congestive heart failure.      CT-HEAD W/O   Final Result      1.  Cerebral atrophy.      2.  White matter lucencies most consistent with small vessel ischemic change versus demyelination or gliosis.      3.  Otherwise, Head CT without contrast with no acute findings. No evidence of acute cerebral infarction, hemorrhage or mass lesion.      EC-ECHOCARDIOGRAM LTD W/O CONT   Final Result      DX-CHEST-PORTABLE (1 VIEW)   Final Result         1. ETT is well-positioned. Right IJ central catheter is in the proximal SVC.   2. Unchanged bilateral interstitial and possible opacities, edema versus multifocal pneumonia.   3. Right basilar consolidation versus atelectasis. Suspected small right pleural effusion.      DX-CHEST-PORTABLE (1 VIEW)   Final Result      1.  Cardiac silhouette enlargement.   2.  Diffuse interstitial and airspace opacities most likely pulmonary edema/CHF. Correlate clinically for infection.   3.  Small right pleural effusion.      CL-TEMPORARY PACEMAKER INSERT    (Results Pending)        EKG:   EKG Independant Review: Completed  QTc:502, HR: 55, Aflutter    Records reviewed and summarized in current documentation :  Yes         Assessment/Plan        #Acute respiratory failure   #Shock   #Calcium channel blocker overdose  #Bradycardia    -Likely 2/2 Cardiogenic shock from CCB OD with resultant pulmonary edema  -S/p PM placement on 12/4       -Intubated 12/4 and extubated 12/5   -Improvement of shock and respiratory status now on BiPAP and off pressors and calcium   -PM seems to have been dislodged by pt pulling on the catheter as it is only intermittently capturing, if decompensates it may need reinserted but given her currently stability it is likely safe to remove later today   -Continue cardiac monitoring      #Acute on chronic renal failure   #LFT Elevation        -Likely due to shock       -Renal US shows echogenic kidneys suggestive of CKD  -Avoid nephrotoxins/hepatotoxins      #Paroxysmal A-fib   -Continue Eliquis, hold CCB/Amio, will eventually need restarted on rate control but will need med distribution by home health aid or assisted living personnel     #Hypercalcemia     -Due to calcium drip for CCB overdose which has since been DC'd, will CTM       Quality Measures  Quality-Core Measures   Reviewed items::  EKG reviewed, Medications reviewed, Radiology images reviewed and Labs reviewed    PCP: Colton Schroeder M.D.

## 2019-12-05 NOTE — ASSESSMENT & PLAN NOTE
Secondary to unintentional calcium channel blocker overdose  Poison control consultation  Status post calcium supplementation up to ionized level greater than 2.0  Transvenous pacing wires in place  Optimize other electrolytes, avoid QT prolonging agents

## 2019-12-05 NOTE — PROGRESS NOTES
Dr. Gonda at bedside assessing patient. Discussed patient current blood pressure ([id number]) per Dr. Gonda, administer PRN Hydralazine if patient sustains SBP >180 for over 45mins. Also discussed fever and current LFTs. Patient temperature responding to ice packs and decrease room temp, therefore RN to avoid administering Tylenol, however if temperature begins to climb again, then RN to administer. Also discussed vomiting and antiemetics. Per Dr. Gonda, okay to give one time dose of Zofran to relieve nausea.

## 2019-12-05 NOTE — CONSULTS
Critical Care Consultation    Date of consult: 12/4/2019    Referring Physician  Jerman Anna M.D.    Reason for Consultation  Bradycardia, shock    History of Presenting Illness  79 y.o. female who presented 12/4/2019 with a history of severe pulmonary hypertension, moderate to severe mitral regurgitation, paroxysmal atrial fibrillation, CKD, valvular heart failure, chronic oral anticoagulation and unintentional calcium channel blocker overdose. Apparently took 3 times her dosing of her daily extended release CCB as well as her beta blocker and amiodarone. She was seen earlier today in the cardiology clinic was noted to be bradycardic. Sent home and then had several syncopal events along with dyspnea. Pt was brought into the ED and emergently intubated.  Upon arrival she was noted to be hypotensive with a wide-complex bradycardic junctional escape rhythm.  She was also noted to have diffuse pulmonary edema, hypoxic and hypotensive. She received 1 amp of calcium and was started on DA, and NE gtts for bradycardia and hypotension. She was started on transcutaneous pacing as well however with little effect on her BP    On my evaluation the patient had a pH of 6.9 and was given 2 amp of sodium bicarb and initiated on a gtt. Dopamine was stopped and placed on epinephrine and norepinephrine. She was place on a calcium gtt. Cards was consulted and plan was to take her for temporary pacemaker placement.           Code Status  Full Code    Review of Systems  Review of Systems   Unable to perform ROS: Intubated       Past Medical History   has a past medical history of A-fib (HCC), Bowel obstruction (HCC), Renal disorder, and Valvular heart disease (5/27/2019).    Surgical History   has a past surgical history that includes gyn surgery and other orthopedic surgery.    Family History  family history includes Cancer in her mother; Heart Attack in her father; Heart Disease in her father.    Social History   reports that she has  never smoked. She has never used smokeless tobacco. She reports current alcohol use. She reports that she does not use drugs.    Medications  Home Medications     Reviewed by Augusta Manning (Pharmacy Tech) on 12/04/19 at 1857  Med List Status: Complete   Medication Last Dose Status   ALPRAZolam (XANAX) 0.25 MG Tab unknown Active   amiodarone (CORDARONE) 200 MG Tab unknown Active   apixaban (ELIQUIS) 2.5mg Tab unknown Active   DILTIAZem CD (CARDIZEM CD) 120 MG CAPSULE SR 24 HR unknown Active   losartan (COZAAR) 100 MG Tab unknown Active   Multiple Vitamins-Minerals (OCUVITE-LUTEIN) Tab unknown Active   potassium Chloride ER (K-TAB) 20 MEQ Tab CR tablet unknown Active   torsemide (DEMADEX) 20 MG Tab unknown Active              Current Facility-Administered Medications   Medication Dose Route Frequency Provider Last Rate Last Dose   • norepinephrine (LEVOPHED) 8 mg in  mL Infusion  0-30 mcg/min Intravenous Continuous Darrius Vargas M.D. 9.4 mL/hr at 12/04/19 1914 5 mcg/min at 12/04/19 1914   • phenylephrine (KORTNEY-SYNEPHRINE) 100 mcg/mL inj (IV Push Syringe) 100 mcg  100 mcg Intravenous Q15 MIN PRN Jerman Anna M.D.       • ketamine (KETALAR) 50 mg/ml injection  65 mg Intravenous Once Jerman Anna M.D.       • NOREPINEPHRINE BITARTRATE 1 MG/ML IV SOLN            • calcium GLUConate 6 g in D5W 250 mL infusion   mg/kg/hr Intravenous Continuous Khoi Mejias M.D. 165.5 mL/hr at 12/04/19 2001 60 mg/kg/hr at 12/04/19 2001   • GLUCAGON HCL RDNA (DIAGNOSTIC) 1 MG INJ SOLR            • EPINEPHrine 4 mg in  mL Infusion  0.5-10 mcg/min Intravenous Continuous Darrius Vargas M.D. 5.6 mL/hr at 12/04/19 2014 1.5 mcg/min at 12/04/19 2014   • sodium bicarbonate 8.4 % 150 mEq in D5W 1,000 mL infusion  150 mEq Intravenous Continuous Prasanna Doshi M.D. 200 mL/hr at 12/04/19 2010 150 mEq at 12/04/19 2010   • propofol (DIPRIVAN) injection  0-80 mcg/kg/min Intravenous Continuous Darrius Vargas M.D. 2 mL/hr  at 12/04/19 2043 5 mcg/kg/min at 12/04/19 2043   • [START ON 12/5/2019] senna-docusate (PERICOLACE or SENOKOT S) 8.6-50 MG per tablet 2 Tab  2 Tab Enteral Tube BID Darrius Vargas M.D.        And   • polyethylene glycol/lytes (MIRALAX) PACKET 1 Packet  1 Packet Enteral Tube QDAY PRDEANDRE Vargas M.D.        And   • magnesium hydroxide (MILK OF MAGNESIA) suspension 30 mL  30 mL Enteral Tube QDAY PRN Darrius Vargas M.D.        And   • bisacodyl (DULCOLAX) suppository 10 mg  10 mg Rectal QDAY PRN Darrius Vargas M.D.         Current Outpatient Medications   Medication Sig Dispense Refill   • amiodarone (CORDARONE) 200 MG Tab Take 200 mg by mouth every day.     • apixaban (ELIQUIS) 2.5mg Tab Take 2.5 mg by mouth 2 Times a Day.     • DILTIAZem CD (CARDIZEM CD) 120 MG CAPSULE SR 24 HR Take 120 mg by mouth every day.     • losartan (COZAAR) 100 MG Tab Take 100 mg by mouth every day.     • torsemide (DEMADEX) 20 MG Tab Take 10-20 mg by mouth 2 times a day. 20 mg in AM  10 mg in afternoon     • potassium Chloride ER (K-TAB) 20 MEQ Tab CR tablet Take 20 mEq by mouth every day.     • Multiple Vitamins-Minerals (OCUVITE-LUTEIN) Tab Take 1 tablet by mouth every day.     • ALPRAZolam (XANAX) 0.25 MG Tab Take 0.25 mg by mouth at bedtime as needed for Sleep.         Allergies  Allergies   Allergen Reactions   • Pcn [Penicillins]      rash       Vital Signs last 24 hours  Pulse:  [] 60  Resp:  [13-35] 32  BP: ()/(17-71) 147/66  SpO2:  [66 %-99 %] 92 %    Physical Exam  Physical Exam  Constitutional:       General: She is in acute distress.      Appearance: She is ill-appearing.   HENT:      Head: Normocephalic and atraumatic.      Mouth/Throat:      Mouth: Mucous membranes are dry.   Eyes:      Extraocular Movements: Extraocular movements intact.      Pupils: Pupils are equal, round, and reactive to light.   Cardiovascular:      Rate and Rhythm: Bradycardia present.      Comments: Weak thready  pulses  Pulmonary:      Breath sounds: No wheezing or rhonchi.   Abdominal:      General: Abdomen is flat. Bowel sounds are normal.      Palpations: Abdomen is soft.   Skin:     Capillary Refill: Capillary refill takes more than 3 seconds.      Coloration: Skin is pale.      Comments: cold   Neurological:      Comments: Recently paralyzed and sedated but no pupillary light repsonse.         Fluids    Intake/Output Summary (Last 24 hours) at 2019  Last data filed at 2019  Gross per 24 hour   Intake 364.96 ml   Output --   Net 364.96 ml       Laboratory  Recent Results (from the past 48 hour(s))   EKG    Collection Time: 19 11:02 AM   Result Value Ref Range    Report       [facility]    Test Date:  2019  Pt Name:    NALINI SORIANO               Department: [facility]  MRN:        [id number]                      Room:  Gender:     Female                       Technician:   :        [id number]                   Requested By:CLIFF WU  Order #:    [id number]                    Reading MD: Cliff Wu MD    Measurements  Intervals                                Axis  Rate:       43                           P:          16  VT:         236                          QRS:        -36  QRSD:       102                          T:          69  QT:         504  QTc:        427    Interpretive Statements  SINUS BRADYCARDIA  FIRST DEGREE AV BLOCK  LEFT AXIS DEVIATION  CONSIDER ANTEROSEPTAL INFARCT  Compared to ECG 2019 14:07:57  Slower rate  Electronically Signed On 2019 11:59:01 PST by Cliff Wu MD     MAGNESIUM    Collection Time: 19  5:40 PM   Result Value Ref Range    Magnesium 2.9 (H) 1.5 - 2.5 mg/dL   PT/INR    Collection Time: 19  5:52 PM   Result Value Ref Range    PT 20.3 (H) 12.0 - 14.6 sec    INR 1.68 (H) 0.87 - 1.13   D-DIMER    Collection Time: 19  5:52 PM   Result Value Ref Range    D-Dimer Screen 0.76 (H) 0.00 - 0.50 ug/mL (FEU)    Lactic acid (lactate)    Collection Time: 12/04/19  5:52 PM   Result Value Ref Range    Lactic Acid 8.2 (HH) 0.5 - 2.0 mmol/L   CBC WITH DIFFERENTIAL    Collection Time: 12/04/19  5:52 PM   Result Value Ref Range    WBC 9.5 4.8 - 10.8 K/uL    RBC 4.09 (L) 4.20 - 5.40 M/uL    Hemoglobin 13.0 12.0 - 16.0 g/dL    Hematocrit 43.2 37.0 - 47.0 %    .6 (H) 81.4 - 97.8 fL    MCH 31.8 27.0 - 33.0 pg    MCHC 30.1 (L) 33.6 - 35.0 g/dL    RDW 55.1 (H) 35.9 - 50.0 fL    Platelet Count 224 164 - 446 K/uL    MPV 11.8 9.0 - 12.9 fL    Neutrophils-Polys 55.20 44.00 - 72.00 %    Lymphocytes 34.40 22.00 - 41.00 %    Monocytes 8.10 0.00 - 13.40 %    Eosinophils 0.80 0.00 - 6.90 %    Basophils 0.70 0.00 - 1.80 %    Immature Granulocytes 0.80 0.00 - 0.90 %    Nucleated RBC 0.00 /100 WBC    Neutrophils (Absolute) 5.23 2.00 - 7.15 K/uL    Lymphs (Absolute) 3.27 1.00 - 4.80 K/uL    Monos (Absolute) 0.77 0.00 - 0.85 K/uL    Eos (Absolute) 0.08 0.00 - 0.51 K/uL    Baso (Absolute) 0.07 0.00 - 0.12 K/uL    Immature Granulocytes (abs) 0.08 0.00 - 0.11 K/uL    NRBC (Absolute) 0.00 K/uL   COMP METABOLIC PANEL    Collection Time: 12/04/19  5:52 PM   Result Value Ref Range    Sodium 138 135 - 145 mmol/L    Potassium 4.7 3.6 - 5.5 mmol/L    Chloride 105 96 - 112 mmol/L    Co2 12 (L) 20 - 33 mmol/L    Anion Gap 21.0 (H) 0.0 - 11.9    Glucose 287 (H) 65 - 99 mg/dL    Bun 81 (HH) 8 - 22 mg/dL    Creatinine 3.92 (H) 0.50 - 1.40 mg/dL    Calcium 8.9 8.5 - 10.5 mg/dL    AST(SGOT) 236 (H) 12 - 45 U/L    ALT(SGPT) 197 (H) 2 - 50 U/L    Alkaline Phosphatase 129 (H) 30 - 99 U/L    Total Bilirubin 0.7 0.1 - 1.5 mg/dL    Albumin 3.9 3.2 - 4.9 g/dL    Total Protein 7.1 6.0 - 8.2 g/dL    Globulin 3.2 1.9 - 3.5 g/dL    A-G Ratio 1.2 g/dL   ESTIMATED GFR    Collection Time: 12/04/19  5:52 PM   Result Value Ref Range    GFR If  13 (A) >60 mL/min/1.73 m 2    GFR If Non African American 11 (A) >60 mL/min/1.73 m 2   proBrain Natriuretic Peptide,  NT    Collection Time: 12/04/19  5:52 PM   Result Value Ref Range    NT-proBNP 25319 (H) 0 - 125 pg/mL   TSH    Collection Time: 12/04/19  5:52 PM   Result Value Ref Range    TSH 15.160 (H) 0.380 - 5.330 uIU/mL   PTT    Collection Time: 12/04/19  5:52 PM   Result Value Ref Range    APTT 34.3 24.7 - 36.0 sec   TROPONIN    Collection Time: 12/04/19  5:52 PM   Result Value Ref Range    Troponin T 61 (H) 6 - 19 ng/L   FREE THYROXINE    Collection Time: 12/04/19  5:52 PM   Result Value Ref Range    Free T-4 0.91 0.53 - 1.43 ng/dL   URINALYSIS    Collection Time: 12/04/19  6:21 PM   Result Value Ref Range    Color Yellow     Character Clear     Specific Gravity 1.015 <1.035    Ph 5.5 5.0 - 8.0    Glucose Negative Negative mg/dL    Ketones Trace (A) Negative mg/dL    Protein 30 (A) Negative mg/dL    Bilirubin Negative Negative    Urobilinogen, Urine 0.2 Negative    Nitrite Negative Negative    Leukocyte Esterase Negative Negative    Occult Blood Negative Negative    Micro Urine Req Microscopic    URINE MICROSCOPIC (W/UA)    Collection Time: 12/04/19  6:21 PM   Result Value Ref Range    WBC 0-2 /hpf    Bacteria Few (A) None /hpf    Epithelial Cells Few /hpf    Hyaline Cast 3-5 (A) /lpf   ISTAT ARTERIAL BLOOD GAS    Collection Time: 12/04/19  7:01 PM   Result Value Ref Range    Ph 6.998 (LL) 7.400 - 7.500    Pco2 52.8 (HH) 26.0 - 37.0 mmHg    Po2 79 64 - 87 mmHg    Tco2 15 (L) 20 - 33 mmol/L    S02 87 (L) 93 - 99 %    Hco3 13.0 (L) 17.0 - 25.0 mmol/L    BE -18 (L) -4 - 3 mmol/L    Body Temp 37.0 C degrees    O2 Therapy 100 %    iPF Ratio 79     Ph Temp Chantelle 6.998 (LL) 7.400 - 7.500    Pco2 Temp Co 52.8 (HH) 26.0 - 37.0 mmHg    Po2 Temp Cor 79 64 - 87 mmHg    Specimen Arterial     Action Range Triggered YES     Inst. Qualified Patient YES    Lactic acid (lactate): Repeat if initial lactic acid result is greater than 2    Collection Time: 12/04/19  7:27 PM   Result Value Ref Range    Lactic Acid 7.5 (HH) 0.5 - 2.0 mmol/L    Ionized Calcium    Collection Time: 12/04/19  7:27 PM   Result Value Ref Range    Ionized Calcium 1.4 (H) 1.1 - 1.3 mmol/L   EC-ECHOCARDIOGRAM LTD W/O CONT    Collection Time: 12/04/19  9:13 PM   Result Value Ref Range    Eject.Frac. MOD BP 74.3     Eject.Frac. MOD 4C 71.28     Eject.Frac. MOD 2C 77.78     Left Ventrical Ejection Fraction 70        Imaging  EC-ECHOCARDIOGRAM LTD W/O CONT   Final Result      DX-CHEST-PORTABLE (1 VIEW)   Final Result         1. ETT is well-positioned. Right IJ central catheter is in the proximal SVC.   2. Unchanged bilateral interstitial and possible opacities, edema versus multifocal pneumonia.   3. Right basilar consolidation versus atelectasis. Suspected small right pleural effusion.      DX-CHEST-PORTABLE (1 VIEW)   Final Result      1.  Cardiac silhouette enlargement.   2.  Diffuse interstitial and airspace opacities most likely pulmonary edema/CHF. Correlate clinically for infection.   3.  Small right pleural effusion.      CL-TEMPORARY PACEMAKER INSERT    (Results Pending)   CT-HEAD W/O    (Results Pending)       Assessment/Plan  Acute respiratory failure (HCC)  Assessment & Plan  Appear volume overloaded on bedside echo and CXR  Will not tolerate diuresis a present  Monitor for HD    RT/O2 protocols  Daily and PRN ABGs  Titration of ventilator therapy based on ABGs and patient's status  Sedation as tolerated/indicated  Daily CXR  HOB >30 degrees and peridex for VAP prevention  Pepcid for GI prophylaxis  SAT/SBT when able (ABCDEF Bundle)  Early mobil        Metabolic acidosis  Assessment & Plan  2/2 to Shock, renal failure, lactate elevation  2 amps bicarb in the ED  Started on bicarb gtt    Calcium channel blocker overdose  Assessment & Plan  Calcium gtt    Bradycardia  Assessment & Plan  CCB unintentional overdose in the setting of beta blocker and amiodarone  Calcium gtt started in the ED  1 of glucagon given  Started on epi gtt  Cards to place temporary pacemaker    Shock  (HCC)  Assessment & Plan  ?cardiogenic in the setting of severe bradycardia and vasoplegic from CCB overdose  Continue pressors for goal MAP>65  Continue calcium gtt  pH 6.9 bicarb gtt    Acute on chronic renal failure (HCC)  Assessment & Plan  Likely secondary to shock  IVF  Renal dose meds, avoid nephrotoxins  Strict I/Os  Follow renal function  Monitor for dialysis need    Acute encephalopathy  Assessment & Plan  Likely multifactorial. Metabolic component. Question if any hypoxemic injury give hypoxemia and profound shock  CT head     Paroxysmal A-fib (HCC)- (present on admission)  Assessment & Plan  Hold home meds for now      Discussed patient condition and risk of morbidity and/or mortality with Hospitalist, Family, RN, RT, Pharmacy, Code status disscussed and cardiology.    The patient remains critically ill.  Critical care time = 90 minutes in directly providing and coordinating critical care and extensive data review.  No time overlap and excludes procedures.

## 2019-12-06 ENCOUNTER — APPOINTMENT (OUTPATIENT)
Dept: RADIOLOGY | Facility: MEDICAL CENTER | Age: 79
DRG: 208 | End: 2019-12-06
Attending: INTERNAL MEDICINE
Payer: MEDICARE

## 2019-12-06 LAB
ALBUMIN SERPL BCP-MCNC: 3.3 G/DL (ref 3.2–4.9)
ALBUMIN/GLOB SERPL: 1.4 G/DL
ALP SERPL-CCNC: 118 U/L (ref 30–99)
ALT SERPL-CCNC: 759 U/L (ref 2–50)
ANION GAP SERPL CALC-SCNC: 15 MMOL/L (ref 0–11.9)
AST SERPL-CCNC: 506 U/L (ref 12–45)
BACTERIA UR CULT: NORMAL
BASOPHILS # BLD AUTO: 0.2 % (ref 0–1.8)
BASOPHILS # BLD: 0.03 K/UL (ref 0–0.12)
BILIRUB SERPL-MCNC: 0.7 MG/DL (ref 0.1–1.5)
BUN SERPL-MCNC: 97 MG/DL (ref 8–22)
CALCIUM SERPL-MCNC: 12.8 MG/DL (ref 8.5–10.5)
CHLORIDE SERPL-SCNC: 99 MMOL/L (ref 96–112)
CO2 SERPL-SCNC: 23 MMOL/L (ref 20–33)
CREAT SERPL-MCNC: 4.67 MG/DL (ref 0.5–1.4)
CRP SERPL HS-MCNC: 10.3 MG/DL (ref 0–0.75)
EOSINOPHIL # BLD AUTO: 0 K/UL (ref 0–0.51)
EOSINOPHIL NFR BLD: 0 % (ref 0–6.9)
ERYTHROCYTE [DISTWIDTH] IN BLOOD BY AUTOMATED COUNT: 47.2 FL (ref 35.9–50)
GLOBULIN SER CALC-MCNC: 2.3 G/DL (ref 1.9–3.5)
GLUCOSE BLD-MCNC: 106 MG/DL (ref 65–99)
GLUCOSE BLD-MCNC: 110 MG/DL (ref 65–99)
GLUCOSE BLD-MCNC: 113 MG/DL (ref 65–99)
GLUCOSE BLD-MCNC: 77 MG/DL (ref 65–99)
GLUCOSE BLD-MCNC: 81 MG/DL (ref 65–99)
GLUCOSE SERPL-MCNC: 109 MG/DL (ref 65–99)
HCT VFR BLD AUTO: 35.5 % (ref 37–47)
HGB BLD-MCNC: 11.6 G/DL (ref 12–16)
IMM GRANULOCYTES # BLD AUTO: 0.08 K/UL (ref 0–0.11)
IMM GRANULOCYTES NFR BLD AUTO: 0.6 % (ref 0–0.9)
LYMPHOCYTES # BLD AUTO: 0.88 K/UL (ref 1–4.8)
LYMPHOCYTES NFR BLD: 6.5 % (ref 22–41)
MAGNESIUM SERPL-MCNC: 2.3 MG/DL (ref 1.5–2.5)
MCH RBC QN AUTO: 31.3 PG (ref 27–33)
MCHC RBC AUTO-ENTMCNC: 32.7 G/DL (ref 33.6–35)
MCV RBC AUTO: 95.7 FL (ref 81.4–97.8)
MONOCYTES # BLD AUTO: 0.53 K/UL (ref 0–0.85)
MONOCYTES NFR BLD AUTO: 3.9 % (ref 0–13.4)
NEUTROPHILS # BLD AUTO: 12.05 K/UL (ref 2–7.15)
NEUTROPHILS NFR BLD: 88.8 % (ref 44–72)
NRBC # BLD AUTO: 0 K/UL
NRBC BLD-RTO: 0 /100 WBC
PLATELET # BLD AUTO: 164 K/UL (ref 164–446)
PMV BLD AUTO: 12 FL (ref 9–12.9)
POTASSIUM SERPL-SCNC: 5.1 MMOL/L (ref 3.6–5.5)
PREALB SERPL-MCNC: 16 MG/DL (ref 18–38)
PROT SERPL-MCNC: 5.6 G/DL (ref 6–8.2)
RBC # BLD AUTO: 3.71 M/UL (ref 4.2–5.4)
SIGNIFICANT IND 70042: NORMAL
SITE SITE: NORMAL
SODIUM SERPL-SCNC: 137 MMOL/L (ref 135–145)
SOURCE SOURCE: NORMAL
WBC # BLD AUTO: 13.6 K/UL (ref 4.8–10.8)

## 2019-12-06 PROCEDURE — 94640 AIRWAY INHALATION TREATMENT: CPT

## 2019-12-06 PROCEDURE — 700102 HCHG RX REV CODE 250 W/ 637 OVERRIDE(OP): Performed by: INTERNAL MEDICINE

## 2019-12-06 PROCEDURE — 85025 COMPLETE CBC W/AUTO DIFF WBC: CPT

## 2019-12-06 PROCEDURE — 700105 HCHG RX REV CODE 258: Performed by: INTERNAL MEDICINE

## 2019-12-06 PROCEDURE — 99233 SBSQ HOSP IP/OBS HIGH 50: CPT | Performed by: HOSPITALIST

## 2019-12-06 PROCEDURE — 83735 ASSAY OF MAGNESIUM: CPT

## 2019-12-06 PROCEDURE — 92610 EVALUATE SWALLOWING FUNCTION: CPT

## 2019-12-06 PROCEDURE — 770022 HCHG ROOM/CARE - ICU (200)

## 2019-12-06 PROCEDURE — 71045 X-RAY EXAM CHEST 1 VIEW: CPT

## 2019-12-06 PROCEDURE — 82962 GLUCOSE BLOOD TEST: CPT

## 2019-12-06 PROCEDURE — 84134 ASSAY OF PREALBUMIN: CPT

## 2019-12-06 PROCEDURE — 93005 ELECTROCARDIOGRAM TRACING: CPT | Performed by: PSYCHIATRY & NEUROLOGY

## 2019-12-06 PROCEDURE — 700111 HCHG RX REV CODE 636 W/ 250 OVERRIDE (IP): Performed by: INTERNAL MEDICINE

## 2019-12-06 PROCEDURE — 80053 COMPREHEN METABOLIC PANEL: CPT

## 2019-12-06 PROCEDURE — 86140 C-REACTIVE PROTEIN: CPT

## 2019-12-06 PROCEDURE — 99291 CRITICAL CARE FIRST HOUR: CPT | Performed by: INTERNAL MEDICINE

## 2019-12-06 PROCEDURE — A9270 NON-COVERED ITEM OR SERVICE: HCPCS | Performed by: INTERNAL MEDICINE

## 2019-12-06 PROCEDURE — 99231 SBSQ HOSP IP/OBS SF/LOW 25: CPT | Mod: GC | Performed by: INTERNAL MEDICINE

## 2019-12-06 RX ORDER — FUROSEMIDE 10 MG/ML
20 INJECTION INTRAMUSCULAR; INTRAVENOUS EVERY 12 HOURS
Status: DISCONTINUED | OUTPATIENT
Start: 2019-12-06 | End: 2019-12-08

## 2019-12-06 RX ORDER — SODIUM CHLORIDE 9 MG/ML
INJECTION, SOLUTION INTRAVENOUS
Status: ACTIVE
Start: 2019-12-06 | End: 2019-12-06

## 2019-12-06 RX ADMIN — APIXABAN 2.5 MG: 2.5 TABLET, FILM COATED ORAL at 05:00

## 2019-12-06 RX ADMIN — APIXABAN 2.5 MG: 2.5 TABLET, FILM COATED ORAL at 16:48

## 2019-12-06 RX ADMIN — FUROSEMIDE 20 MG: 10 INJECTION, SOLUTION INTRAMUSCULAR; INTRAVENOUS at 13:18

## 2019-12-06 RX ADMIN — CEFTRIAXONE SODIUM 1 G: 1 INJECTION, POWDER, FOR SOLUTION INTRAMUSCULAR; INTRAVENOUS at 05:00

## 2019-12-06 RX ADMIN — LACTULOSE 30 ML: 20 SOLUTION ORAL at 05:00

## 2019-12-06 ASSESSMENT — ENCOUNTER SYMPTOMS
PALPITATIONS: 0
MYALGIAS: 0
EYES NEGATIVE: 1
SENSORY CHANGE: 0
CONSTIPATION: 0
SORE THROAT: 0
VOMITING: 0
POLYDIPSIA: 0
HEMOPTYSIS: 0
ORTHOPNEA: 1
DIARRHEA: 0
FOCAL WEAKNESS: 0
NAUSEA: 1
SPEECH CHANGE: 0
COUGH: 1
CHILLS: 0
ABDOMINAL PAIN: 0
SPUTUM PRODUCTION: 0
FEVER: 1
SHORTNESS OF BREATH: 1

## 2019-12-06 NOTE — PROGRESS NOTES
Cardiology Medicine Interval Note  Note Author: Pasha Noel D.O.     Name Geno Gallegos 1940   Age/Sex 79 y.o. female   MRN [id number]   Code Status DNR, I OK       Reason for interval visit  (Principal Problem)   Heart Block    Interval Problem Daily Status Update  (24 hours)   Pt much improved this morning, poor historian and somewhat disoriented however, continues to have SOB but BPs/HR improved, temp PM pulled yesterday PM. Labs improving aside from NOLAN. Fever overnight, unclear source, started on C3. Will likely need placement given her accidental overdose and probable dementia. At this time we will sign off.    Review of Systems   Constitutional: Positive for fever and malaise/fatigue. Negative for chills.   HENT: Negative for congestion and sore throat.    Eyes: Negative.    Respiratory: Positive for cough and shortness of breath. Negative for hemoptysis and sputum production.    Cardiovascular: Positive for orthopnea and leg swelling. Negative for chest pain and palpitations.   Gastrointestinal: Positive for nausea. Negative for abdominal pain, constipation, diarrhea and vomiting.   Genitourinary: Negative for dysuria.   Musculoskeletal: Negative for myalgias.   Skin: Negative for rash.   Neurological: Negative for sensory change, speech change and focal weakness.   Endo/Heme/Allergies: Negative for polydipsia.         Disposition  Inpatient for unintentional OD    Quality Measures  Quality-Core Measures   Reviewed items::  Medications reviewed, EKG reviewed, Labs reviewed and Radiology images reviewed        Physical Exam       Vitals:    19 0732 19 0800 19 1000 19 1038   BP:  128/60 114/61    Pulse: (!) 50 (!) 50 (!) 45 (!) 53   Resp: 20 (!) 21    Temp:  37.8 °C (100 °F)     TempSrc:  Bladder     SpO2: 95% 97% 98% 97%   Weight:       Height:         Body mass index is 23.76 kg/m². Weight: 68.8 kg (151 lb 10.8 oz)  Oxygen Therapy:  Pulse Oximetry: 97  %, O2 (LPM): 40, FiO2%: 40 %, O2 Delivery: High Flow Nasal Cannula    Physical Exam   Constitutional: She has a sickly appearance.   HENT:   Head: Normocephalic and atraumatic.   Eyes: Pupils are equal, round, and reactive to light. Conjunctivae are normal.   Neck: JVD present.   Cardiovascular: Regular rhythm. Bradycardia present.   No murmur heard.  Pulmonary/Chest: Effort normal. She has rales in the right lower field and the left lower field.   Abdominal: Soft. She exhibits no distension. There is no tenderness.   Musculoskeletal:         General: Edema present. No tenderness.   Neurological: She is alert. She is disoriented. GCS score is 15.   Skin: Skin is warm and dry.         Lab Data Review:       12/6/2019  11:01 AM    Recent Labs     12/04/19  1740  12/05/19  0010 12/05/19  0435 12/06/19  0250   SODIUM  --    < > 136 133* 137   POTASSIUM  --    < > 4.8 4.8 5.1   CHLORIDE  --    < > 102 98 99   CO2  --    < > 16* 20 23   BUN  --    < > 81* 81* 97*   CREATININE  --    < > 3.92* 3.94* 4.67*   MAGNESIUM 2.9*  --   --   --  2.3   CALCIUM  --    < > 14.9* 17.8* 12.8*    < > = values in this interval not displayed.       Recent Labs     12/05/19  0010 12/05/19  0435 12/06/19  0250   ALTSGPT 610* 935* 759*   ASTSGOT 716* 1111* 506*   ALKPHOSPHAT 129* 126* 118*   TBILIRUBIN 0.9 0.6 0.7   PREALBUMIN  --   --  16.0*   GLUCOSE 273* 270* 109*       Recent Labs     12/04/19  1752 12/05/19  0010 12/05/19  0435 12/06/19  0250   RBC 4.09* 4.00* 3.84* 3.71*   HEMOGLOBIN 13.0 12.8 12.2 11.6*   HEMATOCRIT 43.2 40.3 37.7 35.5*   PLATELETCT 224 244 183 164   PROTHROMBTM 20.3* 20.9*  --   --    APTT 34.3 31.6  --   --    INR 1.68* 1.74*  --   --        Recent Labs     12/05/19  0010 12/05/19  0435 12/06/19  0250   WBC 19.2* 14.9* 13.6*   NEUTSPOLYS 84.80* 92.60* 88.80*   LYMPHOCYTES 7.10* 2.90* 6.50*   MONOCYTES 6.90 3.20 3.90   EOSINOPHILS 0.10 0.00 0.00   BASOPHILS 0.30 0.30 0.20   ASTSGOT 716* 1111* 506*   ALTSGPT 610* 935*  759*   ALKPHOSPHAT 129* 126* 118*   TBILIRUBIN 0.9 0.6 0.7           Assessment/Plan     #Acute respiratory failure   #Shock   #Calcium channel blocker overdose  #Bradycardia    -Likely 2/2 Cardiogenic shock from CCB OD with resultant pulmonary edema  -S/p temporary PM placement on 12/4, removed on 12/5      -Intubated 12/4 and extubated 12/5   -Improvement of shock and respiratory status now on HFNC with BP wnl and HR only slightly magdiel  -Continue cardiac monitoring     #Acute on chronic renal failure   #LFT Elevation        -Likely due to shock       -Renal US shows echogenic kidneys suggestive of CKD  -Avoid nephrotoxins/hepatotoxins      #Paroxysmal A-fib   -Continue Eliquis, hold CCB/Amio, will eventually need restarted on rate control but will need med distribution by home health aid or assisted living personnel      #Hypercalcemia     -Due to calcium drip for CCB overdose, improving now that off drip      Thank you for this consult, at this time we will sign off. Please reconsult if further concerns or questions arise.

## 2019-12-06 NOTE — PROGRESS NOTES
Ring that was placed in [facility], was handed over to  Sarthak, who took ring home with him.

## 2019-12-06 NOTE — PROGRESS NOTES
Lab called with critical result of Calcium 12.8 at 0325. Critical lab result read back.   Dr. Johnson notified of critical lab result at 0330.  Critical lab result read back by Dr. Johnson.

## 2019-12-06 NOTE — PROGRESS NOTES
Discussed NPO status and cortrak placement with Dr. Doshi, see previous note. Per Dr. Doshi, okay to place cortrak.

## 2019-12-06 NOTE — PROGRESS NOTES
Critical Care Progress Note    Date of admission  12/4/2019    Chief Complaint  79 y.o. female admitted 12/4/2019 with shock, respiratory failure    Hospital Course    79 y.o. female who presented 12/4/2019 with a history of severe pulmonary hypertension, moderate to severe mitral regurgitation, paroxysmal atrial fibrillation, CKD, valvular heart failure, chronic oral anticoagulation and unintentional calcium channel blocker overdose. Apparently took 3 times her dosing of her daily extended release CCB as well as her beta blocker and amiodarone. She was seen earlier today in the cardiology clinic was noted to be bradycardic. Sent home and then had several syncopal events along with dyspnea. Pt was brought into the ED and emergently intubated.  Upon arrival she was noted to be hypotensive with a wide-complex bradycardic junctional escape rhythm.  She was also noted to have diffuse pulmonary edema, hypoxic and hypotensive. She received 1 amp of calcium and was started on DA, and NE gtts for bradycardia and hypotension. She was started on transcutaneous pacing as well however with little effect on her BP         Interval Problem Update  Reviewed last 24 hour events:   - remains on HFNC   - worsening NOLAN, mildly oliguric   - vomited while on BiPAP yesterday, BiPAP discontinued   - Tm 38.6, WBC down slightly   - more awake and oriented this morning   - sinus magdiel in the 50s still   - -150   - Hgb down slightly to 12   - improving LFTs   - lactic acid resolved   - glucose better controlled   - BMs x 3, raymond TFs at goal, SLP pending this morning   - rocephin day 3/5    Review of Systems  Review of Systems   Unable to perform ROS: Mental status change        Vital Signs for last 24 hours   Temp:  [37.8 °C (100 °F)-38.6 °C (101.5 °F)] 37.8 °C (100 °F)  Pulse:  [48-56] 51  Resp:  [17-29] 21  BP: (109-164)/(54-84) 116/54  SpO2:  [88 %-100 %] 97 %    Respiratory Information for the last 24 hours    HFNC 50 lpm,  40%    Physical Exam   Physical Exam  Vitals signs and nursing note reviewed.   Constitutional:       General: She is not in acute distress.     Appearance: Normal appearance. She is well-developed. She is ill-appearing.      Comments: More awake and somewhat oriented today   HENT:      Head: Normocephalic and atraumatic.      Nose: Nose normal. No rhinorrhea.      Comments: High flow nasal cannula in place, nasal feeding tube     Mouth/Throat:      Mouth: Mucous membranes are moist.      Pharynx: Oropharynx is clear.   Eyes:      Extraocular Movements: Extraocular movements intact.      Conjunctiva/sclera: Conjunctivae normal.      Pupils: Pupils are equal, round, and reactive to light.   Neck:      Musculoskeletal: Neck supple. No neck rigidity.   Cardiovascular:      Rate and Rhythm: Regular rhythm. Bradycardia present. Occasional extrasystoles are present.     Chest Wall: PMI is displaced.      Pulses: Normal pulses.      Heart sounds: Heart sounds not distant. Murmur present.   Pulmonary:      Effort: No tachypnea, accessory muscle usage or respiratory distress.      Breath sounds: No decreased air movement. Examination of the right-lower field reveals rhonchi and rales. Examination of the left-lower field reveals rhonchi and rales. Rhonchi and rales present. No decreased breath sounds or wheezing.      Comments: Strong cough  Abdominal:      General: Bowel sounds are normal. There is no distension.      Palpations: Abdomen is soft.      Tenderness: There is no guarding.   Genitourinary:     Comments: Lilly catheter in place  Musculoskeletal:         General: No swelling.      Right lower leg: No edema.      Left lower leg: No edema.   Skin:     General: Skin is warm and dry.      Capillary Refill: Capillary refill takes less than 2 seconds.      Coloration: Skin is not jaundiced.   Neurological:      General: No focal deficit present.      Mental Status: She is alert. She is disoriented.      Cranial Nerves:  No cranial nerve deficit.      Comments: Follows commands, calm   Psychiatric:      Comments: Unable to assess given current clinical condition         Medications  Current Facility-Administered Medications   Medication Dose Route Frequency Provider Last Rate Last Dose   • SODIUM CHLORIDE 0.9 % IV SOLN            • insulin regular (HUMULIN R) injection 2-9 Units  2-9 Units Subcutaneous Q6HRS Brian Johnson M.D.   Stopped at 12/05/19 1200    And   • DEXTROSE 10% BOLUS 250 mL  250 mL Intravenous Q15 MIN PRN Brian Johnson M.D.       • cefTRIAXone (ROCEPHIN) 1 g in  mL IVPB  1 g Intravenous Q24HRS Jeremy M Gonda, M.D.   Stopped at 12/06/19 0530   • hydrALAZINE (APRESOLINE) injection 10 mg  10 mg Intravenous Q2HRS PRN Pasha Noel D.O.       • acetaminophen (TYLENOL) suppository 325 mg  325 mg Rectal Q6HRS PRN Jeremy M Gonda, M.D.       • Pharmacy Consult: Enteral tube insertion - review meds/change route/product selection  1 Each Other PHARMACY TO DOSE Jeremy M Gonda, M.D.       • lactulose 20 GM/30ML solution 30 mL  30 mL Enteral Tube TID Jeremy M Gonda, M.D.   30 mL at 12/06/19 0500   • apixaban (ELIQUIS) 2.5mg tablet 2.5 mg  2.5 mg Enteral Tube BID Jeremy M Gonda, M.D.   2.5 mg at 12/06/19 0500   • acetaminophen (TYLENOL) tablet 650 mg  650 mg Enteral Tube Q4HRS PRN Jeremy M Gonda, M.D.       • ondansetron (ZOFRAN) syringe/vial injection 4 mg  4 mg Intravenous Once Jeremy M Gonda, M.D.   Stopped at 12/05/19 1330   • senna-docusate (PERICOLACE or SENOKOT S) 8.6-50 MG per tablet 2 Tab  2 Tab Enteral Tube BID Darrius Vargas M.D.   Stopped at 12/05/19 0600    And   • polyethylene glycol/lytes (MIRALAX) PACKET 1 Packet  1 Packet Enteral Tube QDAY PRN Darrius Vargas M.D.        And   • magnesium hydroxide (MILK OF MAGNESIA) suspension 30 mL  30 mL Enteral Tube QDAY PRN Darrius Vargas M.D.        And   • bisacodyl (DULCOLAX) suppository 10 mg  10 mg Rectal QDAY PRN Darrius Vargas M.D.            Fluids    Intake/Output Summary (Last 24 hours) at 12/6/2019 0725  Last data filed at 12/6/2019 0600  Gross per 24 hour   Intake 541.76 ml   Output 2335 ml   Net -1793.24 ml       Laboratory  Recent Labs     12/04/19  2218 12/05/19  0303 12/05/19  0846   ISTATAPH 7.165* 7.316* 7.389*   ISTATAPCO2 54.4* 32.9 35.2   ISTATAPO2 82 90* 57*   ISTATATCO2 21 18* 22   WWAPVCB4QBD 92* 96 89*   ISTATARTHCO3 19.6 16.8* 21.3   ISTATARTBE -9* -8* -3   ISTATTEMP 35.2 C 37.4 C 37.8 C   ISTATFIO2 90 70 50   ISTATSPEC Arterial Arterial Arterial   ISTATAPHTC 7.189* 7.310* 7.377*   RNFUXTRB3NF 73 92* 60*         Recent Labs     12/04/19  1740  12/05/19  0010 12/05/19  0435 12/06/19  0250   SODIUM  --    < > 136 133* 137   POTASSIUM  --    < > 4.8 4.8 5.1   CHLORIDE  --    < > 102 98 99   CO2  --    < > 16* 20 23   BUN  --    < > 81* 81* 97*   CREATININE  --    < > 3.92* 3.94* 4.67*   MAGNESIUM 2.9*  --   --   --  2.3   CALCIUM  --    < > 14.9* 17.8* 12.8*    < > = values in this interval not displayed.     Recent Labs     12/05/19  0010 12/05/19  0435 12/06/19  0250   ALTSGPT 610* 935* 759*   ASTSGOT 716* 1111* 506*   ALKPHOSPHAT 129* 126* 118*   TBILIRUBIN 0.9 0.6 0.7   PREALBUMIN  --   --  16.0*   GLUCOSE 273* 270* 109*     Recent Labs     12/05/19  0010 12/05/19  0435 12/06/19  0250   WBC 19.2* 14.9* 13.6*   NEUTSPOLYS 84.80* 92.60* 88.80*   LYMPHOCYTES 7.10* 2.90* 6.50*   MONOCYTES 6.90 3.20 3.90   EOSINOPHILS 0.10 0.00 0.00   BASOPHILS 0.30 0.30 0.20   ASTSGOT 716* 1111* 506*   ALTSGPT 610* 935* 759*   ALKPHOSPHAT 129* 126* 118*   TBILIRUBIN 0.9 0.6 0.7     Recent Labs     12/04/19  1752 12/05/19  0010 12/05/19  0435 12/06/19  0250   RBC 4.09* 4.00* 3.84* 3.71*   HEMOGLOBIN 13.0 12.8 12.2 11.6*   HEMATOCRIT 43.2 40.3 37.7 35.5*   PLATELETCT 224 244 183 164   PROTHROMBTM 20.3* 20.9*  --   --    APTT 34.3 31.6  --   --    INR 1.68* 1.74*  --   --        Imaging  X-Ray:  I have personally reviewed the images and compared with  prior images. and My impression is: Unchanged diffuse bilateral infiltrates worse at the bases, enlarged cardiac silhouette, possible small effusions, gastric tube and right IJ central venous catheter are in good position    Assessment/Plan  * Acute respiratory failure (HCC)  Assessment & Plan  Intubated in ED 12/4, extubated early 12/5 -persistent  Continue to titrate high flow nasal cannula to keep SaO2 greater than 90%, remains a BiPAP candidate currently if needed  Continue strict aspiration precautions  RT/O2 protocol  SLP eval  Low threshold for reintubation  Begin forced diuresis    Aspiration pneumonia (HCC)  Assessment & Plan  Continue Rocephin x5 days  Follow-up on blood cultures  Ongoing aspiration precautions, n.p.o. pending SLP    Acute encephalopathy  Assessment & Plan  Likely multifactorial. Metabolic component -improving  Avoid sedatives  aspiration precautions  Reorient and maintain sleep/wake cycle    Metabolic acidosis  Assessment & Plan  Resolved    Calcium channel blocker overdose  Assessment & Plan  Unintentional  Poison control consulted  Status post calcium supplementation up to ionized level greater than 2.0  Transvenous pacing wires removed  Optimize other electrolytes, avoid QT prolonging agents    Bradycardia  Assessment & Plan  Due to unintentional calcium channel blockade -improving  Temporary transvenous pacing wires removed    Shock (HCC)  Assessment & Plan  Cardiogenic shock due to calcium channel blocker overdose -improving again today  Cardiology consulted    Elevated liver enzymes  Assessment & Plan  Due to shock -improving  Avoid hepatotoxins  Daily CMP  Continue lactulose to treat elevated ammonia level    Acute on chronic renal failure (HCC)  Assessment & Plan  Likely secondary to shock -worsening, mildly oliguric  Status post fluid resuscitation --> begin diuresis  Avoid nephrotoxins  Monitor creatinine, urine output, electrolytes closely    Paroxysmal A-fib (HCC)- (present on  admission)  Assessment & Plan  Hold heart rate control agents for now  Continue Eliquis, may need to transition to heparin drip if renal function continues to worsen  Optimize electrolytes  Continuous telemetry monitoring       VTE:  NOAC  Ulcer: Not Indicated  Lines: Central Line  Ongoing indication addressed and Lilly Catheter  Ongoing indication addressed    I have performed a physical exam and reviewed and updated ROS and Plan today (12/6/2019). In review of yesterday's note (12/5/2019), there are no changes except as documented above.     Patient is critically ill today requiring active management of her high flow nasal cannula titrating to support her persistent hypoxic respiratory failure. High risk of deterioration and worsening vital organ dysfunction and death without the above critical care interventions.    Discussed patient condition and risk of morbidity and/or mortality with Family, RN, RT, Pharmacy, , Charge nurse / hot rounds, Patient, cardiology and Dr. Lou  The patient remains critically ill.  Critical care time = 37 minutes in directly providing and coordinating critical care and extensive data review.  No time overlap and excludes procedures.

## 2019-12-06 NOTE — ASSESSMENT & PLAN NOTE
?ischemic injury  LFTs now going back down after briefly climbing yesterday  ?Ceftriaxone, amiodarone?  US benign  Hep panel neg  Reviewed with Pharmacy; no other readily apparent drug exposures

## 2019-12-06 NOTE — THERAPY
"  Speech Language Therapy Clinical Swallow Evaluation completed.  Functional Status: Pt awakens to verbal stimulation. Voice is with fair intensity. Two pillows placed behind head to promote 90 degrees. Pt with limited ability to have HOB elev related to groin line per RN. RN positioning pt at the approved HOB elev. and using reversed trendelenburg. Pt given single ice chips and 1/4 tsp amounts of thickened cold water. Pt following directives for a dble swallow. Congested sounding productive and non-productive coughing following single small ice chips. Oral suction x1 for small amount secretions. No coughing post swallow with 1/4 tsp amounts of NTL water. Weak laryngeal elev palpated. Pt intermittently with eyes closed when not stimulated. Pt able to state correct place without cues. SLP provided educ to the pt and collaborated with nurs regarding NPO and NG with SLP to target prefeeding as approp.   Recommendations - Diet:  NPO and  NG                          Strategies: during prefeeding HOB as high as approp per current restrictions with dble swallow.                           Medication Administration:    Plan of Care: Will benefit from Speech Therapy 3 times per week  Post-Acute Therapy: Discharge to a transitional care facility for continued skilled therapy services.Thanks, Javed    See \"Rehab Therapy-Acute\" Patient Summary Report for complete documentation.   "

## 2019-12-06 NOTE — PROGRESS NOTES
Late entry:    RN to hold placement of cortrak due to patient vomiting, per Dr. Gonda. Will reassess this afternoon.

## 2019-12-06 NOTE — CARE PLAN
Problem: Safety  Goal: Will remain free from injury  Outcome: PROGRESSING SLOWER THAN EXPECTED  Intervention: Provide assistance with mobility  Note:   Completed. Patient is on strict bed rest related to femoral lines. Patient requires frequent reminders about mobility and is a high risk for pulling lines and climbing out of bed.   Intervention: Collaborate with Interdisciplinary Team for safe transfer and mobilization techniques  Note:   Completed. Log rolls in pale for turning and boosting patient in bed      Problem: Infection  Goal: Will remain free from infection  Outcome: PROGRESSING SLOWER THAN EXPECTED  Intervention: Assess signs and symptoms of infection  Note:   Patient is febrile with elevated white count. MEWS score is elevated. Interventions in place.   Intervention: Implement standard precautions and perform hand washing before and after patient contact  Note:   Hand hygiene performed before and after pt care is initiated.    Intervention: Assess for removal of potential routes of infection, such as IV, central line, intra-arterial or urinary catheters  Note:   All LDAs assessed and necessary for pt care. Femoral pacing wires and sheath removed by MD at bedside.

## 2019-12-06 NOTE — PROGRESS NOTES
Cortrak Placement    Tube Team verified patient name and medical record number prior to tube placement.  Cortrak tube (43 inches, 10 Austrian) placed at 60 cm in right nare.  Per Cortrak picture, tube appears to be in the stomach.  Nursing Instructions: Awaiting KUB to confirm placement before use for medications or feeding. Once placement confirmed, flush tube with 30 ml of water, and then remove and save stylet, in patient medication drawer.

## 2019-12-06 NOTE — PROGRESS NOTES
Hospital Medicine Daily Progress Note    Date of Service  12/6/2019    Chief Complaint  79 y.o. female admitted 12/4/2019 with syncope    Hospital Course    PMHx severe pulmonary HTN, MR, PAFib, CKD, on AC with apixaban.  The pt apparently mistakenly took a triple dose of her CCB as well as amiodarone and BB.  Found to be magdiel and hypotensive.  Temp pacer placed and Intubated in the ED.  EKG showing a wide complex escape rythm.  Extubated 12/52      Interval Problem Update  ROS is unobtainble due to pt's mental status.  In no apparent distress  Extubated this am  Sinus/jnctnl 40-50s  -160s  Tmax 38.4  UOP 625ml/12hrs s/p lasix  BiPAP  D1 Rocephin  Consultants/Specialty  Cardiology  CC    Code Status  DNAR/I    Disposition  Cont in ICU    Review of Systems  Review of Systems   Unable to perform ROS: Mental status change        Physical Exam  Temp:  [37.8 °C (100 °F)-38.6 °C (101.5 °F)] 38.2 °C (100.8 °F)  Pulse:  [45-56] 48  Resp:  [17-29] 20  BP: (109-164)/(55-96) 126/61  SpO2:  [88 %-100 %] 98 %    Physical Exam  Constitutional:       General: She is not in acute distress.     Appearance: She is well-developed. She is not diaphoretic.   HENT:      Head: Normocephalic and atraumatic.   Eyes:      General: No scleral icterus.     Pupils: Pupils are equal, round, and reactive to light.   Neck:      Vascular: No JVD.   Cardiovascular:      Rate and Rhythm: Normal rate and regular rhythm.      Heart sounds: Murmur present.   Pulmonary:      Effort: Pulmonary effort is normal. No respiratory distress.      Breath sounds: No stridor. Rales present. No wheezing.   Abdominal:      Palpations: Abdomen is soft.      Tenderness: There is no tenderness. There is no guarding or rebound.   Musculoskeletal:         General: No tenderness.      Right lower leg: No edema.      Left lower leg: No edema.   Skin:     General: Skin is warm and dry.      Findings: No rash.   Neurological:      General: No focal deficit present.       Mental Status: She is disoriented.         Fluids    Intake/Output Summary (Last 24 hours) at 12/6/2019 0529  Last data filed at 12/6/2019 0400  Gross per 24 hour   Intake 1069.87 ml   Output 2560 ml   Net -1490.13 ml       Laboratory  Recent Labs     12/05/19  0010 12/05/19  0435 12/06/19  0250   WBC 19.2* 14.9* 13.6*   RBC 4.00* 3.84* 3.71*   HEMOGLOBIN 12.8 12.2 11.6*   HEMATOCRIT 40.3 37.7 35.5*   .8* 98.2* 95.7   MCH 32.0 31.8 31.3   MCHC 31.8* 32.4* 32.7*   RDW 51.0* 48.4 47.2   PLATELETCT 244 183 164   MPV 11.7 11.6 12.0     Recent Labs     12/05/19  0010 12/05/19  0435 12/06/19  0250   SODIUM 136 133* 137   POTASSIUM 4.8 4.8 5.1   CHLORIDE 102 98 99   CO2 16* 20 23   GLUCOSE 273* 270* 109*   BUN 81* 81* 97*   CREATININE 3.92* 3.94* 4.67*   CALCIUM 14.9* 17.8* 12.8*     Recent Labs     12/04/19  1752 12/05/19  0010   APTT 34.3 31.6   INR 1.68* 1.74*         Recent Labs     12/04/19  2209   TRIGLYCERIDE 85       Imaging  DX-CHEST-LIMITED (1 VIEW)   Final Result         Congestive heart failure.      DX-ABDOMEN FOR TUBE PLACEMENT   Final Result      Feeding tube tip projects over the expected location of the gastric body/antrum.      US-RENAL   Final Result      Atrophic, echogenic kidneys consistent with medical renal disease.      Bilateral renal cysts.      DX-CHEST-PORTABLE (1 VIEW)   Final Result      Congestive heart failure.      CT-HEAD W/O   Final Result      1.  Cerebral atrophy.      2.  White matter lucencies most consistent with small vessel ischemic change versus demyelination or gliosis.      3.  Otherwise, Head CT without contrast with no acute findings. No evidence of acute cerebral infarction, hemorrhage or mass lesion.      EC-ECHOCARDIOGRAM LTD W/O CONT   Final Result      DX-CHEST-PORTABLE (1 VIEW)   Final Result         1. ETT is well-positioned. Right IJ central catheter is in the proximal SVC.   2. Unchanged bilateral interstitial and possible opacities, edema versus  multifocal pneumonia.   3. Right basilar consolidation versus atelectasis. Suspected small right pleural effusion.      DX-CHEST-PORTABLE (1 VIEW)   Final Result      1.  Cardiac silhouette enlargement.   2.  Diffuse interstitial and airspace opacities most likely pulmonary edema/CHF. Correlate clinically for infection.   3.  Small right pleural effusion.      CL-TEMPORARY PACEMAKER INSERT    (Results Pending)        Assessment/Plan  * Acute respiratory failure (HCC)  Assessment & Plan  Due to volume overload   Intubated 12/4/2019  Extubated 12/5  PMA consulted   Cautious diuresis    Aspiration pneumonia (HCC)  Assessment & Plan  Rocephin  Plan 5 day course  Cultures neg    Acute encephalopathy  Assessment & Plan  Related to OD on underlying dementia  CT head unremarkable  Minimize sedation   mobilize    Metabolic acidosis  Assessment & Plan  resolved    Calcium channel blocker overdose  Assessment & Plan  Calcium GGT   Unintentional overdose concern    Bradycardia  Assessment & Plan  Due to CCB overdose?   Maintaining 40-50s without pacing  Cards following      Shock ([facility])  Assessment & Plan    Due to CCB or BB overdose  Cardiogenic shock  PMA and cardiology following   Off pressors     Elevated liver enzymes  Assessment & Plan  ?ischemic injury  Follow daily CMP  Starting to improve    Acute on chronic renal failure (HCC)  Assessment & Plan  Baseline creat around 2  Creat elevated 4.6  ?secondary to ischemic injury from hypotension  Has responded to lasix  Renal US showing chronic changes, no obstruction  Renally dose medicatoions  Follow BMP daily  Check urine lytes  Consult Neph if she does not start to improve    Paroxysmal A-fib (HCC)- (present on admission)  Assessment & Plan  Had been on amio and CCB both DC'd in setting of bradycardia and hypotension  Cont apixaban though will need to follow renal function closely  Resume amio/CCB as needed       VTE prophylaxis: apixaban

## 2019-12-06 NOTE — CARE PLAN
Problem: Safety  Goal: Will remain free from falls  Outcome: PROGRESSING AS EXPECTED  Bed locked and in low position, Lower bed rails raised, bed alarm in use, call light within reach, treaded slipper socks on patient and patient room near nursing station. Pt educated on calling when in need of assistance.      Problem: Skin Integrity  Goal: Risk for impaired skin integrity will decrease  Outcome: PROGRESSING AS EXPECTED  Assess patient's skin at the beginning of the shift. Turn patient every two hours and monitor under all medical devices throughout entire shift. Maintain a clean, dry linen environment. Float heals and elbows.

## 2019-12-07 ENCOUNTER — APPOINTMENT (OUTPATIENT)
Dept: RADIOLOGY | Facility: MEDICAL CENTER | Age: 79
DRG: 208 | End: 2019-12-07
Attending: INTERNAL MEDICINE
Payer: MEDICARE

## 2019-12-07 PROBLEM — E87.6 HYPOKALEMIA: Status: ACTIVE | Noted: 2019-12-07

## 2019-12-07 PROBLEM — E87.20 METABOLIC ACIDOSIS: Status: RESOLVED | Noted: 2019-12-04 | Resolved: 2019-12-07

## 2019-12-07 LAB
ALBUMIN SERPL BCP-MCNC: 3.2 G/DL (ref 3.2–4.9)
ALBUMIN/GLOB SERPL: 1.2 G/DL
ALP SERPL-CCNC: 110 U/L (ref 30–99)
ALT SERPL-CCNC: 910 U/L (ref 2–50)
ANION GAP SERPL CALC-SCNC: 10 MMOL/L (ref 0–11.9)
AST SERPL-CCNC: 472 U/L (ref 12–45)
BASOPHILS # BLD AUTO: 0.2 % (ref 0–1.8)
BASOPHILS # BLD: 0.02 K/UL (ref 0–0.12)
BILIRUB SERPL-MCNC: 0.5 MG/DL (ref 0.1–1.5)
BUN SERPL-MCNC: 106 MG/DL (ref 8–22)
CALCIUM SERPL-MCNC: 11 MG/DL (ref 8.5–10.5)
CHLORIDE SERPL-SCNC: 103 MMOL/L (ref 96–112)
CO2 SERPL-SCNC: 29 MMOL/L (ref 20–33)
CREAT SERPL-MCNC: 4.13 MG/DL (ref 0.5–1.4)
EKG IMPRESSION: NORMAL
EOSINOPHIL # BLD AUTO: 0.04 K/UL (ref 0–0.51)
EOSINOPHIL NFR BLD: 0.4 % (ref 0–6.9)
ERYTHROCYTE [DISTWIDTH] IN BLOOD BY AUTOMATED COUNT: 48.2 FL (ref 35.9–50)
GLOBULIN SER CALC-MCNC: 2.6 G/DL (ref 1.9–3.5)
GLUCOSE BLD-MCNC: 100 MG/DL (ref 65–99)
GLUCOSE BLD-MCNC: 123 MG/DL (ref 65–99)
GLUCOSE SERPL-MCNC: 113 MG/DL (ref 65–99)
HCT VFR BLD AUTO: 35 % (ref 37–47)
HGB BLD-MCNC: 11.2 G/DL (ref 12–16)
IMM GRANULOCYTES # BLD AUTO: 0.04 K/UL (ref 0–0.11)
IMM GRANULOCYTES NFR BLD AUTO: 0.4 % (ref 0–0.9)
LYMPHOCYTES # BLD AUTO: 0.78 K/UL (ref 1–4.8)
LYMPHOCYTES NFR BLD: 7.9 % (ref 22–41)
MAGNESIUM SERPL-MCNC: 2.4 MG/DL (ref 1.5–2.5)
MCH RBC QN AUTO: 31.1 PG (ref 27–33)
MCHC RBC AUTO-ENTMCNC: 32 G/DL (ref 33.6–35)
MCV RBC AUTO: 97.2 FL (ref 81.4–97.8)
MONOCYTES # BLD AUTO: 0.43 K/UL (ref 0–0.85)
MONOCYTES NFR BLD AUTO: 4.4 % (ref 0–13.4)
NEUTROPHILS # BLD AUTO: 8.55 K/UL (ref 2–7.15)
NEUTROPHILS NFR BLD: 86.7 % (ref 44–72)
NRBC # BLD AUTO: 0 K/UL
NRBC BLD-RTO: 0 /100 WBC
PLATELET # BLD AUTO: 169 K/UL (ref 164–446)
PMV BLD AUTO: 11.7 FL (ref 9–12.9)
POTASSIUM SERPL-SCNC: 3.8 MMOL/L (ref 3.6–5.5)
PROT SERPL-MCNC: 5.8 G/DL (ref 6–8.2)
RBC # BLD AUTO: 3.6 M/UL (ref 4.2–5.4)
SODIUM SERPL-SCNC: 142 MMOL/L (ref 135–145)
WBC # BLD AUTO: 9.9 K/UL (ref 4.8–10.8)

## 2019-12-07 PROCEDURE — 82962 GLUCOSE BLOOD TEST: CPT | Mod: 91

## 2019-12-07 PROCEDURE — A9270 NON-COVERED ITEM OR SERVICE: HCPCS | Performed by: HOSPITALIST

## 2019-12-07 PROCEDURE — 99232 SBSQ HOSP IP/OBS MODERATE 35: CPT | Performed by: HOSPITALIST

## 2019-12-07 PROCEDURE — 80053 COMPREHEN METABOLIC PANEL: CPT

## 2019-12-07 PROCEDURE — 85025 COMPLETE CBC W/AUTO DIFF WBC: CPT

## 2019-12-07 PROCEDURE — 93010 ELECTROCARDIOGRAM REPORT: CPT | Performed by: INTERNAL MEDICINE

## 2019-12-07 PROCEDURE — 92526 ORAL FUNCTION THERAPY: CPT

## 2019-12-07 PROCEDURE — 700111 HCHG RX REV CODE 636 W/ 250 OVERRIDE (IP): Performed by: INTERNAL MEDICINE

## 2019-12-07 PROCEDURE — 306802 SYSTEM FECAL MANAGEMENT CATHETER KIT FLEXI - SEAL: Performed by: INTERNAL MEDICINE

## 2019-12-07 PROCEDURE — 700102 HCHG RX REV CODE 250 W/ 637 OVERRIDE(OP): Performed by: HOSPITALIST

## 2019-12-07 PROCEDURE — 83735 ASSAY OF MAGNESIUM: CPT

## 2019-12-07 PROCEDURE — A9270 NON-COVERED ITEM OR SERVICE: HCPCS | Performed by: INTERNAL MEDICINE

## 2019-12-07 PROCEDURE — 99233 SBSQ HOSP IP/OBS HIGH 50: CPT | Performed by: INTERNAL MEDICINE

## 2019-12-07 PROCEDURE — 700105 HCHG RX REV CODE 258: Performed by: INTERNAL MEDICINE

## 2019-12-07 PROCEDURE — 71045 X-RAY EXAM CHEST 1 VIEW: CPT

## 2019-12-07 PROCEDURE — 700102 HCHG RX REV CODE 250 W/ 637 OVERRIDE(OP): Performed by: INTERNAL MEDICINE

## 2019-12-07 PROCEDURE — 770020 HCHG ROOM/CARE - TELE (206)

## 2019-12-07 RX ORDER — BISACODYL 10 MG
10 SUPPOSITORY, RECTAL RECTAL
Status: DISCONTINUED | OUTPATIENT
Start: 2019-12-07 | End: 2019-12-10 | Stop reason: HOSPADM

## 2019-12-07 RX ORDER — AMOXICILLIN 250 MG
2 CAPSULE ORAL 2 TIMES DAILY
Status: DISCONTINUED | OUTPATIENT
Start: 2019-12-07 | End: 2019-12-10 | Stop reason: HOSPADM

## 2019-12-07 RX ORDER — LACTULOSE 20 G/30ML
30 SOLUTION ORAL 3 TIMES DAILY
Status: DISCONTINUED | OUTPATIENT
Start: 2019-12-07 | End: 2019-12-08

## 2019-12-07 RX ORDER — POLYETHYLENE GLYCOL 3350 17 G/17G
1 POWDER, FOR SOLUTION ORAL
Status: DISCONTINUED | OUTPATIENT
Start: 2019-12-07 | End: 2019-12-10 | Stop reason: HOSPADM

## 2019-12-07 RX ADMIN — APIXABAN 2.5 MG: 2.5 TABLET, FILM COATED ORAL at 16:58

## 2019-12-07 RX ADMIN — FUROSEMIDE 20 MG: 10 INJECTION, SOLUTION INTRAMUSCULAR; INTRAVENOUS at 05:18

## 2019-12-07 RX ADMIN — CEFTRIAXONE SODIUM 1 G: 1 INJECTION, POWDER, FOR SOLUTION INTRAMUSCULAR; INTRAVENOUS at 05:11

## 2019-12-07 RX ADMIN — LACTULOSE 30 ML: 20 SOLUTION ORAL at 16:58

## 2019-12-07 RX ADMIN — FUROSEMIDE 20 MG: 10 INJECTION, SOLUTION INTRAMUSCULAR; INTRAVENOUS at 16:58

## 2019-12-07 RX ADMIN — LACTULOSE 30 ML: 20 SOLUTION ORAL at 05:18

## 2019-12-07 RX ADMIN — APIXABAN 2.5 MG: 2.5 TABLET, FILM COATED ORAL at 05:18

## 2019-12-07 ASSESSMENT — ENCOUNTER SYMPTOMS
BACK PAIN: 0
ABDOMINAL PAIN: 0
BRUISES/BLEEDS EASILY: 0
NAUSEA: 0
PALPITATIONS: 0
SHORTNESS OF BREATH: 0
FEVER: 0
NERVOUS/ANXIOUS: 0
DIZZINESS: 0
DEPRESSION: 0
CHILLS: 0
VOMITING: 0
SPUTUM PRODUCTION: 0
BLURRED VISION: 0
COUGH: 1

## 2019-12-07 NOTE — PROGRESS NOTES
Critical Care Progress Note    Date of admission  12/4/2019    Chief Complaint  79 y.o. female admitted 12/4/2019 with shock, respiratory failure    Hospital Course    79 y.o. female who presented 12/4/2019 with a history of severe pulmonary hypertension, moderate to severe mitral regurgitation, paroxysmal atrial fibrillation, CKD, valvular heart failure, chronic oral anticoagulation and unintentional calcium channel blocker overdose. Apparently took 3 times her dosing of her daily extended release CCB as well as her beta blocker and amiodarone. She was seen earlier today in the cardiology clinic was noted to be bradycardic. Sent home and then had several syncopal events along with dyspnea. Pt was brought into the ED and emergently intubated.  Upon arrival she was noted to be hypotensive with a wide-complex bradycardic junctional escape rhythm.  She was also noted to have diffuse pulmonary edema, hypoxic and hypotensive. She received 1 amp of calcium and was started on DA, and NE gtts for bradycardia and hypotension. She was started on transcutaneous pacing as well however with little effect on her BP         Interval Problem Update  Reviewed last 24 hour events:   - titrated off HFNC   - AF, decreased WBC   - Afib/flutter with rate control, -125   - low K   - unchanged NOLAN on CKD   - improving LFTs slowly   - AAOx4 this morning, intermittent confusion   - failed SLP, raymond TFs   - jo in place   - Ca trending down   - rocephin day 4/5    Review of Systems  Review of Systems   Constitutional: Negative for chills and fever.   HENT: Negative for congestion.    Eyes: Negative for blurred vision.   Respiratory: Positive for cough. Negative for sputum production and shortness of breath.    Cardiovascular: Negative for chest pain, palpitations and leg swelling.   Gastrointestinal: Negative for abdominal pain, nausea and vomiting.   Genitourinary: Negative for dysuria.   Musculoskeletal: Negative for back pain.    Skin: Negative for rash.   Neurological: Negative for dizziness.   Endo/Heme/Allergies: Does not bruise/bleed easily.   Psychiatric/Behavioral: Negative for depression. The patient is not nervous/anxious.    All other systems reviewed and are negative.       Vital Signs for last 24 hours   Temp:  [37.6 °C (99.7 °F)-37.8 °C (100 °F)] 37.6 °C (99.7 °F)  Pulse:  [] 78  Resp:  [17-26] 20  BP: (109-141)/(56-90) 115/71  SpO2:  [92 %-100 %] 98 %    Respiratory Information for the last 24 hours    4 lpm n/c    Physical Exam   Physical Exam  Vitals signs and nursing note reviewed.   Constitutional:       Appearance: Normal appearance. She is well-developed. She is not ill-appearing or toxic-appearing.   HENT:      Head: Normocephalic and atraumatic.      Right Ear: External ear normal.      Left Ear: External ear normal.      Nose:      Comments: nasal feeding tube in place     Mouth/Throat:      Mouth: Mucous membranes are moist.      Pharynx: Oropharynx is clear.   Eyes:      General: No scleral icterus.     Conjunctiva/sclera: Conjunctivae normal.      Pupils: Pupils are equal, round, and reactive to light.   Neck:      Musculoskeletal: Neck supple.      Comments: Right IJ central venous catheter without surrounding hematoma  Cardiovascular:      Rate and Rhythm: Normal rate. Rhythm irregular. Occasional extrasystoles are present.     Chest Wall: PMI is displaced.      Pulses: Normal pulses.      Heart sounds: Heart sounds not distant. Murmur present.   Pulmonary:      Effort: No accessory muscle usage or respiratory distress.      Breath sounds: No decreased air movement. Examination of the right-lower field reveals rales. Examination of the left-lower field reveals rhonchi and rales. Rhonchi and rales present. No decreased breath sounds or wheezing.   Abdominal:      General: Bowel sounds are normal. There is no distension.      Palpations: Abdomen is soft.      Tenderness: There is no tenderness.    Genitourinary:     Comments: Lilly catheter in place  Musculoskeletal:         General: No tenderness.      Right lower leg: No edema.      Left lower leg: No edema.   Skin:     General: Skin is warm and dry.      Capillary Refill: Capillary refill takes less than 2 seconds.      Coloration: Skin is not pale.   Neurological:      General: No focal deficit present.      Mental Status: She is alert. She is disoriented.      Cranial Nerves: No cranial nerve deficit.      Comments: Intermittent confusion but improving daily, generalized weakness   Psychiatric:         Mood and Affect: Mood normal.         Medications  Current Facility-Administered Medications   Medication Dose Route Frequency Provider Last Rate Last Dose   • furosemide (LASIX) injection 20 mg  20 mg Intravenous Q12HRS Jeremy M Gonda, M.D.   20 mg at 12/07/19 0518   • insulin regular (HUMULIN R) injection 2-9 Units  2-9 Units Subcutaneous Q6HRS Brian Johnson M.D.   Stopped at 12/05/19 1200    And   • DEXTROSE 10% BOLUS 250 mL  250 mL Intravenous Q15 MIN PRN Brian Johnson M.D.       • cefTRIAXone (ROCEPHIN) 1 g in  mL IVPB  1 g Intravenous Q24HRS Jeremy M Gonda, M.D.   Stopped at 12/07/19 0541   • hydrALAZINE (APRESOLINE) injection 10 mg  10 mg Intravenous Q2HRS PRN Pasha Noel D.O.       • acetaminophen (TYLENOL) suppository 325 mg  325 mg Rectal Q6HRS PRN Jeremy M Gonda, M.D.       • Pharmacy Consult: Enteral tube insertion - review meds/change route/product selection  1 Each Other PHARMACY TO DOSE Jeremy M Gonda, M.D.       • lactulose 20 GM/30ML solution 30 mL  30 mL Enteral Tube TID Jeremy M Gonda, M.D.   30 mL at 12/07/19 0518   • apixaban (ELIQUIS) 2.5mg tablet 2.5 mg  2.5 mg Enteral Tube BID Jeremy M Gonda, M.D.   2.5 mg at 12/07/19 0518   • acetaminophen (TYLENOL) tablet 650 mg  650 mg Enteral Tube Q4HRS PRN Jeremy M Gonda, M.D.       • senna-docusate (PERICOLACE or SENOKOT S) 8.6-50 MG per tablet 2 Tab  2 Tab Enteral Tube  BID Darrius Vargas M.D.   Stopped at 12/05/19 0600    And   • polyethylene glycol/lytes (MIRALAX) PACKET 1 Packet  1 Packet Enteral Tube QDAY PRDEANDRE Vargas M.D.        And   • magnesium hydroxide (MILK OF MAGNESIA) suspension 30 mL  30 mL Enteral Tube QDAY PRDEANDRE Vargas M.D.        And   • bisacodyl (DULCOLAX) suppository 10 mg  10 mg Rectal QDAY PRDEANDRE Vargas M.D.           Fluids    Intake/Output Summary (Last 24 hours) at 12/7/2019 0719  Last data filed at 12/7/2019 0600  Gross per 24 hour   Intake 1235 ml   Output 3575 ml   Net -2340 ml       Laboratory  Recent Labs     12/04/19  2218 12/05/19  0303 12/05/19  0846   ISTATAPH 7.165* 7.316* 7.389*   ISTATAPCO2 54.4* 32.9 35.2   ISTATAPO2 82 90* 57*   ISTATATCO2 21 18* 22   [id number] 92* 96 89*   ISTATARTHCO3 19.6 16.8* 21.3   ISTATARTBE -9* -8* -3   ISTATTEMP 35.2 C 37.4 C 37.8 C   ISTATFIO2 90 70 50   ISTATSPEC Arterial Arterial Arterial   ISTATAPHTC 7.189* 7.310* 7.377*   [id number] 73 92* 60*         Recent Labs     12/04/19  1740  12/05/19  0435 12/06/19  0250 12/07/19  0520   SODIUM  --    < > 133* 137 142   POTASSIUM  --    < > 4.8 5.1 3.8   CHLORIDE  --    < > 98 99 103   CO2  --    < > 20 23 29   BUN  --    < > 81* 97* 106*   CREATININE  --    < > 3.94* 4.67* 4.13*   MAGNESIUM 2.9*  --   --  2.3 2.4   CALCIUM  --    < > 17.8* 12.8* 11.0*    < > = values in this interval not displayed.     Recent Labs     12/05/19  0435 12/06/19  0250 12/07/19  0520   ALTSGPT 935* 759* 910*   ASTSGOT 1111* 506* 472*   ALKPHOSPHAT 126* 118* 110*   TBILIRUBIN 0.6 0.7 0.5   PREALBUMIN  --  16.0*  --    GLUCOSE 270* 109* 113*     Recent Labs     12/05/19  0435 12/06/19  0250 12/07/19  0520   WBC 14.9* 13.6* 9.9   NEUTSPOLYS 92.60* 88.80* 86.70*   LYMPHOCYTES 2.90* 6.50* 7.90*   MONOCYTES 3.20 3.90 4.40   EOSINOPHILS 0.00 0.00 0.40   BASOPHILS 0.30 0.20 0.20   ASTSGOT 1111* 506* 472*   ALTSGPT 935* 759* 910*   ALKPHOSPHAT 126* 118* 110*   TBILIRUBIN  0.6 0.7 0.5     Recent Labs     12/04/19  1752 12/05/19  0010 12/05/19  0435 12/06/19  0250 12/07/19  0520   RBC 4.09* 4.00* 3.84* 3.71* 3.60*   HEMOGLOBIN 13.0 12.8 12.2 11.6* 11.2*   HEMATOCRIT 43.2 40.3 37.7 35.5* 35.0*   PLATELETCT 224 244 183 164 169   PROTHROMBTM 20.3* 20.9*  --   --   --    APTT 34.3 31.6  --   --   --    INR 1.68* 1.74*  --   --   --        Imaging  X-Ray:  I have personally reviewed the images and compared with prior images. and My impression is: Improving diffuse bilateral infiltrates, small effusions, right gastric tube and right IJ central venous catheter in good position    Assessment/Plan  * Acute respiratory failure (HCC)  Assessment & Plan  Intubated in ED 12/4, extubated early 12/5  Continue aspiration precautions  RT/O2 protocol, titrate oxygen therapy to keep SaO2 greater than 92%  SLP eval ongoing  Continue forced diuresis    Aspiration pneumonia (HCC)  Assessment & Plan  Continue Rocephin x5 days  Ongoing aspiration precautions, SLP    Acute encephalopathy  Assessment & Plan  Likely multifactorial. Metabolic component -improving again today  Avoid sedatives  Reorient and maintain sleep/wake cycle  PT/OT, ambulation    Calcium channel blocker overdose  Assessment & Plan  Unintentional  Poison control consulted  Status post supportive care  Optimize other electrolytes, avoid QT prolonging agents    Bradycardia  Assessment & Plan  Due to unintentional calcium channel blockade -improving again today  Avoid AV oralia blocking agents    Shock (HCC)  Assessment & Plan  Cardiogenic shock due to calcium channel blocker overdose -resolved  Cardiology consulted    Elevated liver enzymes  Assessment & Plan  Due to shock -improving  Avoid hepatotoxins  Daily cmp    Acute on chronic renal failure (HCC)  Assessment & Plan  Likely secondary to shock -improving slightly, non-oliguric  Status post fluid resuscitation  Continue diuresis  Avoid nephrotoxins  Monitor creatinine, urine output,  electrolytes closely    Hypokalemia  Assessment & Plan  Repletion and monitor closely while undergoing diuresis    Paroxysmal A-fib (HCC)- (present on admission)  Assessment & Plan  Continue to hold heart rate control agents for now  Continue Eliquis renally adjusted  Optimize electrolytes  Continuous telemetry monitoring       VTE:  NOAC  Ulcer: Not Indicated  Lines: Central Line  To be removed today and Lilly Catheter  To be removed today    I have performed a physical exam and reviewed and updated ROS and Plan today (12/7/2019). In review of yesterday's note (12/6/2019), there are no changes except as documented above.     Discussed patient condition and risk of morbidity and/or mortality with Hospitalist, RN, RT, Pharmacy, , Charge nurse / hot rounds, Patient and cardiology.

## 2019-12-07 NOTE — PROGRESS NOTES
Patient family asking about patient's hearing aid. Hearing aid not in patient's room, drawer, or family's possession. Will continue to search.

## 2019-12-07 NOTE — PROGRESS NOTES
Patient converted into A. Fib/Flutter at 1914. STAT EKG ordered and confirmed rhythm. Dr. Doshi updated. No new orders received.

## 2019-12-07 NOTE — PROGRESS NOTES
[facility] Medicine Daily Progress Note    Date of Service  12/7/2019    Chief Complaint  79 y.o. female admitted 12/4/2019 with syncope    Hospital Course    PMHx severe pulmonary HTN, MR, PAFib, CKD, on AC with apixaban.  The pt apparently mistakenly took a triple dose of her CCB as well as amiodarone and BB.  Found to be magdiel and hypotensive.  Temp pacer placed and Intubated in the ED.  EKG showing a wide complex escape rythm.  Extubated 12/52      Interval Problem Update  Pt in good spirits, happy to be off the vent.  Family at the bedside state she's looking much better and is thinking clearly, back to her baseline in their oppinion.  Fib/Flttr  -160s  Tmax 38.4  UOP 1000ml/12hrs s/p lasix      Consultants/Specialty  Cardiology  CC    Code Status  DNAR/I    Disposition  Cont in ICU    Review of Systems  Review of Systems   Constitutional: Negative for chills and fever.   HENT: Negative for nosebleeds and sore throat.    Eyes: Negative for blurred vision and double vision.   Respiratory: Negative for cough and shortness of breath.    Cardiovascular: Negative for chest pain, palpitations and leg swelling.   Gastrointestinal: Negative for abdominal pain, diarrhea, nausea and vomiting.   Genitourinary: Negative for dysuria and urgency.   Musculoskeletal: Negative for back pain.   Skin: Negative for rash.   Neurological: Positive for weakness. Negative for dizziness, loss of consciousness and headaches.        Physical Exam  Temp:  [37.6 °C (99.7 °F)-37.8 °C (100 °F)] 37.6 °C (99.7 °F)  Pulse:  [] 98  Resp:  [17-26] 19  BP: (109-141)/(54-90) 129/77  SpO2:  [92 %-100 %] 100 %    Physical Exam  Constitutional:       General: She is not in acute distress.     Appearance: She is well-developed. She is not diaphoretic.   HENT:      Head: Normocephalic and atraumatic.   Eyes:      General: No scleral icterus.     Pupils: Pupils are equal, round, and reactive to light.   Neck:      Vascular: No JVD.    Cardiovascular:      Rate and Rhythm: Normal rate and regular rhythm.      Heart sounds: Murmur present.   Pulmonary:      Effort: Pulmonary effort is normal. No respiratory distress.      Breath sounds: No stridor. Rales present. No wheezing.   Abdominal:      Palpations: Abdomen is soft.      Tenderness: There is no tenderness. There is no guarding or rebound.   Musculoskeletal:         General: No tenderness.      Right lower leg: No edema.      Left lower leg: No edema.   Skin:     General: Skin is warm and dry.      Findings: No rash.   Neurological:      General: No focal deficit present.      Mental Status: She is oriented to person, place, and time.   Psychiatric:         Mood and Affect: Mood normal.         Behavior: Behavior normal.         Fluids    Intake/Output Summary (Last 24 hours) at 12/7/2019 0459  Last data filed at 12/7/2019 0400  Gross per 24 hour   Intake 1185 ml   Output 3325 ml   Net -2140 ml       Laboratory  Recent Labs     12/05/19  0010 12/05/19  0435 12/06/19  0250   WBC 19.2* 14.9* 13.6*   RBC 4.00* 3.84* 3.71*   HEMOGLOBIN 12.8 12.2 11.6*   HEMATOCRIT 40.3 37.7 35.5*   .8* 98.2* 95.7   MCH 32.0 31.8 31.3   MCHC 31.8* 32.4* 32.7*   RDW 51.0* 48.4 47.2   PLATELETCT 244 183 164   MPV 11.7 11.6 12.0     Recent Labs     12/05/19  0010 12/05/19  0435 12/06/19  0250   SODIUM 136 133* 137   POTASSIUM 4.8 4.8 5.1   CHLORIDE 102 98 99   CO2 16* 20 23   GLUCOSE 273* 270* 109*   BUN 81* 81* 97*   CREATININE 3.92* 3.94* 4.67*   CALCIUM 14.9* 17.8* 12.8*     Recent Labs     12/04/19  1752 12/05/19  0010   APTT 34.3 31.6   INR 1.68* 1.74*         Recent Labs     12/04/19  2209   TRIGLYCERIDE 85       Imaging  DX-CHEST-LIMITED (1 VIEW)   Final Result         No significant interval change.      DX-CHEST-LIMITED (1 VIEW)   Final Result         Congestive heart failure.      DX-ABDOMEN FOR TUBE PLACEMENT   Final Result      Feeding tube tip projects over the expected location of the gastric  body/antrum.      US-RENAL   Final Result      Atrophic, echogenic kidneys consistent with medical renal disease.      Bilateral renal cysts.      DX-CHEST-PORTABLE (1 VIEW)   Final Result      Congestive heart failure.      CT-HEAD W/O   Final Result      1.  Cerebral atrophy.      2.  White matter lucencies most consistent with small vessel ischemic change versus demyelination or gliosis.      3.  Otherwise, Head CT without contrast with no acute findings. No evidence of acute cerebral infarction, hemorrhage or mass lesion.      EC-ECHOCARDIOGRAM LTD W/O CONT   Final Result      DX-CHEST-PORTABLE (1 VIEW)   Final Result         1. ETT is well-positioned. Right IJ central catheter is in the proximal SVC.   2. Unchanged bilateral interstitial and possible opacities, edema versus multifocal pneumonia.   3. Right basilar consolidation versus atelectasis. Suspected small right pleural effusion.      DX-CHEST-PORTABLE (1 VIEW)   Final Result      1.  Cardiac silhouette enlargement.   2.  Diffuse interstitial and airspace opacities most likely pulmonary edema/CHF. Correlate clinically for infection.   3.  Small right pleural effusion.      CL-TEMPORARY PACEMAKER INSERT    (Results Pending)        Assessment/Plan  * Acute respiratory failure (HCC)  Assessment & Plan  Due to volume overload   Intubated 12/4/2019  Extubated 12/5  PMA consulted   Cautious diuresis    Aspiration pneumonia (HCC)  Assessment & Plan  Rocephin  Plan 5 day course  Cultures neg    Acute encephalopathy  Assessment & Plan  Related to OD on underlying dementia  CT head unremarkable  Minimize sedation   mobilize    Metabolic acidosis  Assessment & Plan  resolved    Calcium channel blocker overdose  Assessment & Plan  Calcium GGT   Unintentional overdose concern    Bradycardia  Assessment & Plan  Due to CCB overdose?   Maintaining 40-50s without pacing  Cards following      Shock (HCC)  Assessment & Plan    Due to CCB or BB overdose  Cardiogenic  shock  PMA and cardiology following   Off pressors     Elevated liver enzymes  Assessment & Plan  ?ischemic injury  Follow daily CMP  Starting to improve    Acute on chronic renal failure (HCC)  Assessment & Plan  Baseline creat around 2  Creat elevated 4.6  ?secondary to ischemic injury from hypotension  Has responded to lasix  Renal US showing chronic changes, no obstruction  Renally dose medicatoions  Follow BMP daily  Check urine lytes  Consult Neph if she does not start to improve    Paroxysmal A-fib (HCC)- (present on admission)  Assessment & Plan  Had been on amio and CCB both DC'd in setting of bradycardia and hypotension  Cont apixaban though will need to follow renal function closely  Resume amio/CCB as needed       VTE prophylaxis: apixaban

## 2019-12-07 NOTE — CARE PLAN
Problem: Safety - Medical Restraint  Goal: Remains free of injury from restraints (Restraint for Interference with Medical Device)  Description  INTERVENTIONS:  1. Determine that other, less restrictive measures have been tried or would not be effective before applying the restraint  2. Evaluate the patient's condition at the time of restraint application  3. Inform patient/family regarding the reason for restraint  4. Q2H: Monitor safety, psychosocial status, comfort, nutrition and hydration  Outcome: PROGRESSING AS EXPECTED     Problem: Communication  Goal: The ability to communicate needs accurately and effectively will improve  Outcome: PROGRESSING AS EXPECTED

## 2019-12-07 NOTE — THERAPY
"Speech Language Therapy dysphagia treatment completed.     Functional Status:  Received \"re-order\" for clinical swallow evaluation d/t improved level of alertness, decreased O2 needs, sitter no longer needed. Pt was AOX4, follows all commands, very [place] (with amplifier, reports she has hearing aids at home). HOB elevation restrictions now lifted and pt was positioned to 90 degrees in bed. Assessed pt's tolerance to ice chips, thin liquid via cup and straw, puree textures, soft/chopped textures, and solid/regular textures. Noted belching after first trial thin liquid, no belching thereafter. Pt demonstrated a cough in 1/20 trials thin liquid and in 1/10 trials thin liquid via straw. Pt was very eager to drink and required cues to take small, single sips at a time. No clinical s/o aspiration demonstrated when pt taking single, small sips. Laryngeal elevation was complete to palpation. Pt with efficient rate of mastication with solid textures, but complained of bolus being dry. SpO2 maintained at 99 on 4L O2 throughout PO trials. Recommend initiation of a Dysphagia 2 diet with thin liquids. Will continue to trial less restrictive textures as pt demonstrates continued tolerance. Please ensure adequate and safe PO intake over the next two meals prior to removing Cortrak. SLP will continue to follow.    Recommendations: 1) Initiate a Dysphagia 2 diet with thin liquids 2) Meds whole with thin 3) Full supervision and cueing for all PO intake; HOB at 90 degrees for all PO intake or up in chair as able; no straws; single, small sips and bites at a time     Plan of Care: Will benefit from Speech Therapy 3 times per week    Post-Acute Therapy: Recommend inpatient transitional care services for continued speech therapy services.      See \"Rehab Therapy-Acute\" Patient Summary Report for complete documentation.     "

## 2019-12-07 NOTE — CARE PLAN
Problem: Communication  Goal: The ability to communicate needs accurately and effectively will improve  Outcome: PROGRESSING AS EXPECTED  Pt able to communicate needs effectively.      Problem: Safety  Goal: Will remain free from falls  Outcome: PROGRESSING AS EXPECTED  Bed locked and in low position, Lower bed rails raised, bed alarm in use, call light within reach, treaded slipper socks on patient and patient room near nursing station. Pt educated on calling when in need of assistance.

## 2019-12-08 ENCOUNTER — APPOINTMENT (OUTPATIENT)
Dept: RADIOLOGY | Facility: MEDICAL CENTER | Age: 79
DRG: 208 | End: 2019-12-08
Attending: INTERNAL MEDICINE
Payer: MEDICARE

## 2019-12-08 LAB
ALBUMIN SERPL BCP-MCNC: 3.3 G/DL (ref 3.2–4.9)
ALBUMIN/GLOB SERPL: 1.1 G/DL
ALP SERPL-CCNC: 113 U/L (ref 30–99)
ALT SERPL-CCNC: 1358 U/L (ref 2–50)
ANION GAP SERPL CALC-SCNC: 9 MMOL/L (ref 0–11.9)
AST SERPL-CCNC: 859 U/L (ref 12–45)
BASOPHILS # BLD AUTO: 0.4 % (ref 0–1.8)
BASOPHILS # BLD: 0.03 K/UL (ref 0–0.12)
BILIRUB SERPL-MCNC: 0.7 MG/DL (ref 0.1–1.5)
BUN SERPL-MCNC: 87 MG/DL (ref 8–22)
CALCIUM SERPL-MCNC: 10.1 MG/DL (ref 8.5–10.5)
CHLORIDE SERPL-SCNC: 99 MMOL/L (ref 96–112)
CO2 SERPL-SCNC: 31 MMOL/L (ref 20–33)
CREAT SERPL-MCNC: 3.23 MG/DL (ref 0.5–1.4)
EOSINOPHIL # BLD AUTO: 0.14 K/UL (ref 0–0.51)
EOSINOPHIL NFR BLD: 1.7 % (ref 0–6.9)
ERYTHROCYTE [DISTWIDTH] IN BLOOD BY AUTOMATED COUNT: 45.8 FL (ref 35.9–50)
GLOBULIN SER CALC-MCNC: 3 G/DL (ref 1.9–3.5)
GLUCOSE BLD-MCNC: 121 MG/DL (ref 65–99)
GLUCOSE BLD-MCNC: 82 MG/DL (ref 65–99)
GLUCOSE BLD-MCNC: 96 MG/DL (ref 65–99)
GLUCOSE BLD-MCNC: 98 MG/DL (ref 65–99)
GLUCOSE SERPL-MCNC: 100 MG/DL (ref 65–99)
HAV IGM SERPL QL IA: NEGATIVE
HBV CORE IGM SER QL: NEGATIVE
HBV SURFACE AG SER QL: NEGATIVE
HCT VFR BLD AUTO: 37.9 % (ref 37–47)
HCV AB SER QL: NEGATIVE
HGB BLD-MCNC: 12.3 G/DL (ref 12–16)
IMM GRANULOCYTES # BLD AUTO: 0.03 K/UL (ref 0–0.11)
IMM GRANULOCYTES NFR BLD AUTO: 0.4 % (ref 0–0.9)
LYMPHOCYTES # BLD AUTO: 0.83 K/UL (ref 1–4.8)
LYMPHOCYTES NFR BLD: 10.1 % (ref 22–41)
MAGNESIUM SERPL-MCNC: 2.3 MG/DL (ref 1.5–2.5)
MCH RBC QN AUTO: 31.1 PG (ref 27–33)
MCHC RBC AUTO-ENTMCNC: 32.5 G/DL (ref 33.6–35)
MCV RBC AUTO: 95.9 FL (ref 81.4–97.8)
MONOCYTES # BLD AUTO: 0.52 K/UL (ref 0–0.85)
MONOCYTES NFR BLD AUTO: 6.3 % (ref 0–13.4)
NEUTROPHILS # BLD AUTO: 6.66 K/UL (ref 2–7.15)
NEUTROPHILS NFR BLD: 81.1 % (ref 44–72)
NRBC # BLD AUTO: 0 K/UL
NRBC BLD-RTO: 0 /100 WBC
PLATELET # BLD AUTO: 184 K/UL (ref 164–446)
PMV BLD AUTO: 11.1 FL (ref 9–12.9)
POTASSIUM SERPL-SCNC: 3.3 MMOL/L (ref 3.6–5.5)
PROT SERPL-MCNC: 6.3 G/DL (ref 6–8.2)
RBC # BLD AUTO: 3.95 M/UL (ref 4.2–5.4)
SODIUM SERPL-SCNC: 139 MMOL/L (ref 135–145)
WBC # BLD AUTO: 8.2 K/UL (ref 4.8–10.8)

## 2019-12-08 PROCEDURE — 99233 SBSQ HOSP IP/OBS HIGH 50: CPT | Performed by: INTERNAL MEDICINE

## 2019-12-08 PROCEDURE — A9270 NON-COVERED ITEM OR SERVICE: HCPCS | Performed by: HOSPITALIST

## 2019-12-08 PROCEDURE — 82962 GLUCOSE BLOOD TEST: CPT

## 2019-12-08 PROCEDURE — 36415 COLL VENOUS BLD VENIPUNCTURE: CPT

## 2019-12-08 PROCEDURE — 76700 US EXAM ABDOM COMPLETE: CPT

## 2019-12-08 PROCEDURE — 700111 HCHG RX REV CODE 636 W/ 250 OVERRIDE (IP): Performed by: INTERNAL MEDICINE

## 2019-12-08 PROCEDURE — 99233 SBSQ HOSP IP/OBS HIGH 50: CPT | Performed by: HOSPITALIST

## 2019-12-08 PROCEDURE — 700105 HCHG RX REV CODE 258

## 2019-12-08 PROCEDURE — A9270 NON-COVERED ITEM OR SERVICE: HCPCS | Performed by: INTERNAL MEDICINE

## 2019-12-08 PROCEDURE — 770020 HCHG ROOM/CARE - TELE (206)

## 2019-12-08 PROCEDURE — 83735 ASSAY OF MAGNESIUM: CPT

## 2019-12-08 PROCEDURE — 700105 HCHG RX REV CODE 258: Performed by: INTERNAL MEDICINE

## 2019-12-08 PROCEDURE — 85025 COMPLETE CBC W/AUTO DIFF WBC: CPT

## 2019-12-08 PROCEDURE — 80053 COMPREHEN METABOLIC PANEL: CPT

## 2019-12-08 PROCEDURE — 97166 OT EVAL MOD COMPLEX 45 MIN: CPT

## 2019-12-08 PROCEDURE — 700102 HCHG RX REV CODE 250 W/ 637 OVERRIDE(OP): Performed by: HOSPITALIST

## 2019-12-08 PROCEDURE — 700102 HCHG RX REV CODE 250 W/ 637 OVERRIDE(OP): Performed by: INTERNAL MEDICINE

## 2019-12-08 PROCEDURE — 80074 ACUTE HEPATITIS PANEL: CPT

## 2019-12-08 RX ORDER — POTASSIUM CHLORIDE 20 MEQ/1
40 TABLET, EXTENDED RELEASE ORAL ONCE
Status: COMPLETED | OUTPATIENT
Start: 2019-12-08 | End: 2019-12-08

## 2019-12-08 RX ORDER — FUROSEMIDE 20 MG/1
20 TABLET ORAL
Status: DISCONTINUED | OUTPATIENT
Start: 2019-12-08 | End: 2019-12-10 | Stop reason: HOSPADM

## 2019-12-08 RX ORDER — SODIUM CHLORIDE 9 MG/ML
INJECTION, SOLUTION INTRAVENOUS
Status: COMPLETED
Start: 2019-12-08 | End: 2019-12-08

## 2019-12-08 RX ADMIN — LACTULOSE 30 ML: 20 SOLUTION ORAL at 05:18

## 2019-12-08 RX ADMIN — CEFTRIAXONE SODIUM 1 G: 1 INJECTION, POWDER, FOR SOLUTION INTRAMUSCULAR; INTRAVENOUS at 05:18

## 2019-12-08 RX ADMIN — FUROSEMIDE 20 MG: 10 INJECTION, SOLUTION INTRAMUSCULAR; INTRAVENOUS at 05:18

## 2019-12-08 RX ADMIN — APIXABAN 2.5 MG: 2.5 TABLET, FILM COATED ORAL at 05:18

## 2019-12-08 RX ADMIN — SODIUM CHLORIDE 250 ML: 9 INJECTION, SOLUTION INTRAVENOUS at 05:18

## 2019-12-08 RX ADMIN — FUROSEMIDE 20 MG: 20 TABLET ORAL at 10:44

## 2019-12-08 RX ADMIN — POTASSIUM CHLORIDE 40 MEQ: 1500 TABLET, EXTENDED RELEASE ORAL at 10:44

## 2019-12-08 RX ADMIN — APIXABAN 2.5 MG: 2.5 TABLET, FILM COATED ORAL at 17:07

## 2019-12-08 ASSESSMENT — COGNITIVE AND FUNCTIONAL STATUS - GENERAL
SUGGESTED CMS G CODE MODIFIER DAILY ACTIVITY: CK
HELP NEEDED FOR BATHING: A LITTLE
TOILETING: A LITTLE
DRESSING REGULAR UPPER BODY CLOTHING: A LITTLE
DAILY ACTIVITIY SCORE: 18
PERSONAL GROOMING: A LITTLE
DAILY ACTIVITIY SCORE: 18
SUGGESTED CMS G CODE MODIFIER DAILY ACTIVITY: CK
MOBILITY SCORE: 24
DRESSING REGULAR LOWER BODY CLOTHING: A LITTLE
DRESSING REGULAR LOWER BODY CLOTHING: A LITTLE
DRESSING REGULAR UPPER BODY CLOTHING: A LITTLE
SUGGESTED CMS G CODE MODIFIER MOBILITY: CH
EATING MEALS: A LITTLE
HELP NEEDED FOR BATHING: A LITTLE
PERSONAL GROOMING: A LITTLE
EATING MEALS: A LITTLE
TOILETING: A LITTLE

## 2019-12-08 ASSESSMENT — ENCOUNTER SYMPTOMS
DIARRHEA: 0
NAUSEA: 0
DOUBLE VISION: 0
DEPRESSION: 0
HEADACHES: 0
WEAKNESS: 1
VOMITING: 0
SPUTUM PRODUCTION: 0
NERVOUS/ANXIOUS: 0
SORE THROAT: 0
BLURRED VISION: 0
COUGH: 1
DIZZINESS: 0
LOSS OF CONSCIOUSNESS: 0
ABDOMINAL PAIN: 0
SHORTNESS OF BREATH: 0
PALPITATIONS: 0
BACK PAIN: 0
COUGH: 0
BRUISES/BLEEDS EASILY: 0
CHILLS: 0
FEVER: 0

## 2019-12-08 ASSESSMENT — COPD QUESTIONNAIRES
DO YOU EVER COUGH UP ANY MUCUS OR PHLEGM?: NO/ONLY WITH OCCASIONAL COLDS OR INFECTIONS
DURING THE PAST 4 WEEKS HOW MUCH DID YOU FEEL SHORT OF BREATH: NONE/LITTLE OF THE TIME
HAVE YOU SMOKED AT LEAST 100 CIGARETTES IN YOUR ENTIRE LIFE: NO/DON'T KNOW
IN THE PAST 12 MONTHS DO YOU DO LESS THAN YOU USED TO BECAUSE OF YOUR BREATHING PROBLEMS: DISAGREE/UNSURE
COPD SCREENING SCORE: 2

## 2019-12-08 ASSESSMENT — LIFESTYLE VARIABLES
DOES PATIENT WANT TO STOP DRINKING: NO
TOTAL SCORE: 0
HOW MANY TIMES IN THE PAST YEAR HAVE YOU HAD 5 OR MORE DRINKS IN A DAY: 0
ALCOHOL_USE: NO
CONSUMPTION TOTAL: NEGATIVE
EVER FELT BAD OR GUILTY ABOUT YOUR DRINKING: NO
ON A TYPICAL DAY WHEN YOU DRINK ALCOHOL HOW MANY DRINKS DO YOU HAVE: 0
TOTAL SCORE: 0
EVER HAD A DRINK FIRST THING IN THE MORNING TO STEADY YOUR NERVES TO GET RID OF A HANGOVER: NO
EVER_SMOKED: NEVER
HAVE YOU EVER FELT YOU SHOULD CUT DOWN ON YOUR DRINKING: NO
AVERAGE NUMBER OF DAYS PER WEEK YOU HAVE A DRINK CONTAINING ALCOHOL: 0
TOTAL SCORE: 0
HAVE PEOPLE ANNOYED YOU BY CRITICIZING YOUR DRINKING: NO

## 2019-12-08 ASSESSMENT — ACTIVITIES OF DAILY LIVING (ADL): TOILETING: INDEPENDENT

## 2019-12-08 NOTE — PROGRESS NOTES
Critical Care Progress Note    Date of admission  12/4/2019    Chief Complaint  79 y.o. female admitted 12/4/2019 with shock, respiratory failure    Hospital Course    79 y.o. female who presented 12/4/2019 with a history of severe pulmonary hypertension, moderate to severe mitral regurgitation, paroxysmal atrial fibrillation, CKD, valvular heart failure, chronic oral anticoagulation and unintentional calcium channel blocker overdose. Apparently took 3 times her dosing of her daily extended release CCB as well as her beta blocker and amiodarone. She was seen earlier today in the cardiology clinic was noted to be bradycardic. Sent home and then had several syncopal events along with dyspnea. Pt was brought into the ED and emergently intubated.  Upon arrival she was noted to be hypotensive with a wide-complex bradycardic junctional escape rhythm.  She was also noted to have diffuse pulmonary edema, hypoxic and hypotensive. She received 1 amp of calcium and was started on DA, and NE gtts for bradycardia and hypotension. She was started on transcutaneous pacing as well however with little effect on her BP         Interval Problem Update  Reviewed last 24 hour events:   - AF, nl WBC   - AAOx4   - Afib/flutter but rate controlled, SBP    - Hgb up to 12   - low K   - improving NOLAN again   - LFTs worsened --> abd US ordered   - diuresing well with neg 4L on lasix   - raymond diet   - jo and BMS to be removed   - up to chair, walks to BR, denying pain   - completes rocephin today day 5/5   - PT/OT eval    Review of Systems  Review of Systems   Constitutional: Negative for chills and fever.   HENT: Negative for congestion.    Eyes: Negative for blurred vision.   Respiratory: Positive for cough. Negative for sputum production and shortness of breath.    Cardiovascular: Negative for chest pain, palpitations and leg swelling.   Gastrointestinal: Negative for abdominal pain, nausea and vomiting.   Genitourinary: Negative for  dysuria.   Musculoskeletal: Negative for back pain.   Skin: Negative for rash.   Neurological: Negative for dizziness.   Endo/Heme/Allergies: Does not bruise/bleed easily.   Psychiatric/Behavioral: Negative for depression. The patient is not nervous/anxious.    All other systems reviewed and are negative.       Vital Signs for last 24 hours   Temp:  [36.1 °C (97 °F)-36.2 °C (97.2 °F)] 36.2 °C (97.2 °F)  Pulse:  [64-84] 66  Resp:  [13-22] 13  BP: (106-132)/(62-81) 106/63  SpO2:  [95 %-100 %] 96 %    Respiratory Information for the last 24 hours    remains on 4 lpm n/c, no treatments    Physical Exam   Physical Exam  Vitals signs and nursing note reviewed.   Constitutional:       Appearance: Normal appearance. She is well-developed. She is not ill-appearing or toxic-appearing.   HENT:      Head: Normocephalic and atraumatic.      Right Ear: External ear normal.      Left Ear: External ear normal.      Nose:      Comments: nasal feeding tube in place     Mouth/Throat:      Mouth: Mucous membranes are moist.      Pharynx: Oropharynx is clear.   Eyes:      General: No scleral icterus.     Conjunctiva/sclera: Conjunctivae normal.      Pupils: Pupils are equal, round, and reactive to light.   Neck:      Musculoskeletal: Neck supple.      Comments: Right IJ central venous catheter without surrounding hematoma  Cardiovascular:      Rate and Rhythm: Normal rate. Rhythm irregular. Occasional extrasystoles are present.     Chest Wall: PMI is displaced.      Pulses: Normal pulses.      Heart sounds: Heart sounds not distant. Murmur present.   Pulmonary:      Effort: No accessory muscle usage or respiratory distress.      Breath sounds: No decreased air movement. Examination of the right-lower field reveals rales. Examination of the left-lower field reveals rhonchi and rales. Rhonchi and rales present. No decreased breath sounds or wheezing.   Abdominal:      General: Bowel sounds are normal. There is no distension.       Palpations: Abdomen is soft.      Tenderness: There is no tenderness.   Genitourinary:     Comments: Lilly catheter in place  Musculoskeletal:         General: No tenderness.      Right lower leg: No edema.      Left lower leg: No edema.   Skin:     General: Skin is warm and dry.      Capillary Refill: Capillary refill takes less than 2 seconds.      Coloration: Skin is not pale.   Neurological:      General: No focal deficit present.      Mental Status: She is alert. She is disoriented.      Cranial Nerves: No cranial nerve deficit.      Comments: Intermittent confusion but improving daily, generalized weakness   Psychiatric:         Mood and Affect: Mood normal.         Medications  Current Facility-Administered Medications   Medication Dose Route Frequency Provider Last Rate Last Dose   • senna-docusate (PERICOLACE or SENOKOT S) 8.6-50 MG per tablet 2 Tab  2 Tab Oral BID Darrius Vargas M.D.   Stopped at 12/07/19 1800    And   • polyethylene glycol/lytes (MIRALAX) PACKET 1 Packet  1 Packet Oral QDAY PRN Darrius Vargas M.D.        And   • magnesium hydroxide (MILK OF MAGNESIA) suspension 30 mL  30 mL Oral QDAY PRN Darrius Vargas M.D.        And   • bisacodyl (DULCOLAX) suppository 10 mg  10 mg Rectal QDAY PRN Darrius Vargas M.D.       • lactulose 20 GM/30ML solution 30 mL  30 mL Oral TID Darrius Vargas M.D.   30 mL at 12/08/19 0518   • apixaban (ELIQUIS) 2.5mg tablet 2.5 mg  2.5 mg Oral BID Darrius Vargas M.D.   2.5 mg at 12/08/19 0518   • furosemide (LASIX) injection 20 mg  20 mg Intravenous Q12HRS Jeremy M Gonda, M.D.   20 mg at 12/08/19 0518   • insulin regular (HUMULIN R) injection 2-9 Units  2-9 Units Subcutaneous Q6HRS Brian Johnson M.D.   Stopped at 12/05/19 1200    And   • DEXTROSE 10% BOLUS 250 mL  250 mL Intravenous Q15 MIN PRN Brian Johnson M.D.       • hydrALAZINE (APRESOLINE) injection 10 mg  10 mg Intravenous Q2HRS PRN Pasha Noel D.O.       • acetaminophen (TYLENOL)  tablet 650 mg  650 mg Enteral Tube Q4HRS PRN Jeremy M Gonda, M.D.           Fluids    Intake/Output Summary (Last 24 hours) at 12/8/2019 0721  Last data filed at 12/8/2019 0600  Gross per 24 hour   Intake 100 ml   Output 4100 ml   Net -4000 ml       Laboratory  Recent Labs     12/05/19  0846   ISTATAPH 7.389*   ISTATAPCO2 35.2   ISTATAPO2 57*   ISTATATCO2 22   ETZFQVG9BLI 89*   ISTATARTHCO3 21.3   ISTATARTBE -3   ISTATTEMP 37.8 C   ISTATFIO2 50   ISTATSPEC Arterial   ISTATAPHTC 7.377*   NVRELMYU6PI 60*         Recent Labs     12/06/19  0250 12/07/19  0520   SODIUM 137 142   POTASSIUM 5.1 3.8   CHLORIDE 99 103   CO2 23 29   BUN 97* 106*   CREATININE 4.67* 4.13*   MAGNESIUM 2.3 2.4   CALCIUM 12.8* 11.0*     Recent Labs     12/06/19  0250 12/07/19  0520   ALTSGPT 759* 910*   ASTSGOT 506* 472*   ALKPHOSPHAT 118* 110*   TBILIRUBIN 0.7 0.5   PREALBUMIN 16.0*  --    GLUCOSE 109* 113*     Recent Labs     12/06/19  0250 12/07/19  0520   WBC 13.6* 9.9   NEUTSPOLYS 88.80* 86.70*   LYMPHOCYTES 6.50* 7.90*   MONOCYTES 3.90 4.40   EOSINOPHILS 0.00 0.40   BASOPHILS 0.20 0.20   ASTSGOT 506* 472*   ALTSGPT 759* 910*   ALKPHOSPHAT 118* 110*   TBILIRUBIN 0.7 0.5     Recent Labs     12/06/19  0250 12/07/19  0520   RBC 3.71* 3.60*   HEMOGLOBIN 11.6* 11.2*   HEMATOCRIT 35.5* 35.0*   PLATELETCT 164 169       Imaging  X-Ray:  I have personally reviewed the images and compared with prior images. and No film today    Assessment/Plan  * Acute respiratory failure (HCC)  Assessment & Plan  Intubated in ED 12/4, extubated early 12/5  Encourage incentive spirometry/PEP  Ambulation, out of bed to chair  RT/O2 protocol, titrate oxygen therapy to keep SaO2 greater than 92%  Continue forced diuresis (decreased to p.o.)    Aspiration pneumonia (HCC)  Assessment & Plan  Completes Rocephin x5 days today  Ongoing aspiration precautions, SLP    Acute encephalopathy  Assessment & Plan  Likely multifactorial. Metabolic component -improved  Avoid  sedatives  Reorient and maintain sleep/wake cycle  PT/OT, ambulation    Calcium channel blocker overdose  Assessment & Plan  Unintentional  Poison control consulted  Status post supportive care  Optimize other electrolytes, avoid QT prolonging agents    Bradycardia  Assessment & Plan  Due to unintentional calcium channel blockade -improving  Avoid AV oralia blocking agents    Shock (HCC)  Assessment & Plan  Cardiogenic shock due to calcium channel blocker overdose -resolved  Cardiology consulted  Continue close blood pressure monitoring    Elevated liver enzymes  Assessment & Plan  Due to shock -worsening today for unclear reasons (discussed medication causes with pharmacy)  Avoid hepatotoxins  Daily cmp  Abdominal ultrasound to evaluate for obstructive process versus portal venous thrombosis    Acute on chronic renal failure (HCC)  Assessment & Plan  Likely secondary to shock -improving daily, non-oliguric  Status post fluid resuscitation  Continue diuresis (oral)  Avoid nephrotoxins  Monitor creatinine, urine output, electrolytes closely    Hypokalemia  Assessment & Plan  Replete again today and monitor closely    Paroxysmal A-fib (HCC)- (present on admission)  Assessment & Plan  Continue to hold heart rate control agents for now  Continue Eliquis renally adjusted  Optimize electrolytes  Continuous telemetry monitoring       VTE:  NOAC  Ulcer: Not Indicated  Lines: Lilly Catheter  To be removed today    I have performed a physical exam and reviewed and updated ROS and Plan today (12/8/2019). In review of yesterday's note (12/7/2019), there are no changes except as documented above.     Discussed patient condition and risk of morbidity and/or mortality with RN, RT, Pharmacy, Charge nurse / hot rounds, Patient and Dr. Lou.  Critical care will sign off.  Please call with questions.

## 2019-12-08 NOTE — PROGRESS NOTES
Report called to Demond SPRINGER on T8. Pt taken in wheelchair, with all belongings verified by Pt to [id number].

## 2019-12-08 NOTE — PROGRESS NOTES
"Hospital Medicine Daily Progress Note    Date of Service  12/8/2019    Chief Complaint  79 y.o. female admitted 12/4/2019 with syncope    Hospital Course    PMHx severe pulmonary HTN, MR, PAFib, CKD, on AC with apixaban.  The pt apparently mistakenly took a triple dose of her CCB as well as amiodarone and BB.  Found to be magdiel and hypotensive.  Temp pacer placed and Intubated in the ED.  EKG showing a wide complex escape rythm.  Extubated 12/52      Interval Problem Update  Pt states she feels \"OK\".  Has been out of bed and feels generally weak but not dizzy or light headed  AFib 40-50s  -160s  Tmax 38.4  UOP 1500ml/12hrs s/p lasix  D5/5 Rocephin    Consultants/Specialty  Cardiology  CC    Code Status  DNAR/I    Disposition  Cont in ICU    Review of Systems  Review of Systems   Constitutional: Negative for chills and fever.   HENT: Negative for nosebleeds and sore throat.    Eyes: Negative for blurred vision and double vision.   Respiratory: Negative for cough and shortness of breath.    Cardiovascular: Negative for chest pain, palpitations and leg swelling.   Gastrointestinal: Negative for abdominal pain, diarrhea, nausea and vomiting.   Genitourinary: Negative for dysuria and urgency.   Musculoskeletal: Negative for back pain.   Skin: Negative for rash.   Neurological: Positive for weakness. Negative for dizziness, loss of consciousness and headaches.        Physical Exam  Temp:  [36.1 °C (97 °F)-36.2 °C (97.2 °F)] 36.2 °C (97.2 °F)  Pulse:  [64-84] 66  Resp:  [13-22] 13  BP: (106-132)/(62-81) 106/63  SpO2:  [95 %-100 %] 96 %    Physical Exam  Constitutional:       General: She is not in acute distress.     Appearance: She is well-developed. She is not diaphoretic.   HENT:      Head: Normocephalic and atraumatic.   Eyes:      General: No scleral icterus.     Pupils: Pupils are equal, round, and reactive to light.   Neck:      Vascular: No JVD.   Cardiovascular:      Rate and Rhythm: Normal rate and regular " rhythm.      Heart sounds: Murmur present.   Pulmonary:      Effort: Pulmonary effort is normal. No respiratory distress.      Breath sounds: No stridor. No wheezing or rales.   Abdominal:      Palpations: Abdomen is soft.      Tenderness: There is no tenderness. There is no guarding or rebound.   Musculoskeletal:         General: No tenderness.      Right lower leg: No edema.      Left lower leg: No edema.   Skin:     General: Skin is warm and dry.      Findings: No rash.   Neurological:      General: No focal deficit present.      Mental Status: She is oriented to person, place, and time. Mental status is at baseline.   Psychiatric:         Mood and Affect: Mood normal.         Behavior: Behavior normal.         Thought Content: Thought content normal.         Fluids    Intake/Output Summary (Last 24 hours) at 12/8/2019 0530  Last data filed at 12/7/2019 1700  Gross per 24 hour   Intake 260 ml   Output 2675 ml   Net -2415 ml       Laboratory  Recent Labs     12/06/19  0250 12/07/19  0520   WBC 13.6* 9.9   RBC 3.71* 3.60*   HEMOGLOBIN 11.6* 11.2*   HEMATOCRIT 35.5* 35.0*   MCV 95.7 97.2   MCH 31.3 31.1   MCHC 32.7* 32.0*   RDW 47.2 48.2   PLATELETCT 164 169   MPV 12.0 11.7     Recent Labs     12/06/19  0250 12/07/19  0520   SODIUM 137 142   POTASSIUM 5.1 3.8   CHLORIDE 99 103   CO2 23 29   GLUCOSE 109* 113*   BUN 97* 106*   CREATININE 4.67* 4.13*   CALCIUM 12.8* 11.0*                   Imaging  DX-CHEST-LIMITED (1 VIEW)   Final Result         No significant interval change.      DX-CHEST-LIMITED (1 VIEW)   Final Result         Congestive heart failure.      DX-ABDOMEN FOR TUBE PLACEMENT   Final Result      Feeding tube tip projects over the expected location of the gastric body/antrum.      US-RENAL   Final Result      Atrophic, echogenic kidneys consistent with medical renal disease.      Bilateral renal cysts.      DX-CHEST-PORTABLE (1 VIEW)   Final Result      Congestive heart failure.      CT-HEAD W/O   Final  Result      1.  Cerebral atrophy.      2.  White matter lucencies most consistent with small vessel ischemic change versus demyelination or gliosis.      3.  Otherwise, Head CT without contrast with no acute findings. No evidence of acute cerebral infarction, hemorrhage or mass lesion.      EC-ECHOCARDIOGRAM LTD W/O CONT   Final Result      DX-CHEST-PORTABLE (1 VIEW)   Final Result         1. ETT is well-positioned. Right IJ central catheter is in the proximal SVC.   2. Unchanged bilateral interstitial and possible opacities, edema versus multifocal pneumonia.   3. Right basilar consolidation versus atelectasis. Suspected small right pleural effusion.      DX-CHEST-PORTABLE (1 VIEW)   Final Result      1.  Cardiac silhouette enlargement.   2.  Diffuse interstitial and airspace opacities most likely pulmonary edema/CHF. Correlate clinically for infection.   3.  Small right pleural effusion.      CL-TEMPORARY PACEMAKER INSERT    (Results Pending)        Assessment/Plan  * Acute respiratory failure (HCC)  Assessment & Plan  Due to volume overload   Intubated 12/4/2019  Extubated 12/5  PMA consulted   Cautious diuresis    Aspiration pneumonia (HCC)  Assessment & Plan  Has completed 5 days of Rocephin  Cultures neg    Acute encephalopathy  Assessment & Plan  Related to OD on underlying dementia  CT head unremarkable  Minimize sedation   mobilize    Calcium channel blocker overdose  Assessment & Plan  Calcium GGT   Unintentional overdose concern    Bradycardia  Assessment & Plan  Due to CCB overdose?   Maintaining 40-50s without pacing  Cards following      Shock (HCC)  Assessment & Plan    Due to CCB or BB overdose  Cardiogenic shock  PMA and cardiology following   Off pressors     Elevated liver enzymes  Assessment & Plan  ?ischemic injury  LFTs had been improving but have risen today  ?Ceftriaxone, amiodarone?  Check RUQ US and hep panel.   Reviewed with Pharmacy; no other readily apparent drug exposures    Acute on  chronic renal failure (HCC)  Assessment & Plan  Baseline creat around 2  Creat elevated 4.6 however is coming down daily  ?secondary to ischemic injury from hypotension  Has responded to lasix  Renal US showing chronic changes, no obstruction  Renally dose medicatoions  Follow BMP daily      Paroxysmal A-fib (HCC)- (present on admission)  Assessment & Plan  Had been on amio and CCB both DC'd in setting of bradycardia and hypotension  Cont apixaban though will need to follow renal function closely  Resume CCB as needed  ?if we should resume amio given elevation of AST/ALT       VTE prophylaxis: apixaban

## 2019-12-08 NOTE — CARE PLAN
Problem: Skin Integrity  Goal: Risk for impaired skin integrity will decrease  Outcome: PROGRESSING AS EXPECTED  Intervention: Assess risk factors for impaired skin integrity and/or pressure ulcers  Note:   BMS leaking, skin with IAD, BMS removed per MD order as Pt is much ore mobile today.      Problem: Respiratory:  Goal: Respiratory status will improve  Outcome: PROGRESSING AS EXPECTED  Intervention: Administer and titrate oxygen therapy  Note:   NC titrated down as tolerated for 5L to 3L

## 2019-12-08 NOTE — PROGRESS NOTES
Pt arrived to unit via wheelchair from [facility]. Pt oriented to room, unit, and plan of care. Tele-monitor placed and monitor room notified. All questions answered at this time. Call light within reach; fall precautions in place.

## 2019-12-09 LAB
ALBUMIN SERPL BCP-MCNC: 3.1 G/DL (ref 3.2–4.9)
ALBUMIN/GLOB SERPL: 1.3 G/DL
ALP SERPL-CCNC: 100 U/L (ref 30–99)
ALT SERPL-CCNC: 1066 U/L (ref 2–50)
ANION GAP SERPL CALC-SCNC: 7 MMOL/L (ref 0–11.9)
AST SERPL-CCNC: 494 U/L (ref 12–45)
BACTERIA BLD CULT: NORMAL
BACTERIA BLD CULT: NORMAL
BASOPHILS # BLD AUTO: 0.5 % (ref 0–1.8)
BASOPHILS # BLD: 0.04 K/UL (ref 0–0.12)
BILIRUB SERPL-MCNC: 0.6 MG/DL (ref 0.1–1.5)
BUN SERPL-MCNC: 79 MG/DL (ref 8–22)
CALCIUM SERPL-MCNC: 8.6 MG/DL (ref 8.5–10.5)
CHLORIDE SERPL-SCNC: 102 MMOL/L (ref 96–112)
CO2 SERPL-SCNC: 28 MMOL/L (ref 20–33)
CREAT SERPL-MCNC: 2.85 MG/DL (ref 0.5–1.4)
EOSINOPHIL # BLD AUTO: 0.27 K/UL (ref 0–0.51)
EOSINOPHIL NFR BLD: 3.5 % (ref 0–6.9)
ERYTHROCYTE [DISTWIDTH] IN BLOOD BY AUTOMATED COUNT: 44.9 FL (ref 35.9–50)
GLOBULIN SER CALC-MCNC: 2.4 G/DL (ref 1.9–3.5)
GLUCOSE BLD-MCNC: 112 MG/DL (ref 65–99)
GLUCOSE BLD-MCNC: 87 MG/DL (ref 65–99)
GLUCOSE BLD-MCNC: 92 MG/DL (ref 65–99)
GLUCOSE SERPL-MCNC: 91 MG/DL (ref 65–99)
HCT VFR BLD AUTO: 33.8 % (ref 37–47)
HGB BLD-MCNC: 10.9 G/DL (ref 12–16)
IMM GRANULOCYTES # BLD AUTO: 0.03 K/UL (ref 0–0.11)
IMM GRANULOCYTES NFR BLD AUTO: 0.4 % (ref 0–0.9)
LYMPHOCYTES # BLD AUTO: 1.21 K/UL (ref 1–4.8)
LYMPHOCYTES NFR BLD: 15.5 % (ref 22–41)
MAGNESIUM SERPL-MCNC: 2.1 MG/DL (ref 1.5–2.5)
MCH RBC QN AUTO: 31.1 PG (ref 27–33)
MCHC RBC AUTO-ENTMCNC: 32.2 G/DL (ref 33.6–35)
MCV RBC AUTO: 96.6 FL (ref 81.4–97.8)
MONOCYTES # BLD AUTO: 0.59 K/UL (ref 0–0.85)
MONOCYTES NFR BLD AUTO: 7.6 % (ref 0–13.4)
NEUTROPHILS # BLD AUTO: 5.67 K/UL (ref 2–7.15)
NEUTROPHILS NFR BLD: 72.5 % (ref 44–72)
NRBC # BLD AUTO: 0 K/UL
NRBC BLD-RTO: 0 /100 WBC
PLATELET # BLD AUTO: 186 K/UL (ref 164–446)
PMV BLD AUTO: 11.2 FL (ref 9–12.9)
POTASSIUM SERPL-SCNC: 3.7 MMOL/L (ref 3.6–5.5)
PROT SERPL-MCNC: 5.5 G/DL (ref 6–8.2)
RBC # BLD AUTO: 3.5 M/UL (ref 4.2–5.4)
SIGNIFICANT IND 70042: NORMAL
SIGNIFICANT IND 70042: NORMAL
SITE SITE: NORMAL
SITE SITE: NORMAL
SODIUM SERPL-SCNC: 137 MMOL/L (ref 135–145)
SOURCE SOURCE: NORMAL
SOURCE SOURCE: NORMAL
WBC # BLD AUTO: 7.8 K/UL (ref 4.8–10.8)

## 2019-12-09 PROCEDURE — A9270 NON-COVERED ITEM OR SERVICE: HCPCS | Performed by: HOSPITALIST

## 2019-12-09 PROCEDURE — A9270 NON-COVERED ITEM OR SERVICE: HCPCS | Performed by: INTERNAL MEDICINE

## 2019-12-09 PROCEDURE — 700102 HCHG RX REV CODE 250 W/ 637 OVERRIDE(OP): Performed by: HOSPITALIST

## 2019-12-09 PROCEDURE — 99232 SBSQ HOSP IP/OBS MODERATE 35: CPT | Performed by: HOSPITALIST

## 2019-12-09 PROCEDURE — 80053 COMPREHEN METABOLIC PANEL: CPT

## 2019-12-09 PROCEDURE — 83735 ASSAY OF MAGNESIUM: CPT

## 2019-12-09 PROCEDURE — 770020 HCHG ROOM/CARE - TELE (206)

## 2019-12-09 PROCEDURE — 700102 HCHG RX REV CODE 250 W/ 637 OVERRIDE(OP): Performed by: INTERNAL MEDICINE

## 2019-12-09 PROCEDURE — 97161 PT EVAL LOW COMPLEX 20 MIN: CPT

## 2019-12-09 PROCEDURE — 85025 COMPLETE CBC W/AUTO DIFF WBC: CPT

## 2019-12-09 PROCEDURE — 82962 GLUCOSE BLOOD TEST: CPT

## 2019-12-09 PROCEDURE — 36415 COLL VENOUS BLD VENIPUNCTURE: CPT

## 2019-12-09 RX ADMIN — APIXABAN 2.5 MG: 2.5 TABLET, FILM COATED ORAL at 17:32

## 2019-12-09 RX ADMIN — FUROSEMIDE 20 MG: 20 TABLET ORAL at 05:50

## 2019-12-09 RX ADMIN — APIXABAN 2.5 MG: 2.5 TABLET, FILM COATED ORAL at 05:50

## 2019-12-09 ASSESSMENT — ENCOUNTER SYMPTOMS
BLURRED VISION: 0
ABDOMINAL PAIN: 0
DIZZINESS: 0
LOSS OF CONSCIOUSNESS: 0
SORE THROAT: 0
FEVER: 0
HEADACHES: 0
BACK PAIN: 0
DOUBLE VISION: 0
DIARRHEA: 0
SHORTNESS OF BREATH: 0
VOMITING: 0
PALPITATIONS: 0
WEAKNESS: 0
CHILLS: 0
NAUSEA: 0
COUGH: 0

## 2019-12-09 ASSESSMENT — COGNITIVE AND FUNCTIONAL STATUS - GENERAL
WALKING IN HOSPITAL ROOM: A LITTLE
CLIMB 3 TO 5 STEPS WITH RAILING: A LITTLE
SUGGESTED CMS G CODE MODIFIER MOBILITY: CJ
MOBILITY SCORE: 22

## 2019-12-09 ASSESSMENT — GAIT ASSESSMENTS
GAIT LEVEL OF ASSIST: SUPERVISED
DISTANCE (FEET): 500

## 2019-12-09 ASSESSMENT — PATIENT HEALTH QUESTIONNAIRE - PHQ9
2. FEELING DOWN, DEPRESSED, IRRITABLE, OR HOPELESS: NOT AT ALL
SUM OF ALL RESPONSES TO PHQ9 QUESTIONS 1 AND 2: 0
1. LITTLE INTEREST OR PLEASURE IN DOING THINGS: NOT AT ALL

## 2019-12-09 NOTE — DISCHARGE PLANNING
Care Transition Team Assessment  LSW met with pt at bedside to complete assessment and discuss discharge plans/barriers. Pt goal is to return home. She reported she uses the [facility] pharmacy off of [address].     Pt reported she lives with her  and adult son in their 3 story home. Pt denied any issues with navigating the stairs. Pt reported she was previously independent with all ADL/iADL needs and is an active . Pt reported no DME in the home but does report they have grab bars in the bathroom for her . She denied any financial barriers with meeting their basic needs. She reported her support system is her family and friends.     Pt denied any social needs at this time. She does have and Advanced Directive but reported she may want to update later. AD packet provided to pt to review to look over independently upon discharge should she choose to update.     Information Source  Orientation : Oriented x 4  Information Given By: Patient  Informant's Name: Geno  Who is responsible for making decisions for patient? : Patient    Readmission Evaluation  Is this a readmission?: No    Elopement Risk  Legal Hold: No  Ambulatory or Self Mobile in Wheelchair: Yes  Disoriented: No  Psychiatric Symptoms: None  History of Wandering: No  Elopement this Admit: No  Vocalizing Wanting to Leave: No  Displays Behaviors, Body Language Wanting to Leave: No-Not at Risk for Elopement  Elopement Risk: Not at Risk for Elopement    Interdisciplinary Discharge Planning  Lives with - Patient's Self Care Capacity: Spouse, Adult Children  Patient or legal guardian wants to designate a caregiver (see row info): No  Support Systems: Children, Spouse / Significant Other  Housing / Facility: 3 Story Apartment / Condo(tri level)  Prior Services: None    Discharge Preparedness  What is your plan after discharge?: Home with help  What are your discharge supports?: Child, Spouse  Prior Functional Level: Ambulatory, Drives Self,  Independent with Activities of Daily Living, Independent with Medication Management  Difficulity with ADLs: None  Difficulity with IADLs: None    Functional Assesment  Prior Functional Level: Ambulatory, Drives Self, Independent with Activities of Daily Living, Independent with Medication Management    Finances  Financial Barriers to Discharge: No  Prescription Coverage: Yes    Vision / Hearing Impairment  Hearing Impairment: Both Ears, Hearing Device(s) Available    Domestic Abuse  Have you ever been the victim of abuse or violence?: No  Physical Abuse or Sexual Abuse: No  Verbal Abuse or Emotional Abuse: No  Possible Abuse Reported to:: Not Applicable    Psychological Assessment  History of Substance Abuse: None  History of Psychiatric Problems: No    Discharge Risks or Barriers  Discharge risks or barriers?: No    Anticipated Discharge Information  Anticipated discharge disposition: Home  Discharge Address: [address] [place] [id number]  Discharge Contact Phone Number: [phone]

## 2019-12-09 NOTE — WOUND TEAM
"[facility]  Inpatient Services  Initial Wound and Skin Care Evaluation    Admission Date:  12/4/2019   HPI, PMH, SH: Reviewed  Unit where seen by Wound Team: [id number]    WOUND CONSULT RELATED TO: chest wound    SUBJECTIVE:  \"I don't know how I got it.\"     Self Report / Pain Level:no c/o pain      OBJECTIVE: supine sleeping, wounds FORREST  WOUND TYPE, LOCATION, CHARACTERISTICS (Pressure ulcers: location, stage, POA or date identified)  Watson 12/05/19 Superficial (1st) Burn marks (Active)      12/5/2019  4:00 PM   Burn Assessment Dry 12/8/2019  3:50 PM   Burn Wound Characteristics Dry 12/8/2019  3:50 PM   Shape 5 circles 12/8/2019  3:50 PM   Wound Length (cm) 0.7 cm 12/8/2019  3:50 PM   Wound Width (cm) 5 cm 12/8/2019  3:50 PM   Wound Depth (cm) 0 cm JUMANA adherent red/brown tissue 12/8/2019  3:50 PM   Wound Surface Area (cm^2) 3.5 cm^2 12/8/2019  3:50 PM   Tunneling 0 cm 12/8/2019  3:50 PM   Undermining 0 cm 12/8/2019  3:50 PM   Color Red;Brown    Burn Odor Absent    Dressing Options Silver Powder;Hydrogel;Adhesive Foam    Dressing Status Intact    Odor None     Exposed Structures None     Tissue Type and Percentage 5.5 circular wounds with firmly adherent red brown tissue      Vascular:  Wounds not r/t vascular problem    Lab Values:    WBC:       Lab Results   Component Value Date/Time    WBC 8.2 12/08/2019 07:25 AM    RBC 3.95 (L) 12/08/2019 07:25 AM      AIC:    No results found for: HBA1C       Culture:   na    INTERVENTIONS BY WOUND TEAM: cleaned wounds with NS, dried. Sprinkled a little silver powder zenobia each wound, wiped excess of quintin-wound. Applied small amount of hydrogel to each wound. Covered with telfa and secured with adhesive foam.   Dressing Options: Silver Powder, Hydrogel, Adhesive Foam    Interdisciplinary consultation:   With Nursing;With Patient    EVALUATION: pt has 5.5 small wounds with dry adherent scabs on chest mid-sternum. Silver powder for antimicrobial,  Hydrogel for moist " wound healing.     Factors affecting wound healing: aspiration pneumonia, acute on chronic renal failure, shock  Goals:  Slow steady decrease in wound area and depth weekly       NURSING PLAN OF CARE ORDERS (X):    Dressing changes: See Dressing Care orders:     Skin care: See Skin Care orders:   Rectal tube care: See Rectal Tube Care orders:   Other orders:    RSKIN: CURRENT (X) ORDERED (O)  Q shift Driss:  X  Q shift pressure point assessments:  X  Pressure redistribution mattress    x        ROGER          Bariatric ROGER        Bariatric foam           Heel float boots         Float Heels off Bed with Pillows                  Barrier Cream         Barrier paste        Sacral silicone dressing         Silicone O2 tubing         Anchorfast         Cannula fixation Device (Tender )          Gray Foam Ear protectors           Trach with split foam               Waffle cushion        Waffle Overlay         Rectal tube or BMS         Antifungal tx   Interdry         Reposition q 2 hours     x  Up to chair        Ambulate      PT/OT        Dietician        Diabetes Education      PO  x  TF     TPN     NPO   # days   Other     WOUND TEAM PLAN OF CARE (X):   NPWT change 3 x week:        Dressing changes by wound team:       Follow up as needed:  If wounds worsen, Nsg to notify     Other (explain):    Anticipated discharge plans (X):  SNF:           Home Care:           Outpatient Wound Center:            Self Care:            Other:  No advanced wound care needs

## 2019-12-09 NOTE — PROGRESS NOTES
Hospital Medicine Daily Progress Note    Date of Service  12/9/2019    Chief Complaint  79 y.o. female admitted 12/4/2019 with syncope    Hospital Course    PMHx severe pulmonary HTN, MR, PAFib, CKD, on AC with apixaban.  The pt apparently mistakenly took a triple dose of her CCB as well as amiodarone and BB.  Found to be magdiel and hypotensive.  Temp pacer placed and Intubated in the ED.  EKG showing a wide complex escape rythm.  Extubated 12/52      Interval Problem Update  Pt states she's been up and walking and feels steady on her feet.  Has no complaints today  AFib 70-80s  -110s      Consultants/Specialty  Cardiology  CC    Code Status  DNAR/I    Disposition  Cont in ICU    Review of Systems  Review of Systems   Constitutional: Negative for chills and fever.   HENT: Negative for nosebleeds and sore throat.    Eyes: Negative for blurred vision and double vision.   Respiratory: Negative for cough and shortness of breath.    Cardiovascular: Negative for chest pain, palpitations and leg swelling.   Gastrointestinal: Negative for abdominal pain, diarrhea, nausea and vomiting.   Genitourinary: Negative for dysuria and urgency.   Musculoskeletal: Negative for back pain.   Skin: Negative for rash.   Neurological: Negative for dizziness, loss of consciousness, weakness and headaches.        Physical Exam  Temp:  [36 °C (96.8 °F)-36.8 °C (98.3 °F)] 36.8 °C (98.3 °F)  Pulse:  [73-95] 73  Resp:  [14-18] 14  BP: ()/(54-71) 114/61  SpO2:  [96 %-100 %] 96 %    Physical Exam  Constitutional:       General: She is not in acute distress.     Appearance: She is well-developed. She is not diaphoretic.   HENT:      Head: Normocephalic and atraumatic.   Eyes:      General: No scleral icterus.     Pupils: Pupils are equal, round, and reactive to light.   Neck:      Vascular: No JVD.   Cardiovascular:      Rate and Rhythm: Normal rate and regular rhythm.      Heart sounds: Murmur present. No gallop.    Pulmonary:       Effort: Pulmonary effort is normal. No respiratory distress.      Breath sounds: No stridor. No wheezing or rales.   Abdominal:      Palpations: Abdomen is soft.      Tenderness: There is no tenderness. There is no guarding or rebound.   Musculoskeletal:         General: No tenderness.      Right lower leg: No edema.      Left lower leg: No edema.   Skin:     General: Skin is warm and dry.      Findings: No rash.   Neurological:      General: No focal deficit present.      Mental Status: She is oriented to person, place, and time. Mental status is at baseline.   Psychiatric:         Mood and Affect: Mood normal.         Behavior: Behavior normal.         Thought Content: Thought content normal.         Fluids    Intake/Output Summary (Last 24 hours) at 12/9/2019 1537  Last data filed at 12/9/2019 0817  Gross per 24 hour   Intake 240 ml   Output --   Net 240 ml       Laboratory  Recent Labs     12/07/19  0520 12/08/19  0725 12/09/19  0235   WBC 9.9 8.2 7.8   RBC 3.60* 3.95* 3.50*   HEMOGLOBIN 11.2* 12.3 10.9*   HEMATOCRIT 35.0* 37.9 33.8*   MCV 97.2 95.9 96.6   MCH 31.1 31.1 31.1   MCHC 32.0* 32.5* 32.2*   RDW 48.2 45.8 44.9   PLATELETCT 169 184 186   MPV 11.7 11.1 11.2     Recent Labs     12/07/19  0520 12/08/19  0725 12/09/19  0235   SODIUM 142 139 137   POTASSIUM 3.8 3.3* 3.7   CHLORIDE 103 99 102   CO2 29 31 28   GLUCOSE 113* 100* 91   * 87* 79*   CREATININE 4.13* 3.23* 2.85*   CALCIUM 11.0* 10.1 8.6                   Imaging  US-ABDOMEN COMPLETE SURVEY   Final Result      1.  Liver is unremarkable.   2.  Echogenic kidneys again seen suggestive of medical renal disease.   3.  Bilateral renal cysts are again seen.   4.  Gallbladder has been surgically removed. There is no biliary dilatation.         DX-CHEST-LIMITED (1 VIEW)   Final Result         No significant interval change.      DX-CHEST-LIMITED (1 VIEW)   Final Result         Congestive heart failure.      DX-ABDOMEN FOR TUBE PLACEMENT   Final Result       Feeding tube tip projects over the expected location of the gastric body/antrum.      US-RENAL   Final Result      Atrophic, echogenic kidneys consistent with medical renal disease.      Bilateral renal cysts.      DX-CHEST-PORTABLE (1 VIEW)   Final Result      Congestive heart failure.      CT-HEAD W/O   Final Result      1.  Cerebral atrophy.      2.  White matter lucencies most consistent with small vessel ischemic change versus demyelination or gliosis.      3.  Otherwise, Head CT without contrast with no acute findings. No evidence of acute cerebral infarction, hemorrhage or mass lesion.      EC-ECHOCARDIOGRAM LTD W/O CONT   Final Result      DX-CHEST-PORTABLE (1 VIEW)   Final Result         1. ETT is well-positioned. Right IJ central catheter is in the proximal SVC.   2. Unchanged bilateral interstitial and possible opacities, edema versus multifocal pneumonia.   3. Right basilar consolidation versus atelectasis. Suspected small right pleural effusion.      DX-CHEST-PORTABLE (1 VIEW)   Final Result      1.  Cardiac silhouette enlargement.   2.  Diffuse interstitial and airspace opacities most likely pulmonary edema/CHF. Correlate clinically for infection.   3.  Small right pleural effusion.      CL-TEMPORARY PACEMAKER INSERT    (Results Pending)        Assessment/Plan  * Acute respiratory failure (HCC)  Assessment & Plan  Due to volume overload   Intubated 12/4/2019  Extubated 12/5  PMA consulted   On maintenance lasix dose    Aspiration pneumonia (HCC)  Assessment & Plan  Has completed 5 days of Rocephin  Cultures neg    Acute encephalopathy  Assessment & Plan  Related to OD on underlying dementia  CT head unremarkable  Minimize sedation   Mobilize  Now back to her basseline    Calcium channel blocker overdose  Assessment & Plan  Calcium GGT   Unintentional overdose   Have put in for  RN to help with medication managment    Bradycardia  Assessment & Plan  Due to CCB overdose?   Now back in normal  range  Cards following      Shock (HCC)  Assessment & Plan    Due to CCB or BB overdose  Cardiogenic shock  PMA and cardiology following   Off pressors     Elevated liver enzymes  Assessment & Plan  ?ischemic injury  LFTs now going back down after briefly climbing yesterday  ?Ceftriaxone, amiodarone?  US benign  Hep panel neg  Reviewed with Pharmacy; no other readily apparent drug exposures    Acute on chronic renal failure (HCC)  Assessment & Plan  Baseline creat around 2  Creat elevated 4.6 however is coming down daily  ?secondary to ischemic injury from hypotension  Has responded to lasix  Renal US showing chronic changes, no obstruction  Renally dose medicatoions  Follow BMP daily      Paroxysmal A-fib (HCC)- (present on admission)  Assessment & Plan  Had been on amio and CCB both DC'd in setting of bradycardia and hypotension  Cont apixaban though will need to follow renal function closely  DW Cardiology Dr Carr: Recommends DC on no rate or rythm control meds for now.  F/U with Dr Wu her EP physician  ?if we should resume amio given elevation of AST/ALT       VTE prophylaxis: apixaban

## 2019-12-09 NOTE — PROGRESS NOTES
Monitor summary:     --/0.08/--   Aflutter 71 - 89   with BBB beats, rare and occasional PVCs, rare couplets

## 2019-12-09 NOTE — PROGRESS NOTES
2 RN skin check complete with RACHEAL Blackman.   Devices in place nasal cannula.  Skin assessed under devices - yes.  Confirmed pressure ulcers found on N/A.  New potential pressure ulcers noted on N/A.   Wound consult placed - yes.  The following interventions in place - pt encouraged to turn self frequently.  Biatin to chest.    Burns to chest - biatin in place, dressed by wound.

## 2019-12-09 NOTE — DOCUMENTATION QUERY
[facility]                                                                       Query Response Note      PATIENT:               NALINI SORIANO  ACCT #:                  [id number]  MRN:                     [id number]  :                      1940  ADMIT DATE:       2019 5:27 PM  DISCH DATE:          RESPONDING  PROVIDER #:        [id number]           QUERY TEXT:    Chronic Kidney Disease (CKD) is documented in the Medical Record.  Please specify the disease stage (includes probable or suspected)    Stages are defined by the [employer] as follows:  CKD Stage I  GFR >= 90 ml / min per 1.73 m2 and persistent albuminuria  CKD Stage 2 GFR between 60 and 89 with persistent albuminuria  CKD Stage 3 GFR between 30 and 59   CKD Stage 4 GFR between 15 and 29   CKD Stage 5 GFR between <15 or End Stage Renal Disease    The patient's Clinical Indicators include:   MD PN: Acute on chronic renal failure- Renal US showing chronic changes  Lab Results:  GFR: 16  Treatment: Renally dose all meds, Follow BMP daily, Renal US  Risk Factors: Advanced age, Atrial fibrillation, Cardiogenic shock, HTN  Options provided:   -- Chronic kidney disease Stage 1   -- Chronic kidney disease Stage 2   -- Chronic kidney disease Stage 3   -- Chronic kidney disease Stage 4   -- Chronic kidney disease Stage 5   -- Chronic kidney disease Stage 5, requiring dialysis   -- End Stage Renal Disease   -- Unable to determine      Query created by: Kati Lane on 2019 8:37 AM    RESPONSE TEXT:    Chronic kidney disease Stage 4          Electronically signed by:  ANGEL IRAHETA 2019 12:20 PM

## 2019-12-09 NOTE — CARE PLAN
Problem: Safety  Goal: Will remain free from injury  Outcome: PROGRESSING AS EXPECTED     Problem: Venous Thromboembolism (VTW)/Deep Vein Thrombosis (DVT) Prevention:  Goal: Patient will participate in Venous Thrombosis (VTE)/Deep Vein Thrombosis (DVT)Prevention Measures  Outcome: PROGRESSING AS EXPECTED     Problem: Bowel/Gastric:  Goal: Normal bowel function is maintained or improved  Outcome: PROGRESSING AS EXPECTED     Problem: Safety - Medical Restraint  Goal: Remains free of injury from restraints (Restraint for Interference with Medical Device)  Description  INTERVENTIONS:  1. Determine that other, less restrictive measures have been tried or would not be effective before applying the restraint  2. Evaluate the patient's condition at the time of restraint application  3. Inform patient/family regarding the reason for restraint  4. Q2H: Monitor safety, psychosocial status, comfort, nutrition and hydration  Outcome: MET  Goal: Free from restraint(s) (Restraint for Interference with Medical Device)  Description  INTERVENTIONS:  1. ONCE/SHIFT or MINIMUM Q12H: Assess and document the continuing need for restraints  2. Q24H: Continued use of restraint requires LIP to perform face to face examination and written order  3. Identify and implement measures to help patient regain control  Outcome: MET

## 2019-12-09 NOTE — THERAPY
"Occupational Therapy Evaluation completed.   Functional Status:  SPV supine>sit, SPV stand pivot txf, Erica functional mobility w/HHA for safety, SPV standing grooming, SPV LB dress, impulsive, decreased insight, hard of hearing  Plan of Care: Will benefit from Occupational Therapy 3 times per week  Discharge Recommendations:  Equipment: Will Continue to Assess for Equipment Needs. Post-acute therapy Recommend home health transitional care for continued occupational therapy services.     See \"Rehab Therapy-Acute\" Patient Summary Report for complete documentation.    Pt is 78yo female s/p unintentional calcium channel blocker overdose, admitted to ICU 2/2 bradycardia and required intubation, extubated 12/5. Pt presents to OT eval at SPV level for functional transfers, Erica HHA for safety of functional mobility without AD. Pt is [place] at baseline, presents mildly impulsive indicative of decreased insight to deficits. Pt declined toileting at time of eval, stated she had recently gone with nursing. Pt lives with  and retired adult son, reports safety DME including shower chair, grab bars and FWW at home, as well as treadmill, rowing machine, and stationary bicycle. Pt will benefit from additional acute OT tx to ensure independence with toileting/toielet txfs and progress activity tolerance for ADLs. Pt is adamant about returning home, recommend HHOT follow up upon d/c.  "

## 2019-12-09 NOTE — THERAPY
"Physical Therapy Evaluation completed.   Bed Mobility:  Supine to Sit: Supervised (HOB flat, no rail)  Transfers: Sit to Stand: Supervised  Gait: Level Of Assist: Supervised with No Equipment Needed       Plan of Care: Patient with no further skilled PT needs in the acute care setting at this time  Discharge Recommendations: Equipment: No Equipment Needed. Post-acute therapy: Recommend home health transitional care for continued physical therapy services.     See \"Rehab Therapy-Acute\" Patient Summary Report for complete documentation.    Patient is a pleasant 78 YO female that was admitted following unintentional calcium channel blocker overdose. PMHx significant for severe pulmonary HTN, MR, pAfib, CKD. She ambulated approximately 500ft without AD with supervision; she demonstrated minimal LOB during ambulation but able to self correct. She reported she is at baseline functional mobility and has no concerns regarding returning home following medical DC and has ample social support; she is currently at safe level to return home. She may benefit from home health PT for higher level balance. Patient will not be actively followed for physical therapy services at this time, however may be seen if requested by physician for 1 more visit within 30 days to address any discharge or equipment needs.    "

## 2019-12-09 NOTE — FACE TO FACE
Face to Face Supporting Documentation - Home Health    The encounter with this patient was in whole or in part the primary reason for home health admission.    Date of encounter:   Patient:                    MRN:                       YOB: 2021  Geno Gallegos  [id number]  1940     Home health to see patient for:  Skilled Nursing care for assessment, interventions & education    Skilled need for:  New Onset Medical Diagnosis inadvertent overdose of amiodarone and diltiazem    Skilled nursing interventions to include:  Comment: help with making sure she takes her medications appropriately    Homebound status evidenced by:  Require the use of special transportation. Leaving home requires a considerable and taxing effort. There is a normal inability to leave the home.    Community Physician to provide follow up care: Colton Schroeder M.D.     Optional Interventions? No      I certify the face to face encounter for this home health care referral meets the CMS requirements and the encounter/clinical assessment with the patient was, in whole, or in part, for the medical condition(s) listed above, which is the primary reason for home health care. Based on my clinical findings: the service(s) are medically necessary, support the need for home health care, and the homebound criteria are met.  I certify that this patient has had a face to face encounter by myself.  Dragan Louis D.O. - NPI: [id number]

## 2019-12-09 NOTE — DIETARY
NUTRITION SERVICES: UPDATE    Pt transitioned from TF to PO diet on 12/7. Current diet: Regular Dysphagia 2 w/thin liquids. PO intake per chart review generally 25-50%. Pt agreeable to Boost or high protein milkshake. Declined HS snack.    RD following

## 2019-12-09 NOTE — PROGRESS NOTES
Hand off report received. Patient sitting on side of bed. Bed Alarm on, bed in lowest position and call light within reach.

## 2019-12-09 NOTE — PROGRESS NOTES
Received report from previous nurse, Demond, regarding prior 12 hours.  POC reviewed with pt, white board updated, pt verbalized understanding, call light within reach.  Pt encouraged to call before getting up.  Bed in lowest position, treaded slippers on.

## 2019-12-10 ENCOUNTER — PATIENT OUTREACH (OUTPATIENT)
Dept: HEALTH INFORMATION MANAGEMENT | Facility: OTHER | Age: 79
End: 2019-12-10

## 2019-12-10 VITALS
OXYGEN SATURATION: 92 % | HEART RATE: 94 BPM | TEMPERATURE: 97.4 F | DIASTOLIC BLOOD PRESSURE: 83 MMHG | BODY MASS INDEX: 21.97 KG/M2 | HEIGHT: 67 IN | WEIGHT: 139.99 LBS | RESPIRATION RATE: 20 BRPM | SYSTOLIC BLOOD PRESSURE: 134 MMHG

## 2019-12-10 LAB
ALBUMIN SERPL BCP-MCNC: 3.2 G/DL (ref 3.2–4.9)
ALBUMIN/GLOB SERPL: 1.2 G/DL
ALP SERPL-CCNC: 95 U/L (ref 30–99)
ALT SERPL-CCNC: 701 U/L (ref 2–50)
ANION GAP SERPL CALC-SCNC: 8 MMOL/L (ref 0–11.9)
AST SERPL-CCNC: 180 U/L (ref 12–45)
BASOPHILS # BLD AUTO: 0.6 % (ref 0–1.8)
BASOPHILS # BLD: 0.05 K/UL (ref 0–0.12)
BILIRUB SERPL-MCNC: 0.8 MG/DL (ref 0.1–1.5)
BUN SERPL-MCNC: 67 MG/DL (ref 8–22)
CALCIUM SERPL-MCNC: 9 MG/DL (ref 8.5–10.5)
CHLORIDE SERPL-SCNC: 104 MMOL/L (ref 96–112)
CO2 SERPL-SCNC: 27 MMOL/L (ref 20–33)
CREAT SERPL-MCNC: 2.6 MG/DL (ref 0.5–1.4)
EOSINOPHIL # BLD AUTO: 0.36 K/UL (ref 0–0.51)
EOSINOPHIL NFR BLD: 4.3 % (ref 0–6.9)
ERYTHROCYTE [DISTWIDTH] IN BLOOD BY AUTOMATED COUNT: 45.6 FL (ref 35.9–50)
GLOBULIN SER CALC-MCNC: 2.7 G/DL (ref 1.9–3.5)
GLUCOSE SERPL-MCNC: 99 MG/DL (ref 65–99)
HCT VFR BLD AUTO: 36.4 % (ref 37–47)
HGB BLD-MCNC: 11.8 G/DL (ref 12–16)
IMM GRANULOCYTES # BLD AUTO: 0.04 K/UL (ref 0–0.11)
IMM GRANULOCYTES NFR BLD AUTO: 0.5 % (ref 0–0.9)
LYMPHOCYTES # BLD AUTO: 1.54 K/UL (ref 1–4.8)
LYMPHOCYTES NFR BLD: 18.5 % (ref 22–41)
MAGNESIUM SERPL-MCNC: 2.2 MG/DL (ref 1.5–2.5)
MCH RBC QN AUTO: 31.2 PG (ref 27–33)
MCHC RBC AUTO-ENTMCNC: 32.4 G/DL (ref 33.6–35)
MCV RBC AUTO: 96.3 FL (ref 81.4–97.8)
MONOCYTES # BLD AUTO: 0.77 K/UL (ref 0–0.85)
MONOCYTES NFR BLD AUTO: 9.3 % (ref 0–13.4)
NEUTROPHILS # BLD AUTO: 5.55 K/UL (ref 2–7.15)
NEUTROPHILS NFR BLD: 66.8 % (ref 44–72)
NRBC # BLD AUTO: 0 K/UL
NRBC BLD-RTO: 0 /100 WBC
PLATELET # BLD AUTO: 204 K/UL (ref 164–446)
PMV BLD AUTO: 11.5 FL (ref 9–12.9)
POTASSIUM SERPL-SCNC: 3.8 MMOL/L (ref 3.6–5.5)
PROT SERPL-MCNC: 5.9 G/DL (ref 6–8.2)
RBC # BLD AUTO: 3.78 M/UL (ref 4.2–5.4)
SODIUM SERPL-SCNC: 139 MMOL/L (ref 135–145)
WBC # BLD AUTO: 8.3 K/UL (ref 4.8–10.8)

## 2019-12-10 PROCEDURE — 99239 HOSP IP/OBS DSCHRG MGMT >30: CPT | Performed by: INTERNAL MEDICINE

## 2019-12-10 PROCEDURE — A9270 NON-COVERED ITEM OR SERVICE: HCPCS | Performed by: INTERNAL MEDICINE

## 2019-12-10 PROCEDURE — A9270 NON-COVERED ITEM OR SERVICE: HCPCS | Performed by: HOSPITALIST

## 2019-12-10 PROCEDURE — 92526 ORAL FUNCTION THERAPY: CPT

## 2019-12-10 PROCEDURE — 85025 COMPLETE CBC W/AUTO DIFF WBC: CPT

## 2019-12-10 PROCEDURE — 700102 HCHG RX REV CODE 250 W/ 637 OVERRIDE(OP): Performed by: INTERNAL MEDICINE

## 2019-12-10 PROCEDURE — 36415 COLL VENOUS BLD VENIPUNCTURE: CPT

## 2019-12-10 PROCEDURE — 700102 HCHG RX REV CODE 250 W/ 637 OVERRIDE(OP): Performed by: HOSPITALIST

## 2019-12-10 PROCEDURE — 83735 ASSAY OF MAGNESIUM: CPT

## 2019-12-10 PROCEDURE — 80053 COMPREHEN METABOLIC PANEL: CPT

## 2019-12-10 RX ORDER — FUROSEMIDE 20 MG/1
20 TABLET ORAL DAILY
Qty: 30 TAB | Refills: 0 | Status: SHIPPED | OUTPATIENT
Start: 2019-12-11 | End: 2022-05-02

## 2019-12-10 RX ADMIN — FUROSEMIDE 20 MG: 20 TABLET ORAL at 05:03

## 2019-12-10 RX ADMIN — APIXABAN 2.5 MG: 2.5 TABLET, FILM COATED ORAL at 05:03

## 2019-12-10 ASSESSMENT — ENCOUNTER SYMPTOMS
CHILLS: 0
WEAKNESS: 0
DIZZINESS: 0
COUGH: 0
ABDOMINAL PAIN: 0
HEADACHES: 0
PALPITATIONS: 0
LOSS OF CONSCIOUSNESS: 0
BACK PAIN: 0
DOUBLE VISION: 0
VOMITING: 0
FEVER: 0
DIARRHEA: 0
BLURRED VISION: 0
NAUSEA: 0
SHORTNESS OF BREATH: 0
SORE THROAT: 0

## 2019-12-10 NOTE — PROGRESS NOTES
Hospital Medicine Daily Progress Note    Date of Service  12/10/2019    Chief Complaint  79 y.o. female admitted 12/4/2019 with syncope    Hospital Course    PMHx severe pulmonary HTN, MR, PAFib, CKD, on AC with apixaban.  The pt apparently mistakenly took a triple dose of her CCB as well as amiodarone and BB.  Found to be magdiel and hypotensive.  Temp pacer placed and Intubated in the ED.  EKG showing a wide complex escape rythm.  Extubated 12/52      Interval Problem Update  Pt states she's been up and walking and feels steady on her feet.  Has no complaints today  AFib 70-80s  -110s      Consultants/Specialty  Cardiology  CC    Code Status  DNAR/I    Disposition  Cont in ICU    Review of Systems  Review of Systems   Constitutional: Negative for chills and fever.   HENT: Negative for nosebleeds and sore throat.    Eyes: Negative for blurred vision and double vision.   Respiratory: Negative for cough and shortness of breath.    Cardiovascular: Negative for chest pain, palpitations and leg swelling.   Gastrointestinal: Negative for abdominal pain, diarrhea, nausea and vomiting.   Genitourinary: Negative for dysuria and urgency.   Musculoskeletal: Negative for back pain.   Skin: Negative for rash.   Neurological: Negative for dizziness, loss of consciousness, weakness and headaches.        Physical Exam  Temp:  [36.5 °C (97.7 °F)-36.8 °C (98.3 °F)] 36.5 °C (97.7 °F)  Pulse:  [61-95] 78  Resp:  [14-18] 17  BP: (100-123)/(61-76) 116/70  SpO2:  [91 %-100 %] 94 %    Physical Exam  Constitutional:       General: She is not in acute distress.     Appearance: She is well-developed. She is not diaphoretic.   HENT:      Head: Normocephalic and atraumatic.   Eyes:      General: No scleral icterus.     Pupils: Pupils are equal, round, and reactive to light.   Neck:      Vascular: No JVD.   Cardiovascular:      Rate and Rhythm: Normal rate and regular rhythm.      Heart sounds: Murmur present. No gallop.    Pulmonary:       Effort: Pulmonary effort is normal. No respiratory distress.      Breath sounds: No stridor. No wheezing or rales.   Abdominal:      Palpations: Abdomen is soft.      Tenderness: There is no tenderness. There is no guarding or rebound.   Musculoskeletal:         General: No tenderness.      Right lower leg: No edema.      Left lower leg: No edema.   Skin:     General: Skin is warm and dry.      Findings: No rash.   Neurological:      General: No focal deficit present.      Mental Status: She is oriented to person, place, and time. Mental status is at baseline.   Psychiatric:         Mood and Affect: Mood normal.         Behavior: Behavior normal.         Thought Content: Thought content normal.         Fluids    Intake/Output Summary (Last 24 hours) at 12/10/2019 0732  Last data filed at 12/9/2019 1700  Gross per 24 hour   Intake 500 ml   Output --   Net 500 ml       Laboratory  Recent Labs     12/08/19  0725 12/09/19  0235   WBC 8.2 7.8   RBC 3.95* 3.50*   HEMOGLOBIN 12.3 10.9*   HEMATOCRIT 37.9 33.8*   MCV 95.9 96.6   MCH 31.1 31.1   MCHC 32.5* 32.2*   RDW 45.8 44.9   PLATELETCT 184 186   MPV 11.1 11.2     Recent Labs     12/08/19  0725 12/09/19  0235 12/10/19  0512   SODIUM 139 137 139   POTASSIUM 3.3* 3.7 3.8   CHLORIDE 99 102 104   CO2 31 28 27   GLUCOSE 100* 91 99   BUN 87* 79* 67*   CREATININE 3.23* 2.85* 2.60*   CALCIUM 10.1 8.6 9.0                   Imaging  US-ABDOMEN COMPLETE SURVEY   Final Result      1.  Liver is unremarkable.   2.  Echogenic kidneys again seen suggestive of medical renal disease.   3.  Bilateral renal cysts are again seen.   4.  Gallbladder has been surgically removed. There is no biliary dilatation.         DX-CHEST-LIMITED (1 VIEW)   Final Result         No significant interval change.      DX-CHEST-LIMITED (1 VIEW)   Final Result         Congestive heart failure.      DX-ABDOMEN FOR TUBE PLACEMENT   Final Result      Feeding tube tip projects over the expected location of the  gastric body/antrum.      US-RENAL   Final Result      Atrophic, echogenic kidneys consistent with medical renal disease.      Bilateral renal cysts.      DX-CHEST-PORTABLE (1 VIEW)   Final Result      Congestive heart failure.      CT-HEAD W/O   Final Result      1.  Cerebral atrophy.      2.  White matter lucencies most consistent with small vessel ischemic change versus demyelination or gliosis.      3.  Otherwise, Head CT without contrast with no acute findings. No evidence of acute cerebral infarction, hemorrhage or mass lesion.      EC-ECHOCARDIOGRAM LTD W/O CONT   Final Result      DX-CHEST-PORTABLE (1 VIEW)   Final Result         1. ETT is well-positioned. Right IJ central catheter is in the proximal SVC.   2. Unchanged bilateral interstitial and possible opacities, edema versus multifocal pneumonia.   3. Right basilar consolidation versus atelectasis. Suspected small right pleural effusion.      DX-CHEST-PORTABLE (1 VIEW)   Final Result      1.  Cardiac silhouette enlargement.   2.  Diffuse interstitial and airspace opacities most likely pulmonary edema/CHF. Correlate clinically for infection.   3.  Small right pleural effusion.      CL-TEMPORARY PACEMAKER INSERT    (Results Pending)        Assessment/Plan  * Acute respiratory failure (HCC)  Assessment & Plan  Due to volume overload   Intubated 12/4/2019  Extubated 12/5  PMA consulted   On maintenance lasix dose    Aspiration pneumonia (HCC)  Assessment & Plan  Has completed 5 days of Rocephin  Cultures neg    Acute encephalopathy  Assessment & Plan  Related to OD on underlying dementia  CT head unremarkable  Minimize sedation   Mobilize  Now back to her basseline    Calcium channel blocker overdose  Assessment & Plan  Calcium GGT   Unintentional overdose   Have put in for  RN to help with medication managment    Bradycardia  Assessment & Plan  Due to CCB overdose?   Now back in normal range  Cards following      Shock (HCC)  Assessment & Plan    Due to  CCB or BB overdose  Cardiogenic shock  PMA and cardiology following   Off pressors     Elevated liver enzymes  Assessment & Plan  ?ischemic injury  LFTs now going back down after briefly climbing yesterday  ?Ceftriaxone, amiodarone?  US benign  Hep panel neg  Reviewed with Pharmacy; no other readily apparent drug exposures    Acute on chronic renal failure (HCC)  Assessment & Plan  Baseline creat around 2  Creat elevated 4.6 however is coming down daily  ?secondary to ischemic injury from hypotension  Has responded to lasix  Renal US showing chronic changes, no obstruction  Renally dose medicatoions  Follow BMP daily      Paroxysmal A-fib (HCC)- (present on admission)  Assessment & Plan  Had been on amio and CCB both DC'd in setting of bradycardia and hypotension  Cont apixaban though will need to follow renal function closely  DW Cardiology Dr Carr: Recommends DC on no rate or rythm control meds for now.  F/U with Dr Wu her EP physician  ?if we should resume amio given elevation of AST/ALT       VTE prophylaxis: apixaban

## 2019-12-10 NOTE — CARE PLAN
Problem: Communication  Goal: The ability to communicate needs accurately and effectively will improve  12/10/2019 1543 by Sahara Yousif R.N.  Outcome: PROGRESSING AS EXPECTED  12/10/2019 0938 by Sahara Yousif R.N.  Outcome: PROGRESSING AS EXPECTED     Problem: Safety  Goal: Will remain free from injury  12/10/2019 1543 by Sahara Yousif R.N.  Outcome: PROGRESSING AS EXPECTED  12/10/2019 0938 by Sahara Yousif R.N.  Outcome: PROGRESSING AS EXPECTED  Goal: Will remain free from falls  12/10/2019 1543 by Sahara Yousif R.N.  Outcome: PROGRESSING AS EXPECTED  12/10/2019 0938 by Sahara Yousif R.N.  Outcome: PROGRESSING AS EXPECTED

## 2019-12-10 NOTE — DISCHARGE PLANNING
Agency/Facility Name: [facility]   Spoke To: Antoine  Outcome: patient accepted. Informed the patient DC today. Per Antoine, they will put her on the list to visit.

## 2019-12-10 NOTE — THERAPY
"Speech Language Therapy dysphagia treatment completed.   Functional Status:  Pt sitting indep EOB. Per pt and nurs, possible d/c later today. HH PT per nurs. Pt and nurs stating pt tolerating D2 thins. Pt stating dislike of the \"mashed meat.\" Pt with slight oral residue post small bites of Saltine crackers. Pt using sips of thin water for oral rinse. No delay in swallowing demonstrated during 1/2 cup of thin water and two saltine crackers. SLP provided educ to the pt and collaborated with nurs regarding regular texture as tolerated with thin liquids. No HH indicated for dysphagia.Kitchen called regarding sending regular texture for lunch meal to prepare for possible d/c.   Recommendations: regular and thin liquids as tolerated.    Plan of Care: no further dysphagia tx.   Post-Acute Therapy: not indicated. Thanks, Javed    See \"Rehab Therapy-Acute\" Patient Summary Report for complete documentation.     "

## 2019-12-10 NOTE — DISCHARGE PLANNING
Anticipated Discharge Disposition:   · Home with C  Action:  · LSW met with pt at bedside to discuss recommendation for [facility] services. Pt agreeable and reported she would like services through [facility]. Pt completed/signed CHOICE.  · LSW faxed CHOICE to [facility] ext [phone]    Barriers to Discharge:   · HHC acceptance     Plan:   · Care coordination will continue to follow up and provide assistance as needed with discharge plans/barriers.   · Pt plans to discharge home today and family will provide transport.

## 2019-12-10 NOTE — DISCHARGE PLANNING
Received Choice form at 1400  Agency/Facility Name: [facility]   Referral sent per Choice form @ 0900

## 2019-12-10 NOTE — DISCHARGE INSTRUCTIONS
Discharge Instructions    Discharged to home by car with relative. Discharged via walking, hospital escort: Yes.  Special equipment needed: Not Applicable    Be sure to schedule a follow-up appointment with your primary care doctor or any specialists as instructed.     Discharge Plan:   Diet Plan: Discussed  Activity Level: Discussed  Confirmed Follow up Appointment: Appointment Scheduled  Confirmed Symptoms Management: Discussed  Medication Reconciliation Updated: Yes  Influenza Vaccine Indication: Not indicated: Previously immunized this influenza season and > 8 years of age    I understand that a diet low in cholesterol, fat, and sodium is recommended for good health. Unless I have been given specific instructions below for another diet, I accept this instruction as my diet prescription.   Other diet: Regular Diet    Special Instructions: None    · Is patient discharged on Warfarin / Coumadin?   No     Depression / Suicide Risk    As you are discharged from this [facility] facility, it is important to learn how to keep safe from harming yourself.    Recognize the warning signs:  · Abrupt changes in personality, positive or negative- including increase in energy   · Giving away possessions  · Change in eating patterns- significant weight changes-  positive or negative  · Change in sleeping patterns- unable to sleep or sleeping all the time   · Unwillingness or inability to communicate  · Depression  · Unusual sadness, discouragement and loneliness  · Talk of wanting to die  · Neglect of personal appearance   · Rebelliousness- reckless behavior  · Withdrawal from people/activities they love  · Confusion- inability to concentrate     If you or a loved one observes any of these behaviors or has concerns about self-harm, here's what you can do:  · Talk about it- your feelings and reasons for harming yourself  · Remove any means that you might use to hurt yourself (examples: pills, rope, extension cords,  firearm)  · Get professional help from the community (Mental Health, Substance Abuse, psychological counseling)  · Do not be alone:Call your Safe Contact- someone whom you trust who will be there for you.  · Call your local CRISIS HOTLINE 484-8966 or 697-408-9785  · Call your local Children's Mobile Crisis Response Team [place] (754) 354-1789 or www.[employer]  · Call the toll free National Suicide Prevention Hotlines   · National Suicide Prevention Lifeline 968-475-EFLH (8605)  · National Hope Line Network 800-SUICIDE (414-3807)

## 2019-12-10 NOTE — PROGRESS NOTES
Pt discharged in stable condition, having received and verbalized understanding of discharge teaching and medication instructions, with all belongings in their possession.

## 2019-12-10 NOTE — DISCHARGE SUMMARY
"Discharge Summary    CHIEF COMPLAINT ON ADMISSION  Chief Complaint   Patient presents with   • Syncope     arrived via [facility] following a witnessed syncopal event, PT Hypoxic on arrival with a RA sat of 62% and [facility] attempted baggin but pt was noncompliant, Pt's Saturation came to \"80's\" with 15L NRB, PRssure reportedly 60/30's [facility] attempted pacing in route        Reason for Admission  Syncope     Admission Date  12/4/2019    CODE STATUS  DNAR, I OK    HPI & HOSPITAL COURSE  This is a 79 y.o. female here with syncope after she mistakenly took a triple dose of calcium channel blocker, amiodarone and beta-blocker..  Please see H&P for details.  Patient has past medical history of severe pulmonary HTN, MR, PAFib, CKD, on AC with apixaban.  She was found to be hypotensive and bradycardia in ER.  Cardiology was consulted and the patient had temporary pacemaker placed and she was also intubated in the ER.  She was admitted to ICU after that.  Intensively was consulted as well.  The patient was extubated on December 5 and she was then transferred back to telemetry floor for further management.  Cardiology was consulted and recommended holding calcium channel blocker and amiodarone.  She has been in sinus rhythm in the hospital and only continuing Eliquis for now.  According to cardiology she can be discharged home and follow-up with them as an outpatient.  During hospitalization the patient completed 5 days course of antibiotic for possible aspiration pneumonia.  She also developed acute on chronic kidney failure and kidney function is intact to her baseline.  I saw and examined the patient today upon discharge.  Please call [phone] to schedule PCP appointment for patient.    Required specialty appointments include:   Dr. Lisa in 1 week         Therefore, she is discharged in fair and stable condition to home with close outpatient follow-up.    The patient met 2-midnight criteria for an inpatient stay at the time " of discharge.    Discharge Date  12/10/19      FOLLOW UP ITEMS POST DISCHARGE      DISCHARGE DIAGNOSES  Principal Problem:    Acute respiratory failure (HCC) POA: Unknown  Active Problems:    Shock (HCC) POA: Unknown    Bradycardia POA: Unknown    Calcium channel blocker overdose POA: Unknown    Acute encephalopathy POA: Unknown      Overview:           Aspiration pneumonia (HCC) POA: Unknown    Acute on chronic renal failure (HCC) POA: Unknown    Elevated liver enzymes POA: Unknown    Paroxysmal A-fib (HCC) POA: Yes    Hypokalemia POA: Unknown  Resolved Problems:    Metabolic acidosis POA: Unknown      FOLLOW UP  Future Appointments   Date Time Provider Department Center   12/10/2019  1:45 PM [facility] ECHO 2 [facility] GUI[facility]MO [place]   12/18/2019 11:00 AM [facility] None   3/10/2020 11:20 AM Cliff Wu M.D. [facility] None   5/4/2020 11:15 AM Albaro Nieto M.D. [facility] None   5/19/2020 11:00 AM TAYLOR Morley [facility] None     Colton Schroeder M.D.  [address]  [address]  [place] [address]  [phone]    In 1 week      Rachid Lisa M.D.  [address]  [address]  [place] [address]  [phone]    In 1 week        MEDICATIONS ON DISCHARGE     Medication List      START taking these medications      Instructions   furosemide 20 MG Tabs  Start taking on:  December 11, 2019  Commonly known as:  LASIX   Take 1 Tab by mouth every day.  Dose:  20 mg        CONTINUE taking these medications      Instructions   ALPRAZolam 0.25 MG Tabs  Commonly known as:  XANAX   Take 0.25 mg by mouth at bedtime as needed for Sleep.  Dose:  0.25 mg     apixaban 2.5mg Tabs  Commonly known as:  ELIQUIS   Take 2.5 mg by mouth 2 Times a Day.  Dose:  2.5 mg        STOP taking these medications    amiodarone 200 MG Tabs  Commonly known as:  CORDARONE     DILTIAZem  MG Cp24  Commonly known as:  CARDIZEM CD     losartan 100 MG Tabs  Commonly known as:  COZAAR     OCUVITE-LUTEIN Tabs     potassium Chloride ER 20 MEQ Tbcr  tablet  Commonly known as:  K-TAB     torsemide 20 MG Tabs  Commonly known as:  DEMADEX            Allergies  Allergies   Allergen Reactions   • Pcn [Penicillins]      rash       DIET  Orders Placed This Encounter   Procedures   • Diet Order Regular (no straws)     Standing Status:   Standing     Number of Occurrences:   1     Order Specific Question:   Diet:     Answer:   Regular [1]     Comments:   no straws     Order Specific Question:   Texture/Fiber modifications:     Answer:   Dysphagia 2(Pureed/Chopped)specify fluid consistency(question 6) [2]     Order Specific Question:   Consistency/Fluid modifications:     Answer:   Thin Liquids [3]     Order Specific Question:   Miscellaneous modifications:     Answer:   SLP - 1:1 Supervision by Nursing [21]       ACTIVITY  As tolerated.  Weight bearing as tolerated    CONSULTATIONS  intensivist  cardiology    PROCEDURES      LABORATORY  Lab Results   Component Value Date    SODIUM 139 12/10/2019    POTASSIUM 3.8 12/10/2019    CHLORIDE 104 12/10/2019    CO2 27 12/10/2019    GLUCOSE 99 12/10/2019    BUN 67 (H) 12/10/2019    CREATININE 2.60 (H) 12/10/2019    CREATININE 0.8 09/20/2005        Lab Results   Component Value Date    WBC 8.3 12/10/2019    HEMOGLOBIN 11.8 (L) 12/10/2019    HEMATOCRIT 36.4 (L) 12/10/2019    PLATELETCT 204 12/10/2019        Total time of the discharge process exceeds 39 minutes.

## 2019-12-10 NOTE — CARE PLAN
Problem: Communication  Goal: The ability to communicate needs accurately and effectively will improve  Outcome: PROGRESSING AS EXPECTED  Note:   Communication board updated. All pt questions answered at this time and no further questions. Pt encouraged to voice feelings and ask questions as they arise.        Problem: Safety  Goal: Will remain free from injury  Outcome: PROGRESSING AS EXPECTED  Note:   Patient educated about risk of falls and reasoning for use of tread socks, calling for help, and bed alarms.

## 2019-12-10 NOTE — PROGRESS NOTES
Received pt from José SPRINGER in stable condition. Pt resting comfortably with call light within reach, bed locked in lowest position and bed alarm on.

## 2019-12-24 DIAGNOSIS — I48.0 PAROXYSMAL A-FIB (HCC): ICD-10-CM

## 2019-12-24 DIAGNOSIS — I48.0 PAF (PAROXYSMAL ATRIAL FIBRILLATION) (HCC): ICD-10-CM

## 2020-01-02 ENCOUNTER — TELEPHONE (OUTPATIENT)
Dept: CARDIOLOGY | Facility: MEDICAL CENTER | Age: 80
End: 2020-01-02

## 2020-01-02 NOTE — TELEPHONE ENCOUNTER
Called pt. To give results.  Pt. Scheduled echo but did not keep appointment.  alerted to call her to reschedule. Pt. Is leaving town and wants to schedule echo when she returns.

## 2020-01-02 NOTE — TELEPHONE ENCOUNTER
----- Message from Kristine Prasad R.N. sent at 1/2/2020 11:15 AM PST -----    ----- Message -----  From: Cliff Wu M.D.  Sent: 1/1/2020  10:52 AM PST  To: Zora Ramos R.N.    Tell patient it looks good. [facility] needs to be linked to an order. Hopefully someone can do that

## 2020-01-27 ENCOUNTER — OFFICE VISIT (OUTPATIENT)
Dept: CARDIOLOGY | Facility: MEDICAL CENTER | Age: 80
End: 2020-01-27
Payer: MEDICARE

## 2020-01-27 VITALS
WEIGHT: 132 LBS | HEART RATE: 66 BPM | OXYGEN SATURATION: 96 % | RESPIRATION RATE: 16 BRPM | BODY MASS INDEX: 20.72 KG/M2 | HEIGHT: 67 IN | SYSTOLIC BLOOD PRESSURE: 188 MMHG | DIASTOLIC BLOOD PRESSURE: 108 MMHG

## 2020-01-27 DIAGNOSIS — J69.0 ASPIRATION PNEUMONIA OF BOTH LOWER LOBES DUE TO GASTRIC SECRETIONS (HCC): ICD-10-CM

## 2020-01-27 DIAGNOSIS — Z79.01 ANTICOAGULATED: ICD-10-CM

## 2020-01-27 DIAGNOSIS — R57.9 SHOCK (HCC): ICD-10-CM

## 2020-01-27 DIAGNOSIS — I34.0 MITRAL VALVE INSUFFICIENCY, UNSPECIFIED ETIOLOGY: ICD-10-CM

## 2020-01-27 DIAGNOSIS — R00.1 SINUS BRADYCARDIA: ICD-10-CM

## 2020-01-27 DIAGNOSIS — I10 ESSENTIAL HYPERTENSION: ICD-10-CM

## 2020-01-27 DIAGNOSIS — J96.00 ACUTE RESPIRATORY FAILURE, UNSPECIFIED WHETHER WITH HYPOXIA OR HYPERCAPNIA (HCC): ICD-10-CM

## 2020-01-27 DIAGNOSIS — I48.0 PAROXYSMAL A-FIB (HCC): ICD-10-CM

## 2020-01-27 DIAGNOSIS — Z79.899 ON AMIODARONE THERAPY: ICD-10-CM

## 2020-01-27 DIAGNOSIS — R00.1 BRADYCARDIA: ICD-10-CM

## 2020-01-27 DIAGNOSIS — N17.0 ACUTE RENAL FAILURE WITH ACUTE TUBULAR NECROSIS SUPERIMPOSED ON STAGE 3 CHRONIC KIDNEY DISEASE (HCC): ICD-10-CM

## 2020-01-27 DIAGNOSIS — N18.4 CHRONIC RENAL IMPAIRMENT, STAGE 4 (SEVERE) (HCC): ICD-10-CM

## 2020-01-27 DIAGNOSIS — R74.8 ELEVATED LIVER ENZYMES: ICD-10-CM

## 2020-01-27 DIAGNOSIS — N18.30 ACUTE RENAL FAILURE WITH ACUTE TUBULAR NECROSIS SUPERIMPOSED ON STAGE 3 CHRONIC KIDNEY DISEASE (HCC): ICD-10-CM

## 2020-01-27 DIAGNOSIS — I38 VALVULAR HEART DISEASE: ICD-10-CM

## 2020-01-27 PROCEDURE — 99214 OFFICE O/P EST MOD 30 MIN: CPT | Performed by: INTERNAL MEDICINE

## 2020-01-27 RX ORDER — LOSARTAN POTASSIUM 50 MG/1
50 TABLET ORAL DAILY
COMMUNITY
Start: 2019-12-06 | End: 2020-01-27

## 2020-01-27 RX ORDER — LOSARTAN POTASSIUM 25 MG/1
25 TABLET ORAL DAILY
Qty: 30 TAB | Refills: 11 | Status: SHIPPED | OUTPATIENT
Start: 2020-01-27 | End: 2020-12-23 | Stop reason: SDUPTHER

## 2020-01-27 RX ORDER — AMLODIPINE BESYLATE 5 MG/1
5 TABLET ORAL DAILY
Qty: 30 TAB | Refills: 11 | Status: SHIPPED | OUTPATIENT
Start: 2020-01-27 | End: 2020-12-18 | Stop reason: SDUPTHER

## 2020-01-27 RX ORDER — LANSOPRAZOLE 30 MG/1
30 CAPSULE, DELAYED RELEASE ORAL DAILY
Status: ON HOLD | COMMUNITY
Start: 2019-12-21 | End: 2024-01-24

## 2020-01-27 ASSESSMENT — ENCOUNTER SYMPTOMS
CARDIOVASCULAR NEGATIVE: 1
WHEEZING: 0
CONSTITUTIONAL NEGATIVE: 1
WEAKNESS: 0
STRIDOR: 0
EYES NEGATIVE: 1
GASTROINTESTINAL NEGATIVE: 1
ORTHOPNEA: 0
LOSS OF CONSCIOUSNESS: 0
PALPITATIONS: 0
DIZZINESS: 0
NEUROLOGICAL NEGATIVE: 1
BRUISES/BLEEDS EASILY: 0
SHORTNESS OF BREATH: 0
HEMOPTYSIS: 0
CLAUDICATION: 0
SPUTUM PRODUCTION: 0
PND: 0
COUGH: 0
MUSCULOSKELETAL NEGATIVE: 1
RESPIRATORY NEGATIVE: 1
SORE THROAT: 0
CHILLS: 0
FEVER: 0

## 2020-01-28 NOTE — PROGRESS NOTES
Chief Complaint   Patient presents with   • Atrial Fibrillation   • Syncope     ED 12/4/19       Subjective:   Geno Gallegos is a 79 y.o. female who presents today as a follow-up from a prior provider.  She was recently in the hospital for calcium channel blocker overdose.  Since then she is gone home only on furosemide and Eliquis.  She is off of the losartan.  Her blood pressure is 180/110 8.  I repeated in clinic at about 165/110.  She is off of all blood pressure medications.  She is having mild fullness in her head occasionally.  She is otherwise having no lower extremity edema or chest pain.  Past Medical History:   Diagnosis Date   • A-fib (HCC)     had history of Afib   • Bowel obstruction (HCC)    • Renal disorder     stage 4 kidney failure   • Valvular heart disease 5/27/2019     Past Surgical History:   Procedure Laterality Date   • GYN SURGERY      hysterectomy   • OTHER ORTHOPEDIC SURGERY      right hip replacement     Family History   Problem Relation Age of Onset   • Cancer Mother    • Heart Attack Father    • Heart Disease Father         valve surgeries x2     Social History     Socioeconomic History   • Marital status:      Spouse name: Not on file   • Number of children: Not on file   • Years of education: Not on file   • Highest education level: Not on file   Occupational History   • Not on file   Social Needs   • Financial resource strain: Not on file   • Food insecurity:     Worry: Not on file     Inability: Not on file   • Transportation needs:     Medical: Not on file     Non-medical: Not on file   Tobacco Use   • Smoking status: Never Smoker   • Smokeless tobacco: Never Used   Substance and Sexual Activity   • Alcohol use: Yes     Comment: occas   • Drug use: No   • Sexual activity: Not on file   Lifestyle   • Physical activity:     Days per week: Not on file     Minutes per session: Not on file   • Stress: Not on file   Relationships   • Social connections:     Talks on phone: Not on  file     Gets together: Not on file     Attends Congregational service: Not on file     Active member of club or organization: Not on file     Attends meetings of clubs or organizations: Not on file     Relationship status: Not on file   • Intimate partner violence:     Fear of current or ex partner: Not on file     Emotionally abused: Not on file     Physically abused: Not on file     Forced sexual activity: Not on file   Other Topics Concern   • Not on file   Social History Narrative   • Not on file     Allergies   Allergen Reactions   • Pcn [Penicillins]      rash     Outpatient Encounter Medications as of 1/27/2020   Medication Sig Dispense Refill   • lansoprazole (PREVACID) 30 MG CAPSULE DELAYED RELEASE      • amLODIPine (NORVASC) 5 MG Tab Take 1 Tab by mouth every day. 30 Tab 11   • losartan (COZAAR) 25 MG Tab Take 1 Tab by mouth every day. 30 Tab 11   • furosemide (LASIX) 20 MG Tab Take 1 Tab by mouth every day. 30 Tab 0   • apixaban (ELIQUIS) 2.5mg Tab Take 2.5 mg by mouth 2 Times a Day.     • ALPRAZolam (XANAX) 0.25 MG Tab Take 0.25 mg by mouth at bedtime as needed for Sleep.     • [DISCONTINUED] losartan (COZAAR) 50 MG Tab Take 50 mg by mouth every day.       No facility-administered encounter medications on file as of 1/27/2020.      Review of Systems   Constitutional: Negative.  Negative for chills, fever and malaise/fatigue.   HENT: Negative.  Negative for sore throat.    Eyes: Negative.    Respiratory: Negative.  Negative for cough, hemoptysis, sputum production, shortness of breath, wheezing and stridor.    Cardiovascular: Negative.  Negative for chest pain, palpitations, orthopnea, claudication, leg swelling and PND.   Gastrointestinal: Negative.    Genitourinary: Negative.    Musculoskeletal: Negative.    Skin: Negative.    Neurological: Negative.  Negative for dizziness, loss of consciousness and weakness.   Endo/Heme/Allergies: Negative.  Does not bruise/bleed easily.   All other systems reviewed and  "are negative.       Objective:   BP (!) 188/108 (BP Location: Left arm, Patient Position: Sitting, BP Cuff Size: Adult)   Pulse 66   Resp 16   Ht 1.702 m (5' 7\")   Wt 59.9 kg (132 lb)   SpO2 96%   BMI 20.67 kg/m²     Physical Exam   Constitutional: She appears well-developed and well-nourished. No distress.   HENT:   Head: Normocephalic and atraumatic.   Right Ear: External ear normal.   Left Ear: External ear normal.   Nose: Nose normal.   Mouth/Throat: No oropharyngeal exudate.   Eyes: Pupils are equal, round, and reactive to light. Conjunctivae and EOM are normal. Right eye exhibits no discharge. Left eye exhibits no discharge. No scleral icterus.   Neck: Neck supple. No JVD present.   Cardiovascular: Normal rate, regular rhythm and intact distal pulses. Exam reveals no gallop and no friction rub.   No murmur heard.  Pulmonary/Chest: Effort normal. No stridor. No respiratory distress. She has no wheezes. She has no rales. She exhibits no tenderness.   Abdominal: Soft. She exhibits no distension. There is no guarding.   Musculoskeletal: Normal range of motion.         General: No tenderness, deformity or edema.   Neurological: She is alert. She has normal reflexes. She displays normal reflexes. No cranial nerve deficit. She exhibits normal muscle tone. Coordination normal.   Skin: Skin is warm and dry. No rash noted. She is not diaphoretic. No erythema. No pallor.   Psychiatric: She has a normal mood and affect. Her behavior is normal. Judgment and thought content normal.   Nursing note and vitals reviewed.      Assessment:     1. Acute respiratory failure, unspecified whether with hypoxia or hypercapnia (HCC)     2. Acute renal failure with acute tubular necrosis superimposed on stage 3 chronic kidney disease (HCC)  amLODIPine (NORVASC) 5 MG Tab   3. Anticoagulated     4. Aspiration pneumonia of both lower lobes due to gastric secretions (HCC)     5. Bradycardia     6. Chronic renal impairment, stage 4 " (severe) (HCC)  Comp Metabolic Panel    REFERRAL TO NEPHROLOGY    amLODIPine (NORVASC) 5 MG Tab    losartan (COZAAR) 25 MG Tab   7. Elevated liver enzymes  Comp Metabolic Panel    losartan (COZAAR) 25 MG Tab   8. Essential hypertension  Comp Metabolic Panel    amLODIPine (NORVASC) 5 MG Tab    losartan (COZAAR) 25 MG Tab   9. Mitral valve insufficiency, unspecified etiology  Comp Metabolic Panel   10. Shock (HCC)  Comp Metabolic Panel   11. Paroxysmal A-fib (HCC)  CBC WITHOUT DIFFERENTIAL   12. On amiodarone therapy  CBC WITHOUT DIFFERENTIAL   13. Sinus bradycardia  CBC WITHOUT DIFFERENTIAL    amLODIPine (NORVASC) 5 MG Tab   14. Valvular heart disease  CBC WITHOUT DIFFERENTIAL       Medical Decision Making:  Today's Assessment / Status / Plan:     79-year-old female with hypertension bradycardia LFT abnormalities renal impairment status post calcium channel blocker overdose.  We will check her labs to make sure LFTs got gone back to normal.  In the meantime for her high blood pressure which is clearly elevated I will start her on losartan 25 and amlodipine 5.  We will see her back in a month for blood pressure check.  Otherwise she has plenty of her apixaban and furosemide.  She will bring a blood pressure cuff at home.  We will see her back in 1 month.

## 2020-02-08 ENCOUNTER — HOSPITAL ENCOUNTER (OUTPATIENT)
Dept: LAB | Facility: MEDICAL CENTER | Age: 80
End: 2020-02-08
Attending: INTERNAL MEDICINE
Payer: MEDICARE

## 2020-02-08 DIAGNOSIS — I10 ESSENTIAL HYPERTENSION: ICD-10-CM

## 2020-02-08 DIAGNOSIS — R57.9 SHOCK (HCC): ICD-10-CM

## 2020-02-08 DIAGNOSIS — I48.0 PAROXYSMAL A-FIB (HCC): ICD-10-CM

## 2020-02-08 DIAGNOSIS — N18.4 CHRONIC RENAL IMPAIRMENT, STAGE 4 (SEVERE) (HCC): ICD-10-CM

## 2020-02-08 DIAGNOSIS — I34.0 MITRAL VALVE INSUFFICIENCY, UNSPECIFIED ETIOLOGY: ICD-10-CM

## 2020-02-08 DIAGNOSIS — Z79.899 ON AMIODARONE THERAPY: ICD-10-CM

## 2020-02-08 DIAGNOSIS — R74.8 ELEVATED LIVER ENZYMES: ICD-10-CM

## 2020-02-08 DIAGNOSIS — I38 VALVULAR HEART DISEASE: ICD-10-CM

## 2020-02-08 DIAGNOSIS — R00.1 SINUS BRADYCARDIA: ICD-10-CM

## 2020-02-08 LAB
ALBUMIN SERPL BCP-MCNC: 4 G/DL (ref 3.2–4.9)
ALBUMIN/GLOB SERPL: 1.4 G/DL
ALP SERPL-CCNC: 87 U/L (ref 30–99)
ALT SERPL-CCNC: 15 U/L (ref 2–50)
ANION GAP SERPL CALC-SCNC: 9 MMOL/L (ref 0–11.9)
AST SERPL-CCNC: 24 U/L (ref 12–45)
BILIRUB SERPL-MCNC: 0.6 MG/DL (ref 0.1–1.5)
BUN SERPL-MCNC: 35 MG/DL (ref 8–22)
CALCIUM SERPL-MCNC: 9.8 MG/DL (ref 8.5–10.5)
CHLORIDE SERPL-SCNC: 104 MMOL/L (ref 96–112)
CO2 SERPL-SCNC: 25 MMOL/L (ref 20–33)
CREAT SERPL-MCNC: 1.86 MG/DL (ref 0.5–1.4)
ERYTHROCYTE [DISTWIDTH] IN BLOOD BY AUTOMATED COUNT: 45.1 FL (ref 35.9–50)
GLOBULIN SER CALC-MCNC: 2.9 G/DL (ref 1.9–3.5)
GLUCOSE SERPL-MCNC: 99 MG/DL (ref 65–99)
HCT VFR BLD AUTO: 43.1 % (ref 37–47)
HGB BLD-MCNC: 14.1 G/DL (ref 12–16)
MCH RBC QN AUTO: 31.8 PG (ref 27–33)
MCHC RBC AUTO-ENTMCNC: 32.7 G/DL (ref 33.6–35)
MCV RBC AUTO: 97.1 FL (ref 81.4–97.8)
PLATELET # BLD AUTO: 232 K/UL (ref 164–446)
PMV BLD AUTO: 11.7 FL (ref 9–12.9)
POTASSIUM SERPL-SCNC: 3.8 MMOL/L (ref 3.6–5.5)
PROT SERPL-MCNC: 6.9 G/DL (ref 6–8.2)
RBC # BLD AUTO: 4.44 M/UL (ref 4.2–5.4)
SODIUM SERPL-SCNC: 138 MMOL/L (ref 135–145)
WBC # BLD AUTO: 4.8 K/UL (ref 4.8–10.8)

## 2020-02-08 PROCEDURE — 80053 COMPREHEN METABOLIC PANEL: CPT

## 2020-02-08 PROCEDURE — 85027 COMPLETE CBC AUTOMATED: CPT

## 2020-02-08 PROCEDURE — 36415 COLL VENOUS BLD VENIPUNCTURE: CPT

## 2020-02-12 ENCOUNTER — APPOINTMENT (OUTPATIENT)
Dept: NEPHROLOGY | Facility: MEDICAL CENTER | Age: 80
End: 2020-02-12
Payer: MEDICARE

## 2020-02-22 NOTE — PROGRESS NOTES
Chief Complaint   Patient presents with   • Hypertension       Subjective:   Geno Gallegos is a 79 y.o. female who presents today for follow-up.    Patient of Dr. Nieto.  Current medical problems include hypertension, paroxysmal atrial fibrillation and anticoagulation, mitral regurgitation, pulmonary hypertension, chronic kidney disease.  She was recently hospitalized for cardiogenic shock with multiorgan failure suspected 2/2 overdose of CCB requiring temporary pacing but recovered, no PPM required.    She was last seen by Dr. Nieto 1 month ago at which time she had severely elevated blood pressures, he started losartan 25 and amlodipine 5.    Her blood pressure is 110-130/80s at home. No dizziness or lightheadedness at home.     She does not have any shortness of breath with activity, no orthopnea, PND or cough.    She walked 402M on 6MWT today.    Past Medical History:   Diagnosis Date   • A-fib (HCC)     had history of Afib   • Bowel obstruction (HCC)    • Renal disorder     stage 4 kidney failure   • Valvular heart disease 5/27/2019     Past Surgical History:   Procedure Laterality Date   • GYN SURGERY      hysterectomy   • OTHER ORTHOPEDIC SURGERY      right hip replacement     Family History   Problem Relation Age of Onset   • Cancer Mother    • Heart Attack Father    • Heart Disease Father         valve surgeries x2     Social History     Socioeconomic History   • Marital status:      Spouse name: Not on file   • Number of children: Not on file   • Years of education: Not on file   • Highest education level: Not on file   Occupational History   • Not on file   Social Needs   • Financial resource strain: Not on file   • Food insecurity     Worry: Not on file     Inability: Not on file   • Transportation needs     Medical: Not on file     Non-medical: Not on file   Tobacco Use   • Smoking status: Never Smoker   • Smokeless tobacco: Never Used   Substance and Sexual Activity   • Alcohol use: Yes      "Comment: occas   • Drug use: No   • Sexual activity: Not on file   Lifestyle   • Physical activity     Days per week: Not on file     Minutes per session: Not on file   • Stress: Not on file   Relationships   • Social connections     Talks on phone: Not on file     Gets together: Not on file     Attends Congregational service: Not on file     Active member of club or organization: Not on file     Attends meetings of clubs or organizations: Not on file     Relationship status: Not on file   • Intimate partner violence     Fear of current or ex partner: Not on file     Emotionally abused: Not on file     Physically abused: Not on file     Forced sexual activity: Not on file   Other Topics Concern   • Not on file   Social History Narrative   • Not on file     Allergies   Allergen Reactions   • Pcn [Penicillins]      rash     Outpatient Encounter Medications as of 2/25/2020   Medication Sig Dispense Refill   • lansoprazole (PREVACID) 30 MG CAPSULE DELAYED RELEASE      • amLODIPine (NORVASC) 5 MG Tab Take 1 Tab by mouth every day. 30 Tab 11   • losartan (COZAAR) 25 MG Tab Take 1 Tab by mouth every day. 30 Tab 11   • furosemide (LASIX) 20 MG Tab Take 1 Tab by mouth every day. 30 Tab 0   • apixaban (ELIQUIS) 2.5mg Tab Take 2.5 mg by mouth 2 Times a Day.     • ALPRAZolam (XANAX) 0.25 MG Tab Take 0.25 mg by mouth at bedtime as needed for Sleep.       No facility-administered encounter medications on file as of 2/25/2020.      Review of Systems   Constitutional: Negative for weight loss.   HENT:        +post nasal drip   Respiratory: Negative for cough and shortness of breath.    Cardiovascular: Negative for chest pain, palpitations, orthopnea, leg swelling and PND.   Gastrointestinal: Negative for blood in stool.   Neurological: Negative for dizziness.        Objective:   /76 (BP Location: Left arm, Patient Position: Sitting, BP Cuff Size: Adult)   Pulse 66   Resp 16   Ht 1.702 m (5' 7\")   Wt 59.4 kg (131 lb)   SpO2 " 98%   BMI 20.52 kg/m²     Physical Exam   Constitutional: She is oriented to person, place, and time. She appears well-developed and well-nourished. No distress.   Well-appearing female   HENT:   Head: Normocephalic and atraumatic.   Neck: No JVD present.   Cardiovascular: Normal rate, regular rhythm and intact distal pulses.   Murmur (very quiet systolic murmur) heard.  Pulmonary/Chest: Effort normal and breath sounds normal. She has no wheezes. She has no rales.   Abdominal: Soft. She exhibits no distension.   Musculoskeletal:         General: No edema.   Neurological: She is alert and oriented to person, place, and time.   Skin: Skin is dry. No pallor.   Psychiatric: Judgment normal.   Vitals reviewed.     [facility] 5/28/19  HEMODYNAMIC DATA:  Hemodynamic data shows right heart pressures of RA 15 mmHg, RV 50/10 mmHg with RVEDP of 15 mmHg, PA 50/25 with mean of 30 mmHg and pulmonary wedge of 18 mmHg.  Left heart catheterization, /70 with mean of 100 mmHg and left ventricular pressure 100/0 with LVEDP of 15 mmHg.     AORTIC VALVE:  There was no significant gradient noted.     LEFT VENTRICULOGRAM:  A 5 mL of contrast were delivered for 1 second.    Ejection fraction was estimated to be 55%.  There was 3-4+ mitral regurgitation noted.     ANGIOGRAM:  LEFT MAIN CORONARY ARTERY:  Left main coronary artery is a moderate length, very large caliber vessel free of disease.  LEFT ANTERIOR DESCENDING ARTERY:  Left anterior descending artery is a long moderate caliber, very tortuous vessel, which wraps around the apex.  There is   a moderate caliber, very tortuous diagonal branch also noted.  Left anterior   descending artery and its branches are free of disease.  RAMUS INTERMEDIUS:  Ramus intermedius is a small tortuous vessel free of   disease.  LEFT CIRCUMFLEX ARTERY:  Left circumflex artery is a codominant large calibervessel with large distal bifurcating very tortuous obtuse marginal branch and  moderate very tortuous  left posterior lateral branch.  Left circumflex artery   and its branches are free of disease.  RIGHT CORONARY ARTERY:  Right coronary artery is a codominant moderate caliber   vessel with moderate length, moderate caliber, very tortuous posterior   descending artery.  Right coronary artery and its branches are free of   disease.     IMPRESSION:  1.  A 3-4+ mitral regurgitation.  2.  No angiographic evidence of coronary artery disease.  3.  Normal left ventricular systolic function with ejection fraction of 65%.  4.  Elevated left ventricular end-diastolic pressure.  5.  Elevated right heart pressures with PA systolic pressure of 50 mmHg.      TTE 12/4/2019  Transcutaneous pacing.    Normal left ventricular systolic function.  Left ventricular ejection fraction is visually estimated to be 70%.  Moderate mitral regurgitation.  Moderate tricuspid regurgitation.  Reduced right ventricular systolic function.  Normal inferior vena cava size and inspiratory collapse.  Assessment:     1. Essential hypertension     2. Chronic renal impairment, stage 4 (severe) ([facility])     3. Mitral valve insufficiency, unspecified etiology     4. Paroxysmal A-fib ([facility])     5. Pulmonary HTN ([facility])     6. Chronic anticoagulation         Medical Decision Making:  Today's Assessment / Status / Plan:   Mitral Valve Regurgitation  Pulmonary HTN, NYHA class I-II  - previously severe MR and recommended referral for MitraClip, YAO showed only mild MR, TTE Dec 2019 showed moderate MR. She has a very quiet murmur on exam today. Will review with Dr. Nieto and refer to CTS is needed  - RHC in May showed elevated right pressure with PCWP of 18, no CT to review lung pathology. Currently asymptomatic with good exercise tolerance.    Hypertension, better controlled  - continue losartan 25mg, amlodipine 5mg. She has no confusion regarding her med doses    PAF  - continue apixaban 2.5mg    Plan: discuss MR/PH with Dr. Nieto, continue meds for HTN. Follow up in  3mo, sooner if problems.     Collaborating MD: Dr. Hidalgo

## 2020-02-25 ENCOUNTER — OFFICE VISIT (OUTPATIENT)
Dept: CARDIOLOGY | Facility: MEDICAL CENTER | Age: 80
End: 2020-02-25
Payer: MEDICARE

## 2020-02-25 VITALS
WEIGHT: 131 LBS | RESPIRATION RATE: 16 BRPM | HEIGHT: 67 IN | DIASTOLIC BLOOD PRESSURE: 76 MMHG | HEART RATE: 66 BPM | OXYGEN SATURATION: 98 % | BODY MASS INDEX: 20.56 KG/M2 | SYSTOLIC BLOOD PRESSURE: 132 MMHG

## 2020-02-25 DIAGNOSIS — N18.4 CHRONIC RENAL IMPAIRMENT, STAGE 4 (SEVERE) (HCC): ICD-10-CM

## 2020-02-25 DIAGNOSIS — I48.0 PAROXYSMAL A-FIB (HCC): ICD-10-CM

## 2020-02-25 DIAGNOSIS — Z79.01 CHRONIC ANTICOAGULATION: ICD-10-CM

## 2020-02-25 DIAGNOSIS — I27.20 PULMONARY HTN (HCC): ICD-10-CM

## 2020-02-25 DIAGNOSIS — I34.0 MITRAL VALVE INSUFFICIENCY, UNSPECIFIED ETIOLOGY: ICD-10-CM

## 2020-02-25 DIAGNOSIS — I10 ESSENTIAL HYPERTENSION: ICD-10-CM

## 2020-02-25 PROCEDURE — 94618 PULMONARY STRESS TESTING: CPT | Performed by: PHYSICIAN ASSISTANT

## 2020-02-25 PROCEDURE — 99213 OFFICE O/P EST LOW 20 MIN: CPT | Mod: 25 | Performed by: PHYSICIAN ASSISTANT

## 2020-02-25 ASSESSMENT — ENCOUNTER SYMPTOMS
DIZZINESS: 0
ORTHOPNEA: 0
COUGH: 0
PND: 0
WEIGHT LOSS: 0
PALPITATIONS: 0
SHORTNESS OF BREATH: 0
BLOOD IN STOOL: 0

## 2020-02-25 ASSESSMENT — MINNESOTA LIVING WITH HEART FAILURE QUESTIONNAIRE (MLHF)
TOTAL_SCORE: 31
DIFFICULTY TO CONCENTRATE OR REMEMBERING THINGS: 1
WORKING AROUND THE HOUSE OR YARD DIFFICULT: 1
MAKING YOU STAY IN A HOSPITAL: 1
HAVING TO SIT OR LIE DOWN DURING THE DAY: 1
MAKING YOU WORRY: 3
FEELING LIKE A BURDEN TO FAMILY AND FRIENDS: 0
WALKING ABOUT OR CLIMBING STAIRS DIFFICULT: 2
GIVING YOU SIDE EFFECTS FROM TREATMENTS: 0
DIFFICULTY SOCIALIZING WITH FAMILY OR FRIENDS: 1
DIFFICULTY WITH SEXUAL ACTIVITIES: 1
MAKING YOU SHORT OF BREATH: 2
EATING LESS FOODS YOU LIKE: 3
DIFFICULTY GOING AWAY FROM HOME: 1
DIFFICULTY WITH RECREATIONAL PASTIMES, SPORTS, HOBBIES: 2
MAKING YOU FEEL DEPRESSED: 2
DIFFICULTY SLEEPING WELL AT NIGHT: 2
LOSS OF SELF CONTROL IN YOUR LIFE: 2
DIFFICULTY WORKING TO EARN A LIVING: 0
TIRED, FATIGUED OR LOW ON ENERGY: 2
COSTING YOU MONEY FOR MEDICAL CARE: 1
SWELLING IN ANKLES OR LEGS: 3

## 2020-02-25 ASSESSMENT — 6 MINUTE WALK TEST (6MWT): TOTAL DISTANCE WALKED (METERS): 402.34

## 2020-03-05 ENCOUNTER — TELEPHONE (OUTPATIENT)
Dept: CARDIOLOGY | Facility: MEDICAL CENTER | Age: 80
End: 2020-03-05

## 2020-03-05 DIAGNOSIS — I48.0 PAF (PAROXYSMAL ATRIAL FIBRILLATION) (HCC): ICD-10-CM

## 2020-03-05 DIAGNOSIS — I48.0 PAF (PAROXYSMAL ATRIAL FIBRILLATION) (HCC): Primary | ICD-10-CM

## 2020-07-27 DIAGNOSIS — I48.0 PAF (PAROXYSMAL ATRIAL FIBRILLATION) (HCC): Primary | ICD-10-CM

## 2020-07-30 ENCOUNTER — HOSPITAL ENCOUNTER (OUTPATIENT)
Dept: LAB | Facility: MEDICAL CENTER | Age: 80
End: 2020-07-30
Attending: INTERNAL MEDICINE
Payer: MEDICARE

## 2020-07-30 LAB
25(OH)D3 SERPL-MCNC: 75 NG/ML (ref 30–100)
ALBUMIN SERPL BCP-MCNC: 4.3 G/DL (ref 3.2–4.9)
APPEARANCE UR: CLEAR
BACTERIA #/AREA URNS HPF: NEGATIVE /HPF
BASOPHILS # BLD AUTO: 1.2 % (ref 0–1.8)
BASOPHILS # BLD: 0.05 K/UL (ref 0–0.12)
BILIRUB UR QL STRIP.AUTO: NEGATIVE
BUN SERPL-MCNC: 36 MG/DL (ref 8–22)
CALCIUM SERPL-MCNC: 9.3 MG/DL (ref 8.5–10.5)
CHLORIDE SERPL-SCNC: 104 MMOL/L (ref 96–112)
CO2 SERPL-SCNC: 25 MMOL/L (ref 20–33)
COLOR UR: YELLOW
CREAT SERPL-MCNC: 1.73 MG/DL (ref 0.5–1.4)
CREAT UR-MCNC: 34.33 MG/DL
EOSINOPHIL # BLD AUTO: 0.19 K/UL (ref 0–0.51)
EOSINOPHIL NFR BLD: 4.6 % (ref 0–6.9)
EPI CELLS #/AREA URNS HPF: NEGATIVE /HPF
ERYTHROCYTE [DISTWIDTH] IN BLOOD BY AUTOMATED COUNT: 46.5 FL (ref 35.9–50)
FERRITIN SERPL-MCNC: 95.7 NG/ML (ref 10–291)
GLUCOSE SERPL-MCNC: 92 MG/DL (ref 65–99)
GLUCOSE UR STRIP.AUTO-MCNC: NEGATIVE MG/DL
HCT VFR BLD AUTO: 44.2 % (ref 37–47)
HGB BLD-MCNC: 13.8 G/DL (ref 12–16)
HYALINE CASTS #/AREA URNS LPF: NORMAL /LPF
IMM GRANULOCYTES # BLD AUTO: 0.01 K/UL (ref 0–0.11)
IMM GRANULOCYTES NFR BLD AUTO: 0.2 % (ref 0–0.9)
IRON SATN MFR SERPL: 29 % (ref 15–55)
IRON SERPL-MCNC: 81 UG/DL (ref 40–170)
KETONES UR STRIP.AUTO-MCNC: NEGATIVE MG/DL
LEUKOCYTE ESTERASE UR QL STRIP.AUTO: NEGATIVE
LYMPHOCYTES # BLD AUTO: 1.11 K/UL (ref 1–4.8)
LYMPHOCYTES NFR BLD: 26.7 % (ref 22–41)
MCH RBC QN AUTO: 30.3 PG (ref 27–33)
MCHC RBC AUTO-ENTMCNC: 31.2 G/DL (ref 33.6–35)
MCV RBC AUTO: 97.1 FL (ref 81.4–97.8)
MICRO URNS: ABNORMAL
MICROALBUMIN UR-MCNC: 50.2 MG/DL
MICROALBUMIN/CREAT UR: 1462 MG/G (ref 0–30)
MONOCYTES # BLD AUTO: 0.28 K/UL (ref 0–0.85)
MONOCYTES NFR BLD AUTO: 6.7 % (ref 0–13.4)
NEUTROPHILS # BLD AUTO: 2.51 K/UL (ref 2–7.15)
NEUTROPHILS NFR BLD: 60.6 % (ref 44–72)
NITRITE UR QL STRIP.AUTO: NEGATIVE
NRBC # BLD AUTO: 0 K/UL
NRBC BLD-RTO: 0 /100 WBC
PH UR STRIP.AUTO: 6.5 [PH] (ref 5–8)
PHOSPHATE SERPL-MCNC: 3.4 MG/DL (ref 2.5–4.5)
PLATELET # BLD AUTO: 210 K/UL (ref 164–446)
PMV BLD AUTO: 10.7 FL (ref 9–12.9)
POTASSIUM SERPL-SCNC: 3.7 MMOL/L (ref 3.6–5.5)
PROT UR QL STRIP: 100 MG/DL
PTH-INTACT SERPL-MCNC: 101 PG/ML (ref 14–72)
RBC # BLD AUTO: 4.55 M/UL (ref 4.2–5.4)
RBC # URNS HPF: NORMAL /HPF
RBC UR QL AUTO: NEGATIVE
SODIUM SERPL-SCNC: 140 MMOL/L (ref 135–145)
SP GR UR STRIP.AUTO: 1.01
TIBC SERPL-MCNC: 275 UG/DL (ref 250–450)
UIBC SERPL-MCNC: 194 UG/DL (ref 110–370)
URATE SERPL-MCNC: 6.5 MG/DL (ref 1.9–8.2)
UROBILINOGEN UR STRIP.AUTO-MCNC: 0.2 MG/DL
WBC # BLD AUTO: 4.2 K/UL (ref 4.8–10.8)
WBC #/AREA URNS HPF: NORMAL /HPF

## 2020-07-30 PROCEDURE — 80069 RENAL FUNCTION PANEL: CPT

## 2020-07-30 PROCEDURE — 83970 ASSAY OF PARATHORMONE: CPT

## 2020-07-30 PROCEDURE — 83520 IMMUNOASSAY QUANT NOS NONAB: CPT

## 2020-07-30 PROCEDURE — 82728 ASSAY OF FERRITIN: CPT

## 2020-07-30 PROCEDURE — 82570 ASSAY OF URINE CREATININE: CPT

## 2020-07-30 PROCEDURE — 83540 ASSAY OF IRON: CPT

## 2020-07-30 PROCEDURE — 84550 ASSAY OF BLOOD/URIC ACID: CPT

## 2020-07-30 PROCEDURE — 82043 UR ALBUMIN QUANTITATIVE: CPT

## 2020-07-30 PROCEDURE — 81001 URINALYSIS AUTO W/SCOPE: CPT

## 2020-07-30 PROCEDURE — 85025 COMPLETE CBC W/AUTO DIFF WBC: CPT

## 2020-07-30 PROCEDURE — 36415 COLL VENOUS BLD VENIPUNCTURE: CPT

## 2020-07-30 PROCEDURE — 82306 VITAMIN D 25 HYDROXY: CPT

## 2020-07-30 PROCEDURE — 83550 IRON BINDING TEST: CPT

## 2020-08-01 LAB
KAPPA LC FREE SER-MCNC: 54.71 MG/L (ref 3.3–19.4)
KAPPA LC FREE/LAMBDA FREE SER NEPH: 1.55 {RATIO} (ref 0.26–1.65)
LAMBDA LC FREE SERPL-MCNC: 35.38 MG/L (ref 5.71–26.3)

## 2020-08-19 ENCOUNTER — TELEPHONE (OUTPATIENT)
Dept: CARDIOLOGY | Facility: MEDICAL CENTER | Age: 80
End: 2020-08-19

## 2020-08-19 DIAGNOSIS — I48.0 PAF (PAROXYSMAL ATRIAL FIBRILLATION) (HCC): ICD-10-CM

## 2020-08-19 NOTE — TELEPHONE ENCOUNTER
[place] pt requesting a refill for apixaban (ELIQUIS) 2.5mg Tab      Pharmacy: [id number] [place], [place] - [facility] [address] AT [facility] [address]

## 2020-08-24 ENCOUNTER — HOSPITAL ENCOUNTER (OUTPATIENT)
Dept: LAB | Facility: MEDICAL CENTER | Age: 80
End: 2020-08-24
Attending: INTERNAL MEDICINE
Payer: MEDICARE

## 2020-08-24 LAB
ALBUMIN SERPL BCP-MCNC: 4.1 G/DL (ref 3.2–4.9)
APPEARANCE UR: CLEAR
BACTERIA #/AREA URNS HPF: NEGATIVE /HPF
BILIRUB UR QL STRIP.AUTO: NEGATIVE
BUN SERPL-MCNC: 57 MG/DL (ref 8–22)
CALCIUM SERPL-MCNC: 9.4 MG/DL (ref 8.5–10.5)
CHLORIDE SERPL-SCNC: 97 MMOL/L (ref 96–112)
CO2 SERPL-SCNC: 28 MMOL/L (ref 20–33)
COLOR UR: YELLOW
CREAT SERPL-MCNC: 2.03 MG/DL (ref 0.5–1.4)
CREAT UR-MCNC: 41.73 MG/DL
EPI CELLS #/AREA URNS HPF: NEGATIVE /HPF
GLUCOSE SERPL-MCNC: 97 MG/DL (ref 65–99)
GLUCOSE UR STRIP.AUTO-MCNC: NEGATIVE MG/DL
HYALINE CASTS #/AREA URNS LPF: NORMAL /LPF
KETONES UR STRIP.AUTO-MCNC: NEGATIVE MG/DL
LEUKOCYTE ESTERASE UR QL STRIP.AUTO: ABNORMAL
MICRO URNS: ABNORMAL
NITRITE UR QL STRIP.AUTO: NEGATIVE
PH UR STRIP.AUTO: 6 [PH] (ref 5–8)
PHOSPHATE SERPL-MCNC: 3.7 MG/DL (ref 2.5–4.5)
POTASSIUM SERPL-SCNC: 3.7 MMOL/L (ref 3.6–5.5)
PROT UR QL STRIP: 30 MG/DL
PROT UR-MCNC: 27 MG/DL (ref 0–15)
RBC # URNS HPF: NORMAL /HPF
RBC UR QL AUTO: NEGATIVE
SODIUM SERPL-SCNC: 137 MMOL/L (ref 135–145)
SP GR UR STRIP.AUTO: 1.01
UROBILINOGEN UR STRIP.AUTO-MCNC: 0.2 MG/DL
WBC #/AREA URNS HPF: NORMAL /HPF

## 2020-08-24 PROCEDURE — 36415 COLL VENOUS BLD VENIPUNCTURE: CPT

## 2020-08-24 PROCEDURE — 84156 ASSAY OF PROTEIN URINE: CPT

## 2020-08-24 PROCEDURE — 82570 ASSAY OF URINE CREATININE: CPT

## 2020-08-24 PROCEDURE — 81001 URINALYSIS AUTO W/SCOPE: CPT

## 2020-08-24 PROCEDURE — 80069 RENAL FUNCTION PANEL: CPT

## 2020-09-01 ENCOUNTER — OFFICE VISIT (OUTPATIENT)
Dept: CARDIOLOGY | Facility: MEDICAL CENTER | Age: 80
End: 2020-09-01
Payer: MEDICARE

## 2020-09-01 VITALS
BODY MASS INDEX: 21.12 KG/M2 | RESPIRATION RATE: 12 BRPM | HEART RATE: 66 BPM | SYSTOLIC BLOOD PRESSURE: 120 MMHG | WEIGHT: 134.6 LBS | HEIGHT: 67 IN | OXYGEN SATURATION: 97 % | DIASTOLIC BLOOD PRESSURE: 82 MMHG

## 2020-09-01 DIAGNOSIS — I10 ESSENTIAL HYPERTENSION: ICD-10-CM

## 2020-09-01 DIAGNOSIS — I48.0 PAROXYSMAL A-FIB (HCC): ICD-10-CM

## 2020-09-01 DIAGNOSIS — N17.0 ACUTE RENAL FAILURE WITH ACUTE TUBULAR NECROSIS SUPERIMPOSED ON STAGE 3 CHRONIC KIDNEY DISEASE (HCC): ICD-10-CM

## 2020-09-01 DIAGNOSIS — I34.0 MITRAL VALVE INSUFFICIENCY, UNSPECIFIED ETIOLOGY: ICD-10-CM

## 2020-09-01 DIAGNOSIS — T46.1X1A CALCIUM CHANNEL BLOCKER OVERDOSE, ACCIDENTAL OR UNINTENTIONAL, INITIAL ENCOUNTER: ICD-10-CM

## 2020-09-01 DIAGNOSIS — Z79.899 HIGH RISK MEDICATION USE: ICD-10-CM

## 2020-09-01 DIAGNOSIS — I36.1 NONRHEUMATIC TRICUSPID VALVE REGURGITATION: ICD-10-CM

## 2020-09-01 DIAGNOSIS — Z79.01 ANTICOAGULATED: ICD-10-CM

## 2020-09-01 DIAGNOSIS — I38 VALVULAR HEART DISEASE: ICD-10-CM

## 2020-09-01 DIAGNOSIS — N18.4 CHRONIC RENAL IMPAIRMENT, STAGE 4 (SEVERE) (HCC): ICD-10-CM

## 2020-09-01 DIAGNOSIS — Z79.899 ON AMIODARONE THERAPY: ICD-10-CM

## 2020-09-01 DIAGNOSIS — N18.30 ACUTE RENAL FAILURE WITH ACUTE TUBULAR NECROSIS SUPERIMPOSED ON STAGE 3 CHRONIC KIDNEY DISEASE (HCC): ICD-10-CM

## 2020-09-01 DIAGNOSIS — R00.1 SINUS BRADYCARDIA: ICD-10-CM

## 2020-09-01 PROBLEM — I05.9 MITRAL VALVE DISORDER: Status: ACTIVE | Noted: 2019-05-27

## 2020-09-01 PROCEDURE — 99214 OFFICE O/P EST MOD 30 MIN: CPT | Performed by: INTERNAL MEDICINE

## 2020-09-01 RX ORDER — TORSEMIDE 20 MG/1
TABLET ORAL
COMMUNITY
Start: 2020-08-07 | End: 2022-05-02

## 2020-09-01 ASSESSMENT — ENCOUNTER SYMPTOMS
EYES NEGATIVE: 1
ORTHOPNEA: 0
BRUISES/BLEEDS EASILY: 0
STRIDOR: 0
RESPIRATORY NEGATIVE: 1
CLAUDICATION: 0
LOSS OF CONSCIOUSNESS: 0
DIZZINESS: 0
COUGH: 0
SHORTNESS OF BREATH: 0
PND: 0
SPUTUM PRODUCTION: 0
WHEEZING: 0
GASTROINTESTINAL NEGATIVE: 1
WEAKNESS: 0
FEVER: 0
CONSTITUTIONAL NEGATIVE: 1
CHILLS: 0
CARDIOVASCULAR NEGATIVE: 1
HEMOPTYSIS: 0
SORE THROAT: 0
NEUROLOGICAL NEGATIVE: 1
MUSCULOSKELETAL NEGATIVE: 1
PALPITATIONS: 0

## 2020-09-01 ASSESSMENT — FIBROSIS 4 INDEX: FIB4 SCORE: 2.42

## 2020-09-01 NOTE — PROGRESS NOTES
Chief Complaint   Patient presents with   • Atrial Fibrillation     dx: PAF       Subjective:   Sylvia Gallegos is a 80 y.o. female who presents today as a follow-up from her hypertension bradycardia calcium channel blocker overdose and chronic kidney disease.  She continues take furosemide.  Creatinine is been stable.  Blood pressure is averaging between 110 and 120 at home.  She has no syncope or shortness of breath.  She has no chest pain.    Past Medical History:   Diagnosis Date   • A-fib (HCC)     had history of Afib   • Bowel obstruction (HCC)    • Renal disorder     stage 4 kidney failure   • Valvular heart disease 5/27/2019     Past Surgical History:   Procedure Laterality Date   • GYN SURGERY      hysterectomy   • OTHER ORTHOPEDIC SURGERY      right hip replacement     Family History   Problem Relation Age of Onset   • Cancer Mother    • Heart Attack Father    • Heart Disease Father         valve surgeries x2     Social History     Socioeconomic History   • Marital status:      Spouse name: Not on file   • Number of children: Not on file   • Years of education: Not on file   • Highest education level: Not on file   Occupational History   • Not on file   Social Needs   • Financial resource strain: Not on file   • Food insecurity     Worry: Not on file     Inability: Not on file   • Transportation needs     Medical: Not on file     Non-medical: Not on file   Tobacco Use   • Smoking status: Never Smoker   • Smokeless tobacco: Never Used   Substance and Sexual Activity   • Alcohol use: Yes     Comment: occas   • Drug use: No   • Sexual activity: Not on file   Lifestyle   • Physical activity     Days per week: Not on file     Minutes per session: Not on file   • Stress: Not on file   Relationships   • Social connections     Talks on phone: Not on file     Gets together: Not on file     Attends Jew service: Not on file     Active member of club or organization: Not on file     Attends meetings of  "clubs or organizations: Not on file     Relationship status: Not on file   • Intimate partner violence     Fear of current or ex partner: Not on file     Emotionally abused: Not on file     Physically abused: Not on file     Forced sexual activity: Not on file   Other Topics Concern   • Not on file   Social History Narrative   • Not on file     Allergies   Allergen Reactions   • Pcn [Penicillins]      rash     Outpatient Encounter Medications as of 9/1/2020   Medication Sig Dispense Refill   • torsemide (DEMADEX) 20 MG Tab      • apixaban (ELIQUIS) 2.5mg Tab Take 1 Tab by mouth 2 Times a Day. 180 Tab 3   • lansoprazole (PREVACID) 30 MG CAPSULE DELAYED RELEASE      • amLODIPine (NORVASC) 5 MG Tab Take 1 Tab by mouth every day. 30 Tab 11   • losartan (COZAAR) 25 MG Tab Take 1 Tab by mouth every day. 30 Tab 11   • furosemide (LASIX) 20 MG Tab Take 1 Tab by mouth every day. 30 Tab 0   • ALPRAZolam (XANAX) 0.25 MG Tab Take 0.25 mg by mouth at bedtime as needed for Sleep.       No facility-administered encounter medications on file as of 9/1/2020.      Review of Systems   Constitutional: Negative.  Negative for chills, fever and malaise/fatigue.   HENT: Negative.  Negative for sore throat.    Eyes: Negative.    Respiratory: Negative.  Negative for cough, hemoptysis, sputum production, shortness of breath, wheezing and stridor.    Cardiovascular: Negative.  Negative for chest pain, palpitations, orthopnea, claudication, leg swelling and PND.   Gastrointestinal: Negative.    Genitourinary: Negative.    Musculoskeletal: Negative.    Skin: Negative.    Neurological: Negative.  Negative for dizziness, loss of consciousness and weakness.   Endo/Heme/Allergies: Negative.  Does not bruise/bleed easily.   All other systems reviewed and are negative.       Objective:   /82 (BP Location: Right arm, Patient Position: Sitting, BP Cuff Size: Adult)   Pulse 66   Resp 12   Ht 1.702 m (5' 7\")   Wt 61.1 kg (134 lb 9.6 oz)   SpO2 " 97%   BMI 21.08 kg/m²     Physical Exam   Constitutional: She appears well-developed and well-nourished. No distress.   HENT:   Head: Normocephalic and atraumatic.   Right Ear: External ear normal.   Left Ear: External ear normal.   Nose: Nose normal.   Mouth/Throat: No oropharyngeal exudate.   Eyes: Pupils are equal, round, and reactive to light. Conjunctivae and EOM are normal. Right eye exhibits no discharge. Left eye exhibits no discharge. No scleral icterus.   Neck: Neck supple. No JVD present.   Cardiovascular: Normal rate, regular rhythm and intact distal pulses. Exam reveals no gallop and no friction rub.   No murmur heard.  Pulmonary/Chest: Effort normal. No stridor. No respiratory distress. She has no wheezes. She has no rales. She exhibits no tenderness.   Abdominal: Soft. She exhibits no distension. There is no guarding.   Musculoskeletal: Normal range of motion.         General: No tenderness, deformity or edema.   Neurological: She is alert. She has normal reflexes. She displays normal reflexes. No cranial nerve deficit. She exhibits normal muscle tone. Coordination normal.   Skin: Skin is warm and dry. No rash noted. She is not diaphoretic. No erythema. No pallor.   Psychiatric: She has a normal mood and affect. Her behavior is normal. Judgment and thought content normal.   Nursing note and vitals reviewed.      Assessment:     1. Acute renal failure with acute tubular necrosis superimposed on stage 3 chronic kidney disease (HCC)     2. Anticoagulated     3. Calcium channel blocker overdose, accidental or unintentional, initial encounter     4. Chronic renal impairment, stage 4 (severe) (HCC)     5. Essential hypertension     6. Mitral valve insufficiency, unspecified etiology     7. On amiodarone therapy     8. Paroxysmal A-fib (HCC)     9. Sinus bradycardia     10. Valvular heart disease     11. High risk medication use         Medical Decision Making:  Today's Assessment / Status / Plan:      80-year-old female with shortness of breath chronic kidney disease lower extremity edema on furosemide with high blood pressure.  All risk factors are well managed.  I reviewed her labs for her high risk medications.  She is otherwise doing well.  We will see her back in 6 months.

## 2020-11-30 ENCOUNTER — HOSPITAL ENCOUNTER (OUTPATIENT)
Dept: LAB | Facility: MEDICAL CENTER | Age: 80
End: 2020-11-30
Attending: NURSE PRACTITIONER
Payer: MEDICARE

## 2020-11-30 LAB
25(OH)D3 SERPL-MCNC: 81 NG/ML (ref 30–100)
ALBUMIN SERPL BCP-MCNC: 4 G/DL (ref 3.2–4.9)
APPEARANCE UR: CLEAR
BILIRUB UR QL STRIP.AUTO: NEGATIVE
BUN SERPL-MCNC: 49 MG/DL (ref 8–22)
CALCIUM SERPL-MCNC: 9.6 MG/DL (ref 8.5–10.5)
CHLORIDE SERPL-SCNC: 106 MMOL/L (ref 96–112)
CO2 SERPL-SCNC: 24 MMOL/L (ref 20–33)
COLOR UR: YELLOW
CREAT SERPL-MCNC: 1.83 MG/DL (ref 0.5–1.4)
CREAT UR-MCNC: 81.49 MG/DL
EPI CELLS #/AREA URNS HPF: NORMAL /HPF
GLUCOSE SERPL-MCNC: 94 MG/DL (ref 65–99)
GLUCOSE UR STRIP.AUTO-MCNC: NEGATIVE MG/DL
KETONES UR STRIP.AUTO-MCNC: NEGATIVE MG/DL
LEUKOCYTE ESTERASE UR QL STRIP.AUTO: ABNORMAL
MICRO URNS: ABNORMAL
NITRITE UR QL STRIP.AUTO: NEGATIVE
PH UR STRIP.AUTO: 6 [PH] (ref 5–8)
PHOSPHATE SERPL-MCNC: 3.4 MG/DL (ref 2.5–4.5)
POTASSIUM SERPL-SCNC: 3.6 MMOL/L (ref 3.6–5.5)
PROT UR QL STRIP: 100 MG/DL
PROT UR-MCNC: 48 MG/DL (ref 0–15)
PROT/CREAT UR: 589 MG/G (ref 10–107)
PTH-INTACT SERPL-MCNC: 102 PG/ML (ref 14–72)
RBC # URNS HPF: NORMAL /HPF
RBC UR QL AUTO: NEGATIVE
SODIUM SERPL-SCNC: 139 MMOL/L (ref 135–145)
SP GR UR STRIP.AUTO: 1.02
UROBILINOGEN UR STRIP.AUTO-MCNC: 0.2 MG/DL
WBC #/AREA URNS HPF: NORMAL /HPF

## 2020-11-30 PROCEDURE — 80069 RENAL FUNCTION PANEL: CPT

## 2020-11-30 PROCEDURE — 82306 VITAMIN D 25 HYDROXY: CPT

## 2020-11-30 PROCEDURE — 83970 ASSAY OF PARATHORMONE: CPT

## 2020-11-30 PROCEDURE — 84156 ASSAY OF PROTEIN URINE: CPT

## 2020-11-30 PROCEDURE — 36415 COLL VENOUS BLD VENIPUNCTURE: CPT

## 2020-11-30 PROCEDURE — 81001 URINALYSIS AUTO W/SCOPE: CPT

## 2020-11-30 PROCEDURE — 82570 ASSAY OF URINE CREATININE: CPT

## 2020-12-15 DIAGNOSIS — R00.1 SINUS BRADYCARDIA: ICD-10-CM

## 2020-12-15 DIAGNOSIS — N18.30 ACUTE RENAL FAILURE WITH ACUTE TUBULAR NECROSIS SUPERIMPOSED ON STAGE 3 CHRONIC KIDNEY DISEASE (HCC): ICD-10-CM

## 2020-12-15 DIAGNOSIS — N18.4 CHRONIC RENAL IMPAIRMENT, STAGE 4 (SEVERE) (HCC): ICD-10-CM

## 2020-12-15 DIAGNOSIS — I10 ESSENTIAL HYPERTENSION: ICD-10-CM

## 2020-12-15 DIAGNOSIS — N17.0 ACUTE RENAL FAILURE WITH ACUTE TUBULAR NECROSIS SUPERIMPOSED ON STAGE 3 CHRONIC KIDNEY DISEASE (HCC): ICD-10-CM

## 2020-12-18 DIAGNOSIS — I10 ESSENTIAL HYPERTENSION: ICD-10-CM

## 2020-12-18 DIAGNOSIS — N18.4 CHRONIC RENAL IMPAIRMENT, STAGE 4 (SEVERE) (HCC): ICD-10-CM

## 2020-12-18 DIAGNOSIS — N18.30 ACUTE RENAL FAILURE WITH ACUTE TUBULAR NECROSIS SUPERIMPOSED ON STAGE 3 CHRONIC KIDNEY DISEASE (HCC): ICD-10-CM

## 2020-12-18 DIAGNOSIS — R00.1 SINUS BRADYCARDIA: ICD-10-CM

## 2020-12-18 DIAGNOSIS — N17.0 ACUTE RENAL FAILURE WITH ACUTE TUBULAR NECROSIS SUPERIMPOSED ON STAGE 3 CHRONIC KIDNEY DISEASE (HCC): ICD-10-CM

## 2020-12-21 ENCOUNTER — TELEPHONE (OUTPATIENT)
Dept: CARDIOLOGY | Facility: MEDICAL CENTER | Age: 80
End: 2020-12-21

## 2020-12-21 RX ORDER — AMLODIPINE BESYLATE 5 MG/1
5 TABLET ORAL DAILY
Qty: 90 TAB | Refills: 1 | Status: SHIPPED | OUTPATIENT
Start: 2020-12-21 | End: 2021-06-29

## 2020-12-21 RX ORDER — AMLODIPINE BESYLATE 5 MG/1
TABLET ORAL
Qty: 90 TAB | Refills: 0 | Status: SHIPPED | OUTPATIENT
Start: 2020-12-21 | End: 2021-09-27

## 2020-12-21 NOTE — TELEPHONE ENCOUNTER
RO Candance calling from [facility] pharmacy in regards to pt being out of refills on rx losartan (COZAAR) 25 MG Tab. Candance states that pt is out of this pills for this Rx. Please call back to advice [phone]  Fax: [phone]

## 2020-12-23 DIAGNOSIS — R74.8 ELEVATED LIVER ENZYMES: ICD-10-CM

## 2020-12-23 DIAGNOSIS — N18.4 CHRONIC RENAL IMPAIRMENT, STAGE 4 (SEVERE) (HCC): ICD-10-CM

## 2020-12-23 DIAGNOSIS — I10 ESSENTIAL HYPERTENSION: ICD-10-CM

## 2020-12-23 RX ORDER — LOSARTAN POTASSIUM 25 MG/1
25 TABLET ORAL DAILY
Qty: 30 TAB | Refills: 3 | Status: SHIPPED | OUTPATIENT
Start: 2020-12-23 | End: 2021-04-07 | Stop reason: SDUPTHER

## 2021-01-14 DIAGNOSIS — Z23 NEED FOR VACCINATION: ICD-10-CM

## 2021-02-10 ENCOUNTER — IMMUNIZATION (OUTPATIENT)
Dept: FAMILY PLANNING/WOMEN'S HEALTH CLINIC | Facility: IMMUNIZATION CENTER | Age: 81
End: 2021-02-10
Attending: INTERNAL MEDICINE
Payer: MEDICARE

## 2021-02-10 DIAGNOSIS — Z23 ENCOUNTER FOR VACCINATION: Primary | ICD-10-CM

## 2021-02-10 DIAGNOSIS — Z23 NEED FOR VACCINATION: ICD-10-CM

## 2021-02-10 PROCEDURE — 91300 PFIZER SARS-COV-2 VACCINE: CPT

## 2021-02-10 PROCEDURE — 0001A PFIZER SARS-COV-2 VACCINE: CPT

## 2021-03-03 ENCOUNTER — HOSPITAL ENCOUNTER (OUTPATIENT)
Dept: LAB | Facility: MEDICAL CENTER | Age: 81
End: 2021-03-03
Attending: NURSE PRACTITIONER
Payer: MEDICARE

## 2021-03-03 LAB
ALBUMIN SERPL BCP-MCNC: 4 G/DL (ref 3.2–4.9)
ALBUMIN/GLOB SERPL: 1.4 G/DL
ALP SERPL-CCNC: 96 U/L (ref 30–99)
ALT SERPL-CCNC: 18 U/L (ref 2–50)
ANION GAP SERPL CALC-SCNC: 8 MMOL/L (ref 7–16)
APPEARANCE UR: CLEAR
AST SERPL-CCNC: 24 U/L (ref 12–45)
BACTERIA #/AREA URNS HPF: NEGATIVE /HPF
BASOPHILS # BLD AUTO: 1.1 % (ref 0–1.8)
BASOPHILS # BLD: 0.05 K/UL (ref 0–0.12)
BILIRUB SERPL-MCNC: 0.7 MG/DL (ref 0.1–1.5)
BILIRUB UR QL STRIP.AUTO: NEGATIVE
BUN SERPL-MCNC: 45 MG/DL (ref 8–22)
CALCIUM SERPL-MCNC: 9.7 MG/DL (ref 8.5–10.5)
CHLORIDE SERPL-SCNC: 97 MMOL/L (ref 96–112)
CO2 SERPL-SCNC: 27 MMOL/L (ref 20–33)
COLOR UR: YELLOW
CREAT SERPL-MCNC: 2.24 MG/DL (ref 0.5–1.4)
CREAT UR-MCNC: 69.12 MG/DL
CREAT UR-MCNC: 69.68 MG/DL
EOSINOPHIL # BLD AUTO: 0.24 K/UL (ref 0–0.51)
EOSINOPHIL NFR BLD: 5.2 % (ref 0–6.9)
EPI CELLS #/AREA URNS HPF: NEGATIVE /HPF
ERYTHROCYTE [DISTWIDTH] IN BLOOD BY AUTOMATED COUNT: 45.6 FL (ref 35.9–50)
FASTING STATUS PATIENT QL REPORTED: NORMAL
GLOBULIN SER CALC-MCNC: 2.9 G/DL (ref 1.9–3.5)
GLUCOSE SERPL-MCNC: 90 MG/DL (ref 65–99)
GLUCOSE UR STRIP.AUTO-MCNC: NEGATIVE MG/DL
HCT VFR BLD AUTO: 40.9 % (ref 37–47)
HGB BLD-MCNC: 13.2 G/DL (ref 12–16)
HYALINE CASTS #/AREA URNS LPF: ABNORMAL /LPF
IMM GRANULOCYTES # BLD AUTO: 0.01 K/UL (ref 0–0.11)
IMM GRANULOCYTES NFR BLD AUTO: 0.2 % (ref 0–0.9)
KETONES UR STRIP.AUTO-MCNC: NEGATIVE MG/DL
LEUKOCYTE ESTERASE UR QL STRIP.AUTO: ABNORMAL
LYMPHOCYTES # BLD AUTO: 1.68 K/UL (ref 1–4.8)
LYMPHOCYTES NFR BLD: 36.4 % (ref 22–41)
MAGNESIUM SERPL-MCNC: 2.6 MG/DL (ref 1.5–2.5)
MCH RBC QN AUTO: 30.8 PG (ref 27–33)
MCHC RBC AUTO-ENTMCNC: 32.3 G/DL (ref 33.6–35)
MCV RBC AUTO: 95.6 FL (ref 81.4–97.8)
MICRO URNS: ABNORMAL
MICROALBUMIN UR-MCNC: 9.8 MG/DL
MICROALBUMIN/CREAT UR: 142 MG/G (ref 0–30)
MONOCYTES # BLD AUTO: 0.38 K/UL (ref 0–0.85)
MONOCYTES NFR BLD AUTO: 8.2 % (ref 0–13.4)
NEUTROPHILS # BLD AUTO: 2.26 K/UL (ref 2–7.15)
NEUTROPHILS NFR BLD: 48.9 % (ref 44–72)
NITRITE UR QL STRIP.AUTO: NEGATIVE
NRBC # BLD AUTO: 0 K/UL
NRBC BLD-RTO: 0 /100 WBC
PH UR STRIP.AUTO: 6 [PH] (ref 5–8)
PHOSPHATE SERPL-MCNC: 3.1 MG/DL (ref 2.5–4.5)
PLATELET # BLD AUTO: 236 K/UL (ref 164–446)
PMV BLD AUTO: 11 FL (ref 9–12.9)
POTASSIUM SERPL-SCNC: 3.4 MMOL/L (ref 3.6–5.5)
PROT SERPL-MCNC: 6.9 G/DL (ref 6–8.2)
PROT UR QL STRIP: NEGATIVE MG/DL
PROT UR-MCNC: 17 MG/DL (ref 0–15)
PROT/CREAT UR: 244 MG/G (ref 10–107)
RBC # BLD AUTO: 4.28 M/UL (ref 4.2–5.4)
RBC # URNS HPF: ABNORMAL /HPF
RBC UR QL AUTO: NEGATIVE
SODIUM SERPL-SCNC: 132 MMOL/L (ref 135–145)
SP GR UR STRIP.AUTO: 1.01
URATE SERPL-MCNC: 9.9 MG/DL (ref 1.9–8.2)
UROBILINOGEN UR STRIP.AUTO-MCNC: 0.2 MG/DL
WBC # BLD AUTO: 4.6 K/UL (ref 4.8–10.8)
WBC #/AREA URNS HPF: ABNORMAL /HPF

## 2021-03-03 PROCEDURE — 80053 COMPREHEN METABOLIC PANEL: CPT

## 2021-03-03 PROCEDURE — 85025 COMPLETE CBC W/AUTO DIFF WBC: CPT

## 2021-03-03 PROCEDURE — 36415 COLL VENOUS BLD VENIPUNCTURE: CPT

## 2021-03-03 PROCEDURE — 82043 UR ALBUMIN QUANTITATIVE: CPT

## 2021-03-03 PROCEDURE — 84100 ASSAY OF PHOSPHORUS: CPT

## 2021-03-03 PROCEDURE — 81001 URINALYSIS AUTO W/SCOPE: CPT

## 2021-03-03 PROCEDURE — 84550 ASSAY OF BLOOD/URIC ACID: CPT

## 2021-03-03 PROCEDURE — 83735 ASSAY OF MAGNESIUM: CPT

## 2021-03-03 PROCEDURE — 84156 ASSAY OF PROTEIN URINE: CPT

## 2021-03-03 PROCEDURE — 82570 ASSAY OF URINE CREATININE: CPT

## 2021-03-06 ENCOUNTER — IMMUNIZATION (OUTPATIENT)
Dept: FAMILY PLANNING/WOMEN'S HEALTH CLINIC | Facility: IMMUNIZATION CENTER | Age: 81
End: 2021-03-06
Attending: INTERNAL MEDICINE
Payer: MEDICARE

## 2021-03-06 DIAGNOSIS — Z23 ENCOUNTER FOR VACCINATION: Primary | ICD-10-CM

## 2021-03-06 PROCEDURE — 0002A PFIZER SARS-COV-2 VACCINE: CPT

## 2021-03-06 PROCEDURE — 91300 PFIZER SARS-COV-2 VACCINE: CPT

## 2021-04-07 DIAGNOSIS — I10 ESSENTIAL HYPERTENSION: ICD-10-CM

## 2021-04-07 DIAGNOSIS — N18.4 CHRONIC RENAL IMPAIRMENT, STAGE 4 (SEVERE) (HCC): ICD-10-CM

## 2021-04-07 DIAGNOSIS — R74.8 ELEVATED LIVER ENZYMES: ICD-10-CM

## 2021-04-08 RX ORDER — LOSARTAN POTASSIUM 25 MG/1
25 TABLET ORAL DAILY
Qty: 100 TABLET | Refills: 0 | Status: SHIPPED | OUTPATIENT
Start: 2021-04-08 | End: 2021-07-19

## 2021-04-29 DIAGNOSIS — I10 ESSENTIAL HYPERTENSION: ICD-10-CM

## 2021-04-29 DIAGNOSIS — N18.4 CHRONIC RENAL IMPAIRMENT, STAGE 4 (SEVERE) (HCC): ICD-10-CM

## 2021-04-29 DIAGNOSIS — R74.8 ELEVATED LIVER ENZYMES: ICD-10-CM

## 2021-04-30 RX ORDER — LOSARTAN POTASSIUM 25 MG/1
25 TABLET ORAL DAILY
Qty: 90 TABLET | Refills: 1 | OUTPATIENT
Start: 2021-04-30

## 2021-05-07 DIAGNOSIS — R74.8 ELEVATED LIVER ENZYMES: ICD-10-CM

## 2021-05-07 DIAGNOSIS — I10 ESSENTIAL HYPERTENSION: ICD-10-CM

## 2021-05-07 DIAGNOSIS — N18.4 CHRONIC RENAL IMPAIRMENT, STAGE 4 (SEVERE) (HCC): ICD-10-CM

## 2021-05-07 RX ORDER — LOSARTAN POTASSIUM 25 MG/1
25 TABLET ORAL DAILY
Qty: 100 TABLET | Refills: 0 | OUTPATIENT
Start: 2021-05-07

## 2021-06-22 NOTE — TELEPHONE ENCOUNTER
Called and spoke to PT and she is not sure her insurance is accepted here now- new insurance. I recommended she contact her new insurance provider and I found her the phone # for them.   SLC

## 2021-06-28 DIAGNOSIS — R00.1 SINUS BRADYCARDIA: ICD-10-CM

## 2021-06-28 DIAGNOSIS — N18.30 ACUTE RENAL FAILURE WITH ACUTE TUBULAR NECROSIS SUPERIMPOSED ON STAGE 3 CHRONIC KIDNEY DISEASE (HCC): ICD-10-CM

## 2021-06-28 DIAGNOSIS — N18.4 CHRONIC RENAL IMPAIRMENT, STAGE 4 (SEVERE) (HCC): ICD-10-CM

## 2021-06-28 DIAGNOSIS — I10 ESSENTIAL HYPERTENSION: ICD-10-CM

## 2021-06-28 DIAGNOSIS — N17.0 ACUTE RENAL FAILURE WITH ACUTE TUBULAR NECROSIS SUPERIMPOSED ON STAGE 3 CHRONIC KIDNEY DISEASE (HCC): ICD-10-CM

## 2021-06-29 RX ORDER — AMLODIPINE BESYLATE 5 MG/1
5 TABLET ORAL
Qty: 90 TABLET | Refills: 0 | Status: SHIPPED | OUTPATIENT
Start: 2021-06-29 | End: 2022-05-02 | Stop reason: CLARIF

## 2021-07-19 DIAGNOSIS — I10 ESSENTIAL HYPERTENSION: ICD-10-CM

## 2021-07-19 DIAGNOSIS — R74.8 ELEVATED LIVER ENZYMES: ICD-10-CM

## 2021-07-19 DIAGNOSIS — Z79.899 HIGH RISK MEDICATION USE: ICD-10-CM

## 2021-07-19 DIAGNOSIS — N18.4 CHRONIC RENAL IMPAIRMENT, STAGE 4 (SEVERE) (HCC): ICD-10-CM

## 2021-07-19 DIAGNOSIS — I38 VALVULAR HEART DISEASE: ICD-10-CM

## 2021-07-19 RX ORDER — LOSARTAN POTASSIUM 25 MG/1
25 TABLET ORAL DAILY
Qty: 90 TABLET | Refills: 0 | Status: SHIPPED | OUTPATIENT
Start: 2021-07-19 | End: 2021-10-18

## 2022-03-21 DIAGNOSIS — N17.0 ACUTE RENAL FAILURE WITH ACUTE TUBULAR NECROSIS SUPERIMPOSED ON STAGE 3 CHRONIC KIDNEY DISEASE (HCC): ICD-10-CM

## 2022-03-21 DIAGNOSIS — I10 ESSENTIAL HYPERTENSION: ICD-10-CM

## 2022-03-21 DIAGNOSIS — N18.30 ACUTE RENAL FAILURE WITH ACUTE TUBULAR NECROSIS SUPERIMPOSED ON STAGE 3 CHRONIC KIDNEY DISEASE (HCC): ICD-10-CM

## 2022-03-21 DIAGNOSIS — N18.4 CHRONIC RENAL IMPAIRMENT, STAGE 4 (SEVERE) (HCC): ICD-10-CM

## 2022-03-21 DIAGNOSIS — R00.1 SINUS BRADYCARDIA: ICD-10-CM

## 2022-03-23 RX ORDER — AMLODIPINE BESYLATE 5 MG/1
TABLET ORAL
Qty: 90 TABLET | Refills: 0 | Status: SHIPPED | OUTPATIENT
Start: 2022-03-23 | End: 2022-05-02 | Stop reason: SDUPTHER

## 2022-04-04 DIAGNOSIS — N18.4 CHRONIC RENAL IMPAIRMENT, STAGE 4 (SEVERE) (HCC): ICD-10-CM

## 2022-04-04 DIAGNOSIS — R74.8 ELEVATED LIVER ENZYMES: ICD-10-CM

## 2022-04-04 DIAGNOSIS — I10 ESSENTIAL HYPERTENSION: ICD-10-CM

## 2022-04-07 RX ORDER — LOSARTAN POTASSIUM 25 MG/1
TABLET ORAL
Qty: 90 TABLET | Refills: 0 | Status: SHIPPED | OUTPATIENT
Start: 2022-04-07 | End: 2022-05-02 | Stop reason: SDUPTHER

## 2022-04-19 ENCOUNTER — HOSPITAL ENCOUNTER (OUTPATIENT)
Dept: LAB | Facility: MEDICAL CENTER | Age: 82
End: 2022-04-19
Attending: INTERNAL MEDICINE
Payer: MEDICARE

## 2022-04-19 DIAGNOSIS — N18.4 CHRONIC RENAL IMPAIRMENT, STAGE 4 (SEVERE) (HCC): ICD-10-CM

## 2022-04-19 DIAGNOSIS — Z79.899 HIGH RISK MEDICATION USE: ICD-10-CM

## 2022-04-19 LAB
ALBUMIN SERPL BCP-MCNC: 4.5 G/DL (ref 3.2–4.9)
ALBUMIN/GLOB SERPL: 1.4 G/DL
ALP SERPL-CCNC: 149 U/L (ref 30–99)
ALT SERPL-CCNC: 13 U/L (ref 2–50)
ANION GAP SERPL CALC-SCNC: 12 MMOL/L (ref 7–16)
AST SERPL-CCNC: 19 U/L (ref 12–45)
BILIRUB SERPL-MCNC: 0.7 MG/DL (ref 0.1–1.5)
BUN SERPL-MCNC: 41 MG/DL (ref 8–22)
CALCIUM SERPL-MCNC: 10.1 MG/DL (ref 8.5–10.5)
CHLORIDE SERPL-SCNC: 104 MMOL/L (ref 96–112)
CHOLEST SERPL-MCNC: 199 MG/DL (ref 100–199)
CO2 SERPL-SCNC: 26 MMOL/L (ref 20–33)
CREAT SERPL-MCNC: 1.75 MG/DL (ref 0.5–1.4)
FASTING STATUS PATIENT QL REPORTED: NORMAL
GFR SERPLBLD CREATININE-BSD FMLA CKD-EPI: 28 ML/MIN/1.73 M 2
GLOBULIN SER CALC-MCNC: 3.2 G/DL (ref 1.9–3.5)
GLUCOSE SERPL-MCNC: 104 MG/DL (ref 65–99)
HDLC SERPL-MCNC: 65 MG/DL
LDLC SERPL CALC-MCNC: 115 MG/DL
POTASSIUM SERPL-SCNC: 4 MMOL/L (ref 3.6–5.5)
PROT SERPL-MCNC: 7.7 G/DL (ref 6–8.2)
SODIUM SERPL-SCNC: 142 MMOL/L (ref 135–145)
TRIGL SERPL-MCNC: 94 MG/DL (ref 0–149)

## 2022-04-19 PROCEDURE — 36415 COLL VENOUS BLD VENIPUNCTURE: CPT

## 2022-04-19 PROCEDURE — 80053 COMPREHEN METABOLIC PANEL: CPT

## 2022-04-19 PROCEDURE — 80061 LIPID PANEL: CPT

## 2022-05-02 ENCOUNTER — OFFICE VISIT (OUTPATIENT)
Dept: CARDIOLOGY | Facility: MEDICAL CENTER | Age: 82
End: 2022-05-02
Payer: COMMERCIAL

## 2022-05-02 VITALS
WEIGHT: 138 LBS | RESPIRATION RATE: 13 BRPM | HEIGHT: 66 IN | HEART RATE: 90 BPM | DIASTOLIC BLOOD PRESSURE: 80 MMHG | BODY MASS INDEX: 22.18 KG/M2 | SYSTOLIC BLOOD PRESSURE: 138 MMHG | OXYGEN SATURATION: 97 %

## 2022-05-02 DIAGNOSIS — Z79.899 HIGH RISK MEDICATION USE: ICD-10-CM

## 2022-05-02 DIAGNOSIS — I34.0 MITRAL VALVE INSUFFICIENCY, UNSPECIFIED ETIOLOGY: ICD-10-CM

## 2022-05-02 DIAGNOSIS — J69.0 ASPIRATION PNEUMONIA OF BOTH LOWER LOBES DUE TO GASTRIC SECRETIONS (HCC): ICD-10-CM

## 2022-05-02 DIAGNOSIS — I05.9 MITRAL VALVE DISORDER: ICD-10-CM

## 2022-05-02 DIAGNOSIS — I48.0 PAF (PAROXYSMAL ATRIAL FIBRILLATION) (HCC): ICD-10-CM

## 2022-05-02 DIAGNOSIS — R00.1 SINUS BRADYCARDIA: ICD-10-CM

## 2022-05-02 DIAGNOSIS — N17.0 ACUTE RENAL FAILURE WITH ACUTE TUBULAR NECROSIS SUPERIMPOSED ON STAGE 3 CHRONIC KIDNEY DISEASE (HCC): ICD-10-CM

## 2022-05-02 DIAGNOSIS — Z79.01 ANTICOAGULATED: ICD-10-CM

## 2022-05-02 DIAGNOSIS — N18.30 ACUTE RENAL FAILURE WITH ACUTE TUBULAR NECROSIS SUPERIMPOSED ON STAGE 3 CHRONIC KIDNEY DISEASE (HCC): ICD-10-CM

## 2022-05-02 DIAGNOSIS — T46.1X1A CALCIUM CHANNEL BLOCKER OVERDOSE, ACCIDENTAL OR UNINTENTIONAL, INITIAL ENCOUNTER: ICD-10-CM

## 2022-05-02 DIAGNOSIS — R74.8 ELEVATED LIVER ENZYMES: ICD-10-CM

## 2022-05-02 DIAGNOSIS — Z79.899 ON AMIODARONE THERAPY: ICD-10-CM

## 2022-05-02 DIAGNOSIS — R57.9 SHOCK (HCC): ICD-10-CM

## 2022-05-02 DIAGNOSIS — R00.1 BRADYCARDIA: ICD-10-CM

## 2022-05-02 DIAGNOSIS — I48.0 PAROXYSMAL A-FIB (HCC): ICD-10-CM

## 2022-05-02 DIAGNOSIS — I36.1 NONRHEUMATIC TRICUSPID VALVE REGURGITATION: ICD-10-CM

## 2022-05-02 DIAGNOSIS — N18.4 CHRONIC RENAL IMPAIRMENT, STAGE 4 (SEVERE) (HCC): ICD-10-CM

## 2022-05-02 DIAGNOSIS — I10 ESSENTIAL HYPERTENSION: ICD-10-CM

## 2022-05-02 PROCEDURE — 99214 OFFICE O/P EST MOD 30 MIN: CPT | Performed by: INTERNAL MEDICINE

## 2022-05-02 RX ORDER — AMLODIPINE BESYLATE 5 MG/1
5 TABLET ORAL DAILY
Qty: 90 TABLET | Refills: 3 | Status: SHIPPED | OUTPATIENT
Start: 2022-05-02 | End: 2024-01-19

## 2022-05-02 RX ORDER — LOSARTAN POTASSIUM 25 MG/1
25 TABLET ORAL
Qty: 90 TABLET | Refills: 3 | Status: SHIPPED | OUTPATIENT
Start: 2022-05-02 | End: 2024-01-19

## 2022-05-02 RX ORDER — TORSEMIDE 20 MG/1
20 TABLET ORAL DAILY
Qty: 90 TABLET | Refills: 3 | Status: SHIPPED | OUTPATIENT
Start: 2022-05-02 | End: 2024-01-19

## 2022-05-02 ASSESSMENT — ENCOUNTER SYMPTOMS
WEAKNESS: 0
PALPITATIONS: 0
SHORTNESS OF BREATH: 0
SPUTUM PRODUCTION: 0
CARDIOVASCULAR NEGATIVE: 1
BRUISES/BLEEDS EASILY: 0
FEVER: 0
NEUROLOGICAL NEGATIVE: 1
COUGH: 0
CHILLS: 0
LOSS OF CONSCIOUSNESS: 0
ORTHOPNEA: 0
HEMOPTYSIS: 0
MUSCULOSKELETAL NEGATIVE: 1
EYES NEGATIVE: 1
DIZZINESS: 0
SORE THROAT: 0
RESPIRATORY NEGATIVE: 1
PND: 0
WHEEZING: 0
GASTROINTESTINAL NEGATIVE: 1
STRIDOR: 0
CONSTITUTIONAL NEGATIVE: 1
CLAUDICATION: 0

## 2022-05-02 ASSESSMENT — FIBROSIS 4 INDEX: FIB4 SCORE: 1.85

## 2022-05-02 NOTE — PROGRESS NOTES
Chief Complaint   Patient presents with   • Hypertension     F/V DX: Essential hypertension    • Atrial Fibrillation     F/V DX: PAF (paroxysmal atrial fibrillation) (HCC)       Subjective:   Sylvia Gallegos is a 80 y.o. female who presents today as a follow-up from her hypertension bradycardia calcium channel blocker overdose and chronic kidney disease.    Since she was last seen she has been rate controlled with her atrial fibrillation off oralia blocking agents.  She has been stable with her current medications.  She has no chest pain palpitations or PND.  Her most recent labs showed stable creatinine.  She is on Eliquis of 2.5 twice daily.    Past Medical History:   Diagnosis Date   • A-fib (HCC)     had history of Afib   • Bowel obstruction (HCC)    • Renal disorder     stage 4 kidney failure   • Valvular heart disease 5/27/2019     Past Surgical History:   Procedure Laterality Date   • GYN SURGERY      hysterectomy   • OTHER ORTHOPEDIC SURGERY      right hip replacement     Family History   Problem Relation Age of Onset   • Cancer Mother    • Heart Attack Father    • Heart Disease Father         valve surgeries x2     Social History     Socioeconomic History   • Marital status:      Spouse name: Not on file   • Number of children: Not on file   • Years of education: Not on file   • Highest education level: Not on file   Occupational History   • Not on file   Tobacco Use   • Smoking status: Never Smoker   • Smokeless tobacco: Never Used   Vaping Use   • Vaping Use: Never used   Substance and Sexual Activity   • Alcohol use: Yes     Comment: occas   • Drug use: No   • Sexual activity: Not on file   Other Topics Concern   • Not on file   Social History Narrative   • Not on file     Social Determinants of Health     Financial Resource Strain: Not on file   Food Insecurity: Not on file   Transportation Needs: Not on file   Physical Activity: Not on file   Stress: Not on file   Social Connections: Not on file    Intimate Partner Violence: Not on file   Housing Stability: Not on file     Allergies   Allergen Reactions   • Pcn [Penicillins]      rash     Outpatient Encounter Medications as of 5/2/2022   Medication Sig Dispense Refill   • amLODIPine (NORVASC) 5 MG Tab Take 1 Tablet by mouth every day. 90 Tablet 3   • torsemide (DEMADEX) 20 MG Tab Take 1 Tablet by mouth every day. 90 Tablet 3   • losartan (COZAAR) 25 MG Tab Take 1 Tablet by mouth every day. 90 Tablet 3   • apixaban (ELIQUIS) 2.5mg Tab Take 1 Tablet by mouth 2 times a day. 180 Tablet 1   • lansoprazole (PREVACID) 30 MG CAPSULE DELAYED RELEASE      • ALPRAZolam (XANAX) 0.25 MG Tab Take 0.25 mg by mouth at bedtime as needed for Sleep.     • [DISCONTINUED] losartan (COZAAR) 25 MG Tab TAKE 1 TABLET BY MOUTH EVERY DAY 90 Tablet 0   • [DISCONTINUED] amLODIPine (NORVASC) 5 MG Tab TAKE 1 TABLET BY MOUTH EVERY DAY. FOLLOW UP APPOINTMENT FOR FURTHER REFILLS. PLEASE 90 Tablet 0   • [DISCONTINUED] apixaban (ELIQUIS) 2.5mg Tab Take 1 Tablet by mouth 2 times a day. Please call [phone] to schedule a follow up appointment for further refills. Thank you. 180 Tablet 1   • [DISCONTINUED] amLODIPine (NORVASC) 5 MG Tab Take 1 tablet by mouth every day. Please call [phone] to schedule a follow up appointment. Thank you 90 tablet 0   • [DISCONTINUED] torsemide (DEMADEX) 20 MG Tab      • [DISCONTINUED] furosemide (LASIX) 20 MG Tab Take 1 Tab by mouth every day. 30 Tab 0     No facility-administered encounter medications on file as of 5/2/2022.     Review of Systems   Constitutional: Negative.  Negative for chills, fever and malaise/fatigue.   HENT: Negative.  Negative for sore throat.    Eyes: Negative.    Respiratory: Negative.  Negative for cough, hemoptysis, sputum production, shortness of breath, wheezing and stridor.    Cardiovascular: Negative.  Negative for chest pain, palpitations, orthopnea, claudication, leg swelling and PND.   Gastrointestinal: Negative.   "  Genitourinary: Negative.    Musculoskeletal: Negative.    Skin: Negative.    Neurological: Negative.  Negative for dizziness, loss of consciousness and weakness.   Endo/Heme/Allergies: Negative.  Does not bruise/bleed easily.   All other systems reviewed and are negative.       Objective:   /80 (BP Location: Right arm, Patient Position: Sitting, BP Cuff Size: Adult)   Pulse 90   Resp 13   Ht 1.676 m (5' 6\")   Wt 62.6 kg (138 lb)   SpO2 97%   BMI 22.27 kg/m²     Physical Exam  Vitals and nursing note reviewed.   Constitutional:       General: She is not in acute distress.     Appearance: She is well-developed. She is not diaphoretic.   HENT:      Head: Normocephalic and atraumatic.      Right Ear: External ear normal.      Left Ear: External ear normal.      Nose: Nose normal.      Mouth/Throat:      Pharynx: No oropharyngeal exudate.   Eyes:      General: No scleral icterus.        Right eye: No discharge.         Left eye: No discharge.      Conjunctiva/sclera: Conjunctivae normal.      Pupils: Pupils are equal, round, and reactive to light.   Neck:      Vascular: No JVD.   Cardiovascular:      Rate and Rhythm: Normal rate and regular rhythm.      Heart sounds: No murmur heard.    No friction rub. No gallop.   Pulmonary:      Effort: Pulmonary effort is normal. No respiratory distress.      Breath sounds: No stridor. No wheezing or rales.   Chest:      Chest wall: No tenderness.   Abdominal:      General: There is no distension.      Palpations: Abdomen is soft.      Tenderness: There is no guarding.   Musculoskeletal:         General: No tenderness or deformity. Normal range of motion.      Cervical back: Neck supple.   Skin:     General: Skin is warm and dry.      Coloration: Skin is not pale.      Findings: No erythema or rash.   Neurological:      Mental Status: She is alert.      Cranial Nerves: No cranial nerve deficit.      Motor: No abnormal muscle tone.      Coordination: Coordination normal. "      Deep Tendon Reflexes: Reflexes are normal and symmetric. Reflexes normal.   Psychiatric:         Behavior: Behavior normal.         Thought Content: Thought content normal.         Judgment: Judgment normal.       Lab Results   Component Value Date/Time    CHOLSTRLTOT 199 04/19/2022 10:08 AM     (H) 04/19/2022 10:08 AM    HDL 65 04/19/2022 10:08 AM    TRIGLYCERIDE 94 04/19/2022 10:08 AM       Lab Results   Component Value Date/Time    SODIUM 142 04/19/2022 10:08 AM    POTASSIUM 4.0 04/19/2022 10:08 AM    CHLORIDE 104 04/19/2022 10:08 AM    CO2 26 04/19/2022 10:08 AM    GLUCOSE 104 (H) 04/19/2022 10:08 AM    BUN 41 (H) 04/19/2022 10:08 AM    CREATININE 1.75 (H) 04/19/2022 10:08 AM    CREATININE 0.8 09/20/2005 07:10 AM     Lab Results   Component Value Date/Time    ALKPHOSPHAT 149 (H) 04/19/2022 10:08 AM    ASTSGOT 19 04/19/2022 10:08 AM    ALTSGPT 13 04/19/2022 10:08 AM    TBILIRUBIN 0.7 04/19/2022 10:08 AM        Assessment:     1. Aspiration pneumonia of both lower lobes due to gastric secretions ([facility])     2. Shock ([facility])     3. Bradycardia     4. Calcium channel blocker overdose, accidental or unintentional, initial encounter  torsemide (DEMADEX) 20 MG Tab   5. Essential hypertension  amLODIPine (NORVASC) 5 MG Tab    torsemide (DEMADEX) 20 MG Tab    losartan (COZAAR) 25 MG Tab   6. Mitral valve disorder     7. Chronic renal impairment, stage 4 (severe) ([facility])  amLODIPine (NORVASC) 5 MG Tab    losartan (COZAAR) 25 MG Tab   8. Anticoagulated  torsemide (DEMADEX) 20 MG Tab   9. Mitral valve insufficiency, unspecified etiology  torsemide (DEMADEX) 20 MG Tab   10. On amiodarone therapy     11. High risk medication use     12. Nonrheumatic tricuspid valve regurgitation     13. Paroxysmal A-fib ([facility])     14. Acute renal failure with acute tubular necrosis superimposed on stage 3 chronic kidney disease (HCC)  amLODIPine (NORVASC) 5 MG Tab   15. Sinus bradycardia  amLODIPine (NORVASC) 5 MG Tab   16. PAF  (paroxysmal atrial fibrillation) (HCC)  apixaban (ELIQUIS) 2.5mg Tab   17. Elevated liver enzymes  losartan (COZAAR) 25 MG Tab       Medical Decision Making:  Today's Assessment / Status / Plan:     80-year-old female with shortness of breath chronic kidney disease lower extremity edema on furosemide with high blood pressure.  I reviewed her labs with her.  Her doses are appropriate.  We refilled her medications.  We will see her back in 6 months.

## 2022-11-03 ENCOUNTER — PATIENT MESSAGE (OUTPATIENT)
Dept: HEALTH INFORMATION MANAGEMENT | Facility: OTHER | Age: 82
End: 2022-11-03

## 2022-11-21 ENCOUNTER — HOSPITAL ENCOUNTER (OUTPATIENT)
Dept: LAB | Facility: MEDICAL CENTER | Age: 82
End: 2022-11-21
Attending: FAMILY MEDICINE
Payer: MEDICARE

## 2022-11-21 LAB
ALBUMIN SERPL BCP-MCNC: 4.1 G/DL (ref 3.2–4.9)
ALBUMIN/GLOB SERPL: 1.1 G/DL
ALP SERPL-CCNC: 133 U/L (ref 30–99)
ALT SERPL-CCNC: 12 U/L (ref 2–50)
ANION GAP SERPL CALC-SCNC: 14 MMOL/L (ref 7–16)
AST SERPL-CCNC: 21 U/L (ref 12–45)
BASOPHILS # BLD AUTO: 1.1 % (ref 0–1.8)
BASOPHILS # BLD: 0.07 K/UL (ref 0–0.12)
BILIRUB SERPL-MCNC: 0.4 MG/DL (ref 0.1–1.5)
BUN SERPL-MCNC: 39 MG/DL (ref 8–22)
CALCIUM SERPL-MCNC: 9.5 MG/DL (ref 8.5–10.5)
CHLORIDE SERPL-SCNC: 101 MMOL/L (ref 96–112)
CO2 SERPL-SCNC: 25 MMOL/L (ref 20–33)
CREAT SERPL-MCNC: 2.01 MG/DL (ref 0.5–1.4)
EOSINOPHIL # BLD AUTO: 0.09 K/UL (ref 0–0.51)
EOSINOPHIL NFR BLD: 1.5 % (ref 0–6.9)
ERYTHROCYTE [DISTWIDTH] IN BLOOD BY AUTOMATED COUNT: 45.2 FL (ref 35.9–50)
GFR SERPLBLD CREATININE-BSD FMLA CKD-EPI: 24 ML/MIN/1.73 M 2
GLOBULIN SER CALC-MCNC: 3.6 G/DL (ref 1.9–3.5)
GLUCOSE SERPL-MCNC: 134 MG/DL (ref 65–99)
HCT VFR BLD AUTO: 45.5 % (ref 37–47)
HGB BLD-MCNC: 14.9 G/DL (ref 12–16)
IMM GRANULOCYTES # BLD AUTO: 0.01 K/UL (ref 0–0.11)
IMM GRANULOCYTES NFR BLD AUTO: 0.2 % (ref 0–0.9)
LYMPHOCYTES # BLD AUTO: 1.36 K/UL (ref 1–4.8)
LYMPHOCYTES NFR BLD: 22.1 % (ref 22–41)
MCH RBC QN AUTO: 30.4 PG (ref 27–33)
MCHC RBC AUTO-ENTMCNC: 32.7 G/DL (ref 33.6–35)
MCV RBC AUTO: 92.9 FL (ref 81.4–97.8)
MONOCYTES # BLD AUTO: 0.34 K/UL (ref 0–0.85)
MONOCYTES NFR BLD AUTO: 5.5 % (ref 0–13.4)
NEUTROPHILS # BLD AUTO: 4.27 K/UL (ref 2–7.15)
NEUTROPHILS NFR BLD: 69.6 % (ref 44–72)
NRBC # BLD AUTO: 0 K/UL
NRBC BLD-RTO: 0 /100 WBC
PLATELET # BLD AUTO: 250 K/UL (ref 164–446)
PMV BLD AUTO: 11.6 FL (ref 9–12.9)
POTASSIUM SERPL-SCNC: 3.5 MMOL/L (ref 3.6–5.5)
PROT SERPL-MCNC: 7.7 G/DL (ref 6–8.2)
RBC # BLD AUTO: 4.9 M/UL (ref 4.2–5.4)
SODIUM SERPL-SCNC: 140 MMOL/L (ref 135–145)
T4 FREE SERPL-MCNC: 1.07 NG/DL (ref 0.93–1.7)
TSH SERPL DL<=0.005 MIU/L-ACNC: 3.51 UIU/ML (ref 0.38–5.33)
WBC # BLD AUTO: 6.1 K/UL (ref 4.8–10.8)

## 2022-11-21 PROCEDURE — 84443 ASSAY THYROID STIM HORMONE: CPT

## 2022-11-21 PROCEDURE — 36415 COLL VENOUS BLD VENIPUNCTURE: CPT

## 2022-11-21 PROCEDURE — 85025 COMPLETE CBC W/AUTO DIFF WBC: CPT

## 2022-11-21 PROCEDURE — 80053 COMPREHEN METABOLIC PANEL: CPT

## 2022-11-21 PROCEDURE — 84439 ASSAY OF FREE THYROXINE: CPT

## 2023-03-12 NOTE — ASSESSMENT & PLAN NOTE
Monitor vitals  Losartan (watch renal function)  Was on atenolol (hold due to bradycardia) and amlodipine 10mg     PRIMARY DIAGNOSIS: BILIARY COLIC/UNCOMPLICATED EARLY ACUTE CHOLECYSTITIS  OUTPATIENT/OBSERVATION GOALS TO BE MET BEFORE DISCHARGE:    1. Pain status: Improved but still requiring IV narcotics.  2. Stable vital signs and labs (if performed) at disposition: Yes  3. Tolerating adequate PO diet:  NPO  4. Successful cholecystectomy or clear follow up plan with General Surgery team if immediate surgery not performed Yes  5. ADLs back to baseline?  Yes  6. Activity and level of assistance: Up with standby assistance.  7. Barriers to discharge noted Yes Lap Lida this morning    Discharge Planner Nurse   Safe discharge environment identified: Yes  Barriers to discharge: Yes       Entered by: JOHANNA KENDALL RN 03/12/2023    Vital signs:  Temp: 97.5  F (36.4  C) Temp src: Temporal BP: 112/69 Pulse: 76   Resp: 16 SpO2: 100 % O2 Device: None (Room air) Alert and oriented x4. Denies pain this morning. NPO since midnight. IVF running @100mL/hr. Went down for Lap Lida this AM. Pt will return to the unit after surgery. Will continue to monitor.    Please review provider order for any additional goals.   Nurse to notify provider when observation goals have been met and patient is ready for discharge.

## 2023-10-13 ENCOUNTER — OFFICE VISIT (OUTPATIENT)
Dept: URGENT CARE | Facility: PHYSICIAN GROUP | Age: 83
End: 2023-10-13
Payer: MEDICARE

## 2023-10-13 VITALS
RESPIRATION RATE: 16 BRPM | TEMPERATURE: 98.1 F | OXYGEN SATURATION: 96 % | BODY MASS INDEX: 22.66 KG/M2 | DIASTOLIC BLOOD PRESSURE: 74 MMHG | HEIGHT: 66 IN | WEIGHT: 141 LBS | HEART RATE: 104 BPM | SYSTOLIC BLOOD PRESSURE: 136 MMHG

## 2023-10-13 DIAGNOSIS — H61.23 CERUMINOSIS, BILATERAL: ICD-10-CM

## 2023-10-13 DIAGNOSIS — I48.0 PAROXYSMAL A-FIB (HCC): ICD-10-CM

## 2023-10-13 DIAGNOSIS — H93.8X9 SENSATION OF PLUGGED EAR, UNSPECIFIED LATERALITY: ICD-10-CM

## 2023-10-13 DIAGNOSIS — Z79.01 ANTICOAGULATED: ICD-10-CM

## 2023-10-13 PROCEDURE — 3075F SYST BP GE 130 - 139MM HG: CPT | Performed by: FAMILY MEDICINE

## 2023-10-13 PROCEDURE — 3078F DIAST BP <80 MM HG: CPT | Performed by: FAMILY MEDICINE

## 2023-10-13 PROCEDURE — 99203 OFFICE O/P NEW LOW 30 MIN: CPT | Performed by: FAMILY MEDICINE

## 2023-10-13 ASSESSMENT — FIBROSIS 4 INDEX: FIB4 SCORE: 2.06

## 2023-10-13 NOTE — PROGRESS NOTES
"Subjective     Geno Gallegos is a 83 y.o. female who presents with Hearing Loss (Bilateral ear fullness. Pt was referred to us by Mindy, due to impaction. )      - This is a very pleasant 83 y.o. who has come to the walk-in clinic today for ears feel extra plugged and hard to hear today. Went to see audiologist and told had wax blockage. Here for eval.     Hx afib and is anticoagulated      ALLERGIES:  Pcn [penicillins]     PMH:  Past Medical History:   Diagnosis Date    A-fib (HCC)     had history of Afib    Bowel obstruction (HCC)     Renal disorder     stage 4 kidney failure    Valvular heart disease 5/27/2019        PSH:  Past Surgical History:   Procedure Laterality Date    GYN SURGERY      hysterectomy    OTHER ORTHOPEDIC SURGERY      right hip replacement       MEDS:    Current Outpatient Medications:     ELIQUIS 2.5 MG Tab, TAKE 1 TABLET BY MOUTH TWICE DAILY, Disp: 180 Tablet, Rfl: 3    amLODIPine (NORVASC) 5 MG Tab, Take 1 Tablet by mouth every day., Disp: 90 Tablet, Rfl: 3    torsemide (DEMADEX) 20 MG Tab, Take 1 Tablet by mouth every day., Disp: 90 Tablet, Rfl: 3    losartan (COZAAR) 25 MG Tab, Take 1 Tablet by mouth every day., Disp: 90 Tablet, Rfl: 3    lansoprazole (PREVACID) 30 MG CAPSULE DELAYED RELEASE, , Disp: , Rfl:     ALPRAZolam (XANAX) 0.25 MG Tab, Take 0.25 mg by mouth at bedtime as needed for Sleep., Disp: , Rfl:     ** I have documented what I find to be significant in regards to past medical, social, family and surgical history  in my HPI or under PMH/PSH/FH review section, otherwise it is noncontributory **         HPI    Review of Systems   HENT:  Positive for hearing loss. Ear pain: clogged.   All other systems reviewed and are negative.             Objective     /74 (BP Location: Right arm, Patient Position: Sitting, BP Cuff Size: Adult long)   Pulse (!) 104   Temp 36.7 °C (98.1 °F) (Temporal)   Resp 16   Ht 1.676 m (5' 6\")   Wt 64 kg (141 lb)   SpO2 96%   BMI 22.76 " kg/m²      Physical Exam  Vitals and nursing note reviewed.   Constitutional:       General: She is not in acute distress.     Appearance: Normal appearance. She is well-developed.   HENT:      Head: Normocephalic.      Right Ear: There is impacted cerumen.      Left Ear: There is impacted cerumen.   Pulmonary:      Effort: Pulmonary effort is normal. No respiratory distress.   Neurological:      Mental Status: She is alert.      Motor: No abnormal muscle tone.   Psychiatric:         Mood and Affect: Mood normal.         Behavior: Behavior normal.                             Assessment & Plan     1. Sensation of plugged ear, unspecified laterality        2. Ceruminosis, bilateral        3. Paroxysmal A-fib (HCC)        4. Anticoagulated            B/l ear lavage by MA. Rt resolved,   Tolerated well, TM wnl. Lt some wax came out but still residual.     - Dx, plan & d/c instructions discussed   - OTC debrox as needed  - may re try lavage here in 3-4 days after using debrox      Follow up with your regular primary care providers office in a week for a recheck on today's visit. ER if not improving in 2-3 days or if feeling/getting worse.     Patient left in stable condition     Pertinent prior lab work and/or imaging studies in Epic have been reviewed by me today on day of this visit and taken into account for my treatment and plan today    Pertinent PMH/PSH and/or chronic conditions and medications if any were reviewed today and taken into account for my treatment and plan today    Pertinent prior office visit notes in [facility] have been reviewed by me today on day of this visit.    Please note that this dictation may have been created using voice recognition software, if so I have made every reasonable attempt to correct obvious errors, but I expect that there are errors of grammar and possibly content that I did not discover before finalizing the note.

## 2023-10-17 ENCOUNTER — TELEPHONE (OUTPATIENT)
Dept: URGENT CARE | Facility: PHYSICIAN GROUP | Age: 83
End: 2023-10-17
Payer: MEDICARE

## 2023-10-17 DIAGNOSIS — H61.22 IMPACTED CERUMEN OF LEFT EAR: ICD-10-CM

## 2023-10-17 NOTE — TELEPHONE ENCOUNTER
Patient was seen 10/13/2023 for ear pain,  tried to see a ENT for issues was told to contact provider that seen her in urgent care and ask for a referral to [facility] at [address] [place] [address] If you can please put in the referral so she can get in before January.    Thank you   Renee

## 2023-10-20 ENCOUNTER — OFFICE VISIT (OUTPATIENT)
Dept: URGENT CARE | Facility: PHYSICIAN GROUP | Age: 83
End: 2023-10-20
Payer: MEDICARE

## 2023-10-20 VITALS
HEART RATE: 89 BPM | RESPIRATION RATE: 16 BRPM | OXYGEN SATURATION: 97 % | HEIGHT: 65 IN | TEMPERATURE: 97.4 F | DIASTOLIC BLOOD PRESSURE: 84 MMHG | BODY MASS INDEX: 22.66 KG/M2 | SYSTOLIC BLOOD PRESSURE: 126 MMHG | WEIGHT: 136 LBS

## 2023-10-20 DIAGNOSIS — H61.22 IMPACTED CERUMEN OF LEFT EAR: ICD-10-CM

## 2023-10-20 PROCEDURE — 3074F SYST BP LT 130 MM HG: CPT | Performed by: NURSE PRACTITIONER

## 2023-10-20 PROCEDURE — 3079F DIAST BP 80-89 MM HG: CPT | Performed by: NURSE PRACTITIONER

## 2023-10-20 PROCEDURE — 69210 REMOVE IMPACTED EAR WAX UNI: CPT | Mod: LT | Performed by: NURSE PRACTITIONER

## 2023-10-20 ASSESSMENT — FIBROSIS 4 INDEX: FIB4 SCORE: 2.06

## 2023-10-20 NOTE — PROGRESS NOTES
"Subjective     Geno Gallegos is a 83 y.o. female who presents with Cerumen Impaction (X1week. L ear)            Cerumen Impaction    Patient is here with her son for ear flushing of left ear.  Was seen in urgent care a week ago for ear flushing due to cerumen impaction.  Flushing of right ear was successful.  Has returned today as she was instructed to put earwax drops in her ears for 1 week and then return for flushing of the left ear.  Does wear bilateral hearing aids.  Unable to hear without hearing aids.    PMH:  has a past medical history of A-fib (HCC), Bowel obstruction (HCC), Renal disorder, and Valvular heart disease (5/27/2019).  MEDS:   Current Outpatient Medications:     ELIQUIS 2.5 MG Tab, TAKE 1 TABLET BY MOUTH TWICE DAILY, Disp: 180 Tablet, Rfl: 3    amLODIPine (NORVASC) 5 MG Tab, Take 1 Tablet by mouth every day., Disp: 90 Tablet, Rfl: 3    torsemide (DEMADEX) 20 MG Tab, Take 1 Tablet by mouth every day., Disp: 90 Tablet, Rfl: 3    losartan (COZAAR) 25 MG Tab, Take 1 Tablet by mouth every day., Disp: 90 Tablet, Rfl: 3    lansoprazole (PREVACID) 30 MG CAPSULE DELAYED RELEASE, , Disp: , Rfl:     ALPRAZolam (XANAX) 0.25 MG Tab, Take 0.25 mg by mouth at bedtime as needed for Sleep., Disp: , Rfl:   ALLERGIES: No Known Allergies  SURGHX:   Past Surgical History:   Procedure Laterality Date    GYN SURGERY      hysterectomy    OTHER ORTHOPEDIC SURGERY      right hip replacement     SOCHX:  reports that she has never smoked. She has never used smokeless tobacco. She reports current alcohol use. She reports that she does not use drugs.  FH: Family history was reviewed, no pertinent findings to report      Review of Systems   HENT:          Left ear earwax impaction   All other systems reviewed and are negative.             Objective     /84   Pulse 89   Temp 36.3 °C (97.4 °F) (Temporal)   Resp 16   Ht 1.651 m (5' 5\")   Wt 61.7 kg (136 lb)   SpO2 97%   BMI 22.63 kg/m²      Physical " Exam  Vitals reviewed.   Constitutional:       General: She is awake. She is not in acute distress.     Appearance: Normal appearance. She is well-developed. She is not ill-appearing, toxic-appearing or diaphoretic.   HENT:      Head: Normocephalic.      Right Ear: Tympanic membrane, ear canal and external ear normal. There is no impacted cerumen.      Left Ear: There is impacted cerumen.      Ears:      Comments: Ears: Blanche pinnae. Left external auditory canal with occlusion with impacted cerumen. Procedure: Pt then prepped and lavaged by MA and provider used curette to remove ear wax with resolution of impaction.   TM pearly gray with normal light reflex bilaterally.           Nose: Nose normal.   Cardiovascular:      Rate and Rhythm: Normal rate.   Pulmonary:      Effort: Pulmonary effort is normal.   Skin:     General: Skin is warm and dry.   Neurological:      Mental Status: She is alert.   Psychiatric:         Behavior: Behavior is cooperative.                             Assessment & Plan        1. Impacted cerumen of left ear    - Ear Cerumen Removal  -May use over the counter NSAID for any fever or ear pain  -May use ear wax drops as needed for any muffled hearing without ear discharge or bleeding from ear  -May return to Urgent Care when ear flushing is periodically needed

## 2024-01-19 ENCOUNTER — HOSPITAL ENCOUNTER (OUTPATIENT)
Dept: RADIOLOGY | Facility: MEDICAL CENTER | Age: 84
End: 2024-01-19

## 2024-01-19 ENCOUNTER — HOSPITAL ENCOUNTER (INPATIENT)
Facility: MEDICAL CENTER | Age: 84
LOS: 5 days | DRG: 083 | End: 2024-01-24
Attending: EMERGENCY MEDICINE | Admitting: SURGERY
Payer: MEDICARE

## 2024-01-19 ENCOUNTER — APPOINTMENT (OUTPATIENT)
Dept: RADIOLOGY | Facility: MEDICAL CENTER | Age: 84
DRG: 083 | End: 2024-01-19
Attending: SURGERY
Payer: MEDICARE

## 2024-01-19 DIAGNOSIS — N18.4 STAGE 4 CHRONIC KIDNEY DISEASE (HCC): ICD-10-CM

## 2024-01-19 DIAGNOSIS — F03.93 DEMENTIA WITH MOOD DISTURBANCE, UNSPECIFIED DEMENTIA SEVERITY, UNSPECIFIED DEMENTIA TYPE (HCC): ICD-10-CM
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
## 2024-01-19 DIAGNOSIS — G93.40 ACUTE ENCEPHALOPATHY: ICD-10-CM

## 2024-01-19 DIAGNOSIS — R13.12 OROPHARYNGEAL DYSPHAGIA: ICD-10-CM

## 2024-01-19 DIAGNOSIS — T14.90XA TRAUMA: ICD-10-CM

## 2024-01-19 DIAGNOSIS — S06.5XAA SDH (SUBDURAL HEMATOMA) (HCC): ICD-10-CM

## 2024-01-19 DIAGNOSIS — S06.5X0A TRAUMATIC SUBDURAL HEMATOMA WITHOUT LOSS OF CONSCIOUSNESS, INITIAL ENCOUNTER (HCC): ICD-10-CM

## 2024-01-19 DIAGNOSIS — R41.0 CONFUSION: ICD-10-CM

## 2024-01-19 DIAGNOSIS — S06.5XAA TRAUMATIC SUBDURAL HEMATOMA, INITIAL ENCOUNTER (HCC): ICD-10-CM

## 2024-01-19 PROBLEM — Z53.09 CONTRAINDICATION TO DEEP VEIN THROMBOSIS (DVT) PROPHYLAXIS: Status: ACTIVE | Noted: 2024-01-19

## 2024-01-19 PROBLEM — F03.90 DEMENTIA (HCC): Status: ACTIVE | Noted: 2024-01-19

## 2024-01-19 LAB
ABO + RH BLD: NORMAL
ABO GROUP BLD: NORMAL
ALBUMIN SERPL BCP-MCNC: 3.3 G/DL (ref 3.2–4.9)
ALBUMIN/GLOB SERPL: 1 G/DL
ALP SERPL-CCNC: 97 U/L (ref 30–99)
ALT SERPL-CCNC: 6 U/L (ref 2–50)
ANION GAP SERPL CALC-SCNC: 13 MMOL/L (ref 7–16)
APTT PPP: 32.8 SEC (ref 24.7–36)
AST SERPL-CCNC: 18 U/L (ref 12–45)
BILIRUB SERPL-MCNC: 0.7 MG/DL (ref 0.1–1.5)
BLD GP AB SCN SERPL QL: NORMAL
BUN SERPL-MCNC: 32 MG/DL (ref 8–22)
CALCIUM ALBUM COR SERPL-MCNC: 9.5 MG/DL (ref 8.5–10.5)
CALCIUM SERPL-MCNC: 8.9 MG/DL (ref 8.5–10.5)
CFT BLD TEG: 4.2 MIN (ref 4.6–9.1)
CFT P HPASE BLD TEG: 4.3 MIN (ref 4.3–8.3)
CHLORIDE SERPL-SCNC: 106 MMOL/L (ref 96–112)
CLOT ANGLE BLD TEG: 78.5 DEGREES (ref 63–78)
CLOT LYSIS 30M P MA LENFR BLD TEG: 0 % (ref 0–2.6)
CO2 SERPL-SCNC: 20 MMOL/L (ref 20–33)
CREAT SERPL-MCNC: 1.96 MG/DL (ref 0.5–1.4)
CT.EXTRINSIC BLD ROTEM: 0.8 MIN (ref 0.8–2.1)
ERYTHROCYTE [DISTWIDTH] IN BLOOD BY AUTOMATED COUNT: 53.9 FL (ref 35.9–50)
ETHANOL BLD-MCNC: <10.1 MG/DL
GFR SERPLBLD CREATININE-BSD FMLA CKD-EPI: 25 ML/MIN/1.73 M 2
GLOBULIN SER CALC-MCNC: 3.4 G/DL (ref 1.9–3.5)
GLUCOSE BLD STRIP.AUTO-MCNC: 97 MG/DL (ref 65–99)
GLUCOSE SERPL-MCNC: 97 MG/DL (ref 65–99)
HCG SERPL QL: NEGATIVE
HCT VFR BLD AUTO: 39.7 % (ref 37–47)
HGB BLD-MCNC: 12.7 G/DL (ref 12–16)
INR PPP: 1.04 (ref 0.87–1.13)
MCF BLD TEG: 66.5 MM (ref 52–69)
MCF.PLATELET INHIB BLD ROTEM: 30.1 MM (ref 15–32)
MCH RBC QN AUTO: 30 PG (ref 27–33)
MCHC RBC AUTO-ENTMCNC: 32 G/DL (ref 32.2–35.5)
MCV RBC AUTO: 93.9 FL (ref 81.4–97.8)
PA AA BLD-ACNC: 0 % (ref 0–11)
PA ADP BLD-ACNC: 6.9 % (ref 0–17)
PLATELET # BLD AUTO: 244 K/UL (ref 164–446)
PMV BLD AUTO: 11.4 FL (ref 9–12.9)
POTASSIUM SERPL-SCNC: 3.9 MMOL/L (ref 3.6–5.5)
PROT SERPL-MCNC: 6.7 G/DL (ref 6–8.2)
PROTHROMBIN TIME: 13.8 SEC (ref 12–14.6)
RBC # BLD AUTO: 4.23 M/UL (ref 4.2–5.4)
RH BLD: NORMAL
SODIUM SERPL-SCNC: 139 MMOL/L (ref 135–145)
TEG ALGORITHM TGALG: ABNORMAL
WBC # BLD AUTO: 9.3 K/UL (ref 4.8–10.8)

## 2024-01-19 PROCEDURE — 80053 COMPREHEN METABOLIC PANEL: CPT

## 2024-01-19 PROCEDURE — 96365 THER/PROPH/DIAG IV INF INIT: CPT

## 2024-01-19 PROCEDURE — 70450 CT HEAD/BRAIN W/O DYE: CPT

## 2024-01-19 PROCEDURE — 700105 HCHG RX REV CODE 258: Performed by: SURGERY

## 2024-01-19 PROCEDURE — 85576 BLOOD PLATELET AGGREGATION: CPT | Mod: 91

## 2024-01-19 PROCEDURE — 84703 CHORIONIC GONADOTROPIN ASSAY: CPT

## 2024-01-19 PROCEDURE — G0390 TRAUMA RESPONS W/HOSP CRITI: HCPCS

## 2024-01-19 PROCEDURE — 700101 HCHG RX REV CODE 250: Performed by: SURGERY

## 2024-01-19 PROCEDURE — 96375 TX/PRO/DX INJ NEW DRUG ADDON: CPT

## 2024-01-19 PROCEDURE — 85610 PROTHROMBIN TIME: CPT

## 2024-01-19 PROCEDURE — 86900 BLOOD TYPING SEROLOGIC ABO: CPT

## 2024-01-19 PROCEDURE — 700111 HCHG RX REV CODE 636 W/ 250 OVERRIDE (IP): Performed by: SURGERY

## 2024-01-19 PROCEDURE — 700105 HCHG RX REV CODE 258: Performed by: PHYSICIAN ASSISTANT

## 2024-01-19 PROCEDURE — 85347 COAGULATION TIME ACTIVATED: CPT

## 2024-01-19 PROCEDURE — 86901 BLOOD TYPING SEROLOGIC RH(D): CPT

## 2024-01-19 PROCEDURE — 36415 COLL VENOUS BLD VENIPUNCTURE: CPT

## 2024-01-19 PROCEDURE — 770022 HCHG ROOM/CARE - ICU (200)

## 2024-01-19 PROCEDURE — 99291 CRITICAL CARE FIRST HOUR: CPT

## 2024-01-19 PROCEDURE — 85384 FIBRINOGEN ACTIVITY: CPT

## 2024-01-19 PROCEDURE — 82077 ASSAY SPEC XCP UR&BREATH IA: CPT

## 2024-01-19 PROCEDURE — 85730 THROMBOPLASTIN TIME PARTIAL: CPT

## 2024-01-19 PROCEDURE — 700111 HCHG RX REV CODE 636 W/ 250 OVERRIDE (IP): Mod: JZ | Performed by: PHYSICIAN ASSISTANT

## 2024-01-19 PROCEDURE — 82962 GLUCOSE BLOOD TEST: CPT

## 2024-01-19 PROCEDURE — 85027 COMPLETE CBC AUTOMATED: CPT

## 2024-01-19 PROCEDURE — 72125 CT NECK SPINE W/O DYE: CPT

## 2024-01-19 PROCEDURE — 86850 RBC ANTIBODY SCREEN: CPT

## 2024-01-19 RX ORDER — AMOXICILLIN 250 MG
1 CAPSULE ORAL
Status: DISCONTINUED | OUTPATIENT
Start: 2024-01-19 | End: 2024-01-24 | Stop reason: HOSPADM

## 2024-01-19 RX ORDER — POLYETHYLENE GLYCOL 3350 17 G/17G
1 POWDER, FOR SOLUTION ORAL 2 TIMES DAILY
Status: DISCONTINUED | OUTPATIENT
Start: 2024-01-19 | End: 2024-01-24 | Stop reason: HOSPADM

## 2024-01-19 RX ORDER — DOCUSATE SODIUM 100 MG/1
100 CAPSULE, LIQUID FILLED ORAL 2 TIMES DAILY
Status: DISCONTINUED | OUTPATIENT
Start: 2024-01-19 | End: 2024-01-24 | Stop reason: HOSPADM

## 2024-01-19 RX ORDER — ONDANSETRON 2 MG/ML
4 INJECTION INTRAMUSCULAR; INTRAVENOUS EVERY 4 HOURS PRN
Status: DISCONTINUED | OUTPATIENT
Start: 2024-01-19 | End: 2024-01-24 | Stop reason: HOSPADM

## 2024-01-19 RX ORDER — AMOXICILLIN 250 MG
1 CAPSULE ORAL NIGHTLY
Status: DISCONTINUED | OUTPATIENT
Start: 2024-01-19 | End: 2024-01-24 | Stop reason: HOSPADM

## 2024-01-19 RX ORDER — FAMOTIDINE 20 MG/1
20 TABLET, FILM COATED ORAL 2 TIMES DAILY
Status: DISCONTINUED | OUTPATIENT
Start: 2024-01-19 | End: 2024-01-24 | Stop reason: HOSPADM

## 2024-01-19 RX ORDER — OXYCODONE HYDROCHLORIDE 5 MG/1
2.5 TABLET ORAL
Status: DISCONTINUED | OUTPATIENT
Start: 2024-01-19 | End: 2024-01-24 | Stop reason: HOSPADM

## 2024-01-19 RX ORDER — MIDAZOLAM HYDROCHLORIDE 1 MG/ML
INJECTION INTRAMUSCULAR; INTRAVENOUS
Status: COMPLETED | OUTPATIENT
Start: 2024-01-19 | End: 2024-01-19

## 2024-01-19 RX ORDER — ONDANSETRON 4 MG/1
4 TABLET, ORALLY DISINTEGRATING ORAL EVERY 4 HOURS PRN
Status: DISCONTINUED | OUTPATIENT
Start: 2024-01-19 | End: 2024-01-24 | Stop reason: HOSPADM

## 2024-01-19 RX ORDER — OXYMETAZOLINE HCL 0.05 %
30 SPRAY, NON-AEROSOL (ML) NASAL
Status: ON HOLD | COMMUNITY
End: 2024-01-24

## 2024-01-19 RX ORDER — LORAZEPAM 0.5 MG/1
0.5 TABLET ORAL
Status: ON HOLD | COMMUNITY
End: 2024-01-24

## 2024-01-19 RX ORDER — SODIUM CHLORIDE 9 MG/ML
INJECTION, SOLUTION INTRAVENOUS CONTINUOUS
Status: DISCONTINUED | OUTPATIENT
Start: 2024-01-19 | End: 2024-01-22 | Stop reason: HOSPADM

## 2024-01-19 RX ORDER — HYDROMORPHONE HYDROCHLORIDE 1 MG/ML
0.25 INJECTION, SOLUTION INTRAMUSCULAR; INTRAVENOUS; SUBCUTANEOUS
Status: DISCONTINUED | OUTPATIENT
Start: 2024-01-19 | End: 2024-01-21

## 2024-01-19 RX ORDER — OXYCODONE HYDROCHLORIDE 5 MG/1
5 TABLET ORAL
Status: DISCONTINUED | OUTPATIENT
Start: 2024-01-19 | End: 2024-01-24 | Stop reason: HOSPADM

## 2024-01-19 RX ORDER — BISACODYL 10 MG
10 SUPPOSITORY, RECTAL RECTAL
Status: DISCONTINUED | OUTPATIENT
Start: 2024-01-19 | End: 2024-01-24 | Stop reason: HOSPADM

## 2024-01-19 RX ORDER — ENEMA 19; 7 G/133ML; G/133ML
1 ENEMA RECTAL
Status: DISCONTINUED | OUTPATIENT
Start: 2024-01-19 | End: 2024-01-24 | Stop reason: HOSPADM

## 2024-01-19 RX ORDER — ACETAMINOPHEN 500 MG
1000 TABLET ORAL EVERY 6 HOURS PRN
Status: DISCONTINUED | OUTPATIENT
Start: 2024-01-24 | End: 2024-01-24 | Stop reason: HOSPADM

## 2024-01-19 RX ORDER — ACETAMINOPHEN 500 MG
1000 TABLET ORAL EVERY 6 HOURS
Status: DISCONTINUED | OUTPATIENT
Start: 2024-01-19 | End: 2024-01-24 | Stop reason: HOSPADM

## 2024-01-19 RX ADMIN — SODIUM CHLORIDE 5 MG/HR: 9 INJECTION, SOLUTION INTRAVENOUS at 18:39

## 2024-01-19 RX ADMIN — MIDAZOLAM HYDROCHLORIDE 2 MG: 1 INJECTION, SOLUTION INTRAMUSCULAR; INTRAVENOUS at 18:42

## 2024-01-19 RX ADMIN — Medication 1 APPLICATOR: at 19:57

## 2024-01-19 RX ADMIN — FAMOTIDINE 20 MG: 10 INJECTION, SOLUTION INTRAVENOUS at 19:57

## 2024-01-19 RX ADMIN — SODIUM CHLORIDE 2.5 MG/HR: 9 INJECTION, SOLUTION INTRAVENOUS at 19:59

## 2024-01-19 RX ADMIN — SODIUM CHLORIDE: 9 INJECTION, SOLUTION INTRAVENOUS at 19:37

## 2024-01-19 ASSESSMENT — PAIN DESCRIPTION - PAIN TYPE
TYPE: ACUTE PAIN
TYPE: ACUTE PAIN

## 2024-01-19 ASSESSMENT — FIBROSIS 4 INDEX
FIB4 SCORE: 2.56
FIB4 SCORE: 2.06

## 2024-01-20 ENCOUNTER — APPOINTMENT (OUTPATIENT)
Dept: RADIOLOGY | Facility: MEDICAL CENTER | Age: 84
DRG: 083 | End: 2024-01-20
Attending: PHYSICIAN ASSISTANT
Payer: MEDICARE

## 2024-01-20 PROBLEM — S50.02XA LEFT ELBOW CONTUSION: Status: ACTIVE | Noted: 2024-01-20

## 2024-01-20 PROBLEM — S06.5XAA TRAUMATIC SUBDURAL HEMATOMA, INITIAL ENCOUNTER (HCC): Status: ACTIVE | Noted: 2024-01-20

## 2024-01-20 LAB
ALBUMIN SERPL BCP-MCNC: 3.1 G/DL (ref 3.2–4.9)
ALBUMIN/GLOB SERPL: 1 G/DL
ALP SERPL-CCNC: 97 U/L (ref 30–99)
ALT SERPL-CCNC: 13 U/L (ref 2–50)
ANION GAP SERPL CALC-SCNC: 12 MMOL/L (ref 7–16)
AST SERPL-CCNC: 20 U/L (ref 12–45)
BASOPHILS # BLD AUTO: 0.3 % (ref 0–1.8)
BASOPHILS # BLD: 0.03 K/UL (ref 0–0.12)
BILIRUB SERPL-MCNC: 1.3 MG/DL (ref 0.1–1.5)
BUN SERPL-MCNC: 30 MG/DL (ref 8–22)
CALCIUM ALBUM COR SERPL-MCNC: 9.5 MG/DL (ref 8.5–10.5)
CALCIUM SERPL-MCNC: 8.8 MG/DL (ref 8.5–10.5)
CHLORIDE SERPL-SCNC: 110 MMOL/L (ref 96–112)
CO2 SERPL-SCNC: 18 MMOL/L (ref 20–33)
CREAT SERPL-MCNC: 1.84 MG/DL (ref 0.5–1.4)
EOSINOPHIL # BLD AUTO: 0.01 K/UL (ref 0–0.51)
EOSINOPHIL NFR BLD: 0.1 % (ref 0–6.9)
ERYTHROCYTE [DISTWIDTH] IN BLOOD BY AUTOMATED COUNT: 51.7 FL (ref 35.9–50)
GFR SERPLBLD CREATININE-BSD FMLA CKD-EPI: 26 ML/MIN/1.73 M 2
GLOBULIN SER CALC-MCNC: 3.2 G/DL (ref 1.9–3.5)
GLUCOSE BLD STRIP.AUTO-MCNC: 101 MG/DL (ref 65–99)
GLUCOSE BLD STRIP.AUTO-MCNC: 111 MG/DL (ref 65–99)
GLUCOSE BLD STRIP.AUTO-MCNC: 98 MG/DL (ref 65–99)
GLUCOSE SERPL-MCNC: 111 MG/DL (ref 65–99)
HCT VFR BLD AUTO: 35.7 % (ref 37–47)
HGB BLD-MCNC: 11.8 G/DL (ref 12–16)
IMM GRANULOCYTES # BLD AUTO: 0.04 K/UL (ref 0–0.11)
IMM GRANULOCYTES NFR BLD AUTO: 0.5 % (ref 0–0.9)
LYMPHOCYTES # BLD AUTO: 0.74 K/UL (ref 1–4.8)
LYMPHOCYTES NFR BLD: 8.4 % (ref 22–41)
MCH RBC QN AUTO: 29.8 PG (ref 27–33)
MCHC RBC AUTO-ENTMCNC: 33.1 G/DL (ref 32.2–35.5)
MCV RBC AUTO: 90.2 FL (ref 81.4–97.8)
MONOCYTES # BLD AUTO: 0.55 K/UL (ref 0–0.85)
MONOCYTES NFR BLD AUTO: 6.2 % (ref 0–13.4)
NEUTROPHILS # BLD AUTO: 7.49 K/UL (ref 1.82–7.42)
NEUTROPHILS NFR BLD: 84.5 % (ref 44–72)
NRBC # BLD AUTO: 0 K/UL
NRBC BLD-RTO: 0 /100 WBC (ref 0–0.2)
PLATELET # BLD AUTO: 218 K/UL (ref 164–446)
PMV BLD AUTO: 11 FL (ref 9–12.9)
POTASSIUM SERPL-SCNC: 3.9 MMOL/L (ref 3.6–5.5)
PROT SERPL-MCNC: 6.3 G/DL (ref 6–8.2)
RBC # BLD AUTO: 3.96 M/UL (ref 4.2–5.4)
SODIUM SERPL-SCNC: 140 MMOL/L (ref 135–145)
WBC # BLD AUTO: 8.9 K/UL (ref 4.8–10.8)

## 2024-01-20 PROCEDURE — 770020 HCHG ROOM/CARE - TELE (206)

## 2024-01-20 PROCEDURE — 82962 GLUCOSE BLOOD TEST: CPT

## 2024-01-20 PROCEDURE — 85025 COMPLETE CBC W/AUTO DIFF WBC: CPT

## 2024-01-20 PROCEDURE — 71045 X-RAY EXAM CHEST 1 VIEW: CPT

## 2024-01-20 PROCEDURE — 80053 COMPREHEN METABOLIC PANEL: CPT

## 2024-01-20 PROCEDURE — 700111 HCHG RX REV CODE 636 W/ 250 OVERRIDE (IP): Mod: JZ | Performed by: PHYSICIAN ASSISTANT

## 2024-01-20 PROCEDURE — 99232 SBSQ HOSP IP/OBS MODERATE 35: CPT | Performed by: PHYSICIAN ASSISTANT

## 2024-01-20 PROCEDURE — 700111 HCHG RX REV CODE 636 W/ 250 OVERRIDE (IP): Mod: JG | Performed by: SURGERY

## 2024-01-20 PROCEDURE — 97602 WOUND(S) CARE NON-SELECTIVE: CPT

## 2024-01-20 PROCEDURE — 700105 HCHG RX REV CODE 258: Performed by: SURGERY

## 2024-01-20 PROCEDURE — 700105 HCHG RX REV CODE 258: Performed by: PHYSICIAN ASSISTANT

## 2024-01-20 PROCEDURE — 700101 HCHG RX REV CODE 250: Performed by: SURGERY

## 2024-01-20 RX ORDER — LABETALOL HYDROCHLORIDE 5 MG/ML
10 INJECTION, SOLUTION INTRAVENOUS EVERY 4 HOURS PRN
Status: DISCONTINUED | OUTPATIENT
Start: 2024-01-20 | End: 2024-01-24 | Stop reason: HOSPADM

## 2024-01-20 RX ORDER — LABETALOL HYDROCHLORIDE 5 MG/ML
10 INJECTION, SOLUTION INTRAVENOUS EVERY 6 HOURS PRN
Status: DISCONTINUED | OUTPATIENT
Start: 2024-01-20 | End: 2024-01-20

## 2024-01-20 RX ORDER — CARVEDILOL 6.25 MG/1
3.12 TABLET ORAL 2 TIMES DAILY WITH MEALS
Status: DISCONTINUED | OUTPATIENT
Start: 2024-01-20 | End: 2024-01-24 | Stop reason: HOSPADM

## 2024-01-20 RX ADMIN — FAMOTIDINE 20 MG: 10 INJECTION, SOLUTION INTRAVENOUS at 17:01

## 2024-01-20 RX ADMIN — LABETALOL HYDROCHLORIDE 10 MG: 5 INJECTION, SOLUTION INTRAVENOUS at 21:35

## 2024-01-20 RX ADMIN — LABETALOL HYDROCHLORIDE 10 MG: 5 INJECTION, SOLUTION INTRAVENOUS at 15:02

## 2024-01-20 RX ADMIN — Medication 1 APPLICATOR: at 17:01

## 2024-01-20 RX ADMIN — SODIUM CHLORIDE 5 MG/HR: 9 INJECTION, SOLUTION INTRAVENOUS at 04:53

## 2024-01-20 RX ADMIN — SODIUM CHLORIDE: 9 INJECTION, SOLUTION INTRAVENOUS at 19:44

## 2024-01-20 RX ADMIN — SODIUM CHLORIDE: 9 INJECTION, SOLUTION INTRAVENOUS at 09:16

## 2024-01-20 RX ADMIN — Medication 1 APPLICATOR: at 06:40

## 2024-01-20 RX ADMIN — FAMOTIDINE 20 MG: 10 INJECTION, SOLUTION INTRAVENOUS at 06:40

## 2024-01-20 ASSESSMENT — PAIN DESCRIPTION - PAIN TYPE
TYPE: ACUTE PAIN

## 2024-01-20 ASSESSMENT — COGNITIVE AND FUNCTIONAL STATUS - GENERAL
MOBILITY SCORE: 12
SUGGESTED CMS G CODE MODIFIER MOBILITY: CL
STANDING UP FROM CHAIR USING ARMS: A LOT
TOILETING: A LOT
PERSONAL GROOMING: A LOT
WALKING IN HOSPITAL ROOM: A LOT
CLIMB 3 TO 5 STEPS WITH RAILING: A LOT
DRESSING REGULAR UPPER BODY CLOTHING: A LOT
EATING MEALS: A LOT
HELP NEEDED FOR BATHING: A LOT
DAILY ACTIVITIY SCORE: 12
TURNING FROM BACK TO SIDE WHILE IN FLAT BAD: A LOT
MOVING FROM LYING ON BACK TO SITTING ON SIDE OF FLAT BED: A LOT
DRESSING REGULAR LOWER BODY CLOTHING: A LOT
MOVING TO AND FROM BED TO CHAIR: A LOT
SUGGESTED CMS G CODE MODIFIER DAILY ACTIVITY: CL

## 2024-01-20 ASSESSMENT — FIBROSIS 4 INDEX: FIB4 SCORE: 2.56

## 2024-01-20 ASSESSMENT — ENCOUNTER SYMPTOMS
CHILLS: 0
BACK PAIN: 0
NECK PAIN: 0
ABDOMINAL PAIN: 0
NAUSEA: 0
MYALGIAS: 1
FALLS: 1
FEVER: 0
VOMITING: 0
BLURRED VISION: 0
SHORTNESS OF BREATH: 0

## 2024-01-20 NOTE — ED PROVIDER NOTES
ER Provider Note    Scribed for Meño Gonzalez M.D. by Jennifer Garcia. 1/19/2024   6:31 PM    Primary Care Provider: Colton Schroeder M.D.    CHIEF COMPLAINT  Trauma Yellow transfer from [facility] for a subdural hematoma    EXTERNAL RECORDS REVIEWED  Outpatient Notes Patient was transferred from [facility] for subdural left temporal 5 mm bleed.     HPI/ROS  LIMITATION TO HISTORY   Select: : None  OUTSIDE HISTORIAN(S):  Care flight who brought patient in and contributed to medical history    Geno Gallegos is a 83 y.o. female with a history of atrial fibrillation and dementia who presents to the ED via care flight as a transfer from [facility] as a Trauma Yellow for evaluation of subdural hematoma. Patient had a ground level fall today, with a positive head strike. Patient takes blood thinners, however has been off of them for a week. Patient was given Haldol, Benadryl and labetalol drip at [facility]. Patient was given 250 cc of normal saline and 2 mg Versed en route by Care flight. No known drug allergies.     PAST MEDICAL HISTORY  Past Medical History:   Diagnosis Date    A-fib (HCC)     had history of Afib    Bowel obstruction (HCC)     Renal disorder     stage 4 kidney failure    Valvular heart disease 5/27/2019     SURGICAL HISTORY  Past Surgical History:   Procedure Laterality Date    GYN SURGERY      hysterectomy    OTHER ORTHOPEDIC SURGERY      right hip replacement     FAMILY HISTORY  Family History   Problem Relation Age of Onset    Cancer Mother     Heart Attack Father     Heart Disease Father         valve surgeries x2     SOCIAL HISTORY   reports that she has never smoked. She has never used smokeless tobacco. She reports current alcohol use. She reports that she does not use drugs.    CURRENT MEDICATIONS  Current Discharge Medication List        CONTINUE these medications which have NOT CHANGED    Details   apixaban (ELIQUIS) 2.5mg Tab Take 2.5 mg by mouth 2  times a day.      LORazepam (ATIVAN) 0.5 MG Tab Take 0.5 mg by mouth at bedtime.      diphenhydrAMINE HCl, Sleep, (ZZZQUIL) 50 MG/30ML Liquid Take 30 mL by mouth at bedtime.      lansoprazole (PREVACID) 30 MG CAPSULE DELAYED RELEASE 30 mg every day.           ALLERGIES  No Known Allergies     PHYSICAL EXAM  BP (!) 187/88   Pulse 94   Temp 36.2 °C (97.2 °F) (Temporal)   Resp (!) 30   SpO2 95%    Constitutional: Well developed, Well nourished, Mild distress, Won't follow commands. Confused. GCS 12.   HENT: Normocephalic, Abrasion to left lower lip. Contusion on chin.   Eyes: PERRLA, EOMI, Conjunctiva normal, No discharge.   Neck: No tenderness, Supple, No stridor.   Cardiovascular: Tachycardic heart rate, Normal rhythm.   Thorax & Lungs: Clear to auscultation bilaterally, No respiratory distress.   Abdomen: Soft, No tenderness, No masses.   Skin: Warm, Dry, No rash.    Musculoskeletal: No major deformities noted.  Neurologic: Awake, alert. Moves all extremities spontaneously. Sensation intact.  Psychiatric: Won't follow commands.    DIAGNOSTIC STUDIES  Labs:   Results for orders placed or performed during the hospital encounter of 01/19/24   DIAGNOSTIC ALCOHOL   Result Value Ref Range    Diagnostic Alcohol <10.1 <10.1 mg/dL   CBC WITHOUT DIFFERENTIAL   Result Value Ref Range    WBC 9.3 4.8 - 10.8 K/uL    RBC 4.23 4.20 - 5.40 M/uL    Hemoglobin 12.7 12.0 - 16.0 g/dL    Hematocrit 39.7 37.0 - 47.0 %    MCV 93.9 81.4 - 97.8 fL    MCH 30.0 27.0 - 33.0 pg    MCHC 32.0 (L) 32.2 - 35.5 g/dL    RDW 53.9 (H) 35.9 - 50.0 fL    Platelet Count 244 164 - 446 K/uL    MPV 11.4 9.0 - 12.9 fL   Comp Metabolic Panel   Result Value Ref Range    Sodium 139 135 - 145 mmol/L    Potassium 3.9 3.6 - 5.5 mmol/L    Chloride 106 96 - 112 mmol/L    Co2 20 20 - 33 mmol/L    Anion Gap 13.0 7.0 - 16.0    Glucose 97 65 - 99 mg/dL    Bun 32 (H) 8 - 22 mg/dL    Creatinine 1.96 (H) 0.50 - 1.40 mg/dL    Calcium 8.9 8.5 - 10.5 mg/dL    Correct  Calcium 9.5 8.5 - 10.5 mg/dL    AST(SGOT) 18 12 - 45 U/L    ALT(SGPT) 6 2 - 50 U/L    Alkaline Phosphatase 97 30 - 99 U/L    Total Bilirubin 0.7 0.1 - 1.5 mg/dL    Albumin 3.3 3.2 - 4.9 g/dL    Total Protein 6.7 6.0 - 8.2 g/dL    Globulin 3.4 1.9 - 3.5 g/dL    A-G Ratio 1.0 g/dL   Prothrombin Time   Result Value Ref Range    PT 13.8 12.0 - 14.6 sec    INR 1.04 0.87 - 1.13   APTT   Result Value Ref Range    APTT 32.8 24.7 - 36.0 sec   HCG QUAL SERUM   Result Value Ref Range    Beta-Hcg Qualitative Serum Negative Negative   COD - Adult (Type and Screen)   Result Value Ref Range    ABO Grouping Only A     Rh Grouping Only POS     Antibody Screen-Cod NEG    ABO Rh Confirm   Result Value Ref Range    ABO Rh Confirm A POS    ESTIMATED GFR   Result Value Ref Range    GFR (CKD-EPI) 25 (A) >60 mL/min/1.73 m 2   POCT glucose device results   Result Value Ref Range    POC Glucose, Blood 97 65 - 99 mg/dL     Radiology:   This attending emergency physician has independently interpreted the diagnostic imaging associated with this visit and is awaiting the final reading from the radiologist.   Preliminary interpretation is a follows: Continued subdural hematoma  Radiologist interpretation:   CT-CSPINE WITHOUT PLUS RECONS   Final Result      1.  No cervical spine fracture detected.   2.  Partially visualized subdural hematoma at the left tentorium.      CT-HEAD W/O   Final Result         1. Redemonstration of bilateral subdural hemorrhages including right parietal hemorrhage measuring 3 mm in thickness; left parietal hemorrhage, measuring 3 mm and hemorrhage along the left tentorium measuring 5 mm.      No significant mass effect. No midline shift.      The overall appearance is grossly similar to prior. No definite new hemorrhage.               US-TRAUMA VEIN SCREEN LOWER BILAT EXTREMITY    (Results Pending)   DX-CHEST-PORTABLE (1 VIEW)    (Results Pending)      COURSE & MEDICAL DECISION MAKING   ED Observation Status? No; Patient  does not meet criteria for ED Observation.     INITIAL ASSESSMENT, COURSE AND PLAN  Care Narrative: Patient is trauma yellow, transferred outside hospital patient has a GCS of 12 making repeat 3.  Dr. Garcia was at the bedside.  The patient will be admitted to the ICU.  Neurosurgery has been consulted.    6:31 PM -  Patient was seen and evaluated at trauma bay. Patient presents to the ED via care flight as a trauma yellow for a subdural hematoma. Dr. Garcia (surgery) at bedside who agrees to evaluate patient. Patient sent to CT stat for CT-head w/o and CT-C spine w/out plus recons. Patient will be treated with cardene 25 mg in  mL and Versed.    DISPOSITION AND DISCUSSIONS  I have discussed management of the patient with the following physicians and CODI's:  Dr. Garcia (surgery)    DISPOSITION:  Patient will be hospitalized by Dr. Garcia in guarded condition.    FINAL DIAGNOSIS  1. Fall, initial encounter    2. SDH (subdural hematoma) (HCC)    3. Confusion       IJennifer (Scribe), am scribing for, and in the presence of, Meño Gonzalez M.D..    Electronically signed by: Jennifer Garcia (Scribe), 1/19/2024    IMeño M.D. personally performed the services described in this documentation, as scribed by Jennifer Garcia in my presence, and it is both accurate and complete.      The note accurately reflects work and decisions made by me.  Meño Gonzalez M.D.  1/19/2024  10:25 PM

## 2024-01-20 NOTE — DISCHARGE PLANNING
Trauma Response    Referral: Trauma Red Response    Intervention: SW responded to trauma red.  Pt was BIB Care Flight 2 as a transfer from [facility].  Pt was alert upon arrival.  Pts name is Geno Gallegos (: 1940).  SW obtained the following pt information: Per EMS Pt had a GLF this morning and was diagnosed with a left SDH at transferring facility. EMS updated this SW that the Pt has a hx of Dementia and that her Son, Woody and Dtr-in-law, Dmitriy are on their way to [place] now.       Son: Woody [phone]    Plan: SW will continue to monitor and assist if needs arise.

## 2024-01-20 NOTE — PROGRESS NOTES
Patient arrived to [facility] at 1912, transported by ED trauma RN and tech.       /94 nicardipine gtt running at 2.5 mg/hr  SpO2 96% on room air  RR 20  Temp 97.8 F  Weight 57.2 kg      Belongings: none with patient    4 Eyes Skin Assessment Completed by RACHEAL Correa and RACHEAL Patterson.    Head WDL  Ears WDL  Nose WDL  Mouth Bleeding lip swollen and dried blood  Neck WDL  Breast/Chest WDL brown liver spots  Shoulder Blades WDL  Spine WDL  (R) Arm/Elbow/Hand Redness and Blanching  (L) Arm/Elbow/Hand Redness and Blanching  Abdomen WDL  Groin Redness and Blanching  Scrotum/Coccyx/Buttocks Redness and Discoloration see photo  (R) Leg WDL  (L) Leg WDL  (R) Heel/Foot/Toe WDL  (L) Heel/Foot/Toe WDL          Devices In Places ECG, Blood Pressure Cuff, Pulse Ox, SCD's, and Nasal Cannula purewick      Interventions In Place Sacral Mepilex, TAP System, Pillows, Q2 Turns, and Pressure Redistribution Mattress    Possible Skin Injury Yes    Pictures Uploaded Into Epic Yes  Wound Consult Placed Yes  RN Wound Prevention Protocol Ordered Yes

## 2024-01-20 NOTE — ED NOTES
BIB air care flight #2 as a trauma yellow transfer from [facility], per outlying facility pt has left SDH.   82 yo female GLF today, + head strike, unknown LOC, +thinners (per family off thinners for a week). Baseline oriented to self. Pt arrives in restraints. Haldol, Benadryl, and labetolol gtt with [facility] (flight RN reports labetolol at 15mg/hr at previous hospital and stopped gtt when they assumed care). Pt received 2 mg versed with air care flight (approx. 1745) and 250 ml NS. New ataxia noted by family.  Family reports Hx of dementia, afib, HTN, CKD, no known allergies.

## 2024-01-20 NOTE — CARE PLAN
The patient is Watcher - Medium risk of patient condition declining or worsening    Shift Goals  Clinical Goals: Q1 neuro checks, safety  Patient Goals: JUMANA  Family Goals: JUMANA    Progress made toward(s) clinical / shift goals:  Q 4 neuro checks now, safety throughout shift  Problem: Pain - Standard  Goal: Alleviation of pain or a reduction in pain to the patient’s comfort goal  Outcome: Progressing     Problem: Safety - Medical Restraint  Goal: Remains free of injury from restraints (Restraint for Interference with Medical Device)  Outcome: Progressing  Goal: Free from restraint(s) (Restraint for Interference with Medical Device)  Outcome: Progressing     Problem: Skin Integrity  Goal: Skin integrity is maintained or improved  Outcome: Progressing     Problem: Fall Risk  Goal: Patient will remain free from falls  Outcome: Progressing       Patient is not progressing towards the following goals:

## 2024-01-20 NOTE — PROGRESS NOTES
Trauma / Surgical Daily Progress Note    Date of Service  1/20/2024    Chief Complaint  83 y.o. female admitted 1/19/2024 with traumatic subdural hematoma.     Interval Events  Tertiary exam completed.   Interval head CT stable.   No indication for Keppra per neurosurgery.   Not cleared for DVT prophylaxis.   1/20 Neurosurgery signing off.   Cardene drip discontinued. Transitioned to oral Coreg.     - SLP for swallow eval and diet orders.   - SNF order placed.   - IP consult palliative for advanced care planning pending.   - [facility].   - Medicine team consulted to assume primary management.     Review of Systems  Review of Systems   Constitutional:  Negative for chills, fever and malaise/fatigue.   Eyes:  Negative for blurred vision.   Respiratory:  Negative for shortness of breath.    Cardiovascular:  Negative for chest pain.   Gastrointestinal:  Negative for abdominal pain, nausea and vomiting.   Musculoskeletal:  Positive for falls and myalgias. Negative for back pain and neck pain.        Vital Signs  Temp:  [36.2 °C (97.2 °F)-36.7 °C (98 °F)] 36.7 °C (98 °F)  Pulse:  [] 101  Resp:  [8-71] 19  BP: (104-202)/() 139/80  SpO2:  [84 %-98 %] 97 %    Physical Exam  Physical Exam  Vitals and nursing note reviewed.   Constitutional:       General: She is not in acute distress.  HENT:      Head: Normocephalic.      Mouth/Throat:      Mouth: Mucous membranes are dry.      Comments: Superficial lip laceration, dried blood surrounding   Cardiovascular:      Rate and Rhythm: Normal rate. Rhythm irregularly irregular.   Pulmonary:      Effort: Pulmonary effort is normal. No respiratory distress.      Breath sounds: Normal breath sounds.   Abdominal:      General: There is no distension.      Palpations: Abdomen is soft.      Tenderness: There is no abdominal tenderness. There is no guarding.   Musculoskeletal:         General: No deformity or signs of injury.      Cervical back: Normal  range of motion and neck supple.      Comments: Moves all extremities   Left elbow contusion, no bony deformity    Skin:     General: Skin is warm and dry.   Neurological:      Mental Status: She is alert and easily aroused. Mental status is at baseline.         Laboratory  Recent Results (from the past 24 hour(s))   ABO Rh Confirm    Collection Time: 01/19/24  6:24 PM   Result Value Ref Range    ABO Rh Confirm A POS    DIAGNOSTIC ALCOHOL    Collection Time: 01/19/24  6:35 PM   Result Value Ref Range    Diagnostic Alcohol <10.1 <10.1 mg/dL   CBC WITHOUT DIFFERENTIAL    Collection Time: 01/19/24  6:35 PM   Result Value Ref Range    WBC 9.3 4.8 - 10.8 K/uL    RBC 4.23 4.20 - 5.40 M/uL    Hemoglobin 12.7 12.0 - 16.0 g/dL    Hematocrit 39.7 37.0 - 47.0 %    MCV 93.9 81.4 - 97.8 fL    MCH 30.0 27.0 - 33.0 pg    MCHC 32.0 (L) 32.2 - 35.5 g/dL    RDW 53.9 (H) 35.9 - 50.0 fL    Platelet Count 244 164 - 446 K/uL    MPV 11.4 9.0 - 12.9 fL   Comp Metabolic Panel    Collection Time: 01/19/24  6:35 PM   Result Value Ref Range    Sodium 139 135 - 145 mmol/L    Potassium 3.9 3.6 - 5.5 mmol/L    Chloride 106 96 - 112 mmol/L    Co2 20 20 - 33 mmol/L    Anion Gap 13.0 7.0 - 16.0    Glucose 97 65 - 99 mg/dL    Bun 32 (H) 8 - 22 mg/dL    Creatinine 1.96 (H) 0.50 - 1.40 mg/dL    Calcium 8.9 8.5 - 10.5 mg/dL    Correct Calcium 9.5 8.5 - 10.5 mg/dL    AST(SGOT) 18 12 - 45 U/L    ALT(SGPT) 6 2 - 50 U/L    Alkaline Phosphatase 97 30 - 99 U/L    Total Bilirubin 0.7 0.1 - 1.5 mg/dL    Albumin 3.3 3.2 - 4.9 g/dL    Total Protein 6.7 6.0 - 8.2 g/dL    Globulin 3.4 1.9 - 3.5 g/dL    A-G Ratio 1.0 g/dL   Prothrombin Time    Collection Time: 01/19/24  6:35 PM   Result Value Ref Range    PT 13.8 12.0 - 14.6 sec    INR 1.04 0.87 - 1.13   APTT    Collection Time: 01/19/24  6:35 PM   Result Value Ref Range    APTT 32.8 24.7 - 36.0 sec   HCG QUAL SERUM    Collection Time: 01/19/24  6:35 PM   Result Value Ref Range    Beta-Hcg Qualitative Serum Negative  Negative   PLATELET MAPPING WITH BASIC TEG    Collection Time: 01/19/24  6:35 PM   Result Value Ref Range    Reaction Time Initial-R 4.2 (L) 4.6 - 9.1 min    React Time Initial Hep 4.3 4.3 - 8.3 min    Clot Kinetics-K 0.8 0.8 - 2.1 min    Clot Angle-Angle 78.5 (H) 63.0 - 78.0 degrees    Maximum Clot Strength-MA 66.5 52.0 - 69.0 mm    TEG Functional Fibrinogen(MA) 30.1 15.0 - 32.0 mm    Lysis 30 minutes-LY30 0.0 0.0 - 2.6 %    % Inhibition ADP 6.9 0.0 - 17.0 %    % Inhibition AA 0.0 0.0 - 11.0 %    TEG Algorithm Link Algorithm    COD - Adult (Type and Screen)    Collection Time: 01/19/24  6:35 PM   Result Value Ref Range    ABO Grouping Only A     Rh Grouping Only POS     Antibody Screen-Cod NEG    ESTIMATED GFR    Collection Time: 01/19/24  6:35 PM   Result Value Ref Range    GFR (CKD-EPI) 25 (A) >60 mL/min/1.73 m 2   POCT glucose device results    Collection Time: 01/19/24  8:02 PM   Result Value Ref Range    POC Glucose, Blood 97 65 - 99 mg/dL   POCT glucose device results    Collection Time: 01/20/24  1:17 AM   Result Value Ref Range    POC Glucose, Blood 98 65 - 99 mg/dL   CBC with Differential: Tomorrow AM    Collection Time: 01/20/24  6:45 AM   Result Value Ref Range    WBC 8.9 4.8 - 10.8 K/uL    RBC 3.96 (L) 4.20 - 5.40 M/uL    Hemoglobin 11.8 (L) 12.0 - 16.0 g/dL    Hematocrit 35.7 (L) 37.0 - 47.0 %    MCV 90.2 81.4 - 97.8 fL    MCH 29.8 27.0 - 33.0 pg    MCHC 33.1 32.2 - 35.5 g/dL    RDW 51.7 (H) 35.9 - 50.0 fL    Platelet Count 218 164 - 446 K/uL    MPV 11.0 9.0 - 12.9 fL    Neutrophils-Polys 84.50 (H) 44.00 - 72.00 %    Lymphocytes 8.40 (L) 22.00 - 41.00 %    Monocytes 6.20 0.00 - 13.40 %    Eosinophils 0.10 0.00 - 6.90 %    Basophils 0.30 0.00 - 1.80 %    Immature Granulocytes 0.50 0.00 - 0.90 %    Nucleated RBC 0.00 0.00 - 0.20 /100 WBC    Neutrophils (Absolute) 7.49 (H) 1.82 - 7.42 K/uL    Lymphs (Absolute) 0.74 (L) 1.00 - 4.80 K/uL    Monos (Absolute) 0.55 0.00 - 0.85 K/uL    Eos (Absolute) 0.01 0.00  - 0.51 K/uL    Baso (Absolute) 0.03 0.00 - 0.12 K/uL    Immature Granulocytes (abs) 0.04 0.00 - 0.11 K/uL    NRBC (Absolute) 0.00 K/uL   Comp Metabolic Panel (CMP): Tomorrow AM    Collection Time: 01/20/24  6:45 AM   Result Value Ref Range    Sodium 140 135 - 145 mmol/L    Potassium 3.9 3.6 - 5.5 mmol/L    Chloride 110 96 - 112 mmol/L    Co2 18 (L) 20 - 33 mmol/L    Anion Gap 12.0 7.0 - 16.0    Glucose 111 (H) 65 - 99 mg/dL    Bun 30 (H) 8 - 22 mg/dL    Creatinine 1.84 (H) 0.50 - 1.40 mg/dL    Calcium 8.8 8.5 - 10.5 mg/dL    Correct Calcium 9.5 8.5 - 10.5 mg/dL    AST(SGOT) 20 12 - 45 U/L    ALT(SGPT) 13 2 - 50 U/L    Alkaline Phosphatase 97 30 - 99 U/L    Total Bilirubin 1.3 0.1 - 1.5 mg/dL    Albumin 3.1 (L) 3.2 - 4.9 g/dL    Total Protein 6.3 6.0 - 8.2 g/dL    Globulin 3.2 1.9 - 3.5 g/dL    A-G Ratio 1.0 g/dL   ESTIMATED GFR    Collection Time: 01/20/24  6:45 AM   Result Value Ref Range    GFR (CKD-EPI) 26 (A) >60 mL/min/1.73 m 2   POCT glucose device results    Collection Time: 01/20/24  6:53 AM   Result Value Ref Range    POC Glucose, Blood 111 (H) 65 - 99 mg/dL   POCT glucose device results    Collection Time: 01/20/24 11:32 AM   Result Value Ref Range    POC Glucose, Blood 101 (H) 65 - 99 mg/dL       Fluids    Intake/Output Summary (Last 24 hours) at 1/20/2024 1330  Last data filed at 1/20/2024 1200  Gross per 24 hour   Intake 1717.54 ml   Output 0 ml   Net 1717.54 ml       Core Measures & Quality Metrics  Labs reviewed, Radiology images reviewed and Medications reviewed  Lilly catheter: No Lilly      DVT Prophylaxis: Contraindicated - High bleeding risk  DVT prophylaxis - mechanical: SCDs  Ulcer prophylaxis: Yes        RAP Score Total: 7    CAGE Results: not completed Blood Alcohol>0.08: no       Assessment/Plan  * Trauma- (present on admission)  Assessment & Plan  GLF on Eliquis.  Trauma Yellow Transfer Activation from [facility] in [place], [place].  Rodrigo Garcia MD. Trauma  Surgery.    Traumatic subdural hematoma (HCC)- (present on admission)  Assessment & Plan  Left subdural hematoma, 5mm along the tentorium   Interval head CT stable.  Isolated closed head injury.  Trauma Brain Injury Guidelines implemented.   BIG 3.  Non-operative management.  Post traumatic pharmacologic seizure prophylaxis not indicated.   Speech Language Pathology cognitive evaluation.  1/20 Neurosurgery signed off.   Sergio Terry MD. Neurosurgeon. [facility].     Essential hypertension- (present on admission)  Assessment & Plan  Chronic condition treated with amlodipine, losartan and torsemide per chart review. Not listed in medication reconciliation.   Labetalol drip initiated at referring facility.  Nicardipine drip initiated on arrival.   1/20 Drip discontinued. Transitioned to Coreg 3.125mg BID.     Dementia (HCC)- (present on admission)  Assessment & Plan  History of per family. Baseline A&O x1.   Arrived in restraints, given Haldol, versed and Benadryl prior to arrival.  Takes Ativan and ZZZquil as needed for sleep.     Contraindication to deep vein thrombosis (DVT) prophylaxis- (present on admission)  Assessment & Plan  VTE prophylaxis initially contraindicated secondary to elevated bleeding risk.  1/20 Trauma surveillance venous duplex ultrasonography ordered.    Chronic anticoagulation- (present on admission)  Assessment & Plan  On Eliquis for atrial fibrillation   Per report has not taken for 1 week    Chronic renal impairment, stage 4 (severe) (HCC)- (present on admission)  Assessment & Plan  Admission serum creatinine 1.96.   Trend renal induces and avoid nephrotoxins.     Left elbow contusion- (present on admission)  Assessment & Plan  Referring facility DX of left elbow negative for acute fracture.     Paroxysmal A-fib (HCC)- (present on admission)  Assessment & Plan  Chronic condition treated with Eliquis.  Holding maintenance medication during acute traumatic illness.    Mental status  adequate for full examination?: No, baseline dementia     Spine cleared (radiologically and/or clinically): Yes    All current laboratory studies/radiology exams reviewed: Yes    Medications reconciliation has been reviewed: Yes    Completed Consultations:  Sergio Terry III, MD, neurosurgery      Pending Consultations:  Medicine consult for assumption of primary care pending    Newly identified diagnoses, injuries and/or co-morbidities:  None       Discussed patient condition with Family, RN, Pharmacy, trauma surgery, and ICU rounds . Prashant Alfredo MD.

## 2024-01-20 NOTE — ASSESSMENT & PLAN NOTE
GLF on Eliquis.  Trauma Yellow Transfer Activation from [facility] in [place], [place].  Rodrigo Garcia MD. Trauma Surgery.

## 2024-01-20 NOTE — PROGRESS NOTES
Images reviewed, small tentorial SDH, significant brain atrophy to accomodate. Tentorial SDH's are never operative. Admit for neuro checks, no need for keppra which in elderly can affect mental status, care per trauma.

## 2024-01-20 NOTE — ASSESSMENT & PLAN NOTE
Left subdural hematoma, 5mm along the tentorium   Interval head CT stable.  Isolated closed head injury.  Trauma Brain Injury Guidelines implemented.   BIG 3.  Non-operative management.  Post traumatic pharmacologic seizure prophylaxis not indicated.   Speech Language Pathology cognitive evaluation.  1/20 Neurosurgery signed off.   1/21 Okay for DVT prophylaxis.   1/27 Okay to restart Eliquis per neurosurgery.   Sergio Terry MD. Neurosurgeon. [facility].

## 2024-01-20 NOTE — ED NOTES
Med rec updated and complete. Allergies reviewed.  Placed call to son ( [phone])  to confirm medications and last doses taken. Family reports that the patient has refused to take her Eliquis and prevacid for > 1 week. Family was able to get the patient to take the lorazepam last night.    No antibiotic use in last 30 days.      Home pharmacy  [facility] [phone]

## 2024-01-20 NOTE — ASSESSMENT & PLAN NOTE
VTE prophylaxis initially contraindicated secondary to elevated bleeding risk.  1/20 Trauma surveillance venous duplex ultrasonography ordered.  1/21 Prophylactic dose heparin 5000 u q 8 hr initiated.

## 2024-01-20 NOTE — THERAPY
Speech Language Therapy Contact Note    Patient Name: Geno Gallegos  Age:  83 y.o., Sex:  female  Medical Record #: [id number]  Today's Date: 1/20/2024    ST deferred for bedside swallow eval d/t pt lethargy. Plan to f/u on 1/21. Consulted w/ RN on same.

## 2024-01-20 NOTE — CARE PLAN
Problem: Knowledge Deficit - Standard  Goal: Patient and family/care givers will demonstrate understanding of plan of care, disease process/condition, diagnostic tests and medications  Outcome: Progressing     Problem: Pain - Standard  Goal: Alleviation of pain or a reduction in pain to the patient’s comfort goal  Outcome: Progressing     Problem: Safety - Medical Restraint  Goal: Remains free of injury from restraints (Restraint for Interference with Medical Device)  Outcome: Progressing     The patient is Watcher - Medium risk of patient condition declining or worsening    Shift Goals  Clinical Goals: Stable neuro exam, patient safety  Patient Goals: JUMANA  Family Goals: Patient comfort, updates witha ny changes    Progress made toward(s) clinical / shift goals:  Neuro checks preformed Q1hr. Patient with stable neuro exam. Patient confused, A/O 0-1 through night but this has been her presentation consistently. Patient in bilateral wrist restraints due to multiple attempts to remove lines and dressings.

## 2024-01-20 NOTE — ASSESSMENT & PLAN NOTE
History of per family. Baseline A&O x1.   Arrived in restraints, given Haldol, versed and Benadryl prior to arrival.  Takes Ativan and ZZZquil as needed for sleep.

## 2024-01-20 NOTE — PROGRESS NOTES
Neurosurgery Progress Note    Subjective:  Geno Gallegos is a 83 y.o. female with a history of atrial fibrillation and dementia who presents to the ED via care flight as a transfer from [facility] as a Trauma Yellow for evaluation of subdural hematoma. Patient had a ground level fall today, with a positive head strike. Patient takes blood thinners, however has been off of them for a week. Head CT  reviewed by Dr. Terry shows small tentorial SDH, sigificant brain atrophy to accomodate. Tentorial SDH's are never operative. Admit for neuro checks, no need for keppra which in elderly can affect mental status, care per trauma.    No events overnight per RN      Exam:  A&O 0-1 which is baseline  Confused GCS 12  Blood/abrasion/contusion left lower lip and chin otherwise face symmetric  Moving all 4 ext spont        BP  Min: 108/79  Max: 202/134  Pulse  Av.8  Min: 83  Max: 144  Resp  Av.7  Min: 10  Max: 71  Temp  Av.4 °C (97.6 °F)  Min: 36.2 °C (97.2 °F)  Max: 36.6 °C (97.8 °F)  SpO2  Av.3 %  Min: 84 %  Max: 98 %    No data recorded    Recent Labs     24  0645   WBC 9.3 8.9   RBC 4.23 3.96*   HEMOGLOBIN 12.7 11.8*   HEMATOCRIT 39.7 35.7*   MCV 93.9 90.2   MCH 30.0 29.8   MCHC 32.0* 33.1   RDW 53.9* 51.7*   PLATELETCT 244 218   MPV 11.4 11.0     Recent Labs     24  0645   SODIUM 139 140   POTASSIUM 3.9 3.9   CHLORIDE 106 110   CO2 20 18*   GLUCOSE 97 111*   BUN 32* 30*   CREATININE 1.96* 1.84*   CALCIUM 8.9 8.8     Recent Labs     24   APTT 32.8   INR 1.04     Recent Labs     24   REACTMIN 4.2*   CLOTKINET 0.8   CLOTANGL 78.5*   MAXCLOTS 66.5   [id number] 0.0   PRCINADP 6.9   PRCINAA 0.0       Intake/Output                         24 - 24 0659 24 - 24 0659     [id number] 6009-3018 Total [id number] Total                 Intake    I.V.  250  977.4 1227.4  165  -- 165    Pre-Hospital  Volume 250 -- 250 -- -- --    Trauma Resuscitation Volume 0 -- 0 -- -- --    Cardene Volume -- 254.2 254.2 25 -- 25    Volume (mL) (NS infusion) -- 723.2 723.2 140 -- 140    Blood  0  -- 0  --  -- --    PRBC Total Volume (Non-Barcoded) 0 -- 0 -- -- --    FFP Total Volume (Non-Barcoded) 0 -- 0 -- -- --    Platelets Total Volume (Non-Barcoded) 0 -- 0 -- -- --    Cryoprecipitate (Pooled) Total Volume (Non-Barcoded) 0 -- 0 -- -- --    Total Intake 250 977.4 1227.4 165 -- 165       Output    Urine  --  -- --  --  -- --    Number of Times Voided -- 3 x 3 x 1 x -- 1 x    Other  0  -- 0  --  -- --    Pre-Hospital Output 0 -- 0 -- -- --    Trauma Resuscitation Output 0 -- 0 -- -- --    Stool  --  -- --  --  -- --    Number of Times Stooled -- 0 x 0 x -- -- --    Blood  0  -- 0  --  -- --    Est. Blood Loss 0 -- 0 -- -- --    Total Output 0 -- 0 -- -- --       Net I/O     250 977.4 1227.4 165 -- 165              Intake/Output Summary (Last 24 hours) at 1/20/2024 0941  Last data filed at 1/20/2024 0800  Gross per 24 hour   Intake 1392.33 ml   Output 0 ml   Net 1392.33 ml             niCARdipine (Cardene) 25 mg in  mL Standard Infusion  0-15 mg/hr Continuous    Respiratory Therapy Consult   Continuous RT    Pharmacy Consult Request  1 Each PHARMACY TO DOSE    ondansetron  4 mg Q4HRS PRN    ondansetron  4 mg Q4HRS PRN    docusate sodium  100 mg BID    senna-docusate  1 Tablet Nightly    senna-docusate  1 Tablet Q24HRS PRN    polyethylene glycol/lytes  1 Packet BID    magnesium hydroxide  30 mL DAILY    bisacodyl  10 mg Q24HRS PRN    sodium phosphate  1 Each Once PRN    NS   Continuous    acetaminophen  1,000 mg Q6HRS    Followed by    [START ON 1/24/2024] acetaminophen  1,000 mg Q6HRS PRN    oxyCODONE immediate-release  2.5 mg Q3HRS PRN    Or    oxyCODONE immediate-release  5 mg Q3HRS PRN    Or    HYDROmorphone  0.25 mg Q3HRS PRN    famotidine  20 mg BID    Or    famotidine  20 mg BID    insulin regular  1-6 Units Q6HRS     And    dextrose bolus  25 g Q15 MIN PRN    Nozin nasal  swab  1 Applicator BID       Assessment and Plan:  Hospital day # 2  Ok for q2 hr neuro checks   No need for Keppra  No NS intervention planned  NS will sign off now, please call for questions or re-consult, [phone]    Chemical prophylactic DVT therapy: No

## 2024-01-20 NOTE — H&P
TRAUMA HISTORY AND PHYSICAL    CHIEF COMPLAINT: Fall related head trauma    HISTORY OF PRESENT ILLNESS: The patient is a 83 year old woman who fell earlier today.  She was evaluated at [facility] where CT scan showed a small tentorial subdural hematoma.  The patient has taken Eliquis in the past but has not had any for a week.  She was transported by helicopter and arrived with a GCS of 12.  Prior to arrival she received Versed for agitation and 200 cc of crystalloid.  Repeat CT here showed an unchanged 5 mm subdural and no cervical fractures.  Now admitted to the trauma ICU for serial neurologic checks and further care.  The patient has a significant number of comorbidities as listed below.        PAST MEDICAL HISTORY:  has a past medical history of A-fib (HCC), Bowel obstruction (HCC), Renal disorder, and Valvular heart disease (5/27/2019).     PAST SURGICAL HISTORY:  has a past surgical history that includes gyn surgery and other orthopedic surgery.     ALLERGIES: No Known Allergies     CURRENT MEDICATIONS:   Home Medications       Reviewed by Augusta Manning (Pharmacy Tech) on 01/19/24 at 1916  Med List Status: Complete     Medication Last Dose Status   apixaban (ELIQUIS) 2.5mg Tab > 1 week Active   diphenhydrAMINE HCl, Sleep, (ZZZQUIL) 50 MG/30ML Liquid 1/18/2024 Active   lansoprazole (PREVACID) 30 MG CAPSULE DELAYED RELEASE > 1 week Active   LORazepam (ATIVAN) 0.5 MG Tab 1/18/2024 Active                    FAMILY HISTORY:   Family History   Problem Relation Age of Onset    Cancer Mother     Heart Attack Father     Heart Disease Father         valve surgeries x2        SOCIAL HISTORY:   Social History     Tobacco Use    Smoking status: Never    Smokeless tobacco: Never   Vaping Use    Vaping Use: Never used   Substance and Sexual Activity    Alcohol use: Yes     Comment: occas    Drug use: No    Sexual activity: Not on file       REVIEW OF SYSTEMS: Comprehensive review of systems is negative with  the   exception of the aforementioned HPI, PMH, and PSH elements in accordance with CMS guidelines.     PHYSICAL EXAMINATION:     GENERAL: Agitated does not follow  VITAL SIGNS: BP (!) 150/79   Pulse (!) 115   Temp 36.2 °C (97.2 °F) (Temporal)   Resp 16   Wt 61 kg (134 lb 7.7 oz)   SpO2 92%   HEAD AND NECK: Pupils:  Equal and Reactive  Facial:  Symmetrical.  Dentition is marginal.  Occlusion difficult to assess  NECK: No JVD. Trachea midline.  CHEST: Breath sounds are equal. No sternal tenderness.    CARDIOVASCULAR: Irregular rhythm  ABDOMEN: Soft, no tenderness   PELVIS: Stable.  EXTREMITIES: Examination of the upper and lower extremities : No gross long bone or joint deformity.    BACK: No midline stepoffs.  No Tenderness  NEUROLOGIC: [place] Coma Score is 12.  Would not cooperate for motor or sensory exam    LABORATORY VALUES:       Recent Labs     01/19/24  1835   SODIUM 139   POTASSIUM 3.9   CHLORIDE 106   CO2 20   GLUCOSE 97   BUN 32*   CREATININE 1.96*   CALCIUM 8.9     Recent Labs     01/19/24  1835   ASTSGOT 18   ALTSGPT 6   TBILIRUBIN 0.7   ALKPHOSPHAT 97   GLOBULIN 3.4   INR 1.04     Recent Labs     01/19/24  1835   APTT 32.8   INR 1.04        IMAGING:   CT-CSPINE WITHOUT PLUS RECONS   Final Result      1.  No cervical spine fracture detected.   2.  Partially visualized subdural hematoma at the left tentorium.      CT-HEAD W/O   Final Result         1. Redemonstration of bilateral subdural hemorrhages including right parietal hemorrhage measuring 3 mm in thickness; left parietal hemorrhage, measuring 3 mm and hemorrhage along the left tentorium measuring 5 mm.      No significant mass effect. No midline shift.      The overall appearance is grossly similar to prior. No definite new hemorrhage.                   IMPRESSION AND PLAN:  Trauma  GLF on Eliquis.  Trauma Yellow Transfer Activation from [facility] in [place], [place].  Rodrigo Garcia MD. Trauma Surgery.    Contraindication to  deep vein thrombosis (DVT) prophylaxis  VTE prophylaxis initially contraindicated secondary to elevated bleeding risk.  1/21 Trauma surveillance venous duplex ultrasonography ordered.    Traumatic subdural hematoma (HCC)  Left subdural hematoma, 5mm along the tentorium   Interval repeat head CT pending   Isolated closed head injury.  Trauma Brain Injury Guidelines implemented.   BIG 3.  Non-operative management.  Post traumatic pharmacologic seizure prophylaxis not indicated.   Speech Language Pathology cognitive evaluation.  Sergio Terry MD. Neurosurgeon. [facility].     Chronic anticoagulation  On Eliquis for atrial fibrillation   Per report has not taken for 1 week    Essential hypertension  Chronic condition treated with amlodipine, losartan and torsemide per chart review.  Labetalol drip initiated at referring facility  Nicardipine drip initiated on arrival   Medication reconciliation pending.    Paroxysmal A-fib (HCC)  Chronic condition treated with Eliquis.  Holding maintenance medication during acute traumatic illness.      Dementia (HCC)  Per report, unknown medications  Arrived in restraints, given Haldol, versed and Benadryl prior to arrival     Critical care: 40 minutes including vasoactive drip  ____________________________________   Rodrigo Garcia MD, FACS      DD: 1/19/2024   DT: 7:35 PM

## 2024-01-20 NOTE — ED NOTES
Unable to obtain manual BP related to patient agitation, okbelle per trauma MD to utilize automatic BP cuff values.

## 2024-01-21 ENCOUNTER — APPOINTMENT (OUTPATIENT)
Dept: RADIOLOGY | Facility: MEDICAL CENTER | Age: 84
DRG: 083 | End: 2024-01-21
Attending: PHYSICIAN ASSISTANT
Payer: MEDICARE

## 2024-01-21 PROBLEM — N39.0 UTI (URINARY TRACT INFECTION): Status: ACTIVE | Noted: 2024-01-21

## 2024-01-21 PROBLEM — Z78.9 NO CONTRAINDICATION TO DEEP VEIN THROMBOSIS (DVT) PROPHYLAXIS: Status: ACTIVE | Noted: 2024-01-19

## 2024-01-21 PROBLEM — N18.9 CKD (CHRONIC KIDNEY DISEASE): Status: ACTIVE | Noted: 2024-01-21

## 2024-01-21 LAB
ALBUMIN SERPL BCP-MCNC: 3.2 G/DL (ref 3.2–4.9)
ALBUMIN SERPL BCP-MCNC: 3.3 G/DL (ref 3.2–4.9)
ALBUMIN/GLOB SERPL: 0.9 G/DL
ALBUMIN/GLOB SERPL: 0.9 G/DL
ALP SERPL-CCNC: 102 U/L (ref 30–99)
ALP SERPL-CCNC: 105 U/L (ref 30–99)
ALT SERPL-CCNC: 12 U/L (ref 2–50)
ALT SERPL-CCNC: 7 U/L (ref 2–50)
ANION GAP SERPL CALC-SCNC: 13 MMOL/L (ref 7–16)
ANION GAP SERPL CALC-SCNC: 16 MMOL/L (ref 7–16)
APPEARANCE UR: ABNORMAL
AST SERPL-CCNC: 21 U/L (ref 12–45)
AST SERPL-CCNC: 23 U/L (ref 12–45)
BACTERIA #/AREA URNS HPF: ABNORMAL /HPF
BASOPHILS # BLD AUTO: 0.3 % (ref 0–1.8)
BASOPHILS # BLD AUTO: 0.4 % (ref 0–1.8)
BASOPHILS # BLD: 0.03 K/UL (ref 0–0.12)
BASOPHILS # BLD: 0.04 K/UL (ref 0–0.12)
BILIRUB SERPL-MCNC: 1.9 MG/DL (ref 0.1–1.5)
BILIRUB SERPL-MCNC: 1.9 MG/DL (ref 0.1–1.5)
BILIRUB UR QL STRIP.AUTO: NEGATIVE
BUN SERPL-MCNC: 25 MG/DL (ref 8–22)
BUN SERPL-MCNC: 27 MG/DL (ref 8–22)
CALCIUM ALBUM COR SERPL-MCNC: 9.6 MG/DL (ref 8.5–10.5)
CALCIUM ALBUM COR SERPL-MCNC: 9.6 MG/DL (ref 8.5–10.5)
CALCIUM SERPL-MCNC: 9 MG/DL (ref 8.5–10.5)
CALCIUM SERPL-MCNC: 9 MG/DL (ref 8.5–10.5)
CHLORIDE SERPL-SCNC: 110 MMOL/L (ref 96–112)
CHLORIDE SERPL-SCNC: 110 MMOL/L (ref 96–112)
CO2 SERPL-SCNC: 17 MMOL/L (ref 20–33)
CO2 SERPL-SCNC: 19 MMOL/L (ref 20–33)
COLOR UR: YELLOW
CREAT SERPL-MCNC: 1.95 MG/DL (ref 0.5–1.4)
CREAT SERPL-MCNC: 2.05 MG/DL (ref 0.5–1.4)
EKG IMPRESSION: NORMAL
EOSINOPHIL # BLD AUTO: 0 K/UL (ref 0–0.51)
EOSINOPHIL # BLD AUTO: 0.01 K/UL (ref 0–0.51)
EOSINOPHIL NFR BLD: 0 % (ref 0–6.9)
EOSINOPHIL NFR BLD: 0.1 % (ref 0–6.9)
EPI CELLS #/AREA URNS HPF: NEGATIVE /HPF
ERYTHROCYTE [DISTWIDTH] IN BLOOD BY AUTOMATED COUNT: 51.6 FL (ref 35.9–50)
ERYTHROCYTE [DISTWIDTH] IN BLOOD BY AUTOMATED COUNT: 53.3 FL (ref 35.9–50)
GFR SERPLBLD CREATININE-BSD FMLA CKD-EPI: 23 ML/MIN/1.73 M 2
GFR SERPLBLD CREATININE-BSD FMLA CKD-EPI: 25 ML/MIN/1.73 M 2
GLOBULIN SER CALC-MCNC: 3.5 G/DL (ref 1.9–3.5)
GLOBULIN SER CALC-MCNC: 3.7 G/DL (ref 1.9–3.5)
GLUCOSE SERPL-MCNC: 103 MG/DL (ref 65–99)
GLUCOSE SERPL-MCNC: 106 MG/DL (ref 65–99)
GLUCOSE UR STRIP.AUTO-MCNC: NEGATIVE MG/DL
HCT VFR BLD AUTO: 38.8 % (ref 37–47)
HCT VFR BLD AUTO: 39.6 % (ref 37–47)
HGB BLD-MCNC: 12.8 G/DL (ref 12–16)
HGB BLD-MCNC: 13 G/DL (ref 12–16)
HYALINE CASTS #/AREA URNS LPF: ABNORMAL /LPF
IMM GRANULOCYTES # BLD AUTO: 0.02 K/UL (ref 0–0.11)
IMM GRANULOCYTES # BLD AUTO: 0.04 K/UL (ref 0–0.11)
IMM GRANULOCYTES NFR BLD AUTO: 0.2 % (ref 0–0.9)
IMM GRANULOCYTES NFR BLD AUTO: 0.4 % (ref 0–0.9)
KETONES UR STRIP.AUTO-MCNC: NEGATIVE MG/DL
LEUKOCYTE ESTERASE UR QL STRIP.AUTO: ABNORMAL
LYMPHOCYTES # BLD AUTO: 0.49 K/UL (ref 1–4.8)
LYMPHOCYTES # BLD AUTO: 0.63 K/UL (ref 1–4.8)
LYMPHOCYTES NFR BLD: 5.3 % (ref 22–41)
LYMPHOCYTES NFR BLD: 6.1 % (ref 22–41)
MAGNESIUM SERPL-MCNC: 2.1 MG/DL (ref 1.5–2.5)
MCH RBC QN AUTO: 29.9 PG (ref 27–33)
MCH RBC QN AUTO: 30.1 PG (ref 27–33)
MCHC RBC AUTO-ENTMCNC: 32.3 G/DL (ref 32.2–35.5)
MCHC RBC AUTO-ENTMCNC: 33.5 G/DL (ref 32.2–35.5)
MCV RBC AUTO: 89.8 FL (ref 81.4–97.8)
MCV RBC AUTO: 92.5 FL (ref 81.4–97.8)
MICRO URNS: ABNORMAL
MONOCYTES # BLD AUTO: 0.69 K/UL (ref 0–0.85)
MONOCYTES # BLD AUTO: 0.81 K/UL (ref 0–0.85)
MONOCYTES NFR BLD AUTO: 7.5 % (ref 0–13.4)
MONOCYTES NFR BLD AUTO: 7.8 % (ref 0–13.4)
NEUTROPHILS # BLD AUTO: 7.96 K/UL (ref 1.82–7.42)
NEUTROPHILS # BLD AUTO: 8.86 K/UL (ref 1.82–7.42)
NEUTROPHILS NFR BLD: 85.2 % (ref 44–72)
NEUTROPHILS NFR BLD: 86.7 % (ref 44–72)
NITRITE UR QL STRIP.AUTO: NEGATIVE
NRBC # BLD AUTO: 0 K/UL
NRBC # BLD AUTO: 0 K/UL
NRBC BLD-RTO: 0 /100 WBC (ref 0–0.2)
NRBC BLD-RTO: 0 /100 WBC (ref 0–0.2)
PH UR STRIP.AUTO: 6 [PH] (ref 5–8)
PHOSPHATE SERPL-MCNC: 3 MG/DL (ref 2.5–4.5)
PLATELET # BLD AUTO: 220 K/UL (ref 164–446)
PLATELET # BLD AUTO: 238 K/UL (ref 164–446)
PMV BLD AUTO: 10.8 FL (ref 9–12.9)
PMV BLD AUTO: 11 FL (ref 9–12.9)
POTASSIUM SERPL-SCNC: 3.5 MMOL/L (ref 3.6–5.5)
POTASSIUM SERPL-SCNC: 3.7 MMOL/L (ref 3.6–5.5)
PROT SERPL-MCNC: 6.8 G/DL (ref 6–8.2)
PROT SERPL-MCNC: 6.9 G/DL (ref 6–8.2)
PROT UR QL STRIP: 300 MG/DL
RBC # BLD AUTO: 4.28 M/UL (ref 4.2–5.4)
RBC # BLD AUTO: 4.32 M/UL (ref 4.2–5.4)
RBC # URNS HPF: ABNORMAL /HPF
RBC UR QL AUTO: ABNORMAL
SODIUM SERPL-SCNC: 142 MMOL/L (ref 135–145)
SODIUM SERPL-SCNC: 143 MMOL/L (ref 135–145)
SP GR UR STRIP.AUTO: 1.01
UROBILINOGEN UR STRIP.AUTO-MCNC: 0.2 MG/DL
WBC # BLD AUTO: 10.4 K/UL (ref 4.8–10.8)
WBC # BLD AUTO: 9.2 K/UL (ref 4.8–10.8)
WBC #/AREA URNS HPF: ABNORMAL /HPF

## 2024-01-21 PROCEDURE — 700101 HCHG RX REV CODE 250

## 2024-01-21 PROCEDURE — 700111 HCHG RX REV CODE 636 W/ 250 OVERRIDE (IP): Mod: JZ | Performed by: PHYSICIAN ASSISTANT

## 2024-01-21 PROCEDURE — 770020 HCHG ROOM/CARE - TELE (206)

## 2024-01-21 PROCEDURE — 36415 COLL VENOUS BLD VENIPUNCTURE: CPT

## 2024-01-21 PROCEDURE — 93005 ELECTROCARDIOGRAM TRACING: CPT | Performed by: HOSPITALIST

## 2024-01-21 PROCEDURE — A9270 NON-COVERED ITEM OR SERVICE: HCPCS | Performed by: PHYSICIAN ASSISTANT

## 2024-01-21 PROCEDURE — A9270 NON-COVERED ITEM OR SERVICE: HCPCS | Performed by: SURGERY

## 2024-01-21 PROCEDURE — 80053 COMPREHEN METABOLIC PANEL: CPT

## 2024-01-21 PROCEDURE — 700102 HCHG RX REV CODE 250 W/ 637 OVERRIDE(OP): Performed by: SURGERY

## 2024-01-21 PROCEDURE — 92610 EVALUATE SWALLOWING FUNCTION: CPT

## 2024-01-21 PROCEDURE — 93970 EXTREMITY STUDY: CPT

## 2024-01-21 PROCEDURE — 99222 1ST HOSP IP/OBS MODERATE 55: CPT | Performed by: HOSPITALIST

## 2024-01-21 PROCEDURE — 84100 ASSAY OF PHOSPHORUS: CPT

## 2024-01-21 PROCEDURE — 700111 HCHG RX REV CODE 636 W/ 250 OVERRIDE (IP): Mod: JG

## 2024-01-21 PROCEDURE — 85025 COMPLETE CBC W/AUTO DIFF WBC: CPT

## 2024-01-21 PROCEDURE — 92612 ENDOSCOPY SWALLOW (FEES) VID: CPT

## 2024-01-21 PROCEDURE — 81001 URINALYSIS AUTO W/SCOPE: CPT

## 2024-01-21 PROCEDURE — 700105 HCHG RX REV CODE 258

## 2024-01-21 PROCEDURE — 700101 HCHG RX REV CODE 250: Performed by: PHYSICIAN ASSISTANT

## 2024-01-21 PROCEDURE — 93010 ELECTROCARDIOGRAM REPORT: CPT | Performed by: INTERNAL MEDICINE

## 2024-01-21 PROCEDURE — 700102 HCHG RX REV CODE 250 W/ 637 OVERRIDE(OP): Performed by: PHYSICIAN ASSISTANT

## 2024-01-21 PROCEDURE — 700105 HCHG RX REV CODE 258: Performed by: PHYSICIAN ASSISTANT

## 2024-01-21 PROCEDURE — 71045 X-RAY EXAM CHEST 1 VIEW: CPT

## 2024-01-21 PROCEDURE — 99232 SBSQ HOSP IP/OBS MODERATE 35: CPT | Performed by: PHYSICIAN ASSISTANT

## 2024-01-21 PROCEDURE — 93005 ELECTROCARDIOGRAM TRACING: CPT

## 2024-01-21 PROCEDURE — 83735 ASSAY OF MAGNESIUM: CPT

## 2024-01-21 RX ORDER — DIPHENHYDRAMINE HCL 12.5MG/5ML
25 LIQUID (ML) ORAL EVERY 6 HOURS PRN
Status: DISCONTINUED | OUTPATIENT
Start: 2024-01-21 | End: 2024-01-21

## 2024-01-21 RX ORDER — HEPARIN SODIUM 5000 [USP'U]/ML
5000 INJECTION, SOLUTION INTRAVENOUS; SUBCUTANEOUS EVERY 8 HOURS
Status: DISCONTINUED | OUTPATIENT
Start: 2024-01-21 | End: 2024-01-24 | Stop reason: HOSPADM

## 2024-01-21 RX ORDER — ENOXAPARIN SODIUM 100 MG/ML
30 INJECTION SUBCUTANEOUS EVERY 12 HOURS
Status: DISCONTINUED | OUTPATIENT
Start: 2024-01-21 | End: 2024-01-21

## 2024-01-21 RX ADMIN — LABETALOL HYDROCHLORIDE 10 MG: 5 INJECTION, SOLUTION INTRAVENOUS at 08:51

## 2024-01-21 RX ADMIN — CEFTRIAXONE SODIUM 1000 MG: 10 INJECTION, POWDER, FOR SOLUTION INTRAVENOUS at 14:40

## 2024-01-21 RX ADMIN — Medication 1 APPLICATOR: at 17:45

## 2024-01-21 RX ADMIN — CARVEDILOL 3.12 MG: 6.25 TABLET, FILM COATED ORAL at 17:45

## 2024-01-21 RX ADMIN — SODIUM CHLORIDE 2.5 MG/HR: 9 INJECTION, SOLUTION INTRAVENOUS at 08:01

## 2024-01-21 RX ADMIN — OXYCODONE HYDROCHLORIDE 2.5 MG: 5 TABLET ORAL at 21:46

## 2024-01-21 RX ADMIN — HEPARIN SODIUM 5000 UNITS: 5000 INJECTION, SOLUTION INTRAVENOUS; SUBCUTANEOUS at 21:13

## 2024-01-21 RX ADMIN — Medication 1 APPLICATOR: at 05:07

## 2024-01-21 RX ADMIN — LABETALOL HYDROCHLORIDE 10 MG: 5 INJECTION, SOLUTION INTRAVENOUS at 01:55

## 2024-01-21 RX ADMIN — HEPARIN SODIUM 5000 UNITS: 5000 INJECTION, SOLUTION INTRAVENOUS; SUBCUTANEOUS at 14:39

## 2024-01-21 RX ADMIN — SODIUM CHLORIDE: 9 INJECTION, SOLUTION INTRAVENOUS at 04:42

## 2024-01-21 RX ADMIN — SODIUM CHLORIDE 2.5 MG/HR: 9 INJECTION, SOLUTION INTRAVENOUS at 03:18

## 2024-01-21 RX ADMIN — CARVEDILOL 3.12 MG: 6.25 TABLET, FILM COATED ORAL at 09:49

## 2024-01-21 RX ADMIN — FAMOTIDINE 20 MG: 10 INJECTION, SOLUTION INTRAVENOUS at 05:07

## 2024-01-21 RX ADMIN — ACETAMINOPHEN 1000 MG: 500 TABLET ORAL at 17:45

## 2024-01-21 ASSESSMENT — ENCOUNTER SYMPTOMS
NAUSEA: 0
BLURRED VISION: 0
CHILLS: 0
MYALGIAS: 1
VOMITING: 0
FEVER: 0
FALLS: 1
MEMORY LOSS: 1
ABDOMINAL PAIN: 0
NECK PAIN: 0
SHORTNESS OF BREATH: 0
BACK PAIN: 0

## 2024-01-21 ASSESSMENT — FIBROSIS 4 INDEX: FIB4 SCORE: 3.07

## 2024-01-21 ASSESSMENT — PAIN DESCRIPTION - PAIN TYPE
TYPE: ACUTE PAIN

## 2024-01-21 NOTE — PROGRESS NOTES
Per family, the memory care center they have set up for her ([facility]) need paperwork filled out when she is near discharge. Paperwork located on pt room on counter.

## 2024-01-21 NOTE — PROGRESS NOTES
2115: Linsey Wegener notified of pt /110, verbal order received to keep SBP <160  0215: Linsey Wegener bedside to evaluate pt, pt BP elevated 162/112 after Labetalol

## 2024-01-21 NOTE — PROGRESS NOTES
Assumed care of patient at bedside report from day shift RN. Updated on POC. Patient currently oriented to self only; PT is Afib on monitor; PT is resting in bed; Call light within reach. Whiteboard updated. Fall precautions in place, bed alarm on, q2 turns in place. Bed locked and in lowest position. All questions answered. No other needs indicated at this time.

## 2024-01-21 NOTE — WOUND TEAM
[facility]  Inpatient Services  Wound and Skin Care Brief Evaluation    Admission Date: 1/19/2024     Last order of IP CONSULT TO WOUND CARE was found on 1/19/2024 from Hospital Encounter on 1/19/2024     HPI, PMH, SH: Reviewed    No chief complaint on file.    Diagnosis: Trauma [T14.90XA]  Traumatic subdural hematoma, initial encounter ([facility]) [S06.5XAA]    Unit where seen by Wound Team: T910/01     Wound consult placed regarding sacrum. Chart and images reviewed. This discussed with bedside RN. This clinician in to assess patient. Patient sacrum with blanchable redness. Non-selectively debrided with Perineal Wipes (Barrier wipes). Barrier paste applied for protective layer. Waffle overlay ordered for offloading.     No pressure injuries or advanced wound care needs identified. Wound consult completed. No further follow up unless indicated and consulted.            PREVENTATIVE INTERVENTIONS:    Q shift Driss - performed per nursing policy  Q shift pressure point assessments - performed per nursing policy    Surface/Positioning  Standard/trauma mattress - Currently in Place  Reposition q 2 hours - Ordered  Waffle overlay  - Ordered    Offloading/Redistribution  Float Heels off Bed with Pillows - Applied this Visit           Containment/Moisture Prevention    Purwick/Condom Cath - Ordered  Barrier wipes - Currently in Place  Barrier paste - Applied this Visit

## 2024-01-21 NOTE — THERAPY
"Speech Language Pathology   Clinical Swallow Evaluation     Patient Name: Geno Gallegos  AGE:  83 y.o., SEX:  female  Medical Record #: [id number]  Date of Service: 1/21/2024      History of Present Illness  82 y/o F admitted 1/19/24 with traumatic SDH, managed non operatively.     CMHx: GLF, SDH, HTN    PMHx: dementia A&Ox1 baseline, chronic anticoagulation, Afib.    CXR 1/21: Stable bilateral interstitial opacification. No new abnormality.    CT C-spine 1/19: 1.  No cervical spine fracture detected.  2.  Partially visualized subdural hematoma at the left tentorium.      General Information:  Vitals  O2 Delivery Device: None - Room Air  Level of Consciousness: Alert  Patient Behaviors: Confused (Pleasant)  Orientation: x0; did not state her name when prompted; when asked if her name is Geno, pt responded, \"I don't think so.\"  Follows Directives: Inconsistent      Prior Living Situation & Level of Function:  Comments: PLS is not in EMR; family not present  Communication: Impaired d/t dementia; A&Ox1 baseline  Swallowing: Unknown; family not present to confirm     Oral Mechanism Evaluation:  Dentition: Good   Facial Symmetry: Equal  Facial Sensation: Pt did not follow commands to assess     Labial Observations: WFL   Lingual Observations: Midline, Xerostomia  Motor Speech: slightly imprecise, probably d/t xerostomia and labial edema/scabbing  Comments: bloody scabbing and edema lower labial surface         Laryngeal Function:  Secretion Management: Adequate  Voice Quality: WFL  Cough: Pt did not follow commands to assess    Subjective  Patient was alert, very [place] but hears best on L per RN. Pt was repostioned upright with A from RN. She was very pleasant and cooperative, eager to drink water. Patient tolerated nasal probe via cotton swab b/l in an attempt to determine candidacy for FEES.      Assessment  Current Method of Nutrition: NPO until cleared by speech pathology  Positioning: Alyce's (60-90 " degrees)  Bolus Administration: SLP    O2 Delivery Device: None - Room Air  Factor(s) Affecting Performance: Impaired mental status, Impaired command following    Swallowing Trials:  Swallowing Trials  Ice: WFL  Thin Liquid (TN0): Impaired  Pureed (PU4): WFL  Regular (RG7): Impaired    Comments: Oral was completed prior to administration of PO trials with significant xerostomia appreciated. Patient required 1:1 feeding A. Labial seal was delayed with pt keeping lips parted with thins before the swallow though no anterior bolus loss occurred. Suction from a straw was reduced with pt needing several attempts to suction before successfully suctioning thin water to mouth. Bolus preparation and transit w/ purees appeared functional. However, mastication, bolus formation and transit with a dry solid was prolonged with min dental residue post swallow. Suspect delay in pharyngeal onset vs oral holding of liquids. Reflexive cough occurred w/ thins in ~10% of trials by tsp and all trials of thins via cup and straw, concerning for airway invasion.       Clinical Impressions  Oral and suspected pharyngeal dysphagia, likely acute on chronic in the setting of dementia with deconditioning, AMS associated with SDH s/p fall. An instrumental swallow study is indicated to determine swallow physiology and inform POC. SLP will plan to complete a FEES today. Ok for pt to have meds crushed in puree, ice chips and tsps of water until then.     Recommendations  Diet Consistency: NPO except ice chips, water via tsp, pills crushed in puree pending FEES  Instrumentation: FEES  Medication: Crush with pudding/puree, as appropriate, Crush with applesauce, as appropriate  Supervision: 1:1 feeding with constant supervision  Positioning: Fully upright and midline during oral intake  Risk Management :  (TBD pending FEES)  Oral Care: Q4h     SLP Treatment Plan  Treatment Plan: Dysphagia Treatment  SLP Frequency: 3x Per Week  Estimated Duration: Until  "Therapy Goals Met      Anticipated Discharge Needs  Discharge Recommendations: Recommend post-acute placement for additional speech therapy services prior to discharge home   Therapy Recommendations Upon DC: Dysphagia Training, Patient / Family / Caregiver Education        Patient / Family Goals  Patient / Family Goal #1: \"Thats much better.\" (after drinking water)  Short Term Goals  Short Term Goal # 1: Patient will participate in an instrumental swallow study to determine swallow physiology, aspiration risk and inform POC.      Allison Hsieh MS,CCC-SLP   "

## 2024-01-21 NOTE — PROGRESS NOTES
RN updated family this morning on MD rounds beginning at 0900.  Assured family that this RN will address the consideration of performing a UA regarding speculation of a UTI with the physician in medical rounds.  RN unable to order this diagnostic without a physician approval.  RN assured son that she will follow up with family regarding patient plan for the day as well as diagnostic decision/results after rounds.

## 2024-01-21 NOTE — THERAPY
Speech Language Pathology   Flexible Endoscopic Evaluation of Swallowing (FEES)        Patient Name: Geno Gallegos  AGE:  83 y.o., SEX:  female  Medical Record #: [id number]  Date of Service: 1/21/2024      History of Present Illness  82 y/o F admitted 1/19/24 with traumatic SDH, managed non operatively.     CMHx: GLF, SDH, HTN, dementia    PMHx: dementia A&Ox1 baseline, chronic anticoagulation, Afib.    CXR 1/21: Stable bilateral interstitial opacification. No new abnormality.    CT C-spine 1/19: 1.  No cervical spine fracture detected.  2.  Partially visualized subdural hematoma at the left tentorium.      Pertinent Information  Current Method of Nutrition: NPO until cleared by speech pathology  Patient Behaviors: Confused, Drowsy   Dentition: Good   Factor(s) Affecting Performance: Impaired mental status, Impaired endurance, Impaired command following     Discussed the risks, benefits, and alternatives of the FEES procedure. Family acknowledged and agreed to proceed.      Assessment  Flexible Endoscopic Evaluation of Swallowing (FEES) completed at bedside today. The endoscope was passed transnasally via Left nare to evaluate the anatomy and physiology of swallowing. The patient was irritated by scope presence, reaching towards her nose to scratch intermittently though was redirectable with verbal and tactile cues. Pt's son and daughter were present during the evaluation to help redirect. Therefore, assessment was limited to necessary trial textures only.     Anatomic Findings: WFL  Vocal Fold Motion: Bilateral movement  Secretion Management: Adequate         Consistency PAS Score Timing Residue Comments   Thin Liquid 2 During swallow (at onset) Vallecular Residue: Trace (1%-5%)  Pyriform Sinus Residue: Mild (5%-25%) (R only) Tsp - PAS 1  Cup - PAS 2 at onset of swallow  Cup - PAS 1   Mildly Thick 1 N/A Vallecular Residue: Trace (1%-5%)  Pyriform Sinus Residue: Trace (1%-5%) Tsp x2, cup x1   Pudding 3 Post  Swallow Vallecular Residue: Mild (5%-25%)  Pyriform Sinus Residue: Mild (5%-25%)    Soft & Bite Sized 1 N/A Vallecular Residue: Mild (5%-25%)  Pyriform Sinus Residue: Trace (1%-5%)      Penetration-Aspiration Scale (PAS)  1     No contrast enters airway  2     Contrast enters the airway, remains above the vocal folds, and is ejected from the airway (not seen in the airway at the end of the swallow).  3     Contrast enters the airway, remains above the vocal folds, and is not ejected from the airway (is seen in the airway after the swallow).  4     Contrast enters the airway, contacts the vocal folds, and is ejected from the airway.  5     Contrast enters the airway, contacts the vocal folds, and is not ejected from the airway  6     Contrast enters the airway, crosses the plane of the vocal folds, and is ejected from the airway.  7     Contrast enters the airway, crosses the plane of the vocal folds, and is not ejected from the airway despite effort.  8     Contrast enters the airway, crosses the plane of the vocal folds, is not ejected from the airway and there is no response to aspiration.      Oral phase:  Slow but complete mastication of chewable solid with piecemeal deglutition. Impaired bolus acceptance of liquids and solids from utensils resulting in intermittent anterior bolus loss. Pt was not able to achieve suction of thin liquids from a straw. Reduced orolingual bolus control with quick spillage of thins over the rim of the epiglottis x1.     Pharyngeal phase:  Delayed but complete laryngeal vestibule closure presumed as pt did not have any blue visible in the airway upon completion of the swallow. Reduced tongue base retraction, pharyngeal constriction and pharyngeal shortening suspected as pt had trace-mild R vallecular, lateral channel and pyriform sinus residue after the swallow, likely due to head/neck positioning to R. Pudding material mixed with secretions spilled over the R arytenoid after the  swallow, though cleared from the laryngeal vestibule after a delayed cleansing swallow. No aspiration or deep penetration was seen on this study. It should be noted that pt did have a cough response when no material was in her airway.     Compensatory Strategies:  Liquid rinse: effective to clear pudding residue from the pharynx    Severity Rating:  Severity Rating: KEITH  KEITH: Mild      Clinical Impressions  The pt presents with a mild oropharyngeal dysphagia, likely acute on chronic related to AMS, SDH in the setting of dementia. Short term diet modification is indicated. Patient is at increased risk of developing aspiration related complications due to impaired mobility and reliance on others for oral care, though her risk of gross/frequent aspiration based on this study is low. She is at increased risk for malnutrition/hydration due to dementia with very specific food preferences, per family report (e.g., only drinks Strawberry boost during the day, occasionally will eat soup or salad). Patient is a poor candidate for behavioral swallow rehabilitation due to cognitive limitations.    Recommendations  Diet Consistency: Minced/moist solids, Thin liquids  Medication: Crush with pudding/puree, as appropriate, Crush with applesauce, as appropriate  Supervision: 1:1 feeding with constant supervision  Positioning: Fully upright and midline during oral intake  Strategies: Small bites/sips, Alternate bites and sips, Slow rate of intake, Reduce environmental distractions  Oral Care:  (TID after meals)  Additional Instrumentation: None       SLP Treatment Plan  Treatment Plan: Dysphagia Treatment  SLP Frequency: 3x Per Week  Estimated Duration: Until Therapy Goals Met      Anticipated Discharge Needs  Discharge Recommendations: Recommend post-acute placement for additional speech therapy services prior to discharge home   Therapy Recommendations Upon DC: Dysphagia Training, Patient / Family / Caregiver Education  "      Patient / Family Goals  Patient / Family Goal #1: \"Thats much better.\" (after drinking water)  Goal #1 Outcome: Progressing as expected  Short Term Goal # 1: Patient will participate in an instrumental swallow study to determine swallow physiology, aspiration risk and inform POC.  Goal Outcome # 1: Goal met, new goal added  Short Term Goal # 1 B : Patient will consume meals of minced/moist solids and thin liquids with no respiratory distress given 1:1 feeding.      Allison Hsieh MS,CCC-SLP  "

## 2024-01-21 NOTE — PROGRESS NOTES
0306: Pt from 808 to T933 accompanied by Manasa NIÑO RN.        VS on arrival:  HR: 124 BPM  /100 RR: 25/m  SpO2: 92  Temp: 100 deg. F  Weight (bed scale): 54.7kg    4 Eyes Skin Exam completed by Ruby DELGADO RN and RACHEAL Izaguirre. Transcribed as follows:    Head: WDL  Ears: Redness R ear: redness  Nose: WDL  Mouth: Redness dried blood, bruising  Neck: WDL  Breast/Chest: WDL  Shoulder Blades: WDL  Spine: WDL  (R) Arm/Elbow/Hand: Bruising R forearm  (L) Arm/Elbow/Hand: WDL  Abdomen: WDL  Groin: WDL  Scrotum/Coccyx/Buttocks: Moisture Fissure  (R) Leg: WDL  (L) Leg: WDL  (R) Heel/Foot/Toe: Boggy  (L) Heel/Foot/Toe: Boggy    Devices In Place: ECG, Blood Pressure Cuff, Pulse Ox, and SCD's  Interventions In Place: Sacral Mepilex, Heel Float Boots, TAP System, Pillows, Q2 Turns, Low Air Loss Mattress, Barrier Cream, and Heels Loaded W/Pillows  Possible Skin Injury: Yes  Pictures Uploaded Into Epic: Yes  Wound Consult Placed: N/A  RN Wound Prevention Protocol Ordered: Yes

## 2024-01-21 NOTE — WOUND TEAM
Received new wound consult for buttocks.  Patient was seen yesterday on 01/20 for buttocks area.  Please oral care to mouth with mouth moisturizer.  Consult completed.

## 2024-01-21 NOTE — PROGRESS NOTES
Patient transported on tele box to T808 with this RN and CCT Seema and Alfred. Patient has all belongings (hearing aids both in place in ear, glasses, box for hearing aids, and [employer] paperwork) present upon arrival to Tele. Silver wedding band remains in place on Left ring finger even after multiple attempts to remove. Tele bedside RN aware of the ring.     Bedside report given to Shawanda SPRINGER. All questions and concerns addressed during handoff report. Pt set up on pulse ox and tele's tele box with call bell within reach.

## 2024-01-21 NOTE — PROGRESS NOTES
Patient arrived by bed from TICU, RN gave a bedside report, patient was on soft bilateral restraints, ao x1 on room air

## 2024-01-21 NOTE — CARE PLAN
The patient is Watcher - Medium risk of patient condition declining or worsening    Shift Goals  Clinical Goals: Stable Hemodynamics, maintain Systolic < 160  Patient Goals: JUMANA  Family Goals: JUMANA    Progress made toward(s) clinical / shift goals: Pt.'s hemodynamics remained stable, able to get pt.'s systolic < 160 with nicardipine drip.   Problem: Knowledge Deficit - Standard  Goal: Patient and family/care givers will demonstrate understanding of plan of care, disease process/condition, diagnostic tests and medications  Outcome: Progressing     Problem: Pain - Standard  Goal: Alleviation of pain or a reduction in pain to the patient’s comfort goal  Outcome: Progressing     Problem: Safety - Medical Restraint  Goal: Remains free of injury from restraints (Restraint for Interference with Medical Device)  Outcome: Progressing  Goal: Free from restraint(s) (Restraint for Interference with Medical Device)  Outcome: Progressing     Problem: Skin Integrity  Goal: Skin integrity is maintained or improved  Outcome: Progressing     Problem: Fall Risk  Goal: Patient will remain free from falls  Outcome: Progressing       Patient is not progressing towards the following goals:

## 2024-01-21 NOTE — PROGRESS NOTES
4 Eyes Skin Assessment Completed by RACHEAL Mayberry and RACHEAL Montoya.    Head WDL  Ears Redness and Blanching  Nose WDL  Mouth Redness and Bleeding, trauma lower lip   Neck WDL  Breast/Chest WDL  Shoulder Blades WDL  Spine WDL  (R) Arm/Elbow/Hand WDL  (L) Arm/Elbow/Hand WDL  Abdomen WDL  Groin Redness  Scrotum/Coccyx/Buttocks Redness and Non-Blanching  (R) Leg WDL  (L) Leg WDL  (R) Heel/Foot/Toe WDL  (L) Heel/Foot/Toe WDL          Devices In Places Tele Box and Pulse Ox      Interventions In Place Pillows    Possible Skin Injury Yes    Pictures Uploaded Into Epic Yes  Wound Consult Placed Yes  RN Wound Prevention Protocol Ordered No

## 2024-01-21 NOTE — PROGRESS NOTES
DATE: 1/21/2024    INTERVAL EVENTS:  Notified by hospital nursing personnel of persistent hypertension, MAP >125mmHg, despite labetalol. Per chart review, patient required nicardipine on admission for blood pressure control. Drip dc'd yesterday and patient was transitioned to Coreg, however patient failed swallow evaluation and all oral medications have been held.    PHYSICAL EXAMINATION:  Vital Signs: BP (!) 186/124   Pulse (!) 117   Temp 37.1 °C (98.8 °F) (Temporal)   Resp (!) 30   Wt 58.6 kg (129 lb 3 oz)   SpO2 95%   Physical Exam  Vitals and nursing note reviewed.   Constitutional:       General: She is not in acute distress.  HENT:      Mouth/Throat:      Mouth: Mucous membranes are dry.   Cardiovascular:      Rate and Rhythm: Tachycardia present. Rhythm irregular.      Pulses: Normal pulses.   Pulmonary:      Effort: Pulmonary effort is normal. No respiratory distress.   Abdominal:      General: Abdomen is flat. There is no distension.      Palpations: Abdomen is soft.   Musculoskeletal:      Right lower leg: No edema.      Left lower leg: No edema.   Skin:     General: Skin is dry.      Capillary Refill: Capillary refill takes 2 to 3 seconds.   Neurological:      Mental Status: She is alert. She is disoriented.       LABORATORY VALUES:   Recent Labs     01/19/24 1835 01/20/24  0645   WBC 9.3 8.9   RBC 4.23 3.96*   HEMOGLOBIN 12.7 11.8*   HEMATOCRIT 39.7 35.7*   MCV 93.9 90.2   MCH 30.0 29.8   MCHC 32.0* 33.1   RDW 53.9* 51.7*   PLATELETCT 244 218   MPV 11.4 11.0     Recent Labs     01/19/24  1835 01/20/24  0645   SODIUM 139 140   POTASSIUM 3.9 3.9   CHLORIDE 106 110   CO2 20 18*   GLUCOSE 97 111*   BUN 32* 30*   CREATININE 1.96* 1.84*   CALCIUM 8.9 8.8     Recent Labs     01/19/24  1835 01/20/24  0645   ASTSGOT 18 20   ALTSGPT 6 13   TBILIRUBIN 0.7 1.3   ALKPHOSPHAT 97 97   GLOBULIN 3.4 3.2   INR 1.04  --      Recent Labs     01/19/24 1835   APTT 32.8   INR 1.04        IMAGING:   DX-CHEST-PORTABLE  (1 VIEW)   Final Result      Lower lung zone and measures opacification. This could represent fibrosis, edema, or interstitial pneumonia.      CT-CSPINE WITHOUT PLUS RECONS   Final Result      1.  No cervical spine fracture detected.   2.  Partially visualized subdural hematoma at the left tentorium.      CT-HEAD W/O   Final Result         1. Redemonstration of bilateral subdural hemorrhages including right parietal hemorrhage measuring 3 mm in thickness; left parietal hemorrhage, measuring 3 mm and hemorrhage along the left tentorium measuring 5 mm.      No significant mass effect. No midline shift.      The overall appearance is grossly similar to prior. No definite new hemorrhage.               US-TRAUMA VEIN SCREEN LOWER BILAT EXTREMITY    (Results Pending)   DX-CHEST-PORTABLE (1 VIEW)    (Results Pending)       ASSESSMENT AND PLAN:   I have seen and evaluated the patient.  #Hypertension  Given NPO status and renal insufficiency, there are limited blood pressure management options. -Transfer to ICU  - Restart Nicardipine gtt with goal SBP <160mmHg  - EKG     Clinical assessment and plan of care discussed with Kahlil Silva MD.     ____________________________________     Linsey M Wegener, A.P.R.N.    DD: 1/21/2024  2:30 AM

## 2024-01-21 NOTE — PROGRESS NOTES
Date of Service  1/21/2024    Chief Complaint  83 y.o. female admitted 1/19/2024 with traumatic subdural hematoma.     Interval Events  Blood pressure uncontrolled yesterday because patient was made NPO after failing bedside swallow and did not get PO meds.   Passed SLP swallow today and cleared for crushed PO meds.   UA pending.   Restart chronic anticoagulation per neurosurgery. Awaiting recommendations.      - SNF order placed.   - IP consult palliative for advanced care planning pending.   - Cleared for transfer from TICU.   - Medicine team consulted to assume primary management.     Review of Systems  Review of Systems   Constitutional:  Negative for chills, fever and malaise/fatigue.   Eyes:  Negative for blurred vision.   Respiratory:  Negative for shortness of breath.    Cardiovascular:  Negative for chest pain.   Gastrointestinal:  Negative for abdominal pain, nausea and vomiting.   Musculoskeletal:  Positive for falls and myalgias. Negative for back pain and neck pain.   Psychiatric/Behavioral:  Positive for memory loss.         Vital Signs  Temp:  [36.6 °C (97.9 °F)-37.2 °C (99 °F)] 37 °C (98.6 °F)  Pulse:  [] 126  Resp:  [8-43] 43  BP: (104-186)/() 135/91  SpO2:  [93 %-98 %] 96 %    Physical Exam  Physical Exam  Vitals and nursing note reviewed.   Constitutional:       General: She is not in acute distress.  HENT:      Head: Normocephalic.      Mouth/Throat:      Mouth: Mucous membranes are dry.      Comments: Superficial lip laceration, dried blood surrounding   Cardiovascular:      Rate and Rhythm: Normal rate. Rhythm irregularly irregular.   Pulmonary:      Effort: Pulmonary effort is normal. No respiratory distress.      Breath sounds: Normal breath sounds.   Abdominal:      General: There is no distension.      Palpations: Abdomen is soft.      Tenderness: There is no abdominal tenderness. There is no guarding.   Musculoskeletal:         General: No deformity or signs of injury.       Cervical back: Normal range of motion and neck supple.      Comments: Moves all extremities   Left elbow contusion, no bony deformity    Skin:     General: Skin is warm and dry.   Neurological:      Mental Status: She is alert and easily aroused. Mental status is at baseline.         Laboratory  Recent Results (from the past 24 hour(s))   POCT glucose device results    Collection Time: 24 11:32 AM   Result Value Ref Range    POC Glucose, Blood 101 (H) 65 - 99 mg/dL   EKG    Collection Time: 24  2:50 AM   Result Value Ref Range    Report       [facility]    Test Date:  2024  Pt Name:    NALINI SORIANO               Department: 183  MRN:        [id number]                      Room:       T808  Gender:     Female                       Technician: FLORENTINO  :        [id number]                   Requested By:LINSEY M WEGENER  Order #:    [id number]                    Reading MD:    Measurements  Intervals                                Axis  Rate:       127                          P:          0  FL:         0                            QRS:        -52  QRSD:       87                           T:          145  QT:         325  QTc:        473    Interpretive Statements  Atrial fibrillation with rapid V-rate  Left anterior fascicular block  Abnormal R-wave progression, late transition  Probable LVH with secondary repol abnrm  Baseline wander in lead(s) I,II,aVR  Compared to ECG 2019 19:27:00  Left anterior fascicular block now present  Atrial flutter no longer present  2:1 AV block no longer present  Left-axis deviat ion no longer present  Sinus rhythm no longer present     CBC with Differential: Tomorrow AM    Collection Time: 24  2:54 AM   Result Value Ref Range    WBC 9.2 4.8 - 10.8 K/uL    RBC 4.32 4.20 - 5.40 M/uL    Hemoglobin 13.0 12.0 - 16.0 g/dL    Hematocrit 38.8 37.0 - 47.0 %    MCV 89.8 81.4 - 97.8 fL    MCH 30.1 27.0 - 33.0 pg    MCHC 33.5 32.2 - 35.5 g/dL    RDW 51.6 (H) 35.9 -  50.0 fL    Platelet Count 220 164 - 446 K/uL    MPV 10.8 9.0 - 12.9 fL    Neutrophils-Polys 86.70 (H) 44.00 - 72.00 %    Lymphocytes 5.30 (L) 22.00 - 41.00 %    Monocytes 7.50 0.00 - 13.40 %    Eosinophils 0.00 0.00 - 6.90 %    Basophils 0.30 0.00 - 1.80 %    Immature Granulocytes 0.20 0.00 - 0.90 %    Nucleated RBC 0.00 0.00 - 0.20 /100 WBC    Neutrophils (Absolute) 7.96 (H) 1.82 - 7.42 K/uL    Lymphs (Absolute) 0.49 (L) 1.00 - 4.80 K/uL    Monos (Absolute) 0.69 0.00 - 0.85 K/uL    Eos (Absolute) 0.00 0.00 - 0.51 K/uL    Baso (Absolute) 0.03 0.00 - 0.12 K/uL    Immature Granulocytes (abs) 0.02 0.00 - 0.11 K/uL    NRBC (Absolute) 0.00 K/uL   Comp Metabolic Panel (CMP): Tomorrow AM    Collection Time: 01/21/24  2:54 AM   Result Value Ref Range    Sodium 143 135 - 145 mmol/L    Potassium 3.7 3.6 - 5.5 mmol/L    Chloride 110 96 - 112 mmol/L    Co2 17 (L) 20 - 33 mmol/L    Anion Gap 16.0 7.0 - 16.0    Glucose 103 (H) 65 - 99 mg/dL    Bun 25 (H) 8 - 22 mg/dL    Creatinine 2.05 (H) 0.50 - 1.40 mg/dL    Calcium 9.0 8.5 - 10.5 mg/dL    Correct Calcium 9.6 8.5 - 10.5 mg/dL    AST(SGOT) 21 12 - 45 U/L    ALT(SGPT) 7 2 - 50 U/L    Alkaline Phosphatase 102 (H) 30 - 99 U/L    Total Bilirubin 1.9 (H) 0.1 - 1.5 mg/dL    Albumin 3.2 3.2 - 4.9 g/dL    Total Protein 6.9 6.0 - 8.2 g/dL    Globulin 3.7 (H) 1.9 - 3.5 g/dL    A-G Ratio 0.9 g/dL   ESTIMATED GFR    Collection Time: 01/21/24  2:54 AM   Result Value Ref Range    GFR (CKD-EPI) 23 (A) >60 mL/min/1.73 m 2   CBC with Differential: Tomorrow AM    Collection Time: 01/21/24  3:56 AM   Result Value Ref Range    WBC 10.4 4.8 - 10.8 K/uL    RBC 4.28 4.20 - 5.40 M/uL    Hemoglobin 12.8 12.0 - 16.0 g/dL    Hematocrit 39.6 37.0 - 47.0 %    MCV 92.5 81.4 - 97.8 fL    MCH 29.9 27.0 - 33.0 pg    MCHC 32.3 32.2 - 35.5 g/dL    RDW 53.3 (H) 35.9 - 50.0 fL    Platelet Count 238 164 - 446 K/uL    MPV 11.0 9.0 - 12.9 fL    Neutrophils-Polys 85.20 (H) 44.00 - 72.00 %    Lymphocytes 6.10 (L) 22.00  - 41.00 %    Monocytes 7.80 0.00 - 13.40 %    Eosinophils 0.10 0.00 - 6.90 %    Basophils 0.40 0.00 - 1.80 %    Immature Granulocytes 0.40 0.00 - 0.90 %    Nucleated RBC 0.00 0.00 - 0.20 /100 WBC    Neutrophils (Absolute) 8.86 (H) 1.82 - 7.42 K/uL    Lymphs (Absolute) 0.63 (L) 1.00 - 4.80 K/uL    Monos (Absolute) 0.81 0.00 - 0.85 K/uL    Eos (Absolute) 0.01 0.00 - 0.51 K/uL    Baso (Absolute) 0.04 0.00 - 0.12 K/uL    Immature Granulocytes (abs) 0.04 0.00 - 0.11 K/uL    NRBC (Absolute) 0.00 K/uL   Comp Metabolic Panel (CMP): Tomorrow AM    Collection Time: 01/21/24  3:56 AM   Result Value Ref Range    Sodium 142 135 - 145 mmol/L    Potassium 3.5 (L) 3.6 - 5.5 mmol/L    Chloride 110 96 - 112 mmol/L    Co2 19 (L) 20 - 33 mmol/L    Anion Gap 13.0 7.0 - 16.0    Glucose 106 (H) 65 - 99 mg/dL    Bun 27 (H) 8 - 22 mg/dL    Creatinine 1.95 (H) 0.50 - 1.40 mg/dL    Calcium 9.0 8.5 - 10.5 mg/dL    Correct Calcium 9.6 8.5 - 10.5 mg/dL    AST(SGOT) 23 12 - 45 U/L    ALT(SGPT) 12 2 - 50 U/L    Alkaline Phosphatase 105 (H) 30 - 99 U/L    Total Bilirubin 1.9 (H) 0.1 - 1.5 mg/dL    Albumin 3.3 3.2 - 4.9 g/dL    Total Protein 6.8 6.0 - 8.2 g/dL    Globulin 3.5 1.9 - 3.5 g/dL    A-G Ratio 0.9 g/dL   MAGNESIUM    Collection Time: 01/21/24  3:56 AM   Result Value Ref Range    Magnesium 2.1 1.5 - 2.5 mg/dL   PHOSPHORUS    Collection Time: 01/21/24  3:56 AM   Result Value Ref Range    Phosphorus 3.0 2.5 - 4.5 mg/dL   ESTIMATED GFR    Collection Time: 01/21/24  3:56 AM   Result Value Ref Range    GFR (CKD-EPI) 25 (A) >60 mL/min/1.73 m 2       Fluids    Intake/Output Summary (Last 24 hours) at 1/21/2024 0904  Last data filed at 1/21/2024 0600  Gross per 24 hour   Intake 925.48 ml   Output --   Net 925.48 ml       Core Measures & Quality Metrics  Labs reviewed, Radiology images reviewed and Medications reviewed  Lilly catheter: No Lilly      DVT Prophylaxis: Contraindicated - High bleeding risk  DVT prophylaxis - mechanical: SCDs  Ulcer  prophylaxis: Yes        RAP Score Total: 7    CAGE Results: not completed Blood Alcohol>0.08: no       Assessment/Plan  * Trauma- (present on admission)  Assessment & Plan  GLF on Eliquis.  Trauma Yellow Transfer Activation from [facility] in [place], [place].  Rodrigo Garcia MD. Trauma Surgery.    Traumatic subdural hematoma (HCC)- (present on admission)  Assessment & Plan  Left subdural hematoma, 5mm along the tentorium   Interval head CT stable.  Isolated closed head injury.  Trauma Brain Injury Guidelines implemented.   BIG 3.  Non-operative management.  Post traumatic pharmacologic seizure prophylaxis not indicated.   Speech Language Pathology cognitive evaluation.  1/20 Neurosurgery signed off.   Sergio Terry MD. Neurosurgeon. [facility].     Essential hypertension- (present on admission)  Assessment & Plan  Chronic condition treated with amlodipine, losartan and torsemide per chart review. Not listed in medication reconciliation.   Labetalol drip initiated at referring facility.  Nicardipine drip initiated on arrival.   1/20 Drip discontinued. Transitioned to Coreg 3.125mg BID.   1/21 No vasoactive drips. NG tube and oral meds, PRN IV labetalol/hydralazine.    Dementia (HCC)- (present on admission)  Assessment & Plan  History of per family. Baseline A&O x1.   Arrived in restraints, given Haldol, versed and Benadryl prior to arrival.  Takes Ativan and ZZZquil as needed for sleep.     Contraindication to deep vein thrombosis (DVT) prophylaxis- (present on admission)  Assessment & Plan  VTE prophylaxis initially contraindicated secondary to elevated bleeding risk.  1/20 Trauma surveillance venous duplex ultrasonography ordered.    Chronic anticoagulation- (present on admission)  Assessment & Plan  On Eliquis for atrial fibrillation   Per report has not taken for 1 week    Chronic renal impairment, stage 4 (severe) (HCC)- (present on admission)  Assessment & Plan  Admission serum creatinine  1.96.   Trend renal induces and avoid nephrotoxins.     Paroxysmal A-fib (HCC)- (present on admission)  Assessment & Plan  Chronic condition treated with Eliquis.  Holding maintenance medication during acute traumatic illness.    Left elbow contusion- (present on admission)  Assessment & Plan  Referring facility DX of left elbow negative for acute fracture.       Discussed patient condition with Family, RN, Pharmacy, trauma surgery, and ICU rounds . Prashant Alfredo MD.

## 2024-01-22 ENCOUNTER — APPOINTMENT (OUTPATIENT)
Dept: RADIOLOGY | Facility: MEDICAL CENTER | Age: 84
DRG: 083 | End: 2024-01-22
Attending: PHYSICIAN ASSISTANT
Payer: MEDICARE

## 2024-01-22 PROBLEM — R13.12 OROPHARYNGEAL DYSPHAGIA: Status: ACTIVE | Noted: 2024-01-22

## 2024-01-22 PROBLEM — Z71.89 ADVANCE CARE PLANNING: Status: ACTIVE | Noted: 2024-01-22

## 2024-01-22 LAB
ALBUMIN SERPL BCP-MCNC: 2.7 G/DL (ref 3.2–4.9)
ALBUMIN/GLOB SERPL: 0.7 G/DL
ALP SERPL-CCNC: 92 U/L (ref 30–99)
ALT SERPL-CCNC: 7 U/L (ref 2–50)
ANION GAP SERPL CALC-SCNC: 15 MMOL/L (ref 7–16)
AST SERPL-CCNC: 21 U/L (ref 12–45)
BASOPHILS # BLD AUTO: 0.3 % (ref 0–1.8)
BASOPHILS # BLD: 0.03 K/UL (ref 0–0.12)
BILIRUB SERPL-MCNC: 0.9 MG/DL (ref 0.1–1.5)
BUN SERPL-MCNC: 32 MG/DL (ref 8–22)
CALCIUM ALBUM COR SERPL-MCNC: 9.5 MG/DL (ref 8.5–10.5)
CALCIUM SERPL-MCNC: 8.5 MG/DL (ref 8.5–10.5)
CHLORIDE SERPL-SCNC: 113 MMOL/L (ref 96–112)
CO2 SERPL-SCNC: 15 MMOL/L (ref 20–33)
CREAT SERPL-MCNC: 2 MG/DL (ref 0.5–1.4)
EKG IMPRESSION: NORMAL
EOSINOPHIL # BLD AUTO: 0.02 K/UL (ref 0–0.51)
EOSINOPHIL NFR BLD: 0.2 % (ref 0–6.9)
ERYTHROCYTE [DISTWIDTH] IN BLOOD BY AUTOMATED COUNT: 54.1 FL (ref 35.9–50)
GFR SERPLBLD CREATININE-BSD FMLA CKD-EPI: 24 ML/MIN/1.73 M 2
GLOBULIN SER CALC-MCNC: 3.8 G/DL (ref 1.9–3.5)
GLUCOSE SERPL-MCNC: 94 MG/DL (ref 65–99)
HCT VFR BLD AUTO: 36.8 % (ref 37–47)
HGB BLD-MCNC: 11.9 G/DL (ref 12–16)
IMM GRANULOCYTES # BLD AUTO: 0.04 K/UL (ref 0–0.11)
IMM GRANULOCYTES NFR BLD AUTO: 0.4 % (ref 0–0.9)
LYMPHOCYTES # BLD AUTO: 1.19 K/UL (ref 1–4.8)
LYMPHOCYTES NFR BLD: 12.1 % (ref 22–41)
MAGNESIUM SERPL-MCNC: 2.1 MG/DL (ref 1.5–2.5)
MCH RBC QN AUTO: 30.2 PG (ref 27–33)
MCHC RBC AUTO-ENTMCNC: 32.3 G/DL (ref 32.2–35.5)
MCV RBC AUTO: 93.4 FL (ref 81.4–97.8)
MONOCYTES # BLD AUTO: 0.71 K/UL (ref 0–0.85)
MONOCYTES NFR BLD AUTO: 7.2 % (ref 0–13.4)
NEUTROPHILS # BLD AUTO: 7.84 K/UL (ref 1.82–7.42)
NEUTROPHILS NFR BLD: 79.8 % (ref 44–72)
NRBC # BLD AUTO: 0 K/UL
NRBC BLD-RTO: 0 /100 WBC (ref 0–0.2)
PHOSPHATE SERPL-MCNC: 2.8 MG/DL (ref 2.5–4.5)
PLATELET # BLD AUTO: 226 K/UL (ref 164–446)
PMV BLD AUTO: 11.3 FL (ref 9–12.9)
POTASSIUM SERPL-SCNC: 3.5 MMOL/L (ref 3.6–5.5)
PROT SERPL-MCNC: 6.5 G/DL (ref 6–8.2)
RBC # BLD AUTO: 3.94 M/UL (ref 4.2–5.4)
SODIUM SERPL-SCNC: 143 MMOL/L (ref 135–145)
WBC # BLD AUTO: 9.8 K/UL (ref 4.8–10.8)

## 2024-01-22 PROCEDURE — 84100 ASSAY OF PHOSPHORUS: CPT

## 2024-01-22 PROCEDURE — 700102 HCHG RX REV CODE 250 W/ 637 OVERRIDE(OP): Performed by: SURGERY

## 2024-01-22 PROCEDURE — 93010 ELECTROCARDIOGRAM REPORT: CPT | Performed by: INTERNAL MEDICINE

## 2024-01-22 PROCEDURE — 99223 1ST HOSP IP/OBS HIGH 75: CPT | Mod: 25 | Performed by: NURSE PRACTITIONER

## 2024-01-22 PROCEDURE — 700102 HCHG RX REV CODE 250 W/ 637 OVERRIDE(OP): Mod: JZ | Performed by: SURGERY

## 2024-01-22 PROCEDURE — 97163 PT EVAL HIGH COMPLEX 45 MIN: CPT

## 2024-01-22 PROCEDURE — 99232 SBSQ HOSP IP/OBS MODERATE 35: CPT | Performed by: PHYSICIAN ASSISTANT

## 2024-01-22 PROCEDURE — 71045 X-RAY EXAM CHEST 1 VIEW: CPT

## 2024-01-22 PROCEDURE — 99497 ADVNCD CARE PLAN 30 MIN: CPT | Mod: 25 | Performed by: NURSE PRACTITIONER

## 2024-01-22 PROCEDURE — A9270 NON-COVERED ITEM OR SERVICE: HCPCS | Performed by: PHYSICIAN ASSISTANT

## 2024-01-22 PROCEDURE — 83735 ASSAY OF MAGNESIUM: CPT

## 2024-01-22 PROCEDURE — 700105 HCHG RX REV CODE 258: Performed by: PHYSICIAN ASSISTANT

## 2024-01-22 PROCEDURE — 700102 HCHG RX REV CODE 250 W/ 637 OVERRIDE(OP): Performed by: NURSE PRACTITIONER

## 2024-01-22 PROCEDURE — A9270 NON-COVERED ITEM OR SERVICE: HCPCS | Performed by: NURSE PRACTITIONER

## 2024-01-22 PROCEDURE — 99233 SBSQ HOSP IP/OBS HIGH 50: CPT | Performed by: HOSPITALIST

## 2024-01-22 PROCEDURE — 80053 COMPREHEN METABOLIC PANEL: CPT

## 2024-01-22 PROCEDURE — A9270 NON-COVERED ITEM OR SERVICE: HCPCS | Mod: JZ | Performed by: SURGERY

## 2024-01-22 PROCEDURE — A9270 NON-COVERED ITEM OR SERVICE: HCPCS | Performed by: SURGERY

## 2024-01-22 PROCEDURE — 700111 HCHG RX REV CODE 636 W/ 250 OVERRIDE (IP): Performed by: PHYSICIAN ASSISTANT

## 2024-01-22 PROCEDURE — G0316 PR PROLONGED IP/OBS E&M EA 15 MIN: HCPCS | Performed by: NURSE PRACTITIONER

## 2024-01-22 PROCEDURE — 97535 SELF CARE MNGMENT TRAINING: CPT

## 2024-01-22 PROCEDURE — 700102 HCHG RX REV CODE 250 W/ 637 OVERRIDE(OP): Performed by: PHYSICIAN ASSISTANT

## 2024-01-22 PROCEDURE — 97165 OT EVAL LOW COMPLEX 30 MIN: CPT

## 2024-01-22 PROCEDURE — 770020 HCHG ROOM/CARE - TELE (206)

## 2024-01-22 PROCEDURE — 85025 COMPLETE CBC W/AUTO DIFF WBC: CPT

## 2024-01-22 RX ORDER — QUETIAPINE FUMARATE 25 MG/1
25 TABLET, FILM COATED ORAL ONCE
Status: COMPLETED | OUTPATIENT
Start: 2024-01-22 | End: 2024-01-22

## 2024-01-22 RX ORDER — POTASSIUM CHLORIDE 20 MEQ/1
20 TABLET, EXTENDED RELEASE ORAL ONCE
Status: COMPLETED | OUTPATIENT
Start: 2024-01-22 | End: 2024-01-22

## 2024-01-22 RX ADMIN — HEPARIN SODIUM 5000 UNITS: 5000 INJECTION, SOLUTION INTRAVENOUS; SUBCUTANEOUS at 20:42

## 2024-01-22 RX ADMIN — CEFTRIAXONE SODIUM 1000 MG: 10 INJECTION, POWDER, FOR SOLUTION INTRAVENOUS at 05:47

## 2024-01-22 RX ADMIN — ACETAMINOPHEN 1000 MG: 500 TABLET ORAL at 12:51

## 2024-01-22 RX ADMIN — CARVEDILOL 3.12 MG: 6.25 TABLET, FILM COATED ORAL at 17:18

## 2024-01-22 RX ADMIN — POTASSIUM CHLORIDE 20 MEQ: 1500 TABLET, EXTENDED RELEASE ORAL at 08:44

## 2024-01-22 RX ADMIN — Medication 1 APPLICATOR: at 06:00

## 2024-01-22 RX ADMIN — OXYCODONE HYDROCHLORIDE 2.5 MG: 5 TABLET ORAL at 20:01

## 2024-01-22 RX ADMIN — FAMOTIDINE 20 MG: 20 TABLET, FILM COATED ORAL at 17:19

## 2024-01-22 RX ADMIN — SODIUM CHLORIDE: 9 INJECTION, SOLUTION INTRAVENOUS at 10:23

## 2024-01-22 RX ADMIN — QUETIAPINE FUMARATE 25 MG: 25 TABLET ORAL at 20:42

## 2024-01-22 RX ADMIN — DOCUSATE SODIUM 50 MG AND SENNOSIDES 8.6 MG 1 TABLET: 8.6; 5 TABLET, FILM COATED ORAL at 20:42

## 2024-01-22 RX ADMIN — OXYCODONE HYDROCHLORIDE 2.5 MG: 5 TABLET ORAL at 03:01

## 2024-01-22 RX ADMIN — HEPARIN SODIUM 5000 UNITS: 5000 INJECTION, SOLUTION INTRAVENOUS; SUBCUTANEOUS at 15:05

## 2024-01-22 RX ADMIN — FAMOTIDINE 20 MG: 20 TABLET, FILM COATED ORAL at 05:39

## 2024-01-22 RX ADMIN — ACETAMINOPHEN 1000 MG: 500 TABLET ORAL at 17:19

## 2024-01-22 RX ADMIN — ACETAMINOPHEN 1000 MG: 500 TABLET ORAL at 05:39

## 2024-01-22 RX ADMIN — CARVEDILOL 3.12 MG: 6.25 TABLET, FILM COATED ORAL at 08:44

## 2024-01-22 RX ADMIN — HEPARIN SODIUM 5000 UNITS: 5000 INJECTION, SOLUTION INTRAVENOUS; SUBCUTANEOUS at 05:39

## 2024-01-22 ASSESSMENT — COGNITIVE AND FUNCTIONAL STATUS - GENERAL
DRESSING REGULAR LOWER BODY CLOTHING: A LITTLE
SUGGESTED CMS G CODE MODIFIER MOBILITY: CL
EATING MEALS: A LITTLE
DAILY ACTIVITIY SCORE: 18
STANDING UP FROM CHAIR USING ARMS: A LOT
DRESSING REGULAR LOWER BODY CLOTHING: A LITTLE
DRESSING REGULAR UPPER BODY CLOTHING: A LITTLE
HELP NEEDED FOR BATHING: A LITTLE
MOVING FROM LYING ON BACK TO SITTING ON SIDE OF FLAT BED: A LITTLE
CLIMB 3 TO 5 STEPS WITH RAILING: A LOT
SUGGESTED CMS G CODE MODIFIER DAILY ACTIVITY: CK
SUGGESTED CMS G CODE MODIFIER MOBILITY: CK
SUGGESTED CMS G CODE MODIFIER DAILY ACTIVITY: CK
MOBILITY SCORE: 15
TOILETING: A LITTLE
PERSONAL GROOMING: A LITTLE
DRESSING REGULAR UPPER BODY CLOTHING: A LITTLE
CLIMB 3 TO 5 STEPS WITH RAILING: A LOT
HELP NEEDED FOR BATHING: A LITTLE
TOILETING: A LITTLE
MOVING TO AND FROM BED TO CHAIR: A LITTLE
TURNING FROM BACK TO SIDE WHILE IN FLAT BAD: A LITTLE
MOVING TO AND FROM BED TO CHAIR: A LOT
WALKING IN HOSPITAL ROOM: A LITTLE
PERSONAL GROOMING: A LITTLE
EATING MEALS: A LITTLE
MOVING FROM LYING ON BACK TO SITTING ON SIDE OF FLAT BED: A LOT
MOBILITY SCORE: 14
STANDING UP FROM CHAIR USING ARMS: A LITTLE
WALKING IN HOSPITAL ROOM: A LOT
TURNING FROM BACK TO SIDE WHILE IN FLAT BAD: A LOT
DAILY ACTIVITIY SCORE: 18

## 2024-01-22 ASSESSMENT — ENCOUNTER SYMPTOMS
MEMORY LOSS: 1
DEPRESSION: 0
EYES NEGATIVE: 1
CARDIOVASCULAR NEGATIVE: 1
SHORTNESS OF BREATH: 0
MYALGIAS: 1
WEAKNESS: 0
BRUISES/BLEEDS EASILY: 0
FOCAL WEAKNESS: 0
CONSTITUTIONAL NEGATIVE: 1
NEUROLOGICAL NEGATIVE: 1
RESPIRATORY NEGATIVE: 1
NERVOUS/ANXIOUS: 1
COUGH: 0
FEVER: 0
POLYDIPSIA: 0
BACK PAIN: 0
DIZZINESS: 0
CHILLS: 0
NAUSEA: 0
NECK PAIN: 0
FALLS: 1
VOMITING: 0
HEADACHES: 0
PALPITATIONS: 0
GASTROINTESTINAL NEGATIVE: 1
HEARTBURN: 0

## 2024-01-22 ASSESSMENT — PAIN DESCRIPTION - PAIN TYPE
TYPE: ACUTE PAIN

## 2024-01-22 ASSESSMENT — LIFESTYLE VARIABLES
TOTAL SCORE: 0
EVER HAD A DRINK FIRST THING IN THE MORNING TO STEADY YOUR NERVES TO GET RID OF A HANGOVER: NO
EVER FELT BAD OR GUILTY ABOUT YOUR DRINKING: NO
ON A TYPICAL DAY WHEN YOU DRINK ALCOHOL HOW MANY DRINKS DO YOU HAVE: 0
HAVE PEOPLE ANNOYED YOU BY CRITICIZING YOUR DRINKING: NO
HOW MANY TIMES IN THE PAST YEAR HAVE YOU HAD 5 OR MORE DRINKS IN A DAY: 0
TOTAL SCORE: 0
HAVE YOU EVER FELT YOU SHOULD CUT DOWN ON YOUR DRINKING: NO
TOTAL SCORE: 0
AVERAGE NUMBER OF DAYS PER WEEK YOU HAVE A DRINK CONTAINING ALCOHOL: 0
ALCOHOL_USE: NO
CONSUMPTION TOTAL: NEGATIVE

## 2024-01-22 ASSESSMENT — GAIT ASSESSMENTS
DISTANCE (FEET): 5
ASSISTIVE DEVICE: FRONT WHEEL WALKER
GAIT LEVEL OF ASSIST: MINIMAL ASSIST
DEVIATION: SHUFFLED GAIT;BRADYKINETIC

## 2024-01-22 ASSESSMENT — PATIENT HEALTH QUESTIONNAIRE - PHQ9
SUM OF ALL RESPONSES TO PHQ9 QUESTIONS 1 AND 2: 0
1. LITTLE INTEREST OR PLEASURE IN DOING THINGS: NOT AT ALL
SUM OF ALL RESPONSES TO PHQ9 QUESTIONS 1 AND 2: 0
1. LITTLE INTEREST OR PLEASURE IN DOING THINGS: NOT AT ALL
2. FEELING DOWN, DEPRESSED, IRRITABLE, OR HOPELESS: NOT AT ALL
2. FEELING DOWN, DEPRESSED, IRRITABLE, OR HOPELESS: NOT AT ALL

## 2024-01-22 ASSESSMENT — FIBROSIS 4 INDEX: FIB4 SCORE: 2.99

## 2024-01-22 NOTE — DISCHARGE PLANNING
1441  Received Choice form at 1426  Agency/Facility Name: [facility]   Referral sent per Choice form @ Unable to send, need hospice referral.      RAFAEL Dumont notified.

## 2024-01-22 NOTE — CARE PLAN
The patient is Stable - Low risk of patient condition declining or worsening    Shift Goals  Clinical Goals: SBP less than 160  Patient Goals: JUMANA  Family Goals: JUMANA    Progress made toward(s) clinical / shift goals:      Problem: Pain - Standard  Goal: Alleviation of pain or a reduction in pain to the patient’s comfort goal  Outcome: Progressing       Patient is not progressing towards the following goals:

## 2024-01-22 NOTE — PROGRESS NOTES
[facility] Medicine Daily Progress Note    Date of Service  1/22/2024    Chief Complaint  Geno Gallegos is a 83 y.o. female admitted 1/19/2024 with a subdural hematoma following a fall    Hospital Course  Geno Gallegos is an 83 y.o. female who presented 1/19/2024 with atrial fibrillation, HTN, dementia, CKD.  She has been off her anticoagulation for ~1 week but took a ground level fall and was found to have a subdural hematoma at [facility].  She was then transferred to [facility] and placed on the Trauma service. She was admitted for control of BP and monitor of her neurologic status.  She did no require operative interventions.   I did discuss with Dr Terry and he did state that patient in 5 days (5 days from 1/21) could be started on Eliquis.     The patient apparently has been suffering from longstanding severe dementia, progressive, the patient lives with family, had a fall,  The patient chronically on anticoagulation for atrial fibrillation  In discussion with the patient's family they are unable to care for the patient in the current setting and wish to transfer the patient to a memory care unit and are interested in hospice care.  Patient's CODE STATUS revised to DNR  Palliative care, hospice consulted    Interval Problem Update  Patient seen and examined today.  Data, Medication data reviewed.  Case discussed with nursing as available.  Plan of Care reviewed with patient and notified of changes.  1/22/2024 the patient seen and examined, she is very confused, ANO x 1, unable to explain her current medical status, discussed with family, they requested a transfer to a memory care unit and are considering hospice, DNR confirmed  Explained the overall risk with anticoagulation in the patient's current state and family is okay with reducing anticoagulation to aspirin therapy only  The patient currently afebrile, heart rate in the 70s to 80s, respiration unlabored, she is on 2 L nasal cannula oxygen  saturating well, she is in the 120s to 140s over 80s in terms of her blood pressure.  Laboratory data reviewed, hemoglobin 11.9, platelet count 226, potassium 3.5, chloride 113, BUN is 32, creatinine 2,  The patient is transferring out of the trauma ICU    I have discussed this patient's plan of care and discharge plan at IDT rounds today with Case Management, Nursing, Nursing leadership, and other members of the IDT team.    Consultants/Specialty  general surgery  Neurosurgery  Palliative care  Hospice    Code Status  Full Code    Disposition  The patient is not medically cleared for discharge to home or a post-acute facility.  Anticipate discharge to: hospice    I have placed the appropriate orders for post-discharge needs.    Review of Systems  Review of Systems   Unable to perform ROS: Dementia   Constitutional: Negative.  Negative for chills and fever.   HENT: Negative.     Eyes: Negative.    Respiratory: Negative.  Negative for cough.    Cardiovascular: Negative.  Negative for chest pain and palpitations.   Gastrointestinal: Negative.  Negative for heartburn, nausea and vomiting.   Genitourinary: Negative.  Negative for dysuria and frequency.   Musculoskeletal:  Positive for falls. Negative for back pain and neck pain.   Skin: Negative.  Negative for itching and rash.   Neurological: Negative.  Negative for dizziness, focal weakness, weakness and headaches.   Endo/Heme/Allergies: Negative.  Negative for polydipsia. Does not bruise/bleed easily.   Psychiatric/Behavioral:  Positive for memory loss. Negative for depression. The patient is nervous/anxious.         Physical Exam  Temp:  [36.5 °C (97.7 °F)-37 °C (98.6 °F)] 36.9 °C (98.4 °F)  Pulse:  [] 90  Resp:  [20-49] 24  BP: (118-149)/() 149/88  SpO2:  [91 %-100 %] 98 %    Physical Exam  Vitals and nursing note reviewed.   Constitutional:       Appearance: She is well-developed. She is ill-appearing. She is not diaphoretic.   HENT:      Head:  Normocephalic and atraumatic.      Nose: Nose normal.   Eyes:      Conjunctiva/sclera: Conjunctivae normal.      Pupils: Pupils are equal, round, and reactive to light.   Neck:      Thyroid: No thyromegaly.      Vascular: No JVD.   Cardiovascular:      Rate and Rhythm: Tachycardia present. Rhythm irregular.      Heart sounds: Normal heart sounds.      No friction rub. No gallop.   Pulmonary:      Effort: Pulmonary effort is normal.      Breath sounds: Normal breath sounds. No wheezing or rales.   Abdominal:      General: Bowel sounds are normal. There is no distension.      Palpations: Abdomen is soft. There is no mass.      Tenderness: There is no abdominal tenderness. There is no guarding or rebound.   Musculoskeletal:         General: No tenderness. Normal range of motion.      Cervical back: Normal range of motion and neck supple.   Lymphadenopathy:      Cervical: No cervical adenopathy.   Skin:     General: Skin is warm and dry.      Findings: Bruising, erythema and lesion present.   Neurological:      Mental Status: She is alert. She is disoriented.      Cranial Nerves: No cranial nerve deficit.      Motor: Weakness present.   Psychiatric:      Comments: Anxious, irritable         Fluids    Intake/Output Summary (Last 24 hours) at 1/22/2024 0830  Last data filed at 1/22/2024 0300  Gross per 24 hour   Intake 961.21 ml   Output 750 ml   Net 211.21 ml       Laboratory  Recent Labs     01/21/24  0254 01/21/24  0356 01/22/24  0256   WBC 9.2 10.4 9.8   RBC 4.32 4.28 3.94*   HEMOGLOBIN 13.0 12.8 11.9*   HEMATOCRIT 38.8 39.6 36.8*   MCV 89.8 92.5 93.4   MCH 30.1 29.9 30.2   MCHC 33.5 32.3 32.3   RDW 51.6* 53.3* 54.1*   PLATELETCT 220 238 226   MPV 10.8 11.0 11.3     Recent Labs     01/21/24  0254 01/21/24  0356 01/22/24  0256   SODIUM 143 142 143   POTASSIUM 3.7 3.5* 3.5*   CHLORIDE 110 110 113*   CO2 17* 19* 15*   GLUCOSE 103* 106* 94   BUN 25* 27* 32*   CREATININE 2.05* 1.95* 2.00*   CALCIUM 9.0 9.0 8.5     Recent  Labs     01/19/24  1835   APTT 32.8   INR 1.04               Imaging  DX-CHEST-PORTABLE (1 VIEW)   Final Result         1.  Hazy left lower lobe infiltrates.   2.  Trace bilateral pleural effusions   3.  Cardiomegaly   4.  Atherosclerosis      US-TRAUMA VEIN SCREEN LOWER BILAT EXTREMITY   Final Result      DX-CHEST-PORTABLE (1 VIEW)   Final Result      Stable bilateral interstitial opacification. No new abnormality.      DX-CHEST-PORTABLE (1 VIEW)   Final Result      Lower lung zone and measures opacification. This could represent fibrosis, edema, or interstitial pneumonia.      CT-CSPINE WITHOUT PLUS RECONS   Final Result      1.  No cervical spine fracture detected.   2.  Partially visualized subdural hematoma at the left tentorium.      CT-HEAD W/O   Final Result         1. Redemonstration of bilateral subdural hemorrhages including right parietal hemorrhage measuring 3 mm in thickness; left parietal hemorrhage, measuring 3 mm and hemorrhage along the left tentorium measuring 5 mm.      No significant mass effect. No midline shift.      The overall appearance is grossly similar to prior. No definite new hemorrhage.                    Assessment/Plan  * Trauma- (present on admission)  Assessment & Plan  GLF on Eliquis.      Advance care planning  Assessment & Plan  Discussed in detail with the patient's son, POA  At this time the patient appears hospice appropriate  The patient will transition to a memory care unit  Hospice would be available at that facility  Hospice consult placed  DNR    Oropharyngeal dysphagia- (present on admission)  Assessment & Plan  Acute on chronic related to altered mental status.   SLP following.     CKD (chronic kidney disease)- (present on admission)  Assessment & Plan  Chronic, avoid nephrotoxins    UTI (urinary tract infection)- (present on admission)  Assessment & Plan  Noted on UA 1/21  Ceftriaxone  Monitor urine culture      Traumatic subdural hematoma, initial encounter (HCC)-  (present on admission)  Assessment & Plan  Neurosurgery states nonoperative and signed off  BP control  Per Dr Harrison mckeon to restart Eliquis on 1/27   Monitor neurochecks    Left elbow contusion- (present on admission)  Assessment & Plan  Referring facility DX of left elbow negative for acute fracture.     Dementia (HCC)- (present on admission)  Assessment & Plan  Longstanding, progressive,  Family is deciding to place the patient in memory care, hospice care    Traumatic subdural hematoma (HCC)- (present on admission)  Assessment & Plan  Left subdural hematoma, 5mm along the tentorium   Interval head CT stable.  Isolated closed head injury.  Trauma Brain Injury Guidelines implemented.   BIG 3.  Non-operative management.  Post traumatic pharmacologic seizure prophylaxis not indicated.   Speech Language Pathology cognitive evaluation.  1/20 Neurosurgery signed off.   1/21 Okay for DVT prophylaxis.   1/27 Okay to restart Eliquis per neurosurgery.   Sergio Terry MD. Neurosurgeon. [facility].     Chronic anticoagulation- (present on admission)  Assessment & Plan  For chronic atrial fibrillation,  Given the patient's advancement and dementia and increased risk for bleeding, increased risk for falls  Additional adverse effects, would recommend to transition the patient to aspirin only  Hospice appropriate,    Chronic renal impairment, stage 4 (severe) (HCC)- (present on admission)  Assessment & Plan    Monitor labs    Essential hypertension- (present on admission)  Assessment & Plan  Coreg  Monitor vitals    Paroxysmal A-fib (HCC)- (present on admission)  Assessment & Plan  Reportedly was off Eliquis one week prior to admit.  Coreg 3.125mg BID      Plan  Continue current gentle medical regimen  The patient would be adequate to restart anticoagulation on 1/27  Given the patient's overall diagnosis, prognosis, level of dementia and heading into palliative care, aggressive anticoagulation is likely not warranted and  the patient can be transition to aspirin therapy on 1/27  Family is aware of the current additional risk for CVA, but anticoagulation with her at increased fall risk, injury risk and complication risk outweighs the benefit  Extended discussion held with family members, as well as palliative care  See orders  Patient is has a high medical complexity, complex decision making and is at high risk for complication, morbidity, and mortality.  My total time spent caring for the patient on the day of the encounter was 58 minutes.   This does not include time spent on separately billable procedures/tests.    VTE prophylaxis:    heparin ppx      I have performed a physical exam and reviewed and updated ROS and Plan today (1/22/2024). In review of yesterday's note (1/21/2024), there are no changes except as documented above.    Please note that this dictation was created using voice recognition software. I have made every reasonable attempt to correct obvious errors, but I expect that there are errors of grammar and possibly context that I did not discover before finalizing the note.

## 2024-01-22 NOTE — CONSULTS
Hospital Medicine Consultation    Date of Service  1/21/2024    Referring Physician  Rodrigo Garcia M.D.    Consulting Physician  Cliff Hathaway D.O.    Reason for Consultation  Transfer of care out of ICU for SDH and medical management.    History of Presenting Illness  Geno Gallegos is an 83 y.o. female who presented 1/19/2024 with atrial fibrillation, HTN, dementia, CKD.  She has been off her anticoagulation for ~1 week but took a ground level fall and was found to have a subdural hematoma at [facility].  She was then transferred to [facility] and placed on the Trauma service. She was admitted for control of BP and monitor of her neurologic status.  She did no require operative interventions.   I did discuss with Dr Terry and he did state that patient in 5 days (5 days from 1/21) could be started on Eliquis.      Paitent seen but advanced dementia/confusion.  Agitated and attempt to climb out of bed but had to redirect her and cover her with blankets.  No sign of distress.    Review of Systems  Review of Systems   Unable to perform ROS: Dementia       Past Medical History   has a past medical history of A-fib (HCC), Bowel obstruction (HCC), Renal disorder, and Valvular heart disease (5/27/2019).    Surgical History   has a past surgical history that includes gyn surgery and other orthopedic surgery.    Family History  family history includes Cancer in her mother; Heart Attack in her father; Heart Disease in her father.    Social History   reports that she has never smoked. She has never used smokeless tobacco. She reports current alcohol use. She reports that she does not use drugs.    Medications  Current Facility-Administered Medications   Medication Dose Route Frequency Provider Last Rate Last Admin    heparin injection 5,000 Units  5,000 Units Subcutaneous Q8HRS ROWENA Davis.CHHAYA.   5,000 Units at 01/21/24 2113    cefTRIAXone (Rocephin) syringe 1,000 mg  1,000 mg Intravenous Q24HRS Janel NIÑO  KIMBERLEY Mauricio   1,000 mg at 01/21/24 1440    carvedilol (Coreg) tablet 3.125 mg  3.125 mg Oral BID WITH MEALS Prashant Alfredo M.D.   3.125 mg at 01/21/24 1745    labetalol (Normodyne/Trandate) injection 10 mg  10 mg Intravenous Q4HRS PRN Linsey M Wegener, A.P.R.NJarvis   10 mg at 01/21/24 0851    Respiratory Therapy Consult   Nebulization Continuous RT Janel Mauricio P.A.-C.        Pharmacy Consult Request ...Pain Management Review 1 Each  1 Each Other PHARMACY TO DOSE Janel Mauricio P.A.-C.        ondansetron (Zofran) syringe/vial injection 4 mg  4 mg Intravenous Q4HRS PRN Janel Mauricio P.A.-C.        ondansetron (Zofran ODT) dispertab 4 mg  4 mg Oral Q4HRS PRN Janel Mauricio P.A.-C.        docusate sodium (Colace) capsule 100 mg  100 mg Oral BID Janel Mauricio P.A.-C.        senna-docusate (Pericolace Or Senokot S) 8.6-50 MG per tablet 1 Tablet  1 Tablet Oral Nightly Janel Mauricio P.A.-C.        senna-docusate (Pericolace Or Senokot S) 8.6-50 MG per tablet 1 Tablet  1 Tablet Oral Q24HRS PRN Janel Mauricio P.A.-C.        polyethylene glycol/lytes (Miralax) Packet 1 Packet  1 Packet Oral BID Janel Mauricio P.A.-C.        magnesium hydroxide (Milk Of Magnesia) suspension 30 mL  30 mL Oral DAILY Janel Mauricio P.A.-C.        bisacodyl (Dulcolax) suppository 10 mg  10 mg Rectal Q24HRS PRN Janel Mauricio P.A.-C.        sodium phosphate (Fleet) enema 133 mL  1 Each Rectal Once PRN Janel Mauricio P.A.-C.        NS infusion   Intravenous Continuous Janel Mauricio P.A.-C. 70 mL/hr at 01/21/24 0442 New Bag at 01/21/24 0442    acetaminophen (Tylenol) tablet 1,000 mg  1,000 mg Oral Q6HRS JARAD Davis-AKHIL.   1,000 mg at 01/21/24 1745    Followed by    [START ON 1/24/2024] acetaminophen (Tylenol) tablet 1,000 mg  1,000 mg Oral Q6HRS PRN JARAD Davis-AKHIL.        oxyCODONE immediate-release (Roxicodone) tablet 2.5 mg  2.5 mg Oral Q3HRS PRN Janel Mauricio P.A.-C.   2.5 mg at  01/21/24 2146    Or    oxyCODONE immediate-release (Roxicodone) tablet 5 mg  5 mg Oral Q3HRS PRN Janel Mauricio P.A.-C.        famotidine (Pepcid) tablet 20 mg  20 mg Oral BID Janel Mauricio P.A.-C.        Or    famotidine (Pepcid) injection 20 mg  20 mg Intravenous BID Janel Mauricio P.A.-C.   20 mg at 01/21/24 0507    Nozin nasal  swab  1 Applicator Each Nostril BID Rodrigo Garcia M.D.   1 Applicator at 01/21/24 1745       Allergies  No Known Allergies    Physical Exam  Temp:  [36.5 °C (97.7 °F)-37.2 °C (99 °F)] 37 °C (98.6 °F)  Pulse:  [] 69  Resp:  [20-43] 25  BP: (115-186)/() 143/87  SpO2:  [93 %-100 %] 99 %    Physical Exam  Vitals (frail) reviewed.   Constitutional:       Appearance: She is not diaphoretic.   HENT:      Head: Normocephalic and atraumatic.      Nose: Nose normal.      Mouth/Throat:      Pharynx: No oropharyngeal exudate.   Eyes:      General: No scleral icterus.        Right eye: No discharge.         Left eye: No discharge.      Extraocular Movements: Extraocular movements intact.      Conjunctiva/sclera: Conjunctivae normal.   Cardiovascular:      Rate and Rhythm: Normal rate and regular rhythm.      Pulses:           Radial pulses are 2+ on the right side and 2+ on the left side.        Dorsalis pedis pulses are 2+ on the right side and 2+ on the left side.      Heart sounds: No murmur heard.  Pulmonary:      Effort: Pulmonary effort is normal. No respiratory distress.      Breath sounds: Normal breath sounds. No wheezing or rales.   Abdominal:      General: Bowel sounds are normal. There is no distension.      Palpations: Abdomen is soft.      Tenderness: There is no abdominal tenderness.   Musculoskeletal:         General: No swelling or tenderness.      Cervical back: Neck supple. No tenderness. No muscular tenderness.      Right lower leg: No edema.      Left lower leg: No edema.   Lymphadenopathy:      Cervical: No cervical adenopathy.   Skin:      Coloration: Skin is not jaundiced or pale.   Neurological:      General: No focal deficit present.      Mental Status: She is alert. She is disoriented.      Cranial Nerves: No cranial nerve deficit.   Psychiatric:         Attention and Perception: She is inattentive.         Behavior: Behavior is agitated.         Judgment: Judgment is impulsive.         Fluids  Date 01/21/24 0700 - 01/22/24 0659   Shift 7565-8607 7125-6947 6636-2246 24 Hour Total   INTAKE   I.V. 968.9   968.9   Shift Total 968.9   968.9   OUTPUT   Urine 450   450   Shift Total 450   450   Weight (kg) 54.7 54.7 54.7 54.7       Laboratory  Recent Labs     01/20/24  0645 01/21/24  0254 01/21/24  0356   WBC 8.9 9.2 10.4   RBC 3.96* 4.32 4.28   HEMOGLOBIN 11.8* 13.0 12.8   HEMATOCRIT 35.7* 38.8 39.6   MCV 90.2 89.8 92.5   MCH 29.8 30.1 29.9   MCHC 33.1 33.5 32.3   RDW 51.7* 51.6* 53.3*   PLATELETCT 218 220 238   MPV 11.0 10.8 11.0     Recent Labs     01/20/24  0645 01/21/24  0254 01/21/24  0356   SODIUM 140 143 142   POTASSIUM 3.9 3.7 3.5*   CHLORIDE 110 110 110   CO2 18* 17* 19*   GLUCOSE 111* 103* 106*   BUN 30* 25* 27*   CREATININE 1.84* 2.05* 1.95*   CALCIUM 8.8 9.0 9.0     Recent Labs     01/19/24  1835   APTT 32.8   INR 1.04                 Imaging  US-TRAUMA VEIN SCREEN LOWER BILAT EXTREMITY   Final Result      DX-CHEST-PORTABLE (1 VIEW)   Final Result      Stable bilateral interstitial opacification. No new abnormality.      DX-CHEST-PORTABLE (1 VIEW)   Final Result      Lower lung zone and measures opacification. This could represent fibrosis, edema, or interstitial pneumonia.      CT-CSPINE WITHOUT PLUS RECONS   Final Result      1.  No cervical spine fracture detected.   2.  Partially visualized subdural hematoma at the left tentorium.      CT-HEAD W/O   Final Result         1. Redemonstration of bilateral subdural hemorrhages including right parietal hemorrhage measuring 3 mm in thickness; left parietal hemorrhage, measuring 3 mm and  hemorrhage along the left tentorium measuring 5 mm.      No significant mass effect. No midline shift.      The overall appearance is grossly similar to prior. No definite new hemorrhage.               DX-CHEST-PORTABLE (1 VIEW)    (Results Pending)       Assessment/Plan  UTI (urinary tract infection)- (present on admission)  Assessment & Plan  Noted on UA 1/21  Ceftriaxone  Monitor urine culture  1/21 WBC:10.4    Traumatic subdural hematoma, initial encounter (HCC)- (present on admission)  Assessment & Plan  Neurosurgery states nonoperative and signed off  BP control  Per Dr Harrison mckeon to restart Eliquis on 1/27   Monitor neurochecks    Dementia ([facility])- (present on admission)  Assessment & Plan  Monitor neurologic status    Chronic renal impairment, stage 4 (severe) ([facility])- (present on admission)  Assessment & Plan  1/21 BUN:27, Cr:1.95  Monitor labs    Essential hypertension- (present on admission)  Assessment & Plan  Coreg  Monitor vitals    Paroxysmal A-fib ([facility])- (present on admission)  Assessment & Plan  Reportedly was off Eliquis one week prior to admit.  Coreg 3.125mg BID  Restart Eliquis 2.5mg as on 1/27 per mike from neurosurgeon

## 2024-01-22 NOTE — ASSESSMENT & PLAN NOTE
Neurosurgery states nonoperative and signed off  BP control  Per Dr Harrison mckeon to restart Eliquis on 1/27   Monitor neurochecks

## 2024-01-22 NOTE — THERAPY
Physical Therapy   Initial Evaluation     Patient Name: Geno Gallegos  Age:  83 y.o., Sex:  female  Medical Record #: [id number]  Today's Date: 1/22/2024     Precautions  Precautions: Fall Risk;Swallow Precautions    Assessment  Patient is 83 y.o. female admitted with a traumatic subdural hematoma on Eliquis and UTI. PMH includes dementia, chronic anticoagulation, chronic renal impairment, afib. Pt oriented to self only. Unable to provide history, per notes, family is moving pt into memory care facility. She was pleasant but presented with delayed responses, poor sequencing and motor planning. Min assist provided for transfers and short distances gait. Pt confused with FWW as she reports using a 4WW. Anticipate pt is close to her functional baseline. Patient will not be actively followed for physical therapy services at this time, however may be seen if requested by physician for 1 more visit within 30 days to address any discharge or equipment needs.    Plan    Physical Therapy Initial Treatment Plan   Duration: Discharge Needs Only    DC Equipment Recommendations: None  Discharge Recommendations: Anticipate that the patient will have no further physical therapy needs after discharge from the hospital (suportive environment for cognitive deficits)        01/22/24 1057   Vitals   O2 Delivery Device Silicone Nasal Cannula   Prior Living Situation   Prior Services Other (Comments)   Comments per notes, pt was living with her son. Plans to move pt into memory care instead of moving home again   Prior Level of Functional Mobility   Bed Mobility Unable To Determine At This Time   Cognition    Cognition / Consciousness X   Speech/ Communication Delayed Responses   Orientation Level Not Oriented to Place;Not Oriented to Reason;Not Oriented to Time;Not Oriented to Day;Not Oriented to Month;Not Oriented to Year;Not Oriented to Address   Level of Consciousness Alert   Ability To Follow Commands 1 Step   Safety Awareness  Impaired   New Learning Impaired   Attention Impaired   Sequencing Impaired   Initiation Impaired   Comments baseline dementia   Active ROM Lower Body    Active ROM Lower Body  WDL   Strength Lower Body   Lower Body Strength  X   Gross Strength Generalized Weakness, Equal Bilaterally   Sensation Lower Body   Lower Extremity Sensation   WDL   Other Treatments   Other Treatments Provided max cues for sequencing and motor planning   Balance Assessment   Sitting Balance (Static) Fair +   Sitting Balance (Dynamic) Fair   Standing Balance (Static) Poor +   Standing Balance (Dynamic) Poor +   Weight Shift Sitting Fair   Weight Shift Standing Fair   Comments FWW   Bed Mobility    Supine to Sit Supervised   Scooting Supervised   Rolling Supervised   Comments with cues for initiation   Gait Analysis   Gait Level Of Assist Minimal Assist   Assistive Device Front Wheel Walker   Distance (Feet) 5   # of Times Distance was Traveled 1   Deviation Shuffled Gait;Bradykinetic   Weight Bearing Status no restrictions   Functional Mobility   Sit to Stand Minimal Assist   Bed, Chair, Wheelchair Transfer Minimal Assist   Transfer Method Stand Step   Mobility in room with FWW   Edema / Skin Assessment   Edema / Skin  Not Assessed   Education Group   Education Provided Role of Physical Therapist   Role of Physical Therapist Patient Response Patient;Nonacceptance;Explanation;Demonstration;Reinforcement Needed;No Learning Evidence   Physical Therapy Initial Treatment Plan    Duration Discharge Needs Only   Anticipated Discharge Equipment and Recommendations   DC Equipment Recommendations None   Discharge Recommendations Anticipate that the patient will have no further physical therapy needs after discharge from the hospital  (suportive environment for cognitive deficits)

## 2024-01-22 NOTE — ASSESSMENT & PLAN NOTE
Longstanding, progressive,  Family is deciding to place the patient in memory care, hospice care  -Hospice consult placed

## 2024-01-22 NOTE — CARE PLAN
The patient is Stable - Low risk of patient condition declining or worsening    Shift Goals  Clinical Goals: Safety, mobility, hemodynamic stability  Patient Goals: Unable to assess  Family Goals: No family present    Progress made toward(s) clinical / shift goals:    Problem: Pain - Standard  Goal: Alleviation of pain or a reduction in pain to the patient’s comfort goal  Outcome: Progressing     Problem: Skin Integrity  Goal: Skin integrity is maintained or improved  Outcome: Progressing     Problem: Fall Risk  Goal: Patient will remain free from falls  Outcome: Progressing

## 2024-01-22 NOTE — DISCHARGE PLANNING
Care Transition Team Assessment    LMSW contacted pt's son, Woody, via telephone to complete assessment. Woody reports that pt was living alone in a tri-level home in [place], [place] with her son, Mark. Pt needed more supervision than what could be provided as Mark worked full-time. Pt then moved in with her other son Woody and his wife in [place], [place]. Woody reports that it became evident that pt needed more support/care then they could provide and family started looking into a care facility for the pt. Woody reports that they have secured a room for the pt at [facility] in [place]. Discharge plan is for pt to dc to [facility] with [facility]. Pt recently experiencing a cognitive change in the last 4 months or so. Pt has a hx of alcoholism and last drink was approximately 4 years ago. No MH hx. Pt did experience a loss of her spouse 2 years ago.         Information Source  Orientation Level: Disoriented X4  Information Given By: Relative  Informant's Name: Woody Gallegos  Who is responsible for making decisions for patient? : Adult child  Name(s) of Primary Decision Maker: Rachid Gallegos    Readmission Evaluation  Is this a readmission?: No    Elopement Risk  Legal Hold: No  Ambulatory or Self Mobile in Wheelchair: No-Not an Elopement Risk  Elopement Risk: Not at Risk for Elopement    Interdisciplinary Discharge Planning  Lives with - Patient's Self Care Capacity: Other (Comments) ([facility])  Patient or legal guardian wants to designate a caregiver: No  Support Systems: Children  Housing / Facility: Long Term Care Facility, Skilled Nursing Facility  Prior Services: Other (Comments)  Durable Medical Equipment: Unknown    Discharge Preparedness  What is your plan after discharge?: Other (comment) ([facility]ospice)  What are your discharge supports?: Child, Other (comment) (Facility staff)  Prior Functional Level: Needs Assist with Activities of Daily  Living, Needs Assist with Medication Management, Uses Walker, Ambulatory  Difficulity with ADLs: None  Difficulity with IADLs: Managing medication, Shopping, Laundry, Keeping track of finances, Cooking, Driving    Functional Assesment  Prior Functional Level: Needs Assist with Activities of Daily Living, Needs Assist with Medication Management, Uses Walker, Ambulatory    Finances  Financial Barriers to Discharge: No  Prescription Coverage: Yes    Vision / Hearing Impairment  Hearing Impairment: Both Ears, Hearing Device(s) Available         Advance Directive  Advance Directive?: DPOA for Health Care  Durable Power of  Name and Contact : Rachid Urrutia (Rick)marcostyson [phone]    Domestic Abuse  Have you ever been the victim of abuse or violence?: No  Physical Abuse or Sexual Abuse: No  Verbal Abuse or Emotional Abuse: No  Possible Abuse/Neglect Reported to:: Not Applicable    Psychological Assessment  History of Substance Abuse: Alcohol (Used to be a heavy drinker/alcoholic. Hasn't drank in the past 4/5 years.)  Date Last Used - Alcohol: 4 years ago  Substance Abuse Comments: Used to be a heavy drinker/alcoholic.  History of Psychiatric Problems: No (Pt was prescribed anti-anxiety medications but unsure if she struggled with anxiety.)    Discharge Risks or Barriers  Discharge risks or barriers?: No  Patient risk factors: Cognitive / sensory / physical deficit, Vulnerable adult    Anticipated Discharge Information  Discharge Disposition: D/T to hospice home (50)

## 2024-01-22 NOTE — THERAPY
"Occupational Therapy   Initial Evaluation     Patient Name: Geno Gallegos  Age:  83 y.o., Sex:  female  Medical Record #: [id number]  Today's Date: 1/22/2024     Precautions: Fall Risk, Swallow Precautions    Assessment  Patient is 83 y.o. female admitted after GLF tripping over her dog w/head strike. Pt txf from outside facility, PMHx: dementia, Afib, HTN and CKD, chart indicates pt is non-compliant w/medications, and PLOF is not known, but chart indicates cognitive decline has been significant over the past 1-2 months.   This admission pt is dx traumatic SDH, UTI, elbow contusion and dementia. Pt at this time is likely at/near her baseline, initiation and sequencing of ADL's is limited by cognitive deficits and unfamiliar setting. Pt will likely not benefit from acute OT.     Plan  Occupational Therapy Initial Treatment Plan   Duration: Discharge Needs Only  Patient will not be actively followed for occupational therapy services at this time, however may be seen if requested by physician for 1 more visit within 30 days to address any discharge or equipment needs.       DC Equipment Recommendations: Unable to determine at this time  Discharge Recommendations: Anticipate that the patient will have no further occupational therapy needs after discharge from the hospital (needs more supportive environment for cognitive deficits moemory care vs more in home assist)     Subjective  \"I will drink that\"      Objective     01/22/24 1054   Charge Group   OT Evaluation OT Evaluation Low   OT Self Care / ADL (Units) 1   Total Time Spent   OT Time Spent Yes   OT Self Care / ADL (Minutes) 15   OT Evaluation (Minutes) 14   OT Total Time Spent (Calculated) 29   Initial Contact Note    Initial Contact Note Order Received and Verified, Evaluation Only - Patient Does Not Require Further Acute Occupational Therapy at this Time.  However, May Benefit from Post Acute Therapy for Higher Level Functional Deficits.   Prior Living " Situation   Prior Services Unable To Determine At This Time   Comments per notes pt txf from outside [facility] after tripping over her dog; unclear who she currently resides w/but has family support; PLOF is unknown pt has declining cognition   Prior Level of ADL Function   Comments Unable To Determine At This Time; chart indicates rapid cognitive decline   Prior Level of IADL Function   Comments Unable To Determine At This Time   History of Falls   History of Falls Yes   Date of Last Fall   (reason for admit)   Precautions   Precautions Fall Risk;Swallow Precautions   Cognition    Cognition / Consciousness X   Speech/ Communication Delayed Responses   Orientation Level Not Oriented to Reason;Not Oriented to Place;Not Oriented to Time;Not Oriented to Day;Not Oriented to Month   Level of Consciousness Alert   Ability To Follow Commands 1 Step   Safety Awareness Impaired   New Learning Impaired   Attention Impaired   Sequencing Impaired   Initiation Impaired   Comments baseline dementia, able to perform familiar tasks, but easily overstimulated w/cues and abstract requests   Passive ROM Upper Body   Passive ROM Upper Body WDL   Active ROM Upper Body   Active ROM Upper Body  WDL   Strength Upper Body   Upper Body Strength  WDL   Sensation Upper Body   Upper Extremity Sensation  WDL   Upper Body Muscle Tone   Upper Body Muscle Tone  WDL   Neurological Concerns   Neurological Concerns No   Coordination Upper Body   Coordination WDL   Balance Assessment   Sitting Balance (Static) Fair +   Sitting Balance (Dynamic) Fair   Standing Balance (Static) Fair   Standing Balance (Dynamic) Fair -   Weight Shift Sitting Fair   Weight Shift Standing Poor   Comments w/fww   Bed Mobility    Supine to Sit Supervised   ADL Assessment   Grooming Supervision;Seated   Upper Body Dressing Minimal Assist   Lower Body Dressing Minimal Assist   Toileting Minimal Assist   Comments pt initated donning underwear, but then had urinary  incotinence and could not problem solve or complete task   How much help from another person does the patient currently need...   6 Clicks Daily Activity Score 18   Functional Mobility   Sit to Stand Minimal Assist   Bed, Chair, Wheelchair Transfer Minimal Assist   Toilet Transfers Minimal Assist   Mobility walking in room w/fww   Activity Tolerance   Comments limited by cognition   Patient / Family Goals   Patient / Family Goal #1 unable to state   Short Term Goals   Short Term Goal # 1 pt will have no further acute OT needs   Education Group   Role of Occupational Therapist Patient Response Patient;No Learning Evidence   Occupational Therapy Initial Treatment Plan    Duration Discharge Needs Only   Anticipated Discharge Equipment and Recommendations   DC Equipment Recommendations Unable to determine at this time   Discharge Recommendations Anticipate that the patient will have no further occupational therapy needs after discharge from the hospital  (needs more supportive environment for cognitive deficits moemory care vs more in home assist)   Interdisciplinary Plan of Care Collaboration   IDT Collaboration with  Nursing;Physical Therapist   Patient Position at End of Therapy Seated;Call Light within Reach;Tray Table within Reach;Phone within Reach   Collaboration Comments RN aware of OT eval and pts efforts   Session Information   Date / Session Number  1/22 #1 eval only  (dc needs only)

## 2024-01-22 NOTE — CONSULTS
"MRN: [id number]  Date of palliative consult: 24  Reason for consult: ACP  Referring provider:  Janel COLBY,  Dr. Bruno - \"POLST needed\"  Location of consult: T9 TICU  Additional consulting services: Hospital medicine    HPI:   Geno Gallegos is a 83 y.o. female with medical history significant for dementia, atrial fibrillation, HTN, CKD brought in via EMS Care Flight from [facility] 2024 due to ground-level fall with head strike on blood thinners with resultant left temporal SDH.  Neurosurgery consulted day of admission and due to small tentorial SDH with significant brain mass atrophy to accommodate considered nonoperative.  Patient admitted to TICU for medical management of SDH and multiple chronic comorbidities.  Trauma service following and consulted hospital medicine for transfer of care .  She is being treated for urinary tract infection and hypertension with plan to restart Eliquis .    ROS:    Review of Systems   Unable to perform ROS: Dementia     PE:   Recent vital signs  BMI: Body mass index is 20.21 kg/m².    Temp (24hrs), Av.7 °C (98.1 °F), Min:36.5 °C (97.7 °F), Max:37 °C (98.6 °F)  Temperature: 36.9 °C (98.4 °F)  Pulse  Av.8  Min: 67  Max: 144   Blood Pressure : 128/81       Physical Exam  Constitutional:       General: She is sleeping.       ASSESSMENT/PLAN WITH SHARED DECISION MAKING:   PHYSICAL ASPECTS OF CARE  Palliative Performance Scale: 40-50%  FAST Score: 6e    # Ground-level fall SDH nonoperative  # Chronic conditions include dementia, a-fib on ACT okay to resume , HTN, CKD 4, patient    SOCIAL ASPECTS OF CARE  Patient is .  She has 2 biological sons Woody and Mark.  Patient previously lived in a 3 level home and [place] and her son Mark was living with her.  Over the last 2 months patient's dementia has progressed rapidly and patient moved in with her son Woody in [facility].  Per Woody they have made plan " "to move patient into [facility].    SPIRITUAL ASPECTS OF CARE   Unable to assess.     GOALS OF CARE/SERIOUS ILLNESS CONVERSATION  Patient sleeping soundly.  No family at bedside.  Call placed to patient's son Mark and left message.  Call placed to patient's son Woody.  Introduced myself and discussed role of palliative care.  Woody is agreeable to discuss goals of care.  Cliff confirmed that patient's dementia has been progressing rapidly over the last 1-2 months to the point of fecal incontinence, hallucinating and seeing people in her home that were \"plotting and planning\".  When patient was still living alone they found her hearing aids broken, glasses dirty, and impacted cerumen in bilateral ears which they took her to urgent care for.  Woody reports patient was not taking care of herself and would not leave her bedroom with her dog Radha.  She was not feeding herself and became incontinent of urine and feces.  At that point patient went to move in with Woody.  Due to patient's progressive dementia they have made a plan to move patient into [facility].      Woody confirmed that patient was recently able to get power of  paperwork done.  Confirmed I reviewed document and it is for finance/state only and not for healthcare decisions.  Confirmed he and his brother Mark are patient's next of kin.    Discussed given patient's significant functional decline and advanced dementia she may qualify for hospice care.  We discussed continue with current selective treatment versus comfort focused treatment.  Woody confirmed he feels he and his brother want to move forward with comfort focused treatment slowly.  After discussing hospice benefit rec agreeable to have a hospice referral placed.  I discussed referral process.  Cliff is missing work today at the [employer] due to being ill.  Offered to complete documentation remotely.  He provided email address which was added to demographic " "information.    Recommended POLST form completion.  Woody chose DNR, comfort focused treatment, no artificial nutrition, no IV fluids.  Confirmed she will need to print, signed, and returned scanned document he and his brother sign under healthcare surrogate.  Provide I will email him a list of hospice agencies that service from the area and encouraged him to research what hospice agency he would like to move forward with.  He appreciated the phone call and I provided palliative care contact information.  Encouraged him to call with any questions or needs moving forward.    Updated Dr. Ortiz.    Sent to Woody with completed POLST form, hospice agencies that service [place], and hospice choice form.  Email forwarded to covering  Julia.    Addendum: Phone call from Woody's wife Dmitriy with hospice choice.  Returned to Dmitriy's call.  They would like to go with [facility].  Confirmed I will update care coordination with marianne.  Woody asked if it would be okay if he completed a POLST form as well as his brother due to the difficulty of printing and signing form.  Confirmed that if both documents say the same thing that would be acceptable.  Discussed also that if I received verbal okay from Mark for Woody to sign form then we could proceed that way as well.  Woody confirmed he would have Mark give me a call.  I provided my availability.  Updated Julia HALL with hospice choice.    Code Status: DNR    ACP Documents: DPoA on file financial not healthcare; requested removal. [facility] medicine requesting POLST.     20 minutes spent discussing advance care planning, this time excludes any other billed services.    I spent a total of 95 minutes reviewing medical records, direct face-to-face time with the patient and/or family, documentation and coordination of care. This is separate from the time spent on advance care planning, which is documented above.    Landy \"Marybel\" WES Nguyen, APRN, " FNP-BC  Palliative Care Nurse Practitioner   Mon-Fri 8AM-5PM  [phone]

## 2024-01-22 NOTE — CARE PLAN
The patient is Stable - Low risk of patient condition declining or worsening    Shift Goals  Clinical Goals: stable neuro exam, SLP  Patient Goals: JUMANA  Family Goals: UA consideration per family request    Progress made toward(s) clinical / shift goals:    Problem: Knowledge Deficit - Standard  Goal: Patient and family/care givers will demonstrate understanding of plan of care, disease process/condition, diagnostic tests and medications  Outcome: Progressing     Problem: Pain - Standard  Goal: Alleviation of pain or a reduction in pain to the patient’s comfort goal  Outcome: Progressing       Patient is not progressing towards the following goals:

## 2024-01-22 NOTE — PROGRESS NOTES
Date of Service  1/22/2024    Chief Complaint  83 y.o. female admitted 1/19/2024 with traumatic subdural hematoma.     Interval Events  Started on Heparin for DVT prophylaxis yesterday.     Blood pressure controlled on Coreg and PRNs.   FEES  completed yesterday.   UTI treated with Ceftriaxone, day 2 of 3.   Transferred to medicine service yesterday.   Appreciate coordination of care.       - Diet per SLP.   - Ongoing management of chronic medical conditions and HTN control.   - Trauma service will sign off.     Review of Systems  Review of Systems   Unable to perform ROS: Dementia   Constitutional:  Negative for fever.   Respiratory:  Negative for shortness of breath.    Musculoskeletal:  Positive for falls and myalgias.   Psychiatric/Behavioral:  Positive for memory loss.         Vital Signs  Temp:  [36.5 °C (97.7 °F)-37 °C (98.6 °F)] 36.9 °C (98.4 °F)  Pulse:  [] 71  Resp:  [14-49] 14  BP: (118-149)/() 128/81  SpO2:  [91 %-100 %] 98 %    Physical Exam  Physical Exam  Vitals and nursing note reviewed.   Constitutional:       General: She is sleeping. She is not in acute distress.  HENT:      Head: Normocephalic.      Mouth/Throat:      Mouth: Mucous membranes are dry.      Comments: Superficial lip laceration, dried blood surrounding   Cardiovascular:      Rate and Rhythm: Normal rate. Rhythm irregularly irregular.   Pulmonary:      Effort: Pulmonary effort is normal. No respiratory distress.      Breath sounds: Normal breath sounds.   Abdominal:      General: There is no distension.      Palpations: Abdomen is soft.      Tenderness: There is no abdominal tenderness.   Musculoskeletal:         General: No deformity or signs of injury.      Cervical back: Normal range of motion and neck supple.      Comments: Moves all extremities   Left elbow contusion, no bony deformity    Skin:     General: Skin is warm and dry.   Neurological:      Mental Status: Mental status is at baseline.          Laboratory  Recent Results (from the past 24 hour(s))   URINALYSIS    Collection Time: 24 11:30 AM    Specimen: Urine, Clean Catch   Result Value Ref Range    Color Yellow     Character Turbid (A)     Specific Gravity 1.013 <1.035    Ph 6.0 5.0 - 8.0    Glucose Negative Negative mg/dL    Ketones Negative Negative mg/dL    Protein 300 (A) Negative mg/dL    Bilirubin Negative Negative    Urobilinogen, Urine 0.2 Negative    Nitrite Negative Negative    Leukocyte Esterase Moderate (A) Negative    Occult Blood Small (A) Negative    Micro Urine Req Microscopic    URINE MICROSCOPIC (W/UA)    Collection Time: 24 11:30 AM   Result Value Ref Range    -150 (A) /hpf    RBC 0-2 /hpf    Bacteria Many (A) None /hpf    Epithelial Cells Negative /hpf    Hyaline Cast 0-2 /lpf   EKG    Collection Time: 24 10:34 PM   Result Value Ref Range    Report       [facility]    Test Date:  2024  Pt Name:    NALINI SORIANO               Department: [facility]  MRN:        [id number]                      Room:       [facility]  Gender:     Female                       Technician: FLORENTINO  :        [id number]                   Requested By:SIXTO ISABEL  Order #:    [id number]                    Reading MD: Jethro Bell MD    Measurements  Intervals                                Axis  Rate:       93                           P:          0  WV:         0                            QRS:        -59  QRSD:       89                           T:          67  QT:         346  QTc:        431    Interpretive Statements  Atrial fibrillation  Left anterior fascicular block  Probable LVH with secondary repol abnrm  Compared to ECG 2024 02:50:20  No significant changes  Electronically Signed On 2024 07:59:58 PST by Jethro Bell MD     Magnesium: Every Monday and Thursday AM    Collection Time: 24  2:56 AM   Result Value Ref Range    Magnesium 2.1 1.5 - 2.5 mg/dL   Phosphorus: Every Monday and Thursday AM     Collection Time: 01/22/24  2:56 AM   Result Value Ref Range    Phosphorus 2.8 2.5 - 4.5 mg/dL   CBC with Differential: Tomorrow AM    Collection Time: 01/22/24  2:56 AM   Result Value Ref Range    WBC 9.8 4.8 - 10.8 K/uL    RBC 3.94 (L) 4.20 - 5.40 M/uL    Hemoglobin 11.9 (L) 12.0 - 16.0 g/dL    Hematocrit 36.8 (L) 37.0 - 47.0 %    MCV 93.4 81.4 - 97.8 fL    MCH 30.2 27.0 - 33.0 pg    MCHC 32.3 32.2 - 35.5 g/dL    RDW 54.1 (H) 35.9 - 50.0 fL    Platelet Count 226 164 - 446 K/uL    MPV 11.3 9.0 - 12.9 fL    Neutrophils-Polys 79.80 (H) 44.00 - 72.00 %    Lymphocytes 12.10 (L) 22.00 - 41.00 %    Monocytes 7.20 0.00 - 13.40 %    Eosinophils 0.20 0.00 - 6.90 %    Basophils 0.30 0.00 - 1.80 %    Immature Granulocytes 0.40 0.00 - 0.90 %    Nucleated RBC 0.00 0.00 - 0.20 /100 WBC    Neutrophils (Absolute) 7.84 (H) 1.82 - 7.42 K/uL    Lymphs (Absolute) 1.19 1.00 - 4.80 K/uL    Monos (Absolute) 0.71 0.00 - 0.85 K/uL    Eos (Absolute) 0.02 0.00 - 0.51 K/uL    Baso (Absolute) 0.03 0.00 - 0.12 K/uL    Immature Granulocytes (abs) 0.04 0.00 - 0.11 K/uL    NRBC (Absolute) 0.00 K/uL   Comp Metabolic Panel (CMP): Tomorrow AM    Collection Time: 01/22/24  2:56 AM   Result Value Ref Range    Sodium 143 135 - 145 mmol/L    Potassium 3.5 (L) 3.6 - 5.5 mmol/L    Chloride 113 (H) 96 - 112 mmol/L    Co2 15 (L) 20 - 33 mmol/L    Anion Gap 15.0 7.0 - 16.0    Glucose 94 65 - 99 mg/dL    Bun 32 (H) 8 - 22 mg/dL    Creatinine 2.00 (H) 0.50 - 1.40 mg/dL    Calcium 8.5 8.5 - 10.5 mg/dL    Correct Calcium 9.5 8.5 - 10.5 mg/dL    AST(SGOT) 21 12 - 45 U/L    ALT(SGPT) 7 2 - 50 U/L    Alkaline Phosphatase 92 30 - 99 U/L    Total Bilirubin 0.9 0.1 - 1.5 mg/dL    Albumin 2.7 (L) 3.2 - 4.9 g/dL    Total Protein 6.5 6.0 - 8.2 g/dL    Globulin 3.8 (H) 1.9 - 3.5 g/dL    A-G Ratio 0.7 g/dL   ESTIMATED GFR    Collection Time: 01/22/24  2:56 AM   Result Value Ref Range    GFR (CKD-EPI) 24 (A) >60 mL/min/1.73 m 2       Fluids    Intake/Output Summary (Last  24 hours) at 1/22/2024 0930  Last data filed at 1/22/2024 0300  Gross per 24 hour   Intake 961.21 ml   Output 750 ml   Net 211.21 ml         Core Measures & Quality Metrics  Labs reviewed, Radiology images reviewed and Medications reviewed  Lilly catheter: No Lilly      DVT Prophylaxis: Contraindicated - High bleeding risk  DVT prophylaxis - mechanical: SCDs  Ulcer prophylaxis: Yes        RAP Score Total: 7    CAGE Results: not completed Blood Alcohol>0.08: no       Assessment/Plan  * Trauma- (present on admission)  Assessment & Plan  GLF on Eliquis.  Trauma Yellow Transfer Activation from [facility] in [place], [place].  Rodrigo Garcia MD. Trauma Surgery.    Traumatic subdural hematoma (HCC)- (present on admission)  Assessment & Plan  Left subdural hematoma, 5mm along the tentorium   Interval head CT stable.  Isolated closed head injury.  Trauma Brain Injury Guidelines implemented.   BIG 3.  Non-operative management.  Post traumatic pharmacologic seizure prophylaxis not indicated.   Speech Language Pathology cognitive evaluation.  1/20 Neurosurgery signed off.   1/21 Okay for DVT prophylaxis.   1/27 Okay to restart Eliquis per neurosurgery.   Sergio Terry MD. Neurosurgeon. [facility].     Essential hypertension- (present on admission)  Assessment & Plan  Chronic condition treated with amlodipine, losartan and torsemide per chart review. Not listed in medication reconciliation.   Labetalol drip initiated at referring facility.  Nicardipine drip initiated on arrival.   1/20 Drip discontinued. Transitioned to Coreg 3.125mg BID.   1/21 No vasoactive drips. NG tube and oral meds, PRN IV labetalol/hydralazine.    UTI (urinary tract infection)- (present on admission)  Assessment & Plan  1/21 Ceftriaxone day 1 of 3.     Dementia (HCC)- (present on admission)  Assessment & Plan  History of per family. Baseline A&O x1.   Arrived in restraints, given Haldol, versed and Benadryl prior to arrival.  Takes  Ativan and ZZZquil as needed for sleep.     No contraindication to deep vein thrombosis (DVT) prophylaxis- (present on admission)  Assessment & Plan  VTE prophylaxis initially contraindicated secondary to elevated bleeding risk.  1/20 Trauma surveillance venous duplex ultrasonography ordered.  1/21 Prophylactic dose heparin 5000 u q 8 hr initiated.     Chronic anticoagulation- (present on admission)  Assessment & Plan  On Eliquis for atrial fibrillation   Per report has not taken for 1 week    Chronic renal impairment, stage 4 (severe) (HCC)- (present on admission)  Assessment & Plan  Admission serum creatinine 1.96.   Trend renal induces and avoid nephrotoxins.     Paroxysmal A-fib (HCC)- (present on admission)  Assessment & Plan  Chronic condition treated with Eliquis.  1/27 Neurosurgery recommends restarting Eliquis.     Left elbow contusion- (present on admission)  Assessment & Plan  Referring facility DX of left elbow negative for acute fracture.       Discussed patient condition with Family, RN, Pharmacy, trauma surgery, and ICU rounds . Luciano Valdez MD

## 2024-01-23 ENCOUNTER — APPOINTMENT (OUTPATIENT)
Dept: RADIOLOGY | Facility: MEDICAL CENTER | Age: 84
DRG: 083 | End: 2024-01-23
Attending: PHYSICIAN ASSISTANT
Payer: MEDICARE

## 2024-01-23 LAB
ALBUMIN SERPL BCP-MCNC: 2.5 G/DL (ref 3.2–4.9)
ALBUMIN/GLOB SERPL: 0.8 G/DL
ALP SERPL-CCNC: 87 U/L (ref 30–99)
ALT SERPL-CCNC: 14 U/L (ref 2–50)
ANION GAP SERPL CALC-SCNC: 8 MMOL/L (ref 7–16)
AST SERPL-CCNC: 14 U/L (ref 12–45)
BASOPHILS # BLD AUTO: 0.6 % (ref 0–1.8)
BASOPHILS # BLD: 0.04 K/UL (ref 0–0.12)
BILIRUB SERPL-MCNC: 0.3 MG/DL (ref 0.1–1.5)
BUN SERPL-MCNC: 43 MG/DL (ref 8–22)
CALCIUM ALBUM COR SERPL-MCNC: 9.7 MG/DL (ref 8.5–10.5)
CALCIUM SERPL-MCNC: 8.5 MG/DL (ref 8.5–10.5)
CHLORIDE SERPL-SCNC: 114 MMOL/L (ref 96–112)
CO2 SERPL-SCNC: 19 MMOL/L (ref 20–33)
CREAT SERPL-MCNC: 2.22 MG/DL (ref 0.5–1.4)
EOSINOPHIL # BLD AUTO: 0.17 K/UL (ref 0–0.51)
EOSINOPHIL NFR BLD: 2.4 % (ref 0–6.9)
ERYTHROCYTE [DISTWIDTH] IN BLOOD BY AUTOMATED COUNT: 55.8 FL (ref 35.9–50)
GFR SERPLBLD CREATININE-BSD FMLA CKD-EPI: 21 ML/MIN/1.73 M 2
GLOBULIN SER CALC-MCNC: 3.3 G/DL (ref 1.9–3.5)
GLUCOSE SERPL-MCNC: 91 MG/DL (ref 65–99)
HCT VFR BLD AUTO: 36 % (ref 37–47)
HGB BLD-MCNC: 11.3 G/DL (ref 12–16)
IMM GRANULOCYTES # BLD AUTO: 0.03 K/UL (ref 0–0.11)
IMM GRANULOCYTES NFR BLD AUTO: 0.4 % (ref 0–0.9)
LYMPHOCYTES # BLD AUTO: 1.1 K/UL (ref 1–4.8)
LYMPHOCYTES NFR BLD: 15.9 % (ref 22–41)
MAGNESIUM SERPL-MCNC: 2.3 MG/DL (ref 1.5–2.5)
MCH RBC QN AUTO: 30 PG (ref 27–33)
MCHC RBC AUTO-ENTMCNC: 31.4 G/DL (ref 32.2–35.5)
MCV RBC AUTO: 95.5 FL (ref 81.4–97.8)
MONOCYTES # BLD AUTO: 0.51 K/UL (ref 0–0.85)
MONOCYTES NFR BLD AUTO: 7.3 % (ref 0–13.4)
NEUTROPHILS # BLD AUTO: 5.09 K/UL (ref 1.82–7.42)
NEUTROPHILS NFR BLD: 73.4 % (ref 44–72)
NRBC # BLD AUTO: 0 K/UL
NRBC BLD-RTO: 0 /100 WBC (ref 0–0.2)
PHOSPHATE SERPL-MCNC: 2.8 MG/DL (ref 2.5–4.5)
PLATELET # BLD AUTO: 203 K/UL (ref 164–446)
PMV BLD AUTO: 11.3 FL (ref 9–12.9)
POTASSIUM SERPL-SCNC: 4 MMOL/L (ref 3.6–5.5)
PROT SERPL-MCNC: 5.8 G/DL (ref 6–8.2)
RBC # BLD AUTO: 3.77 M/UL (ref 4.2–5.4)
SODIUM SERPL-SCNC: 141 MMOL/L (ref 135–145)
WBC # BLD AUTO: 6.9 K/UL (ref 4.8–10.8)

## 2024-01-23 PROCEDURE — 83735 ASSAY OF MAGNESIUM: CPT

## 2024-01-23 PROCEDURE — 71045 X-RAY EXAM CHEST 1 VIEW: CPT

## 2024-01-23 PROCEDURE — 85025 COMPLETE CBC W/AUTO DIFF WBC: CPT

## 2024-01-23 PROCEDURE — 700102 HCHG RX REV CODE 250 W/ 637 OVERRIDE(OP): Performed by: SURGERY

## 2024-01-23 PROCEDURE — 99232 SBSQ HOSP IP/OBS MODERATE 35: CPT | Mod: GC | Performed by: HOSPITALIST

## 2024-01-23 PROCEDURE — A9270 NON-COVERED ITEM OR SERVICE: HCPCS | Performed by: STUDENT IN AN ORGANIZED HEALTH CARE EDUCATION/TRAINING PROGRAM

## 2024-01-23 PROCEDURE — A9270 NON-COVERED ITEM OR SERVICE: HCPCS | Performed by: PHYSICIAN ASSISTANT

## 2024-01-23 PROCEDURE — 92526 ORAL FUNCTION THERAPY: CPT

## 2024-01-23 PROCEDURE — 84100 ASSAY OF PHOSPHORUS: CPT

## 2024-01-23 PROCEDURE — 80053 COMPREHEN METABOLIC PANEL: CPT

## 2024-01-23 PROCEDURE — 700102 HCHG RX REV CODE 250 W/ 637 OVERRIDE(OP): Performed by: PHYSICIAN ASSISTANT

## 2024-01-23 PROCEDURE — 700111 HCHG RX REV CODE 636 W/ 250 OVERRIDE (IP): Mod: JZ | Performed by: PHYSICIAN ASSISTANT

## 2024-01-23 PROCEDURE — 700102 HCHG RX REV CODE 250 W/ 637 OVERRIDE(OP): Performed by: STUDENT IN AN ORGANIZED HEALTH CARE EDUCATION/TRAINING PROGRAM

## 2024-01-23 PROCEDURE — A9270 NON-COVERED ITEM OR SERVICE: HCPCS | Performed by: SURGERY

## 2024-01-23 PROCEDURE — 770020 HCHG ROOM/CARE - TELE (206)

## 2024-01-23 PROCEDURE — 36415 COLL VENOUS BLD VENIPUNCTURE: CPT

## 2024-01-23 RX ORDER — QUETIAPINE FUMARATE 25 MG/1
25 TABLET, FILM COATED ORAL ONCE
Status: COMPLETED | OUTPATIENT
Start: 2024-01-23 | End: 2024-01-23

## 2024-01-23 RX ADMIN — ACETAMINOPHEN 1000 MG: 500 TABLET ORAL at 23:30

## 2024-01-23 RX ADMIN — ACETAMINOPHEN 1000 MG: 500 TABLET ORAL at 05:19

## 2024-01-23 RX ADMIN — FAMOTIDINE 20 MG: 20 TABLET, FILM COATED ORAL at 05:19

## 2024-01-23 RX ADMIN — HEPARIN SODIUM 5000 UNITS: 5000 INJECTION, SOLUTION INTRAVENOUS; SUBCUTANEOUS at 23:30

## 2024-01-23 RX ADMIN — HEPARIN SODIUM 5000 UNITS: 5000 INJECTION, SOLUTION INTRAVENOUS; SUBCUTANEOUS at 05:27

## 2024-01-23 RX ADMIN — DOCUSATE SODIUM 100 MG: 100 CAPSULE, LIQUID FILLED ORAL at 05:19

## 2024-01-23 RX ADMIN — ACETAMINOPHEN 1000 MG: 500 TABLET ORAL at 12:36

## 2024-01-23 RX ADMIN — POLYETHYLENE GLYCOL 3350 1 PACKET: 17 POWDER, FOR SOLUTION ORAL at 16:38

## 2024-01-23 RX ADMIN — QUETIAPINE FUMARATE 25 MG: 25 TABLET ORAL at 21:55

## 2024-01-23 RX ADMIN — DOCUSATE SODIUM 100 MG: 100 CAPSULE, LIQUID FILLED ORAL at 16:38

## 2024-01-23 RX ADMIN — LABETALOL HYDROCHLORIDE 10 MG: 5 INJECTION, SOLUTION INTRAVENOUS at 23:39

## 2024-01-23 RX ADMIN — CEFTRIAXONE SODIUM 1000 MG: 10 INJECTION, POWDER, FOR SOLUTION INTRAVENOUS at 05:19

## 2024-01-23 RX ADMIN — ACETAMINOPHEN 1000 MG: 500 TABLET ORAL at 16:37

## 2024-01-23 RX ADMIN — DOCUSATE SODIUM 50 MG AND SENNOSIDES 8.6 MG 1 TABLET: 8.6; 5 TABLET, FILM COATED ORAL at 21:55

## 2024-01-23 RX ADMIN — CARVEDILOL 3.12 MG: 6.25 TABLET, FILM COATED ORAL at 16:38

## 2024-01-23 RX ADMIN — CARVEDILOL 3.12 MG: 6.25 TABLET, FILM COATED ORAL at 05:19

## 2024-01-23 RX ADMIN — POLYETHYLENE GLYCOL 3350 1 PACKET: 17 POWDER, FOR SOLUTION ORAL at 05:19

## 2024-01-23 RX ADMIN — FAMOTIDINE 20 MG: 10 INJECTION, SOLUTION INTRAVENOUS at 16:37

## 2024-01-23 RX ADMIN — HEPARIN SODIUM 5000 UNITS: 5000 INJECTION, SOLUTION INTRAVENOUS; SUBCUTANEOUS at 13:18

## 2024-01-23 ASSESSMENT — PAIN DESCRIPTION - PAIN TYPE
TYPE: ACUTE PAIN
TYPE: ACUTE PAIN

## 2024-01-23 ASSESSMENT — PATIENT HEALTH QUESTIONNAIRE - PHQ9
2. FEELING DOWN, DEPRESSED, IRRITABLE, OR HOPELESS: NOT AT ALL
1. LITTLE INTEREST OR PLEASURE IN DOING THINGS: NOT AT ALL
SUM OF ALL RESPONSES TO PHQ9 QUESTIONS 1 AND 2: 0

## 2024-01-23 ASSESSMENT — FIBROSIS 4 INDEX: FIB4 SCORE: 2.99

## 2024-01-23 NOTE — ASSESSMENT & PLAN NOTE
Left subdural hematoma, 5mm along the tentorium   Interval head CT stable.  Isolated closed head injury.  Patient seen by neurosurgery who suggested to resume DVT prophylaxis on 1/21, resume Eliquis on 1/27   However medical team decided to discontinue Eliquis as the patient in transition to hospice care and patient is high risk of falls

## 2024-01-23 NOTE — CARE PLAN
The patient is Watcher - Medium risk of patient condition declining or worsening    Shift Goals  Clinical Goals: montor for neuro changes, safety, telemetry, pain mgmt  Patient Goals: pain control, comfort  Family Goals: keep safe    Progress made toward(s) clinical / shift goals:      Problem: Skin Integrity  Goal: Skin integrity is maintained or improved  Outcome: Progressing       Patient is not progressing towards the following goals:      Problem: Knowledge Deficit - Standard  Goal: Patient and family/care givers will demonstrate understanding of plan of care, disease process/condition, diagnostic tests and medications  Outcome: Not Met     Continue patient education. Allow for tech back    Problem: Pain - Standard  Goal: Alleviation of pain or a reduction in pain to the patient’s comfort goal  Outcome: Not Met     Scheduled Tylenol given and PRN Oxy for breakthrough pain

## 2024-01-23 NOTE — ASSESSMENT & PLAN NOTE
Patient's son, POA agreeable for hospice  memory care placement  The patient will transition to a memory care unit  -Seen by palliative  -Hospice consult placed

## 2024-01-23 NOTE — PROGRESS NOTES
4 Eyes Skin Assessment Completed by RACHEAL Alvarado and RACHEAL Leigh.    Head Bruising  Ears WDL  Nose WDL  Mouth Redness  Neck WDL  Breast/Chest WDL  Shoulder Blades WDL  Spine WDL  (R) Arm/Elbow/Hand WDL  (L) Arm/Elbow/Hand WDL  Abdomen WDL  Groin WDL  Scrotum/Coccyx/Buttocks WDL  (R) Leg WDL  (L) Leg WDL  (R) Heel/Foot/Toe WDL  (L) Heel/Foot/Toe WDL          Devices In Places Tele Box, Blood Pressure Cuff, and Pulse Ox      Interventions In Place Gray Ear Foams and Pillows    Possible Skin Injury Yes    Pictures Uploaded Into Epic Yes  Wound Consult Placed NA  RN Wound Prevention Protocol Ordered Yes

## 2024-01-23 NOTE — DISCHARGE SUMMARY
[facility] Discharge Summary    Attending: VERA Parkinson M.D.  Senior Resident: Dr. Vega  Intern:  Dr. Ayers  Contact Number: [phone]    CHIEF COMPLAINT ON ADMISSION  No chief complaint on file.      Reason for Admission  TRAUMA YELLOW TX, 83F, FALL SDH ON*     Admission Date  1/19/2024    CODE STATUS  DNR    HPI & HOSPITAL COURSE    Geno Gallegos is a 83-year-old female who was admitted on 1/19/2024 with a subdural hematoma following a fall.  Patient has a past medical history significant for atrial fibrillation (previously on anticoagulation), dementia.  Patient initially was brought to outside hospital but was later transferred to [facility] for management of acute subdural hematoma.    Neurosurgery evaluated the patient and did not require operative interventions.  In setting of longstanding and progressive dementia, patient's family reported concerns about continued ability to take care of the patient and subsequently plan was created with patient's family to transfer to a memory care unit and initiate hospice care.  Patient's family eventually chose [facility]. In setting of recent subdural hematoma and the patient going towards hospice, plan was created to hold off on further anticoagulation at this time.    Therefore, she is discharged in fair and stable condition to hospice.    The patient met 2-midnight criteria for an inpatient stay at the time of discharge.    Discharge Date  1/24/24    Physical Exam on Day of Discharge  Physical Exam  Vitals and nursing note reviewed.   Constitutional:       Appearance: Normal appearance. She is not toxic-appearing.   HENT:      Head: Normocephalic and atraumatic.      Right Ear: External ear normal.      Left Ear: External ear normal.      Nose: No rhinorrhea.      Mouth/Throat:      Mouth: Mucous membranes are moist.   Eyes:      Extraocular Movements: Extraocular movements intact.      Conjunctiva/sclera: Conjunctivae normal.    Cardiovascular:      Rate and Rhythm: Normal rate and regular rhythm.      Heart sounds: No murmur heard.     No gallop.   Pulmonary:      Breath sounds: Normal breath sounds. No wheezing or rales.   Abdominal:      General: Bowel sounds are normal. There is no distension.      Tenderness: There is no abdominal tenderness.   Musculoskeletal:      Cervical back: Neck supple.      Right lower leg: No edema.      Left lower leg: No edema.   Skin:     General: Skin is warm.      Capillary Refill: Capillary refill takes 2 to 3 seconds.      Coloration: Skin is not pale.      Findings: No rash.   Neurological:      Mental Status: Mental status is at baseline. She is disoriented.      Cranial Nerves: No cranial nerve deficit.   Psychiatric:         Mood and Affect: Mood normal.         Thought Content: Thought content normal.         FOLLOW UP ITEMS POST DISCHARGE  Hospice team recommedations    DISCHARGE DIAGNOSES  Principal Problem:    Traumatic subdural hematoma (HCC) (POA: Yes)  Active Problems:    Paroxysmal A-fib (HCC) (POA: Yes)    Essential hypertension (POA: Yes)    Chronic renal impairment, stage 4 (severe) (HCC) (POA: Yes)    Chronic anticoagulation (POA: Yes)    Dementia (HCC) (POA: Yes)    CKD (chronic kidney disease) (POA: Yes)  Resolved Problems:    Trauma (POA: Yes)    No contraindication to deep vein thrombosis (DVT) prophylaxis (POA: Yes)    Left elbow contusion (POA: Yes)    Traumatic subdural hematoma, initial encounter (HCC) (POA: Yes)    UTI (urinary tract infection) (POA: Yes)    Oropharyngeal dysphagia (POA: Yes)    Advance care planning (POA: Unknown)      FOLLOW UP  No future appointments.  No follow-up provider specified.    MEDICATIONS ON DISCHARGE     Medication List        START taking these medications        Instructions   carvedilol 3.125 MG Tabs  Commonly known as: Coreg   Take 1 Tablet by mouth 2 times a day with meals.  Dose: 3.125 mg     docusate sodium 100 MG Caps   Take 100 mg by  mouth 2 times a day.  Dose: 100 mg     famotidine 20 MG Tabs  Commonly known as: Pepcid   Take 1 Tablet by mouth 2 times a day.  Dose: 20 mg     magnesium hydroxide 400 MG/5ML Susp  Start taking on: January 25, 2024  Commonly known as: Milk Of Magnesia   Take 30 mL by mouth every day.  Dose: 30 mL     polyethylene glycol/lytes Pack  Commonly known as: Miralax   Take 1 Packet by mouth 2 times a day as needed (constipation).  Dose: 17 g     senna-docusate 8.6-50 MG Tabs  Commonly known as: Pericolace Or Senokot S   Take 1 Tablet by mouth every 24 hours as needed for Constipation.  Dose: 1 Tablet            STOP taking these medications      Eliquis 2.5mg Tabs  Generic drug: apixaban     lansoprazole 30 MG Cpdr  Commonly known as: Prevacid     LORazepam 0.5 MG Tabs  Commonly known as: Ativan     ZzzQuil 50 MG/30ML Liqd  Generic drug: diphenhydrAMINE HCl (Sleep)              Allergies  No Known Allergies    DIET  Orders Placed This Encounter   Procedures    Diet Order Diet: Level 5 - Minced and Moist; Liquid level: Level 0 - Thin; Tray Modifications (optional): SLP - 1:1 Supervision by Nursing, SLP - Deliver to Nursing Station     Standing Status:   Standing     Number of Occurrences:   1     Order Specific Question:   Diet:     Answer:   Level 5 - Minced and Moist [24]     Order Specific Question:   Liquid level     Answer:   Level 0 - Thin     Order Specific Question:   Tray Modifications (optional)     Answer:   SLP - 1:1 Supervision by Nursing     Order Specific Question:   Tray Modifications (optional)     Answer:   SLP - Deliver to Nursing Station       ACTIVITY  As tolerated.  Weight bearing as tolerated    LINES, DRAINS, AND WOUNDS  This is an automated list. Peripheral IVs will be removed prior to discharge.  Peripheral IV 01/19/24 20 G Anterior;Right Forearm (Active)   Site Assessment Clean;Dry;Intact 01/23/24 0800   Dressing Type Transparent 01/23/24 0800   Line Status Flushed;Saline locked;Scrubbed the hub  prior to access 01/23/24 0800   Dressing Status Clean;Dry;Intact 01/23/24 0800   Dressing Intervention N/A 01/22/24 1630   Dressing Change Due 01/27/24 01/23/24 0400   Date Primary Tubing Changed 01/19/24 01/19/24 2000   NEXT Primary Tubing Change  01/20/24 01/19/24 2000   Infiltration Grading ([facility], [facility]) 0 01/23/24 0800   Phlebitis Scale ([facility] Only) 0 01/23/24 0800       Peripheral IV 01/21/24 18 G Anterior;Left Forearm (Active)   Site Assessment Clean;Dry;Intact 01/23/24 0800   Dressing Type Transparent 01/23/24 0800   Line Status Flushed;Scrubbed the hub prior to access;Saline locked 01/23/24 0800   Dressing Status Clean;Dry;Intact 01/23/24 0800   Dressing Intervention N/A 01/22/24 1630   Infiltration Grading ([facility]) 0 01/23/24 0800   Phlebitis Scale ([facility] Only) 0 01/23/24 0800       Wound 01/19/24 Traumatic Chin Lower Left abrasion and bruiding lip and chin (Active)   Wound Image    01/19/24 1940   Site Assessment Dry;Clean;Intact;Black 01/23/24 0800   Periwound Assessment Dry;Intact 01/23/24 0800   Margins Defined edges 01/23/24 0800   Closure None 01/22/24 1630   Drainage Amount None 01/23/24 0800   Treatments Site care 01/22/24 2000   Dressing Status Open to Air 01/23/24 0800       Peripheral IV 01/19/24 20 G Anterior;Right Forearm (Active)   Site Assessment Clean;Dry;Intact 01/23/24 0800   Dressing Type Transparent 01/23/24 0800   Line Status Flushed;Saline locked;Scrubbed the hub prior to access 01/23/24 0800   Dressing Status Clean;Dry;Intact 01/23/24 0800   Dressing Intervention N/A 01/22/24 1630   Dressing Change Due 01/27/24 01/23/24 0400   Date Primary Tubing Changed 01/19/24 01/19/24 2000   NEXT Primary Tubing Change  01/20/24 01/19/24 2000   Infiltration Grading ([facility]) 0 01/23/24 0800   Phlebitis Scale ([facility] Only) 0 01/23/24 0800       Peripheral IV 01/21/24 18 G Anterior;Left Forearm (Active)   Site Assessment Clean;Dry;Intact 01/23/24 0800   Dressing Type Transparent  01/23/24 0800   Line Status Flushed;Scrubbed the hub prior to access;Saline locked 01/23/24 0800   Dressing Status Clean;Dry;Intact 01/23/24 0800   Dressing Intervention N/A 01/22/24 1630   Infiltration Grading ([facility]) 0 01/23/24 0800   Phlebitis Scale ([facility] Only) 0 01/23/24 0800               MENTAL STATUS ON TRANSFER     At baseline       CONSULTATIONS  Palliative  Neurosurgery  Surgery    PROCEDURES  N/a    LABORATORY  Lab Results   Component Value Date    SODIUM 141 01/23/2024    POTASSIUM 4.0 01/23/2024    CHLORIDE 114 (H) 01/23/2024    CO2 19 (L) 01/23/2024    GLUCOSE 91 01/23/2024    BUN 43 (H) 01/23/2024    CREATININE 2.22 (H) 01/23/2024    CREATININE 0.8 09/20/2005        Lab Results   Component Value Date    WBC 6.9 01/23/2024    HEMOGLOBIN 11.3 (L) 01/23/2024    HEMATOCRIT 36.0 (L) 01/23/2024    PLATELETCT 203 01/23/2024

## 2024-01-23 NOTE — ASSESSMENT & PLAN NOTE
For chronic atrial fibrillation,  Given the patient's advancement and dementia and increased risk for bleeding, increased risk for falls  Additional adverse effects, would recommend to discontinue Eliquis.  -Seen by palliative  -Hospice consult placed

## 2024-01-23 NOTE — DISCHARGE PLANNING
Received choice form @: [phone]  Agency/Facility name: [facility]  Sent referral per choice form @: [phone]

## 2024-01-23 NOTE — PROGRESS NOTES
Patient transferred to [facility] bed 2 on telebox and 2L oxygen. Patient transferred via wheelchair and escorted by ACLS RN and care aide. Assisted patient into bed upon arrival to [facility]. All personal belongings transferred with patient.

## 2024-01-23 NOTE — PROGRESS NOTES
[facility] Daily Progress Note    Date of Service  1/23/2024    [facility] Team: [facility] Team   Attending: VERA Parkinson M.d.  Senior Resident: Dr. Vega  Intern:  Dr. Ayers  Contact Number: [phone]    Chief Complaint  Geno Gallegos is a 83 y.o. female admitted 1/19/2024 with subdural hematoma and UTI.  Transferred to medical floor from trauma ICU on 1/22/2024    Hospital Course  Geno Gallegos is a 83 y.o. female with medical history significant for dementia, atrial fibrillation, HTN, CKD brought in via EMS Care Flight from [facility] 1/19/2024 due to ground-level fall with head strike on blood thinners with resultant left temporal SDH.  Neurosurgery consulted day of admission and due to small tentorial SDH with significant brain mass atrophy to accommodate considered nonoperative.  Patient admitted to TICU for medical management of SDH and multiple chronic comorbidities.  Trauma service following and consulted hospital medicine for transfer of care 1/21.  She is being treated for urinary tract infection and hypertension.    Interval Problem Update  No acute events overnight.  Patient confused in the setting of dementia. Per nursing patient is impulsive and aggressive towards staff but able to calm without meds. Patient is transitioning to hospice care.  - Discontinued Eliquis  - Completed 3 doses of ceftriaxone for UTI  - Hospice consult placed     I have discussed this patient's plan of care and discharge plan at IDT rounds today with Case Management, Nursing, Nursing leadership, and other members of the IDT team.    Consultants/Specialty  palliative care    Code Status  DNR    Disposition  The patient is not medically cleared for discharge to home or a post-acute facility.      I have placed the appropriate orders for post-discharge needs.    Review of Systems  Review of Systems   Reason unable to perform ROS: Unable to complete due to confusion and dementia.         Physical Exam  Temp:  [36.4 °C (97.5 °F)-37.1 °C (98.8 °F)] 37.1 °C (98.8 °F)  Pulse:  [76-96] 96  Resp:  [17-21] 18  BP: (128-164)/() 144/100  SpO2:  [91 %-98 %] 91 %    Physical Exam  Constitutional:       Appearance: She is ill-appearing.   HENT:      Head: Normocephalic and atraumatic.      Nose: Nose normal.      Mouth/Throat:      Mouth: Mucous membranes are moist.   Eyes:      Conjunctiva/sclera: Conjunctivae normal.   Cardiovascular:      Rate and Rhythm: Normal rate and regular rhythm.      Pulses: Normal pulses.      Heart sounds: Normal heart sounds.   Pulmonary:      Effort: Pulmonary effort is normal.      Breath sounds: Normal breath sounds.   Abdominal:      General: Abdomen is flat. Bowel sounds are normal.      Palpations: Abdomen is soft.   Musculoskeletal:         General: Normal range of motion.      Cervical back: Normal range of motion and neck supple.   Skin:     General: Skin is warm and dry.      Capillary Refill: Capillary refill takes 2 to 3 seconds.      Findings: Bruising present.      Comments: Mild bruising on lower lip   Neurological:      General: No focal deficit present.      Mental Status: She is disoriented.         Fluids    Intake/Output Summary (Last 24 hours) at 1/23/2024 1822  Last data filed at 1/23/2024 1639  Gross per 24 hour   Intake 1400 ml   Output --   Net 1400 ml       Laboratory  Recent Labs     01/21/24  0356 01/22/24  0256 01/23/24  0659   WBC 10.4 9.8 6.9   RBC 4.28 3.94* 3.77*   HEMOGLOBIN 12.8 11.9* 11.3*   HEMATOCRIT 39.6 36.8* 36.0*   MCV 92.5 93.4 95.5   MCH 29.9 30.2 30.0   MCHC 32.3 32.3 31.4*   RDW 53.3* 54.1* 55.8*   PLATELETCT 238 226 203   MPV 11.0 11.3 11.3     Recent Labs     01/21/24  0356 01/22/24  0256 01/23/24  0659   SODIUM 142 143 141   POTASSIUM 3.5* 3.5* 4.0   CHLORIDE 110 113* 114*   CO2 19* 15* 19*   GLUCOSE 106* 94 91   BUN 27* 32* 43*   CREATININE 1.95* 2.00* 2.22*   CALCIUM 9.0 8.5 8.5                    Imaging  DX-CHEST-PORTABLE (1 VIEW)   Final Result      1. Chronic interstitial changes in the lungs, stable. Superimposed edema is not excluded.   2. Small bilateral pleural effusions.   3. Previously noted hazy left lower lobe infiltrate is not as well-defined on the current exam.      DX-CHEST-PORTABLE (1 VIEW)   Final Result         1.  Hazy left lower lobe infiltrates.   2.  Trace bilateral pleural effusions   3.  Cardiomegaly   4.  Atherosclerosis      US-TRAUMA VEIN SCREEN LOWER BILAT EXTREMITY   Final Result      DX-CHEST-PORTABLE (1 VIEW)   Final Result      Stable bilateral interstitial opacification. No new abnormality.      DX-CHEST-PORTABLE (1 VIEW)   Final Result      Lower lung zone and measures opacification. This could represent fibrosis, edema, or interstitial pneumonia.      CT-CSPINE WITHOUT PLUS RECONS   Final Result      1.  No cervical spine fracture detected.   2.  Partially visualized subdural hematoma at the left tentorium.      CT-HEAD W/O   Final Result         1. Redemonstration of bilateral subdural hemorrhages including right parietal hemorrhage measuring 3 mm in thickness; left parietal hemorrhage, measuring 3 mm and hemorrhage along the left tentorium measuring 5 mm.      No significant mass effect. No midline shift.      The overall appearance is grossly similar to prior. No definite new hemorrhage.                    Assessment/Plan  Problem Representation:    * Traumatic subdural hematoma (HCC)- (present on admission)  Assessment & Plan  Left subdural hematoma, 5mm along the tentorium   Interval head CT stable.  Isolated closed head injury.  Patient seen by neurosurgery who suggested to resume DVT prophylaxis on 1/21, resume Eliquis on 1/27   However medical team decided to discontinue Eliquis as the patient in transition to hospice care and patient is high risk of falls    Essential hypertension- (present on admission)  Assessment & Plan  On Coreg three 125 mg  -Monitor  vitals    Oropharyngeal dysphagia- (present on admission)  Assessment & Plan  Acute on chronic related to altered mental status.   -SLP following.   -Advance diet as tolerated    Dementia (HCC)- (present on admission)  Assessment & Plan  Longstanding, progressive,  Family is deciding to place the patient in memory care, hospice care  -Hospice consult placed    Advance care planning  Assessment & Plan  Patient's son, POA agreeable for hospice  memory care placement  The patient will transition to a memory care unit  -Seen by palliative  -Hospice consult placed      CKD (chronic kidney disease)- (present on admission)  Assessment & Plan  Chronic, avoid nephrotoxins    UTI (urinary tract infection)- (present on admission)  Assessment & Plan  Noted on UA 1/21  -Completed 3 doses of ceftriaxone  -Monitor urine culture      Traumatic subdural hematoma, initial encounter ([facility])- (present on admission)  Assessment & Plan  Neurosurgery states nonoperative and signed off  BP control  Per Dr Harrison mckeon to restart Eliquis on 1/27   Monitor neurochecks    Left elbow contusion- (present on admission)  Assessment & Plan  Referring facility DX of left elbow negative for acute fracture.     Trauma- (present on admission)  Assessment & Plan  GLF on Eliquis.      Chronic anticoagulation- (present on admission)  Assessment & Plan  For chronic atrial fibrillation,  Given the patient's advancement and dementia and increased risk for bleeding, increased risk for falls  Additional adverse effects, would recommend to discontinue Eliquis.  -Seen by palliative  -Hospice consult placed    Chronic renal impairment, stage 4 (severe) (HCC)- (present on admission)  Assessment & Plan    Monitor labs    Paroxysmal A-fib (HCC)- (present on admission)  Assessment & Plan  Reportedly was off Eliquis one week prior to admit.  - Coreg 3.125mg BID  -Discontinued Eliquis           VTE prophylaxis: enoxaparin ppx    I have performed a physical exam and  reviewed and updated ROS and Plan today (1/23/2024). In review of yesterday's note (1/22/2024), there are no changes except as documented above.

## 2024-01-23 NOTE — THERAPY
"Speech Language Pathology   Daily Treatment     Patient Name: Geno Gallegos  AGE:  83 y.o., SEX:  female  Medical Record #: [id number]  Date of Service: 1/23/2024      Precautions:  Precautions: Fall Risk, Swallow Precautions         Subjective  Patient cleared by RN for session. Pt received awake, confused, needing max encouragement to participate in session. Pt did not have recollection of FEES study completed 1/21/2024.      Assessment  Patient agreeable to PO of thin liquids (soda/water) and pudding. Adequate oral bolus acceptance/containment/transit. Swallow initiation appeared timely. Vocal quality remained clear. No cough/throat clearing appreciated. No overt s/sx of aspiration noted.       Clinical Impressions  Patient presents with a mild oropharyngeal dysphagia per FEES study completed 1/21/2024. Service will follow likely 1x to ensure diet tolerance and monitor for changes.       Recommendations  Treatment Completed: Dysphagia Treatment  Dysphagia Treatment  Diet Consistency: Minced/moist solids, Thin liquids  Instrumentation: None indicated at this time  Medication: Crush with applesauce, as appropriate, Crush with pudding/puree, as appropriate  Supervision: 1:1 feeding with constant supervision  Positioning: Fully upright and midline during oral intake  Risk Management : Small bites/sips, Slow rate of intake, Physical mobility, as tolerated  Oral Care: BID                     SLP Treatment Plan  Treatment Plan: Dysphagia Treatment, Patient/Family/Caregiver Training  SLP Frequency: 3x Per Week  Estimated Duration: Until Therapy Goals Met      Anticipated Discharge Needs  Discharge Recommendations: Recommend post-acute placement for additional speech therapy services prior to discharge home  Therapy Recommendations Upon DC: Dysphagia Training, Patient / Family / Caregiver Education      Patient / Family Goals  Patient / Family Goal #1: \"Thats much better.\" (after drinking water)  Goal #1 Outcome: " Progressing as expected  Short Term Goals  Short Term Goal # 1 B : Patient will consume meals of minced/moist solids and thin liquids with no respiratory distress given 1:1 feeding.  Goal Outcome  # 1 B: Progressing as expected      Chela Francisco MS,CCC-SLP

## 2024-01-23 NOTE — DISCHARGE PLANNING
@6651  Agency/Facility Name: [facility]  Outcome: GUILHERME received a voice message from Marianna wanting to talk to the pt's CM.  GUILHERME sent a teams message to RN DANILO Flores at [phone].  Marianna's phone number is [phone]

## 2024-01-24 ENCOUNTER — APPOINTMENT (OUTPATIENT)
Dept: RADIOLOGY | Facility: MEDICAL CENTER | Age: 84
DRG: 083 | End: 2024-01-24
Attending: PHYSICIAN ASSISTANT
Payer: MEDICARE

## 2024-01-24 VITALS
RESPIRATION RATE: 19 BRPM | WEIGHT: 128.75 LBS | OXYGEN SATURATION: 95 % | TEMPERATURE: 98.8 F | DIASTOLIC BLOOD PRESSURE: 91 MMHG | HEART RATE: 75 BPM | SYSTOLIC BLOOD PRESSURE: 150 MMHG | BODY MASS INDEX: 21.42 KG/M2

## 2024-01-24 PROBLEM — N39.0 UTI (URINARY TRACT INFECTION): Status: RESOLVED | Noted: 2024-01-21 | Resolved: 2024-01-24

## 2024-01-24 PROBLEM — Z78.9 NO CONTRAINDICATION TO DEEP VEIN THROMBOSIS (DVT) PROPHYLAXIS: Status: RESOLVED | Noted: 2024-01-19 | Resolved: 2024-01-24

## 2024-01-24 PROBLEM — S06.5XAA TRAUMATIC SUBDURAL HEMATOMA, INITIAL ENCOUNTER (HCC): Status: RESOLVED | Noted: 2024-01-20 | Resolved: 2024-01-24

## 2024-01-24 PROBLEM — Z71.89 ADVANCE CARE PLANNING: Status: RESOLVED | Noted: 2024-01-22 | Resolved: 2024-01-24

## 2024-01-24 PROBLEM — R13.12 OROPHARYNGEAL DYSPHAGIA: Status: RESOLVED | Noted: 2024-01-22 | Resolved: 2024-01-24

## 2024-01-24 PROBLEM — T14.90XA TRAUMA: Status: RESOLVED | Noted: 2024-01-19 | Resolved: 2024-01-24

## 2024-01-24 PROBLEM — S50.02XA LEFT ELBOW CONTUSION: Status: RESOLVED | Noted: 2024-01-20 | Resolved: 2024-01-24

## 2024-01-24 PROCEDURE — 700102 HCHG RX REV CODE 250 W/ 637 OVERRIDE(OP): Performed by: PHYSICIAN ASSISTANT

## 2024-01-24 PROCEDURE — 99239 HOSP IP/OBS DSCHRG MGMT >30: CPT | Mod: GC | Performed by: HOSPITALIST

## 2024-01-24 PROCEDURE — 700102 HCHG RX REV CODE 250 W/ 637 OVERRIDE(OP): Performed by: STUDENT IN AN ORGANIZED HEALTH CARE EDUCATION/TRAINING PROGRAM

## 2024-01-24 PROCEDURE — A9270 NON-COVERED ITEM OR SERVICE: HCPCS | Performed by: STUDENT IN AN ORGANIZED HEALTH CARE EDUCATION/TRAINING PROGRAM

## 2024-01-24 PROCEDURE — 700102 HCHG RX REV CODE 250 W/ 637 OVERRIDE(OP): Performed by: SURGERY

## 2024-01-24 PROCEDURE — A9270 NON-COVERED ITEM OR SERVICE: HCPCS | Performed by: PHYSICIAN ASSISTANT

## 2024-01-24 PROCEDURE — A9270 NON-COVERED ITEM OR SERVICE: HCPCS | Performed by: SURGERY

## 2024-01-24 PROCEDURE — 71045 X-RAY EXAM CHEST 1 VIEW: CPT

## 2024-01-24 PROCEDURE — 700111 HCHG RX REV CODE 636 W/ 250 OVERRIDE (IP): Performed by: PHYSICIAN ASSISTANT

## 2024-01-24 RX ORDER — LORAZEPAM 1 MG/1
0.5 TABLET ORAL ONCE
Status: COMPLETED | OUTPATIENT
Start: 2024-01-24 | End: 2024-01-24

## 2024-01-24 RX ORDER — POLYETHYLENE GLYCOL 3350 17 G/17G
17 POWDER, FOR SOLUTION ORAL 2 TIMES DAILY PRN
Qty: 60 PACKET | Refills: 2 | Status: SHIPPED | OUTPATIENT
Start: 2024-01-24

## 2024-01-24 RX ORDER — AMOXICILLIN 250 MG
1 CAPSULE ORAL
Qty: 30 TABLET | Refills: 0 | Status: SHIPPED | OUTPATIENT
Start: 2024-01-24

## 2024-01-24 RX ORDER — CARVEDILOL 3.12 MG/1
3.12 TABLET ORAL 2 TIMES DAILY WITH MEALS
Qty: 60 TABLET | Refills: 2 | Status: SHIPPED | OUTPATIENT
Start: 2024-01-24

## 2024-01-24 RX ORDER — FAMOTIDINE 20 MG/1
20 TABLET, FILM COATED ORAL 2 TIMES DAILY
Qty: 60 TABLET | Refills: 2 | Status: SHIPPED | OUTPATIENT
Start: 2024-01-24

## 2024-01-24 RX ORDER — PSEUDOEPHEDRINE HCL 30 MG
100 TABLET ORAL 2 TIMES DAILY
Qty: 60 CAPSULE | Refills: 2 | Status: SHIPPED | OUTPATIENT
Start: 2024-01-24

## 2024-01-24 RX ADMIN — DOCUSATE SODIUM 100 MG: 100 CAPSULE, LIQUID FILLED ORAL at 04:34

## 2024-01-24 RX ADMIN — HEPARIN SODIUM 5000 UNITS: 5000 INJECTION, SOLUTION INTRAVENOUS; SUBCUTANEOUS at 04:34

## 2024-01-24 RX ADMIN — LABETALOL HYDROCHLORIDE 10 MG: 5 INJECTION, SOLUTION INTRAVENOUS at 04:42

## 2024-01-24 RX ADMIN — ACETAMINOPHEN 1000 MG: 500 TABLET ORAL at 12:02

## 2024-01-24 RX ADMIN — POLYETHYLENE GLYCOL 3350 1 PACKET: 17 POWDER, FOR SOLUTION ORAL at 04:35

## 2024-01-24 RX ADMIN — LORAZEPAM 0.5 MG: 1 TABLET ORAL at 00:41

## 2024-01-24 RX ADMIN — MAGNESIUM HYDROXIDE 30 ML: 1200 LIQUID ORAL at 04:35

## 2024-01-24 RX ADMIN — FAMOTIDINE 20 MG: 20 TABLET, FILM COATED ORAL at 04:34

## 2024-01-24 RX ADMIN — ACETAMINOPHEN 1000 MG: 500 TABLET ORAL at 04:34

## 2024-01-24 RX ADMIN — CARVEDILOL 3.12 MG: 6.25 TABLET, FILM COATED ORAL at 07:26

## 2024-01-24 ASSESSMENT — FIBROSIS 4 INDEX: FIB4 SCORE: 1.57

## 2024-01-24 NOTE — CARE PLAN
The patient is Stable - Low risk of patient condition declining or worsening    Shift Goals  Clinical Goals: Safety  Patient Goals: Comfort  Family Goals: JUMANA    Pt A+Ox1, confused, irritable, and impulsive. Attempting to get out of bed frequently. MD aware and Seroquel and ativan given. Will continue to monitor closely      Problem: Pain - Standard  Goal: Alleviation of pain or a reduction in pain to the patient’s comfort goal  Outcome: Progressing       Patient is not progressing towards the following goals:

## 2024-01-24 NOTE — HOSPITAL COURSE
Geno Gallegos is a 83 y.o. female with medical history significant for dementia, atrial fibrillation, HTN, CKD brought in via EMS Care Flight from [facility] 1/19/2024 due to ground-level fall with head strike on blood thinners with resultant left temporal SDH.  Neurosurgery consulted day of admission and due to small tentorial SDH with significant brain mass atrophy to accommodate considered nonoperative.  Patient admitted to TICU for medical management of SDH and multiple chronic comorbidities.  Trauma service following and consulted hospital medicine for transfer of care 1/21.  She is being treated for urinary tract infection and hypertension.

## 2024-01-24 NOTE — DISCHARGE PLANNING
DC Transport Scheduled    Transport Company Scheduled:  [employer]  Spoke with Ej at [facility] to schedule transport.    Scheduled Date: 1/24/2024  Scheduled Time: 1300    Destination: [facility] at [address] [facility]     Notified care team of scheduled transport via Voalte.     If there are any changes needed to the DC transportation scheduled, please contact [facility] at ext. [phone] between the hours of 7683-3786 Mon-Fri. If outside those hours, contact the ED Case Manager at ext. [phone].

## 2024-01-24 NOTE — DISCHARGE PLANNING
Per case management, PT went home w[facility] family. [facility]   SLC

## 2024-01-24 NOTE — DISCHARGE PLANNING
Spoke with Gay at [facility] [phone] she needs a Quantiferon prior to admit, contacted medical team to order, canceled transport for today, Son Woody took today off for transfer, cannot take off tomorrow, he is off Friday and wants to pick his mom up at 10:00 on Friday, [facility] aware.

## 2024-01-25 NOTE — DISCHARGE PLANNING
Spoke with Gay at [facility], informed Quantiferon was not processed, Gay plans to call [facility] to place a PPD.